# Patient Record
Sex: MALE | Race: WHITE | Employment: OTHER | URBAN - METROPOLITAN AREA
[De-identification: names, ages, dates, MRNs, and addresses within clinical notes are randomized per-mention and may not be internally consistent; named-entity substitution may affect disease eponyms.]

---

## 2017-08-17 ENCOUNTER — HOSPITAL ENCOUNTER (EMERGENCY)
Age: 82
Discharge: HOME | End: 2017-08-17
Attending: EMERGENCY MEDICINE
Payer: MEDICARE

## 2017-08-17 ENCOUNTER — APPOINTMENT (OUTPATIENT)
Dept: GENERAL RADIOLOGY | Age: 82
End: 2017-08-17
Attending: EMERGENCY MEDICINE
Payer: MEDICARE

## 2017-08-17 VITALS
RESPIRATION RATE: 10 BRPM | SYSTOLIC BLOOD PRESSURE: 148 MMHG | WEIGHT: 197 LBS | TEMPERATURE: 97.8 F | DIASTOLIC BLOOD PRESSURE: 67 MMHG | BODY MASS INDEX: 27.58 KG/M2 | HEART RATE: 58 BPM | HEIGHT: 71 IN | OXYGEN SATURATION: 98 %

## 2017-08-17 DIAGNOSIS — R00.1 SINUS BRADYCARDIA: ICD-10-CM

## 2017-08-17 DIAGNOSIS — R42 DIZZINESS: Primary | ICD-10-CM

## 2017-08-17 DIAGNOSIS — I95.1 ORTHOSTASIS: ICD-10-CM

## 2017-08-17 LAB
ALBUMIN SERPL-MCNC: 3.2 G/DL (ref 3.2–4.6)
ALBUMIN/GLOB SERPL: 0.9 {RATIO} (ref 1.2–3.5)
ALP SERPL-CCNC: 65 U/L (ref 50–136)
ALT SERPL-CCNC: 25 U/L (ref 12–65)
ANION GAP SERPL CALC-SCNC: 11 MMOL/L (ref 7–16)
AST SERPL-CCNC: 28 U/L (ref 15–37)
ATRIAL RATE: 55 BPM
BASOPHILS # BLD: 0 K/UL (ref 0–0.2)
BASOPHILS NFR BLD: 0 % (ref 0–2)
BILIRUB SERPL-MCNC: 0.7 MG/DL (ref 0.2–1.1)
BUN SERPL-MCNC: 17 MG/DL (ref 8–23)
CALCIUM SERPL-MCNC: 9.1 MG/DL (ref 8.3–10.4)
CALCULATED P AXIS, ECG09: 49 DEGREES
CALCULATED R AXIS, ECG10: 10 DEGREES
CALCULATED T AXIS, ECG11: 69 DEGREES
CHLORIDE SERPL-SCNC: 107 MMOL/L (ref 98–107)
CO2 SERPL-SCNC: 24 MMOL/L (ref 21–32)
CREAT SERPL-MCNC: 2.1 MG/DL (ref 0.8–1.5)
DIAGNOSIS, 93000: NORMAL
DIFFERENTIAL METHOD BLD: ABNORMAL
EOSINOPHIL # BLD: 0.1 K/UL (ref 0–0.8)
EOSINOPHIL NFR BLD: 2 % (ref 0.5–7.8)
ERYTHROCYTE [DISTWIDTH] IN BLOOD BY AUTOMATED COUNT: 13.3 % (ref 11.9–14.6)
GLOBULIN SER CALC-MCNC: 3.5 G/DL (ref 2.3–3.5)
GLUCOSE SERPL-MCNC: 144 MG/DL (ref 65–100)
HCT VFR BLD AUTO: 43.9 % (ref 41.1–50.3)
HGB BLD-MCNC: 15.5 G/DL (ref 13.6–17.2)
IMM GRANULOCYTES # BLD: 0 K/UL (ref 0–0.5)
IMM GRANULOCYTES NFR BLD: 0.1 % (ref 0–5)
LACTATE BLD-SCNC: 1.8 MMOL/L (ref 0.5–1.9)
LYMPHOCYTES # BLD: 2.4 K/UL (ref 0.5–4.6)
LYMPHOCYTES NFR BLD: 32 % (ref 13–44)
MCH RBC QN AUTO: 29.8 PG (ref 26.1–32.9)
MCHC RBC AUTO-ENTMCNC: 35.3 G/DL (ref 31.4–35)
MCV RBC AUTO: 84.4 FL (ref 79.6–97.8)
MONOCYTES # BLD: 0.7 K/UL (ref 0.1–1.3)
MONOCYTES NFR BLD: 10 % (ref 4–12)
NEUTS SEG # BLD: 4.2 K/UL (ref 1.7–8.2)
NEUTS SEG NFR BLD: 56 % (ref 43–78)
P-R INTERVAL, ECG05: 178 MS
PLATELET # BLD AUTO: 188 K/UL (ref 150–450)
PMV BLD AUTO: 9.6 FL (ref 10.8–14.1)
POTASSIUM SERPL-SCNC: 3.7 MMOL/L (ref 3.5–5.1)
PROT SERPL-MCNC: 6.7 G/DL (ref 6.3–8.2)
Q-T INTERVAL, ECG07: 464 MS
QRS DURATION, ECG06: 96 MS
QTC CALCULATION (BEZET), ECG08: 443 MS
RBC # BLD AUTO: 5.2 M/UL (ref 4.23–5.67)
SODIUM SERPL-SCNC: 142 MMOL/L (ref 136–145)
TROPONIN I BLD-MCNC: 0.02 NG/ML (ref 0–0.08)
TROPONIN I BLD-MCNC: 0.03 NG/ML (ref 0–0.08)
VENTRICULAR RATE, ECG03: 55 BPM
WBC # BLD AUTO: 7.4 K/UL (ref 4.3–11.1)

## 2017-08-17 PROCEDURE — 71020 XR CHEST PA LAT: CPT

## 2017-08-17 PROCEDURE — 85025 COMPLETE CBC W/AUTO DIFF WBC: CPT | Performed by: EMERGENCY MEDICINE

## 2017-08-17 PROCEDURE — 84484 ASSAY OF TROPONIN QUANT: CPT

## 2017-08-17 PROCEDURE — 83605 ASSAY OF LACTIC ACID: CPT

## 2017-08-17 PROCEDURE — 74011250636 HC RX REV CODE- 250/636: Performed by: EMERGENCY MEDICINE

## 2017-08-17 PROCEDURE — 99285 EMERGENCY DEPT VISIT HI MDM: CPT | Performed by: EMERGENCY MEDICINE

## 2017-08-17 PROCEDURE — 81003 URINALYSIS AUTO W/O SCOPE: CPT | Performed by: EMERGENCY MEDICINE

## 2017-08-17 PROCEDURE — 96374 THER/PROPH/DIAG INJ IV PUSH: CPT | Performed by: EMERGENCY MEDICINE

## 2017-08-17 PROCEDURE — 80053 COMPREHEN METABOLIC PANEL: CPT | Performed by: EMERGENCY MEDICINE

## 2017-08-17 PROCEDURE — 93005 ELECTROCARDIOGRAM TRACING: CPT | Performed by: EMERGENCY MEDICINE

## 2017-08-17 RX ORDER — HYDROCORTISONE SODIUM SUCCINATE 100 MG/2ML
100 INJECTION, POWDER, FOR SOLUTION INTRAMUSCULAR; INTRAVENOUS
Status: COMPLETED | OUTPATIENT
Start: 2017-08-17 | End: 2017-08-17

## 2017-08-17 RX ADMIN — SODIUM CHLORIDE 1000 ML: 900 INJECTION, SOLUTION INTRAVENOUS at 10:49

## 2017-08-17 RX ADMIN — HYDROCORTISONE SODIUM SUCCINATE 100 MG: 100 INJECTION, POWDER, FOR SOLUTION INTRAMUSCULAR; INTRAVENOUS at 13:03

## 2017-08-17 NOTE — ED TRIAGE NOTES
Pt states he started feeling bad 2 weeks ago with some stomach and lower back pain. Pt states today he is so weak he cannot sit up. Pt denies any new pains.

## 2017-08-17 NOTE — DISCHARGE INSTRUCTIONS
Rest.  Temperature fluids. Call cardiologist if you do not hear from them by noon tomorrow to recheck. Also call your endocrinologist to recheck as well. Bradycardia: Care Instructions  Your Care Instructions    Bradycardia is a slow heart rate. If your heart beats too slowly, it can't supply your body with enough blood. This can make you weak or dizzy. Or it may make you pass out. Sometimes medicine can cause this problem. If this happens, your doctor may have you adjust one of your medicines. If a medicine is not the problem, your doctor may recommend a pacemaker. It is important to treat bradycardia so that you don't get more serious health problems. Your doctor will want to see you on a routine schedule to make sure that your heartbeat is normal.  Follow-up care is a key part of your treatment and safety. Be sure to make and go to all appointments, and call your doctor if you are having problems. It's also a good idea to know your test results and keep a list of the medicines you take. How can you care for yourself at home? · Take your medicines exactly as prescribed. Call your doctor if you think you are having a problem with your medicine. If your bradycardia is caused by another disease, your doctor will try to treat the disease. If it is caused by heart medicines, he or she will adjust your medicines. · Make lifestyle changes to improve your heart health. ¨ To control your cholesterol, avoid foods with a lot of fat, saturated fat, or sodium. Try to eat more fiber. And if your doctor says it's okay, get some exercise on most days. ¨ Do not smoke. Smoking can make your heart condition worse. If you need help quitting, talk to your doctor about stop-smoking programs and medicines. These can increase your chances of quitting for good. ¨ Limit alcohol to 2 drinks a day for men and 1 drink a day for women. Too much alcohol can cause health problems.   Pacemaker  If you have a pacemaker, you will get more specific information about it. Be sure to:  · Check your pulse as your doctor tells you. · Have your pacemaker checked as often as your doctor recommends. You may be able to do this over the phone or computer. · Avoid strong magnetic or electrical fields. These include wand metal detectors used in airports, MRIs, welding equipment, and generators. · You will be checked several times right after you get your pacemaker and when it is time to have the battery changed. Batteries last for 5 to 15 years. · You can talk on a cell phone. But keep it 6 inches away from your pacemaker. · Microwaves, TVs, radios, and kitchen and bathroom appliances won't harm you. When should you call for help? Call 911 anytime you think you may need emergency care. For example, call if:  · You passed out (lost consciousness). · You have symptoms of a heart attack. These may include:  ¨ Chest pain or pressure, or a strange feeling in the chest.  ¨ Sweating. ¨ Shortness of breath. ¨ Nausea or vomiting. ¨ Pain, pressure, or a strange feeling in the back, neck, jaw, or upper belly or in one or both shoulders or arms. ¨ Lightheadedness or sudden weakness. ¨ A fast or irregular heartbeat. After you call 911, the  may tell you to chew 1 adult-strength or 2 to 4 low-dose aspirin. Wait for an ambulance. Do not try to drive yourself. · You have symptoms of a stroke. These may include:  ¨ Sudden numbness, tingling, weakness, or loss of movement in your face, arm, or leg, especially on only one side of your body. ¨ Sudden vision changes. ¨ Sudden trouble speaking. ¨ Sudden confusion or trouble understanding simple statements. ¨ Sudden problems with walking or balance. ¨ A sudden, severe headache that is different from past headaches. Call your doctor now or seek immediate medical care if:  · You are dizzy or lightheaded, or you feel like you may faint. · You have new or increased shortness of breath.   · You had a pacemaker implanted and you have signs of infection, such as:  ¨ Increased pain, swelling, warmth, or redness. ¨ Red streaks leading from the cut (incision). ¨ Pus draining from the incision. ¨ A fever. Watch closely for changes in your health, and be sure to contact your doctor if you have any problems. Where can you learn more? Go to http://zoie-nusrat.info/. Enter G984 in the search box to learn more about \"Bradycardia: Care Instructions. \"  Current as of: April 3, 2017  Content Version: 11.3  © 5608-9610 [employer]. Care instructions adapted under license by [employer] (which disclaims liability or warranty for this information). If you have questions about a medical condition or this instruction, always ask your healthcare professional. [address] any warranty or liability for your use of this information. Dizziness: Care Instructions  Your Care Instructions  Dizziness is the feeling of unsteadiness or fuzziness in your head. It is different than having vertigo, which is a feeling that the room is spinning or that you are moving or falling. It is also different from lightheadedness, which is the feeling that you are about to faint. It can be hard to know what causes dizziness. Some people feel dizzy when they have migraine headaches. Sometimes bouts of flu can make you feel dizzy. Some medical conditions, such as heart problems or high blood pressure, can make you feel dizzy. Many medicines can cause dizziness, including medicines for high blood pressure, pain, or anxiety. If a medicine causes your symptoms, your doctor may recommend that you stop or change the medicine. If it is a problem with your heart, you may need medicine to help your heart work better. If there is no clear reason for your symptoms, your doctor may suggest watching and waiting for a while to see if the dizziness goes away on its own.   Follow-up care is a key part of your treatment and safety. Be sure to make and go to all appointments, and call your doctor if you are having problems. It's also a good idea to know your test results and keep a list of the medicines you take. How can you care for yourself at home? · If your doctor recommends or prescribes medicine, take it exactly as directed. Call your doctor if you think you are having a problem with your medicine. · Do not drive while you feel dizzy. · Try to prevent falls. Steps you can take include:  ¨ Using nonskid mats, adding grab bars near the tub, and using night-lights. ¨ Clearing your home so that walkways are free of anything you might trip on. ¨ Letting family and friends know that you have been feeling dizzy. This will help them know how to help you. When should you call for help? Call 911 anytime you think you may need emergency care. For example, call if:  · You passed out (lost consciousness). · You have dizziness along with symptoms of a heart attack. These may include:  ¨ Chest pain or pressure, or a strange feeling in the chest.  ¨ Sweating. ¨ Shortness of breath. ¨ Nausea or vomiting. ¨ Pain, pressure, or a strange feeling in the back, neck, jaw, or upper belly or in one or both shoulders or arms. ¨ Lightheadedness or sudden weakness. ¨ A fast or irregular heartbeat. · You have symptoms of a stroke. These may include:  ¨ Sudden numbness, tingling, weakness, or loss of movement in your face, arm, or leg, especially on only one side of your body. ¨ Sudden vision changes. ¨ Sudden trouble speaking. ¨ Sudden confusion or trouble understanding simple statements. ¨ Sudden problems with walking or balance. ¨ A sudden, severe headache that is different from past headaches. Call your doctor now or seek immediate medical care if:  · You feel dizzy and have a fever, headache, or ringing in your ears. · You have new or increased nausea and vomiting.   · Your dizziness does not go away or comes back. Watch closely for changes in your health, and be sure to contact your doctor if:  · You do not get better as expected. Where can you learn more? Go to http://zoie-nusrat.info/. Enter F452 in the search box to learn more about \"Dizziness: Care Instructions. \"  Current as of: March 20, 2017  Content Version: 11.3  © 2332-3133 [employer]. Care instructions adapted under license by [employer] (which disclaims liability or warranty for this information). If you have questions about a medical condition or this instruction, always ask your healthcare professional. [place] [address] any warranty or liability for your use of this information.

## 2017-08-17 NOTE — ED PROVIDER NOTES
HPI Comments: 82-year-old gentleman states he's had some mild flu symptoms for 2 weeks. He is eating less. Feels weak. He's had some chills but no definite fever. His symptoms really worsened in the last 48 hours. No cough no vomiting or diarrhea. No fever. No headache or chest pain or shortness of breath no change in urine. Slight lower abdominal A gradient to his back. No numbness or weakness in the legs. 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 He is on some hormone replacement medicine. Patient is a [de-identified] y.o. male presenting with fatigue. The history is provided by the patient. Fatigue   This is a new problem. The current episode started more than 1 week ago. The problem has been gradually worsening. There was no focality noted. Pertinent negatives include no focal weakness, no loss of sensation, no loss of balance and no visual change. There has been no fever. Associated symptoms include nausea. Pertinent negatives include no shortness of breath, no chest pain, no vomiting, no confusion and no headaches. Past Medical History:   Diagnosis Date    Hypertension     Neurological disorder        History reviewed. No pertinent surgical history. History reviewed. No pertinent family history. Social History     Social History    Marital status:      Spouse name: N/A    Number of children: N/A    Years of education: N/A     Occupational History    Not on file. Social History Main Topics    Smoking status: Never Smoker    Smokeless tobacco: Never Used    Alcohol use No    Drug use: No    Sexual activity: Not on file     Other Topics Concern    Not on file     Social History Narrative    No narrative on file         ALLERGIES: Pcn [penicillins]    Review of Systems   Constitutional: Positive for fatigue. Negative for chills and fever. Respiratory: Negative for cough and shortness of breath.     Cardiovascular: Negative for chest pain and palpitations. Gastrointestinal: Positive for nausea. Negative for abdominal pain, diarrhea and vomiting. Genitourinary: Negative for dysuria and flank pain. Musculoskeletal: Negative for back pain and neck pain. Skin: Negative for color change and rash. Neurological: Negative for focal weakness, syncope, headaches and loss of balance. Psychiatric/Behavioral: Negative for confusion. All other systems reviewed and are negative. Vitals:    08/17/17 1006 08/17/17 1042   BP: (!) 78/48 157/76   Pulse:  (!) 50   Resp: 20    Temp: 97.8 °F (36.6 °C)    SpO2: 97% 99%   Weight: 89.4 kg (197 lb)    Height: 5' 11\" (1.803 m)             Physical Exam   Constitutional: He is oriented to person, place, and time. He appears well-developed and well-nourished. No distress. HENT:   Head: Normocephalic and atraumatic. Mouth/Throat: Oropharynx is clear and moist. No oropharyngeal exudate. Eyes: Conjunctivae and EOM are normal. Pupils are equal, round, and reactive to light. Neck: Normal range of motion. Neck supple. Cardiovascular: Normal rate, regular rhythm and intact distal pulses. No murmur heard. Pulmonary/Chest: Breath sounds normal. No respiratory distress. Abdominal: Soft. Bowel sounds are normal. He exhibits no mass. There is no tenderness. There is no rebound and no guarding. No hernia. Neurological: He is alert and oriented to person, place, and time. Gait normal. GCS eye subscore is 4. GCS verbal subscore is 5. GCS motor subscore is 6. Nl speech    No drift. Normal finger-nose testing. Skin: Skin is warm and dry. Psychiatric: He has a normal mood and affect. His speech is normal.   Nursing note and vitals reviewed. MDM  Number of Diagnoses or Management Options  Diagnosis management comments: Assessment sepsis, dehydration, bleeding, pneumonia, UTI, silent cardiac issues. Patient may need stress dose of cortisol. IV fluids.        Amount and/or Complexity of Data Reviewed  Clinical lab tests: ordered and reviewed  Tests in the radiology section of CPT®: ordered and reviewed  Tests in the medicine section of CPT®: ordered and reviewed    Risk of Complications, Morbidity, and/or Mortality  Presenting problems: moderate  Diagnostic procedures: low  Management options: moderate  General comments: EKG reveals sinus bradycardia at 55 and no ST-T changes or ectopy. ED Course       Procedures           Results Include:    Recent Results (from the past 24 hour(s))   EKG, 12 LEAD, INITIAL    Collection Time: 08/17/17 10:08 AM   Result Value Ref Range    Ventricular Rate 55 BPM    Atrial Rate 55 BPM    P-R Interval 178 ms    QRS Duration 96 ms    Q-T Interval 464 ms    QTC Calculation (Bezet) 443 ms    Calculated P Axis 49 degrees    Calculated R Axis 10 degrees    Calculated T Axis 69 degrees    Diagnosis       !!! Poor data quality, interpretation may be adversely affected  Sinus bradycardia  Nonspecific ST abnormality  Abnormal ECG  No previous ECGs available  Confirmed by Chanel Camarena MD (), ANU GRACIA ([id number]) on 8/17/2017 12:43:27 PM     CBC WITH AUTOMATED DIFF    Collection Time: 08/17/17 10:20 AM   Result Value Ref Range    WBC 7.4 4.3 - 11.1 K/uL    RBC 5.20 4.23 - 5.67 M/uL    HGB 15.5 13.6 - 17.2 g/dL    HCT 43.9 41.1 - 50.3 %    MCV 84.4 79.6 - 97.8 FL    MCH 29.8 26.1 - 32.9 PG    MCHC 35.3 (H) 31.4 - 35.0 g/dL    RDW 13.3 11.9 - 14.6 %    PLATELET 381 829 - 741 K/uL    MPV 9.6 (L) 10.8 - 14.1 FL    DF AUTOMATED      NEUTROPHILS 56 43 - 78 %    LYMPHOCYTES 32 13 - 44 %    MONOCYTES 10 4.0 - 12.0 %    EOSINOPHILS 2 0.5 - 7.8 %    BASOPHILS 0 0.0 - 2.0 %    IMMATURE GRANULOCYTES 0.1 0.0 - 5.0 %    ABS. NEUTROPHILS 4.2 1.7 - 8.2 K/UL    ABS. LYMPHOCYTES 2.4 0.5 - 4.6 K/UL    ABS. MONOCYTES 0.7 0.1 - 1.3 K/UL    ABS. EOSINOPHILS 0.1 0.0 - 0.8 K/UL    ABS. BASOPHILS 0.0 0.0 - 0.2 K/UL    ABS. IMM.  GRANS. 0.0 0.0 - 0.5 K/UL   METABOLIC PANEL, COMPREHENSIVE    Collection Time: 08/17/17 10:20 AM   Result Value Ref Range    Sodium 142 136 - 145 mmol/L    Potassium 3.7 3.5 - 5.1 mmol/L    Chloride 107 98 - 107 mmol/L    CO2 24 21 - 32 mmol/L    Anion gap 11 7 - 16 mmol/L    Glucose 144 (H) 65 - 100 mg/dL    BUN 17 8 - 23 MG/DL    Creatinine 2.10 (H) 0.8 - 1.5 MG/DL    GFR est AA 39 (L) >60 ml/min/1.73m2    GFR est non-AA 32 (L) >60 ml/min/1.73m2    Calcium 9.1 8.3 - 10.4 MG/DL    Bilirubin, total 0.7 0.2 - 1.1 MG/DL    ALT (SGPT) 25 12 - 65 U/L    AST (SGOT) 28 15 - 37 U/L    Alk. phosphatase 65 50 - 136 U/L    Protein, total 6.7 6.3 - 8.2 g/dL    Albumin 3.2 3.2 - 4.6 g/dL    Globulin 3.5 2.3 - 3.5 g/dL    A-G Ratio 0.9 (L) 1.2 - 3.5     POC TROPONIN-I    Collection Time: 08/17/17 10:22 AM   Result Value Ref Range    Troponin-I (POC) 0.02 0.0 - 0.08 ng/ml   POC LACTIC ACID    Collection Time: 08/17/17 10:26 AM   Result Value Ref Range    Lactic Acid (POC) 1.8 0.5 - 1.9 mmol/L   POC TROPONIN-I    Collection Time: 08/17/17 12:22 PM   Result Value Ref Range    Troponin-I (POC) 0.03 0.0 - 0.08 ng/ml     Xr Chest Pa Lat    Result Date: 8/17/2017  TWO-VIEW CHEST: CLINICAL HISTORY: CHEST pain and generalized weakness for one week. COMPARISON:  None. FINDINGS: PA and lateral chest images demonstrate no acute pneumonic infiltrate or significant pleural fluid collection. The heart size is within normal limits without evidence of congestive heart failure or pneumothorax. The bony thorax appears intact on these views with multilevel spondylosis. There are overlying radiopaque support devices. IMPRESSION:  NO ACUTE CARDIOPULMONARY DISEASE IDENTIFIED. Patient feels better at this point. Monitor still showed sinus bradycardia with occasional PVC. Will refer to cardiology for follow-up and possible monitoring.

## 2017-08-17 NOTE — ED NOTES
Discharge instructions given verbally and in written form. Pt verbalized understanding of instructions given. PIV removed and pt left the ER via w/c with family.

## 2017-08-23 PROBLEM — N17.9 ACUTE KIDNEY FAILURE (HCC): Status: ACTIVE | Noted: 2017-08-23

## 2017-08-23 PROBLEM — I10 ESSENTIAL HYPERTENSION: Status: ACTIVE | Noted: 2017-08-23

## 2017-08-23 PROBLEM — R00.1 BRADYCARDIA: Status: ACTIVE | Noted: 2017-08-23

## 2017-08-23 PROBLEM — R94.31 EKG, ABNORMAL: Status: ACTIVE | Noted: 2017-08-23

## 2017-09-15 ENCOUNTER — HOSPITAL ENCOUNTER (OUTPATIENT)
Dept: LAB | Age: 82
Discharge: HOME | End: 2017-09-15
Payer: MEDICARE

## 2017-09-15 DIAGNOSIS — N17.9 ACUTE RENAL FAILURE, UNSPECIFIED ACUTE RENAL FAILURE TYPE (HCC): ICD-10-CM

## 2017-09-15 LAB
ANION GAP SERPL CALC-SCNC: 7 MMOL/L
BNP SERPL-MCNC: 107 PG/ML
BUN SERPL-MCNC: 17 MG/DL (ref 8–23)
CALCIUM SERPL-MCNC: 8.7 MG/DL (ref 8.3–10.4)
CHLORIDE SERPL-SCNC: 105 MMOL/L (ref 98–107)
CO2 SERPL-SCNC: 28 MMOL/L (ref 21–32)
CREAT SERPL-MCNC: 1.2 MG/DL (ref 0.8–1.5)
GLUCOSE SERPL-MCNC: 111 MG/DL (ref 65–100)
MAGNESIUM SERPL-MCNC: 2.1 MG/DL (ref 1.8–2.4)
POTASSIUM SERPL-SCNC: 3.8 MMOL/L (ref 3.5–5.1)
SODIUM SERPL-SCNC: 140 MMOL/L (ref 136–145)
TSH SERPL DL<=0.005 MIU/L-ACNC: 0.04 UIU/ML (ref 0.36–3.74)

## 2017-09-15 PROCEDURE — 83735 ASSAY OF MAGNESIUM: CPT | Performed by: INTERNAL MEDICINE

## 2017-09-15 PROCEDURE — 83880 ASSAY OF NATRIURETIC PEPTIDE: CPT | Performed by: INTERNAL MEDICINE

## 2017-09-15 PROCEDURE — 36415 COLL VENOUS BLD VENIPUNCTURE: CPT | Performed by: INTERNAL MEDICINE

## 2017-09-15 PROCEDURE — 84443 ASSAY THYROID STIM HORMONE: CPT | Performed by: INTERNAL MEDICINE

## 2017-09-15 PROCEDURE — 80048 BASIC METABOLIC PNL TOTAL CA: CPT | Performed by: INTERNAL MEDICINE

## 2017-09-15 NOTE — PROGRESS NOTES
I called and informed wife of lab results. Wife says pt.sees endocrinology DR. Mancera  for thyroid. I then called DR. Mancera at [phone] spoke w/Rosario she said pt.has pituitary tumor and TSH is non-relevant they prefer a T4 but asked me to fax labs to her at [phone] and she will have DR. Mancera address. This was done. Tyra Ochoa

## 2017-09-21 PROBLEM — R60.0 LOCALIZED EDEMA: Status: ACTIVE | Noted: 2017-09-21

## 2018-05-16 ENCOUNTER — HOSPITAL ENCOUNTER (INPATIENT)
Age: 83
LOS: 8 days | Discharge: ANOTHER HOSPITAL | DRG: 234 | End: 2018-05-24
Attending: EMERGENCY MEDICINE | Admitting: INTERNAL MEDICINE
Payer: MEDICARE

## 2018-05-16 ENCOUNTER — APPOINTMENT (OUTPATIENT)
Dept: GENERAL RADIOLOGY | Age: 83
DRG: 234 | End: 2018-05-16
Attending: EMERGENCY MEDICINE
Payer: MEDICARE

## 2018-05-16 DIAGNOSIS — R09.02 HYPOXIA: ICD-10-CM

## 2018-05-16 DIAGNOSIS — R60.0 LOCALIZED EDEMA: ICD-10-CM

## 2018-05-16 DIAGNOSIS — R00.1 BRADYCARDIA: Chronic | ICD-10-CM

## 2018-05-16 DIAGNOSIS — Z79.52 CURRENT CHRONIC USE OF SYSTEMIC STEROIDS: Chronic | ICD-10-CM

## 2018-05-16 DIAGNOSIS — I48.0 PAF (PAROXYSMAL ATRIAL FIBRILLATION) (HCC): ICD-10-CM

## 2018-05-16 DIAGNOSIS — Z95.1 S/P CABG (CORONARY ARTERY BYPASS GRAFT): ICD-10-CM

## 2018-05-16 DIAGNOSIS — I21.4 NSTEMI (NON-ST ELEVATED MYOCARDIAL INFARCTION) (HCC): Primary | ICD-10-CM

## 2018-05-16 DIAGNOSIS — J98.11 ATELECTASIS: ICD-10-CM

## 2018-05-16 PROBLEM — N17.9 ACUTE KIDNEY FAILURE (HCC): Status: RESOLVED | Noted: 2017-08-23 | Resolved: 2018-05-16

## 2018-05-16 LAB
ALBUMIN SERPL-MCNC: 3.7 G/DL (ref 3.2–4.6)
ALBUMIN/GLOB SERPL: 1.1 {RATIO} (ref 1.2–3.5)
ALP SERPL-CCNC: 109 U/L (ref 50–136)
ALT SERPL-CCNC: 36 U/L (ref 12–65)
ANION GAP SERPL CALC-SCNC: 11 MMOL/L (ref 7–16)
APTT PPP: 24.3 SEC (ref 23.2–35.3)
AST SERPL-CCNC: 31 U/L (ref 15–37)
BASOPHILS # BLD: 0 K/UL (ref 0–0.2)
BASOPHILS NFR BLD: 0 % (ref 0–2)
BILIRUB SERPL-MCNC: 0.4 MG/DL (ref 0.2–1.1)
BNP SERPL-MCNC: 98 PG/ML
BUN SERPL-MCNC: 15 MG/DL (ref 8–23)
CALCIUM SERPL-MCNC: 8.7 MG/DL (ref 8.3–10.4)
CHLORIDE SERPL-SCNC: 106 MMOL/L (ref 98–107)
CO2 SERPL-SCNC: 24 MMOL/L (ref 21–32)
CREAT SERPL-MCNC: 1.35 MG/DL (ref 0.8–1.5)
D DIMER PPP FEU-MCNC: 0.73 UG/ML(FEU)
DIFFERENTIAL METHOD BLD: ABNORMAL
EOSINOPHIL # BLD: 0.1 K/UL (ref 0–0.8)
EOSINOPHIL NFR BLD: 1 % (ref 0.5–7.8)
ERYTHROCYTE [DISTWIDTH] IN BLOOD BY AUTOMATED COUNT: 13.9 % (ref 11.9–14.6)
GLOBULIN SER CALC-MCNC: 3.5 G/DL (ref 2.3–3.5)
GLUCOSE SERPL-MCNC: 231 MG/DL (ref 65–100)
HCT VFR BLD AUTO: 45.1 % (ref 41.1–50.3)
HGB BLD-MCNC: 15.3 G/DL (ref 13.6–17.2)
IMM GRANULOCYTES # BLD: 0 K/UL (ref 0–0.5)
IMM GRANULOCYTES NFR BLD AUTO: 0 % (ref 0–5)
LYMPHOCYTES # BLD: 1 K/UL (ref 0.5–4.6)
LYMPHOCYTES NFR BLD: 14 % (ref 13–44)
MCH RBC QN AUTO: 29.8 PG (ref 26.1–32.9)
MCHC RBC AUTO-ENTMCNC: 33.9 G/DL (ref 31.4–35)
MCV RBC AUTO: 87.9 FL (ref 79.6–97.8)
MONOCYTES # BLD: 0.4 K/UL (ref 0.1–1.3)
MONOCYTES NFR BLD: 5 % (ref 4–12)
NEUTS SEG # BLD: 5.8 K/UL (ref 1.7–8.2)
NEUTS SEG NFR BLD: 80 % (ref 43–78)
PLATELET # BLD AUTO: 187 K/UL (ref 150–450)
PMV BLD AUTO: 9.6 FL (ref 10.8–14.1)
POTASSIUM SERPL-SCNC: 3.7 MMOL/L (ref 3.5–5.1)
PROT SERPL-MCNC: 7.2 G/DL (ref 6.3–8.2)
RBC # BLD AUTO: 5.13 M/UL (ref 4.23–5.67)
SODIUM SERPL-SCNC: 141 MMOL/L (ref 136–145)
TROPONIN I BLD-MCNC: 0.04 NG/ML (ref 0.02–0.05)
TROPONIN I SERPL-MCNC: 0.04 NG/ML (ref 0.02–0.05)
TROPONIN I SERPL-MCNC: 2.66 NG/ML (ref 0.02–0.05)
WBC # BLD AUTO: 7.3 K/UL (ref 4.3–11.1)

## 2018-05-16 PROCEDURE — 74011250636 HC RX REV CODE- 250/636: Performed by: EMERGENCY MEDICINE

## 2018-05-16 PROCEDURE — 93005 ELECTROCARDIOGRAM TRACING: CPT | Performed by: EMERGENCY MEDICINE

## 2018-05-16 PROCEDURE — 99285 EMERGENCY DEPT VISIT HI MDM: CPT | Performed by: EMERGENCY MEDICINE

## 2018-05-16 PROCEDURE — 93005 ELECTROCARDIOGRAM TRACING: CPT | Performed by: NURSE PRACTITIONER

## 2018-05-16 PROCEDURE — 83880 ASSAY OF NATRIURETIC PEPTIDE: CPT | Performed by: EMERGENCY MEDICINE

## 2018-05-16 PROCEDURE — 80053 COMPREHEN METABOLIC PANEL: CPT | Performed by: EMERGENCY MEDICINE

## 2018-05-16 PROCEDURE — 84484 ASSAY OF TROPONIN QUANT: CPT

## 2018-05-16 PROCEDURE — 65660000000 HC RM CCU STEPDOWN

## 2018-05-16 PROCEDURE — 74011250637 HC RX REV CODE- 250/637: Performed by: EMERGENCY MEDICINE

## 2018-05-16 PROCEDURE — 96374 THER/PROPH/DIAG INJ IV PUSH: CPT | Performed by: EMERGENCY MEDICINE

## 2018-05-16 PROCEDURE — 71045 X-RAY EXAM CHEST 1 VIEW: CPT

## 2018-05-16 PROCEDURE — 85379 FIBRIN DEGRADATION QUANT: CPT | Performed by: EMERGENCY MEDICINE

## 2018-05-16 PROCEDURE — 84484 ASSAY OF TROPONIN QUANT: CPT | Performed by: EMERGENCY MEDICINE

## 2018-05-16 PROCEDURE — 85025 COMPLETE CBC W/AUTO DIFF WBC: CPT | Performed by: EMERGENCY MEDICINE

## 2018-05-16 PROCEDURE — 85730 THROMBOPLASTIN TIME PARTIAL: CPT | Performed by: EMERGENCY MEDICINE

## 2018-05-16 RX ORDER — HEPARIN SODIUM 5000 [USP'U]/ML
4000 INJECTION, SOLUTION INTRAVENOUS; SUBCUTANEOUS ONCE
Status: COMPLETED | OUTPATIENT
Start: 2018-05-16 | End: 2018-05-16

## 2018-05-16 RX ORDER — GUAIFENESIN 100 MG/5ML
324 LIQUID (ML) ORAL
Status: COMPLETED | OUTPATIENT
Start: 2018-05-16 | End: 2018-05-16

## 2018-05-16 RX ORDER — GUAIFENESIN 100 MG/5ML
324 LIQUID (ML) ORAL
Status: DISCONTINUED | OUTPATIENT
Start: 2018-05-16 | End: 2018-05-16

## 2018-05-16 RX ORDER — SODIUM CHLORIDE 0.9 % (FLUSH) 0.9 %
5-10 SYRINGE (ML) INJECTION EVERY 8 HOURS
Status: DISCONTINUED | OUTPATIENT
Start: 2018-05-16 | End: 2018-05-17

## 2018-05-16 RX ORDER — SODIUM CHLORIDE 0.9 % (FLUSH) 0.9 %
5-10 SYRINGE (ML) INJECTION AS NEEDED
Status: DISCONTINUED | OUTPATIENT
Start: 2018-05-16 | End: 2018-05-17

## 2018-05-16 RX ORDER — HEPARIN SODIUM 5000 [USP'U]/100ML
12-25 INJECTION, SOLUTION INTRAVENOUS
Status: DISCONTINUED | OUTPATIENT
Start: 2018-05-16 | End: 2018-05-17

## 2018-05-16 RX ORDER — EPTIFIBATIDE 0.75 MG/ML
2 INJECTION, SOLUTION INTRAVENOUS CONTINUOUS
Status: DISCONTINUED | OUTPATIENT
Start: 2018-05-16 | End: 2018-05-19

## 2018-05-16 RX ORDER — LISINOPRIL 5 MG/1
5 TABLET ORAL DAILY
Status: DISCONTINUED | OUTPATIENT
Start: 2018-05-16 | End: 2018-05-17

## 2018-05-16 RX ADMIN — ASPIRIN 81 MG 324 MG: 81 TABLET ORAL at 19:18

## 2018-05-16 RX ADMIN — HEPARIN SODIUM AND DEXTROSE 12 UNITS/KG/HR: 5000; 5 INJECTION INTRAVENOUS at 22:38

## 2018-05-16 RX ADMIN — HEPARIN SODIUM 4000 UNITS: 5000 INJECTION, SOLUTION INTRAVENOUS; SUBCUTANEOUS at 22:33

## 2018-05-16 NOTE — ED TRIAGE NOTES
Pt arrives EMS for right side chest pain x30 min prior to ems arrival. Constant. No diaphoresis. Depression in v2 v3 v4. No n/v. . Denies hx of diabetes. No meds given with ems.

## 2018-05-16 NOTE — Clinical Note
Status[de-identified] Inpatient [101] Type of Bed: Telemetry [19] Inpatient Hospitalization Certified Necessary for the Following Reasons: 3. Patient receiving treatment that can only be provided in an inpatient setting (further clarification in H&P documentation) Admitting Diagnosis: NSTEMI (non-ST elevated [facility] infarction) [facility] [[id number]] Admitting Physician: Alexis Mahan [[id number]] Attending Physician: Alexis Mahan [[id number]] Estimated Length of Stay: 2 Midnights Discharge Plan[de-identified] Home with Office Follow-up

## 2018-05-17 ENCOUNTER — ANESTHESIA EVENT (OUTPATIENT)
Dept: SURGERY | Age: 83
DRG: 234 | End: 2018-05-17
Payer: MEDICARE

## 2018-05-17 LAB
ANION GAP SERPL CALC-SCNC: 9 MMOL/L (ref 7–16)
APPEARANCE UR: ABNORMAL
APTT PPP: 95.1 SEC (ref 23.2–35.3)
ATRIAL RATE: 288 BPM
ATRIAL RATE: 53 BPM
ATRIAL RATE: 54 BPM
ATRIAL RATE: 63 BPM
ATRIAL RATE: 79 BPM
BACTERIA SPEC CULT: NORMAL
BACTERIA URNS QL MICRO: 0 /HPF
BILIRUB UR QL: NEGATIVE
BUN SERPL-MCNC: 14 MG/DL (ref 8–23)
CALCIUM SERPL-MCNC: 8.5 MG/DL (ref 8.3–10.4)
CALCULATED P AXIS, ECG09: -126 DEGREES
CALCULATED P AXIS, ECG09: 76 DEGREES
CALCULATED P AXIS, ECG09: 79 DEGREES
CALCULATED P AXIS, ECG09: 85 DEGREES
CALCULATED R AXIS, ECG10: 16 DEGREES
CALCULATED R AXIS, ECG10: 18 DEGREES
CALCULATED R AXIS, ECG10: 23 DEGREES
CALCULATED R AXIS, ECG10: 30 DEGREES
CALCULATED R AXIS, ECG10: 51 DEGREES
CALCULATED T AXIS, ECG11: 57 DEGREES
CALCULATED T AXIS, ECG11: 69 DEGREES
CALCULATED T AXIS, ECG11: 71 DEGREES
CALCULATED T AXIS, ECG11: 73 DEGREES
CALCULATED T AXIS, ECG11: 81 DEGREES
CASTS URNS QL MICRO: 0 /LPF
CHLORIDE SERPL-SCNC: 110 MMOL/L (ref 98–107)
CHOLEST SERPL-MCNC: 183 MG/DL
CO2 SERPL-SCNC: 25 MMOL/L (ref 21–32)
COLOR UR: ABNORMAL
CREAT SERPL-MCNC: 0.96 MG/DL (ref 0.8–1.5)
DIAGNOSIS, 93000: NORMAL
EPI CELLS #/AREA URNS HPF: 0 /HPF
ERYTHROCYTE [DISTWIDTH] IN BLOOD BY AUTOMATED COUNT: 13.9 % (ref 11.9–14.6)
GLUCOSE SERPL-MCNC: 89 MG/DL (ref 65–100)
GLUCOSE UR STRIP.AUTO-MCNC: NEGATIVE MG/DL
HCT VFR BLD AUTO: 41 % (ref 41.1–50.3)
HDLC SERPL-MCNC: 46 MG/DL (ref 40–60)
HDLC SERPL: 4 {RATIO}
HGB BLD-MCNC: 14.2 G/DL (ref 13.6–17.2)
HGB UR QL STRIP: ABNORMAL
KETONES UR QL STRIP.AUTO: NEGATIVE MG/DL
LDLC SERPL CALC-MCNC: 123 MG/DL
LEUKOCYTE ESTERASE UR QL STRIP.AUTO: ABNORMAL
LIPID PROFILE,FLP: ABNORMAL
MCH RBC QN AUTO: 29.8 PG (ref 26.1–32.9)
MCHC RBC AUTO-ENTMCNC: 34.6 G/DL (ref 31.4–35)
MCV RBC AUTO: 86 FL (ref 79.6–97.8)
NITRITE UR QL STRIP.AUTO: NEGATIVE
P-R INTERVAL, ECG05: 152 MS
P-R INTERVAL, ECG05: 160 MS
P-R INTERVAL, ECG05: 168 MS
P-R INTERVAL, ECG05: 170 MS
PH UR STRIP: 8 [PH] (ref 5–9)
PLATELET # BLD AUTO: 190 K/UL (ref 150–450)
PMV BLD AUTO: 9.9 FL (ref 10.8–14.1)
POTASSIUM SERPL-SCNC: 3.2 MMOL/L (ref 3.5–5.1)
PROT UR STRIP-MCNC: ABNORMAL MG/DL
Q-T INTERVAL, ECG07: 378 MS
Q-T INTERVAL, ECG07: 404 MS
Q-T INTERVAL, ECG07: 426 MS
Q-T INTERVAL, ECG07: 438 MS
Q-T INTERVAL, ECG07: 444 MS
QRS DURATION, ECG06: 100 MS
QRS DURATION, ECG06: 102 MS
QRS DURATION, ECG06: 102 MS
QRS DURATION, ECG06: 86 MS
QRS DURATION, ECG06: 98 MS
QTC CALCULATION (BEZET), ECG08: 414 MS
QTC CALCULATION (BEZET), ECG08: 415 MS
QTC CALCULATION (BEZET), ECG08: 416 MS
QTC CALCULATION (BEZET), ECG08: 422 MS
QTC CALCULATION (BEZET), ECG08: 463 MS
RBC # BLD AUTO: 4.77 M/UL (ref 4.23–5.67)
RBC #/AREA URNS HPF: >100 /HPF
SERVICE CMNT-IMP: NORMAL
SODIUM SERPL-SCNC: 144 MMOL/L (ref 136–145)
SP GR UR REFRACTOMETRY: 1.03 (ref 1–1.02)
TRIGL SERPL-MCNC: 70 MG/DL (ref 35–150)
TROPONIN I SERPL-MCNC: 13.3 NG/ML (ref 0.02–0.05)
UROBILINOGEN UR QL STRIP.AUTO: 1 EU/DL (ref 0.2–1)
VENTRICULAR RATE, ECG03: 53 BPM
VENTRICULAR RATE, ECG03: 54 BPM
VENTRICULAR RATE, ECG03: 57 BPM
VENTRICULAR RATE, ECG03: 75 BPM
VENTRICULAR RATE, ECG03: 79 BPM
VLDLC SERPL CALC-MCNC: 14 MG/DL (ref 6–23)
WBC # BLD AUTO: 7 K/UL (ref 4.3–11.1)
WBC URNS QL MICRO: ABNORMAL /HPF

## 2018-05-17 PROCEDURE — 74011000250 HC RX REV CODE- 250: Performed by: INTERNAL MEDICINE

## 2018-05-17 PROCEDURE — 86900 BLOOD TYPING SEROLOGIC ABO: CPT | Performed by: NURSE PRACTITIONER

## 2018-05-17 PROCEDURE — 85730 THROMBOPLASTIN TIME PARTIAL: CPT | Performed by: INTERNAL MEDICINE

## 2018-05-17 PROCEDURE — 86923 COMPATIBILITY TEST ELECTRIC: CPT | Performed by: NURSE PRACTITIONER

## 2018-05-17 PROCEDURE — 81001 URINALYSIS AUTO W/SCOPE: CPT | Performed by: NURSE PRACTITIONER

## 2018-05-17 PROCEDURE — B2111ZZ FLUOROSCOPY OF MULTIPLE CORONARY ARTERIES USING LOW OSMOLAR CONTRAST: ICD-10-PCS | Performed by: INTERNAL MEDICINE

## 2018-05-17 PROCEDURE — 74011250636 HC RX REV CODE- 250/636: Performed by: NURSE PRACTITIONER

## 2018-05-17 PROCEDURE — 74011250637 HC RX REV CODE- 250/637: Performed by: INTERNAL MEDICINE

## 2018-05-17 PROCEDURE — 74011636320 HC RX REV CODE- 636/320: Performed by: INTERNAL MEDICINE

## 2018-05-17 PROCEDURE — 74011250636 HC RX REV CODE- 250/636: Performed by: INTERNAL MEDICINE

## 2018-05-17 PROCEDURE — 77030019605

## 2018-05-17 PROCEDURE — 74011250636 HC RX REV CODE- 250/636

## 2018-05-17 PROCEDURE — 93458 L HRT ARTERY/VENTRICLE ANGIO: CPT

## 2018-05-17 PROCEDURE — 4A023N7 MEASUREMENT OF CARDIAC SAMPLING AND PRESSURE, LEFT HEART, PERCUTANEOUS APPROACH: ICD-10-PCS | Performed by: INTERNAL MEDICINE

## 2018-05-17 PROCEDURE — 74011000250 HC RX REV CODE- 250: Performed by: NURSE PRACTITIONER

## 2018-05-17 PROCEDURE — 36415 COLL VENOUS BLD VENIPUNCTURE: CPT | Performed by: INTERNAL MEDICINE

## 2018-05-17 PROCEDURE — 74011636637 HC RX REV CODE- 636/637: Performed by: NURSE PRACTITIONER

## 2018-05-17 PROCEDURE — 74011250637 HC RX REV CODE- 250/637: Performed by: NURSE PRACTITIONER

## 2018-05-17 PROCEDURE — C8929 TTE W OR WO FOL WCON,DOPPLER: HCPCS

## 2018-05-17 PROCEDURE — 80048 BASIC METABOLIC PNL TOTAL CA: CPT | Performed by: NURSE PRACTITIONER

## 2018-05-17 PROCEDURE — B2151ZZ FLUOROSCOPY OF LEFT HEART USING LOW OSMOLAR CONTRAST: ICD-10-PCS | Performed by: INTERNAL MEDICINE

## 2018-05-17 PROCEDURE — 85027 COMPLETE CBC AUTOMATED: CPT | Performed by: NURSE PRACTITIONER

## 2018-05-17 PROCEDURE — 84484 ASSAY OF TROPONIN QUANT: CPT | Performed by: NURSE PRACTITIONER

## 2018-05-17 PROCEDURE — 87641 MR-STAPH DNA AMP PROBE: CPT | Performed by: NURSE PRACTITIONER

## 2018-05-17 PROCEDURE — 99152 MOD SED SAME PHYS/QHP 5/>YRS: CPT

## 2018-05-17 PROCEDURE — 80061 LIPID PANEL: CPT | Performed by: NURSE PRACTITIONER

## 2018-05-17 PROCEDURE — 65610000001 HC ROOM ICU GENERAL

## 2018-05-17 RX ORDER — ENALAPRILAT 1.25 MG/ML
1.25 INJECTION INTRAVENOUS
Status: COMPLETED | OUTPATIENT
Start: 2018-05-17 | End: 2018-05-17

## 2018-05-17 RX ORDER — AMLODIPINE BESYLATE 10 MG/1
10 TABLET ORAL DAILY
Status: DISCONTINUED | OUTPATIENT
Start: 2018-05-17 | End: 2018-05-19

## 2018-05-17 RX ORDER — GUAIFENESIN 100 MG/5ML
81 LIQUID (ML) ORAL DAILY
Status: DISCONTINUED | OUTPATIENT
Start: 2018-05-17 | End: 2018-05-18

## 2018-05-17 RX ORDER — SODIUM CHLORIDE 9 MG/ML
75 INJECTION, SOLUTION INTRAVENOUS CONTINUOUS
Status: DISCONTINUED | OUTPATIENT
Start: 2018-05-17 | End: 2018-05-19

## 2018-05-17 RX ORDER — LEVOTHYROXINE SODIUM 88 UG/1
88 TABLET ORAL
Status: DISCONTINUED | OUTPATIENT
Start: 2018-05-17 | End: 2018-05-21

## 2018-05-17 RX ORDER — ONDANSETRON 2 MG/ML
4 INJECTION INTRAMUSCULAR; INTRAVENOUS
Status: DISCONTINUED | OUTPATIENT
Start: 2018-05-17 | End: 2018-05-19

## 2018-05-17 RX ORDER — VANCOMYCIN 2 GRAM/500 ML IN 0.9 % SODIUM CHLORIDE INTRAVENOUS
2000
Status: COMPLETED | OUTPATIENT
Start: 2018-05-18 | End: 2018-05-21

## 2018-05-17 RX ORDER — SODIUM CHLORIDE 0.9 % (FLUSH) 0.9 %
5-10 SYRINGE (ML) INJECTION EVERY 8 HOURS
Status: DISCONTINUED | OUTPATIENT
Start: 2018-05-17 | End: 2018-05-18 | Stop reason: HOSPADM

## 2018-05-17 RX ORDER — MIDAZOLAM HYDROCHLORIDE 1 MG/ML
.5-2 INJECTION, SOLUTION INTRAMUSCULAR; INTRAVENOUS
Status: DISCONTINUED | OUTPATIENT
Start: 2018-05-17 | End: 2018-05-18 | Stop reason: SDUPTHER

## 2018-05-17 RX ORDER — PREDNISONE 5 MG/1
5 TABLET ORAL
Status: DISCONTINUED | OUTPATIENT
Start: 2018-05-17 | End: 2018-05-24 | Stop reason: HOSPADM

## 2018-05-17 RX ORDER — SODIUM CHLORIDE 0.9 % (FLUSH) 0.9 %
5-10 SYRINGE (ML) INJECTION EVERY 8 HOURS
Status: DISCONTINUED | OUTPATIENT
Start: 2018-05-17 | End: 2018-05-18

## 2018-05-17 RX ORDER — AMIODARONE HYDROCHLORIDE 200 MG/1
200 TABLET ORAL EVERY 12 HOURS
Status: DISCONTINUED | OUTPATIENT
Start: 2018-05-17 | End: 2018-05-18 | Stop reason: HOSPADM

## 2018-05-17 RX ORDER — FENTANYL CITRATE 50 UG/ML
25-50 INJECTION, SOLUTION INTRAMUSCULAR; INTRAVENOUS
Status: DISCONTINUED | OUTPATIENT
Start: 2018-05-17 | End: 2018-05-18 | Stop reason: HOSPADM

## 2018-05-17 RX ORDER — HYDRALAZINE HYDROCHLORIDE 20 MG/ML
10 INJECTION INTRAMUSCULAR; INTRAVENOUS
Status: DISCONTINUED | OUTPATIENT
Start: 2018-05-17 | End: 2018-05-19

## 2018-05-17 RX ORDER — SODIUM CHLORIDE 0.9 % (FLUSH) 0.9 %
5-10 SYRINGE (ML) INJECTION AS NEEDED
Status: DISCONTINUED | OUTPATIENT
Start: 2018-05-17 | End: 2018-05-18 | Stop reason: HOSPADM

## 2018-05-17 RX ORDER — MUPIROCIN 20 MG/G
OINTMENT TOPICAL 2 TIMES DAILY
Status: DISCONTINUED | OUTPATIENT
Start: 2018-05-17 | End: 2018-05-18

## 2018-05-17 RX ORDER — HEPARIN SODIUM 200 [USP'U]/100ML
3 INJECTION, SOLUTION INTRAVENOUS CONTINUOUS
Status: DISCONTINUED | OUTPATIENT
Start: 2018-05-17 | End: 2018-05-17

## 2018-05-17 RX ORDER — POTASSIUM CHLORIDE 20 MEQ/1
40 TABLET, EXTENDED RELEASE ORAL EVERY 4 HOURS
Status: DISCONTINUED | OUTPATIENT
Start: 2018-05-17 | End: 2018-05-17

## 2018-05-17 RX ORDER — SODIUM CHLORIDE 0.9 % (FLUSH) 0.9 %
5-10 SYRINGE (ML) INJECTION EVERY 8 HOURS
Status: DISCONTINUED | OUTPATIENT
Start: 2018-05-17 | End: 2018-05-17

## 2018-05-17 RX ORDER — LIDOCAINE HYDROCHLORIDE 20 MG/ML
60 INJECTION, SOLUTION INFILTRATION; PERINEURAL ONCE
Status: COMPLETED | OUTPATIENT
Start: 2018-05-17 | End: 2018-05-17

## 2018-05-17 RX ORDER — MORPHINE SULFATE 4 MG/ML
2 INJECTION, SOLUTION INTRAMUSCULAR; INTRAVENOUS
Status: DISCONTINUED | OUTPATIENT
Start: 2018-05-17 | End: 2018-05-19

## 2018-05-17 RX ORDER — SODIUM CHLORIDE 0.9 % (FLUSH) 0.9 %
5-10 SYRINGE (ML) INJECTION AS NEEDED
Status: DISCONTINUED | OUTPATIENT
Start: 2018-05-17 | End: 2018-05-18

## 2018-05-17 RX ORDER — NITROGLYCERIN 0.4 MG/1
0.4 TABLET SUBLINGUAL
Status: DISCONTINUED | OUTPATIENT
Start: 2018-05-17 | End: 2018-05-19

## 2018-05-17 RX ORDER — SODIUM CHLORIDE 9 MG/ML
75 INJECTION, SOLUTION INTRAVENOUS CONTINUOUS
Status: DISCONTINUED | OUTPATIENT
Start: 2018-05-17 | End: 2018-05-17

## 2018-05-17 RX ORDER — ACETAMINOPHEN 325 MG/1
650 TABLET ORAL
Status: DISCONTINUED | OUTPATIENT
Start: 2018-05-17 | End: 2018-05-19

## 2018-05-17 RX ORDER — POTASSIUM CHLORIDE 20 MEQ/1
40 TABLET, EXTENDED RELEASE ORAL EVERY 4 HOURS
Status: COMPLETED | OUTPATIENT
Start: 2018-05-17 | End: 2018-05-17

## 2018-05-17 RX ORDER — SODIUM CHLORIDE 0.9 % (FLUSH) 0.9 %
5-10 SYRINGE (ML) INJECTION AS NEEDED
Status: DISCONTINUED | OUTPATIENT
Start: 2018-05-17 | End: 2018-05-17

## 2018-05-17 RX ORDER — FAMOTIDINE 20 MG/1
20 TABLET, FILM COATED ORAL
Status: DISCONTINUED | OUTPATIENT
Start: 2018-05-18 | End: 2018-05-19

## 2018-05-17 RX ORDER — HYDROCODONE BITARTRATE AND ACETAMINOPHEN 5; 325 MG/1; MG/1
1 TABLET ORAL
Status: DISCONTINUED | OUTPATIENT
Start: 2018-05-17 | End: 2018-05-19

## 2018-05-17 RX ADMIN — Medication 5 ML: at 06:00

## 2018-05-17 RX ADMIN — AMLODIPINE BESYLATE 10 MG: 10 TABLET ORAL at 05:54

## 2018-05-17 RX ADMIN — Medication 10 ML: at 01:37

## 2018-05-17 RX ADMIN — LIDOCAINE HYDROCHLORIDE 60 MG: 20 INJECTION, SOLUTION INFILTRATION; PERINEURAL at 08:19

## 2018-05-17 RX ADMIN — MUPIROCIN: 20 OINTMENT TOPICAL at 17:52

## 2018-05-17 RX ADMIN — HEPARIN SODIUM 2 ML: 10000 INJECTION, SOLUTION INTRAVENOUS; SUBCUTANEOUS at 08:20

## 2018-05-17 RX ADMIN — FENTANYL CITRATE 50 MCG: 50 INJECTION, SOLUTION INTRAMUSCULAR; INTRAVENOUS at 08:15

## 2018-05-17 RX ADMIN — AMIODARONE HYDROCHLORIDE 200 MG: 200 TABLET ORAL at 20:00

## 2018-05-17 RX ADMIN — HEPARIN SODIUM 3 ML/HR: 200 INJECTION, SOLUTION INTRAVENOUS at 08:25

## 2018-05-17 RX ADMIN — POTASSIUM CHLORIDE 40 MEQ: 20 TABLET, EXTENDED RELEASE ORAL at 17:52

## 2018-05-17 RX ADMIN — ASPIRIN 81 MG 81 MG: 81 TABLET ORAL at 07:47

## 2018-05-17 RX ADMIN — SODIUM CHLORIDE 75 ML/HR: 900 INJECTION, SOLUTION INTRAVENOUS at 01:53

## 2018-05-17 RX ADMIN — EPTIFIBATIDE 2 MCG/KG/MIN: 0.75 INJECTION, SOLUTION INTRAVENOUS at 05:24

## 2018-05-17 RX ADMIN — SODIUM CHLORIDE 75 ML/HR: 900 INJECTION, SOLUTION INTRAVENOUS at 17:51

## 2018-05-17 RX ADMIN — ENALAPRILAT 1.25 MG: 1.25 INJECTION INTRAVENOUS at 04:00

## 2018-05-17 RX ADMIN — PREDNISONE 5 MG: 5 TABLET ORAL at 10:38

## 2018-05-17 RX ADMIN — Medication 10 ML: at 17:52

## 2018-05-17 RX ADMIN — EPTIFIBATIDE 2 MCG/KG/MIN: 0.75 INJECTION, SOLUTION INTRAVENOUS at 00:22

## 2018-05-17 RX ADMIN — LEVOTHYROXINE SODIUM 88 MCG: 88 TABLET ORAL at 10:38

## 2018-05-17 RX ADMIN — ASPIRIN 81 MG 81 MG: 81 TABLET ORAL at 10:38

## 2018-05-17 RX ADMIN — Medication 10 ML: at 17:51

## 2018-05-17 RX ADMIN — IOPAMIDOL 84 ML: 755 INJECTION, SOLUTION INTRAVENOUS at 08:32

## 2018-05-17 RX ADMIN — Medication 5 ML: at 22:00

## 2018-05-17 RX ADMIN — POTASSIUM CHLORIDE 40 MEQ: 20 TABLET, EXTENDED RELEASE ORAL at 12:33

## 2018-05-17 RX ADMIN — EPTIFIBATIDE 2 MCG/KG/MIN: 0.75 INJECTION, SOLUTION INTRAVENOUS at 13:00

## 2018-05-17 RX ADMIN — HYDRALAZINE HYDROCHLORIDE 10 MG: 20 INJECTION INTRAMUSCULAR; INTRAVENOUS at 02:06

## 2018-05-17 RX ADMIN — MIDAZOLAM HYDROCHLORIDE 2 MG: 1 INJECTION, SOLUTION INTRAMUSCULAR; INTRAVENOUS at 08:15

## 2018-05-17 RX ADMIN — PERFLUTREN 1 ML: 6.52 INJECTION, SUSPENSION INTRAVENOUS at 09:25

## 2018-05-17 NOTE — PROCEDURES
[address]  MR#: [id number]  : 1935  ACCOUNT #: [de-identified]   DATE OF SERVICE: 2018    PRIMARY CARDIOLOGIST:  Jose Cabrales MD     PRIMARY CARE PHYSICIAN:  Bonilla Ayala MD     BRIEF HISTORY:  The patient is an 65-year-old gentleman followed in our office for a history of mitral regurgitation, hypertension and bradycardia. The patient was admitted yesterday with acute onset chest discomfort. There is description of transient inferior ST elevation by EMS EKGs, which are not available for my evaluation. He does have a nonspecific inferior ST depression, and what appears to be an old anterior infarct by EKG present here. Review of prior echocardiogram demonstrates normal left ventricular systolic function and mild mitral regurgitation. He is referred today for urgent cardiac catheterization due to non-ST elevation myocardial infarction and transient inferior ST elevation. PROCEDURE:  After informed consent, was prepped and draped in the usual sterile fashion. The right wrist was infiltrated with lidocaine. The right radial artery was accessed, and a 6-Israeli sheath was advanced. A 6-Israeli Tiger catheter was utilized for left and right coronary injections, and a 5-Israeli angled pigtail for left ventriculography. Isovue contrast utilized. CONSCIOUS SEDATION:  Start time 8:19 a.m., end time 8:32 a.m. MEDICATIONS:  2 mg of Versed, 50 mcg of fentanyl. MONITORING RN:  Cassy Freitas    FINDINGS:  1. Left ventricle:  Normal left ventricular size, with normal left ventricular systolic function. There is mild inferior hypokinesis present. Left ventricular end-diastolic pressures are measured at 11 mmHg. 2.  Left main:  Left main is heavily calcified, bifurcates in the LAD and circumflex systems, appears to have a 50% distal stenosis. 3.  Left anterior descending coronary artery:   This is a moderate-sized vessel, has proximal 90% stenosis into the first diagonal, the diagonal distally has a 50% to 70% stenosis. The LAD is chronically occluded just after the takeoff of the first septal branch. This is seen filling via left to left and right to left collaterals. 4.  Left circumflex coronary artery: This is a moderate-sized vessel. It has a 50% to 70% proximal stenosis. The larger first obtuse marginal has a 99% stenosis in the ongoing AV circumflex, gives rise to a smaller second obtuse marginal, which has a 95% stenosis. 5.  Right coronary artery: This is a relatively large, anatomically dominant vessel. It has a 50% to 70% stenosis just prior to the bifurcation. Posterolateral branch is large and has a 70% hazy stenosis, and then distal stenosis on the order of 50% to 70% in the small terminal branches. The PDA is a very large branch and appears normal and collateral.  This is the primary source of collateralization of the LAD. Successful hemostasis with pneumatic radial band. CONCLUSIONS:  1. Normal left ventricular systolic function, with mild inferior hypokinesis. 2.  Complex 3-vessel atherosclerotic coronary artery disease. RECOMMENDATIONS:  The patient will be evaluated for urgent coronary bypass grafting, with consideration of LIMA to the LAD, vein graft to the first diagonal, first obtuse marginal and right posterior descending branch. Consideration could be given to bypassing the second obtuse marginal, although appears small. Thank you for allowing us to participate in the care of this patient. If there are questions or concerns, please feel free to contact me.       MD LUCY Manriquez / TERESITA  D: 05/17/2018 08:44     T: 05/17/2018 09:13  JOB #: [id number]  CC: Carolyn You MD  [address] PRACTICE  [address] [facility]  [facility] [address]  CC: [address]  [address] [facility] [address]  [facility], [address] [place] [address]

## 2018-05-17 NOTE — INTERDISCIPLINARY ROUNDS
Interdisciplinary team rounds were held 5/17/2018 with the following team members:Care Management, Nursing, Nurse Practitioner, Nutrition, Pastoral Care, Pharmacy, Physician's Assistant, Respiratory Therapy and Clinical Coordinator. Plan of care discussed. See clinical pathway and/or care plan for interventions and desired outcomes.

## 2018-05-17 NOTE — PROGRESS NOTES
TRANSFER - OUT REPORT:    Verbal report given to Bob(name) on Keyona De Guzman  being transferred to [facility](unit) for routine progression of care       Report consisted of patients Situation, Background, Assessment and   Recommendations(SBAR). Information from the following report(s) SBAR was reviewed with the receiving nurse. Opportunity for questions and clarification was provided. Procedure: [facility]   Finding Summary: consult for cabg(cath/pci/pacer settings)  Location: rwrist    Closure Device: trband 13ml(yes/no/description)  Post Site Assessment: no bleeding no hematoma         Intra Procedure Meds:    Versed: 2mg  Fentanyl: 50mcg             Peripheral IV 05/17/18 Left Antecubital (Active)   Site Assessment Clean, dry, & intact 5/17/2018  7:30 AM   Phlebitis Assessment 0 5/17/2018  7:30 AM   Infiltration Assessment 0 5/17/2018  7:30 AM   Dressing Status Clean, dry, & intact 5/17/2018  7:30 AM   Dressing Type Transparent;Tape 5/17/2018  7:30 AM   Hub Color/Line Status Pink;Patent; Infusing 5/17/2018  7:30 AM   Alcohol Cap Used No 5/17/2018  7:30 AM       Peripheral IV 05/16/18 Right Antecubital (Active)   Site Assessment Clean, dry, & intact 5/17/2018  7:30 AM   Phlebitis Assessment 0 5/17/2018  7:30 AM   Infiltration Assessment 0 5/17/2018  7:30 AM   Dressing Status Clean, dry, & intact 5/17/2018  7:30 AM   Dressing Type Transparent;Tape 5/17/2018  7:30 AM   Hub Color/Line Status Pink;Patent; Infusing 5/17/2018  7:30 AM   Alcohol Cap Used No 5/17/2018  7:30 AM        Post-Procedure Site Assessment (1)  Wound Type: Catheter entry/exit  Location: Wrist  Orientation : Right  Hemostasis : TR Band  Site Assessment: No bleeding, No hematoma                       is allergic to pcn [penicillins].     Past Medical History:   Diagnosis Date    Hypertension     Neurological disorder      Visit Vitals    /72    Pulse (!) 58    Temp 98.7 °F (37.1 °C)    Resp 16    Ht 5' 11\" (1.803 m)    Wt 90.2 kg (198 lb 13.7 oz)    SpO2 97%    BMI 27.73 kg/m2

## 2018-05-17 NOTE — ED PROVIDER NOTES
HPI:  81-year-old male history of hypertension, prior pituitary tumor no prior cardiac history is here with right-sided chest pressure while at rest that lasted for 2 hours and has resolved by the time medics got there. Currently no chest pain. Takes a baby aspirin daily. Compliant with all his medication. Has history of prior swollen in both lower extremity with left greater than right. Recently drove back from [place] 8-1/2 hours each way with rest in between. Denies any hemoptysis. Has not has any chest pain, dyspnea with exertion over the past few weeks. Currently pain-free and has no complaint. Feels well. ROS  Constitutional: No fever, no chills  Skin: no rash  Eye: No vision changes  ENMT: No sore throat, no congestion  Respiratory: No shortness of breath, no cough  Cardiovascular: + chest pain, no palpitations  Gastrointestinal: No vomiting, no nausea, no diarrhea, no abdominal pain  : No dysuria, no hematuria  MSK: No back pain, no muscle pain  Neuro: No headache, no change in mental status, no numbness, no tingling, no weakness    All other review of systems positive per history of present illness and the above otherwise negative or noncontributory. Visit Vitals    /83    Pulse 87    Temp 97.6 °F (36.4 °C)    Resp 16    Ht 5' 11\" (1.803 m)    Wt 90.7 kg (200 lb)    SpO2 99%    BMI 27.89 kg/m2     Past Medical History:   Diagnosis Date    Hypertension     Neurological disorder      Past Surgical History:   Procedure Laterality Date    HX HEMORRHOIDECTOMY       Prior to Admission Medications   Prescriptions Last Dose Informant Patient Reported? Taking? amLODIPine (NORVASC) 10 mg tablet   Yes No   Sig: Take  by mouth daily. levothyroxine (SYNTHROID) 88 mcg tablet   Yes No   Sig: Take  by mouth Daily (before breakfast). predniSONE (DELTASONE) 5 mg tablet   Yes No   Sig: Take  by mouth.       Facility-Administered Medications: None         Adult Exam   General: alert, no acute distress  Head: normocephalic, atraumatic  ENT: moist mucous membranes  Neck: supple, non-tender; full range of motion  Cardiovascular: regular rate and rhythm, normal peripheral perfusion, no edema. Equal radial pulses   Respiratory: lungs are clear to auscultation; normal respirations; no wheezing, rales or rhonchi  Gastrointestinal: soft, non-tender; no rebound or guarding, no peritoneal signs, no distension  Back: non-tender, full range of motion  Musculoskeletal: normal range of motion, normal strength, no gross deformities  Neurological: alert and oriented x 4, no gross focal deficits; normal speech  Psychiatric: cooperative; appropriate mood and affect    MDM: there were 4 EKG strip performed by EMS. Tube him has ST depression with poor baseline noted V2. The following 2 which were taken a minute apart does not have any sore ST depression. EKG here rate of 79 sinus rhythm normal axis no STEMI noted no ST depression noted. Nonspecific T-wave inversion in aVL. No ectopy or Brugada. Troponin negative.   d-dimer of 0.73. Age adjusted for 82-year-old male normal level is less than 0.81. I have a low suspicion for dissection. Chest x-ray unremarkable. We'll obtain serial troponin and EKG. Low suspicion for DVT.     Recent Results (from the past 12 hour(s))   EKG, 12 LEAD, INITIAL    Collection Time: 05/16/18  6:35 PM   Result Value Ref Range    Ventricular Rate 79 BPM    Atrial Rate 79 BPM    P-R Interval 168 ms    QRS Duration 100 ms    Q-T Interval 404 ms    QTC Calculation (Bezet) 463 ms    Calculated P Axis 76 degrees    Calculated R Axis 16 degrees    Calculated T Axis 73 degrees    Diagnosis       !!! Poor data quality, interpretation may be adversely affected  Normal sinus rhythm  Nonspecific ST and T wave abnormality  Abnormal ECG  When compared with ECG of 17-AUG-2017 10:08,  No significant change was found     CBC WITH AUTOMATED DIFF    Collection Time: 05/16/18  6:45 PM   Result Value Ref Range    WBC 7.3 4.3 - 11.1 K/uL    RBC 5.13 4.23 - 5.67 M/uL    HGB 15.3 13.6 - 17.2 g/dL    HCT 45.1 41.1 - 50.3 %    MCV 87.9 79.6 - 97.8 FL    MCH 29.8 26.1 - 32.9 PG    MCHC 33.9 31.4 - 35.0 g/dL    RDW 13.9 11.9 - 14.6 %    PLATELET 527 147 - 765 K/uL    MPV 9.6 (L) 10.8 - 14.1 FL    DF AUTOMATED      NEUTROPHILS 80 (H) 43 - 78 %    LYMPHOCYTES 14 13 - 44 %    MONOCYTES 5 4.0 - 12.0 %    EOSINOPHILS 1 0.5 - 7.8 %    BASOPHILS 0 0.0 - 2.0 %    IMMATURE GRANULOCYTES 0 0.0 - 5.0 %    ABS. NEUTROPHILS 5.8 1.7 - 8.2 K/UL    ABS. LYMPHOCYTES 1.0 0.5 - 4.6 K/UL    ABS. MONOCYTES 0.4 0.1 - 1.3 K/UL    ABS. EOSINOPHILS 0.1 0.0 - 0.8 K/UL    ABS. BASOPHILS 0.0 0.0 - 0.2 K/UL    ABS. IMM. GRANS. 0.0 0.0 - 0.5 K/UL   METABOLIC PANEL, COMPREHENSIVE    Collection Time: 05/16/18  6:45 PM   Result Value Ref Range    Sodium 141 136 - 145 mmol/L    Potassium 3.7 3.5 - 5.1 mmol/L    Chloride 106 98 - 107 mmol/L    CO2 24 21 - 32 mmol/L    Anion gap 11 7 - 16 mmol/L    Glucose 231 (H) 65 - 100 mg/dL    BUN 15 8 - 23 MG/DL    Creatinine 1.35 0.8 - 1.5 MG/DL    GFR est AA >60 >60 ml/min/1.73m2    GFR est non-AA 54 (L) >60 ml/min/1.73m2    Calcium 8.7 8.3 - 10.4 MG/DL    Bilirubin, total 0.4 0.2 - 1.1 MG/DL    ALT (SGPT) 36 12 - 65 U/L    AST (SGOT) 31 15 - 37 U/L    Alk.  phosphatase 109 50 - 136 U/L    Protein, total 7.2 6.3 - 8.2 g/dL    Albumin 3.7 3.2 - 4.6 g/dL    Globulin 3.5 2.3 - 3.5 g/dL    A-G Ratio 1.1 (L) 1.2 - 3.5     D DIMER    Collection Time: 05/16/18  6:45 PM   Result Value Ref Range    D DIMER 0.73 (HH) <0.56 ug/ml(FEU)   BNP    Collection Time: 05/16/18  6:45 PM   Result Value Ref Range    BNP 98 pg/mL   TROPONIN I    Collection Time: 05/16/18  6:45 PM   Result Value Ref Range    Troponin-I, Qt. 0.04 0.02 - 0.05 NG/ML   POC TROPONIN-I    Collection Time: 05/16/18  6:49 PM   Result Value Ref Range    Troponin-I (POC) 0.04 0.02 - 0.05 ng/ml   EKG, 12 LEAD, SUBSEQUENT    Collection Time: 05/16/18  7:11 PM Result Value Ref Range    Ventricular Rate 75 BPM    Atrial Rate 288 BPM    QRS Duration 86 ms    Q-T Interval 378 ms    QTC Calculation (Bezet) 422 ms    Calculated R Axis 18 degrees    Calculated T Axis 57 degrees    Diagnosis       !! AGE AND GENDER SPECIFIC ECG ANALYSIS !! Accelerated Junctional rhythm  Possible Anterior infarct , age undetermined  Abnormal ECG  When compared with ECG of 16-MAY-2018 18:35,  Junctional rhythm has replaced Sinus rhythm     EKG, 12 LEAD, SUBSEQUENT    Collection Time: 05/16/18  8:25 PM   Result Value Ref Range    Ventricular Rate 57 BPM    Atrial Rate 63 BPM    P-R Interval 152 ms    QRS Duration 102 ms    Q-T Interval 426 ms    QTC Calculation (Bezet) 414 ms    Calculated P Axis 85 degrees    Calculated R Axis 23 degrees    Calculated T Axis 81 degrees    Diagnosis       !! AGE AND GENDER SPECIFIC ECG ANALYSIS !!   Sinus rhythm with sinus arrhythmia  Cannot rule out Anterior infarct (cited on or before 16-MAY-2018)  Abnormal ECG  When compared with ECG of 16-MAY-2018 19:11,  Sinus rhythm has replaced Junctional rhythm     EKG, 12 LEAD, SUBSEQUENT    Collection Time: 05/16/18  8:51 PM   Result Value Ref Range    Ventricular Rate 54 BPM    Atrial Rate 54 BPM    P-R Interval 160 ms    QRS Duration 98 ms    Q-T Interval 438 ms    QTC Calculation (Bezet) 415 ms    Calculated P Axis -126 degrees    Calculated R Axis 30 degrees    Calculated T Axis 71 degrees    Diagnosis       !! AGE AND GENDER SPECIFIC ECG ANALYSIS !!  Unusual P axis, possible ectopic atrial bradycardia  Cannot rule out Anterior infarct (cited on or before 16-MAY-2018)  Abnormal ECG  When compared with ECG of 16-MAY-2018 20:25,  Ectopic atrial rhythm has replaced Sinus rhythm     TROPONIN I    Collection Time: 05/16/18  8:52 PM   Result Value Ref Range    Troponin-I, Qt. 2.66 (HH) 0.02 - 0.05 NG/ML   PTT    Collection Time: 05/16/18 10:02 PM   Result Value Ref Range    aPTT 24.3 23.2 - 35.3 SEC       Xr Chest Port    Result Date: 5/16/2018  PORTABLE CHEST, May 16, 2018 at 1834 hours CLINICAL HISTORY:  Moderate chest pain. COMPARISON:  August 17, 2017. FINDINGS:  AP erect image demonstrates no confluent infiltrate or significant pleural fluid. The heart size is within normal limits without evidence of congestive heart failure or pneumothorax. Atherosclerotic calcification and tortuosity of the aorta are noted. The bony thorax appears intact on this view. There are overlying radiopaque support devices. IMPRESSION:  NO ACUTE CARDIOPULMONARY DISEASE IDENTIFIED. Dragon voice recognition software was used to create this note. Although the note has been reviewed and corrected where necessary, additional errors may have been overlooked and remain in the text.

## 2018-05-17 NOTE — PROGRESS NOTES
LEAPFROG PROTOCOL NOTE    Simba Spain  5/17/2018    The patient is currently in the critical care setting managed by Dr. Radha Ferrera with NSTEMI. The patient's chart is reviewed and the patient is discussed with the staff. Patient is currently hemodynamically stable. Patient has no needs identified for Intensivist management in the critical care setting at this time. Please notify us if can be of assistance. No charge billed to the patient. Thank you.     Leandro Fofana MD

## 2018-05-17 NOTE — PROGRESS NOTES
Tamia NP notified of cont hypertension after hydralazine and vasotec earlier. Order received to give norvasc earlier. Norvasc given. bilat groins shaved and cleaned. Cath consent obtained. Hematuria noted with some blood from penis as well. NP Tamia notified. PTT 95 no change to heparin gtt per protocol.     Troponin 13.3 Tamia BENTLEY notified

## 2018-05-17 NOTE — H&P
[facility] Cardiology History & Physical      Date of  Admission: 5/16/2018  6:38 PM     Primary Care Physician: Dr. Jacquelyn Healy  Primary Cardiologist: Dr. Nadya Ta  Admitting Physician: Dr. Cristiano Ragland    CC: chest pain    HPI:  Maria Jean is a 82 y.o. male with past medical history of remote pituitary tumor post surgery, HTN and hypothyroidism who presented to the ER with complaints of chest pain. Patient reports that his pain started tonight around 1800. He describes his pain as right sided pressure with radiation into his right shoulder. He denies nausea, vomiting, shortness of breath or diaphoresis with chest pain episode. Upon arrival of EMS, EKG showed significant ST depression in V2-V4 without ST elevation. No medications were given by EMS during transport and his chest pain resolved on its own. Repeat EKG done in the ER showed improvement of ST depression initially. Additional repeat EKG done while in the ER showed ST changes in III and aVF. Initial troponin was 0.04, repeat was 2.66. He remains chest pain free since arrival. While in the ER, he was treated with 324mg ASA, 4000 unit IV heparin bolus followed by drip. Social history -- denies tobacco abuse  Family history -- no known CAD     Past Medical History:   Diagnosis Date    Hypertension     Neurological disorder       Past Surgical History:   Procedure Laterality Date    HX HEMORRHOIDECTOMY         Allergies   Allergen Reactions    Pcn [Penicillins] Swelling      Social History     Social History    Marital status:      Spouse name: N/A    Number of children: N/A    Years of education: N/A     Occupational History    Not on file.      Social History Main Topics    Smoking status: Never Smoker    Smokeless tobacco: Never Used    Alcohol use No    Drug use: No    Sexual activity: Not on file     Other Topics Concern    Not on file     Social History Narrative     Family History   Problem Relation Age of Onset    No Known Problems Mother     No Known Problems Father         Current Facility-Administered Medications   Medication Dose Route Frequency    sodium chloride (NS) flush 5-10 mL  5-10 mL IntraVENous Q8H    sodium chloride (NS) flush 5-10 mL  5-10 mL IntraVENous PRN    heparin 25,000 units in dextrose 500 mL infusion  12-25 Units/kg/hr IntraVENous TITRATE    eptifibatide (INTEGRILIN) 0.75 mg/mL infusion  2 mcg/kg/min IntraVENous CONTINUOUS     Current Outpatient Prescriptions   Medication Sig    amLODIPine (NORVASC) 10 mg tablet Take  by mouth daily.  predniSONE (DELTASONE) 5 mg tablet Take  by mouth.  levothyroxine (SYNTHROID) 88 mcg tablet Take  by mouth Daily (before breakfast). Review of Systems    Review of Systems   Cardiovascular: Positive for chest pain. All other systems reviewed and are negative. Subjective:     Visit Vitals    /85    Pulse (!) 52    Temp 97.6 °F (36.4 °C)    Resp 21    Ht 5' 11\" (1.803 m)    Wt 90.7 kg (200 lb)    SpO2 95%    BMI 27.89 kg/m2     Physical Exam   Constitutional: He is oriented to person, place, and time and well-developed, well-nourished, and in no distress. Cardiovascular: Regular rhythm. Bradycardia present. Pulmonary/Chest: Effort normal and breath sounds normal.   Abdominal: Soft. Bowel sounds are normal.   Musculoskeletal: Normal range of motion. He exhibits no edema. Neurological: He is alert and oriented to person, place, and time. Skin: Skin is warm and dry.    Psychiatric: Affect normal.       Cardiographics  Telemetry: sinus bradycardia  ECG: normal EKG, normal sinus rhythm, nonspecific ST and T waves changes  Echocardiogram: ordered/pending    Labs:   Recent Results (from the past 24 hour(s))   EKG, 12 LEAD, INITIAL    Collection Time: 05/16/18  6:35 PM   Result Value Ref Range    Ventricular Rate 79 BPM    Atrial Rate 79 BPM    P-R Interval 168 ms    QRS Duration 100 ms    Q-T Interval 404 ms    QTC Calculation (Bezet) 463 ms Calculated P Axis 76 degrees    Calculated R Axis 16 degrees    Calculated T Axis 73 degrees    Diagnosis       !!! Poor data quality, interpretation may be adversely affected  Normal sinus rhythm  Nonspecific ST and T wave abnormality  Abnormal ECG  When compared with ECG of 17-AUG-2017 10:08,  No significant change was found     CBC WITH AUTOMATED DIFF    Collection Time: 05/16/18  6:45 PM   Result Value Ref Range    WBC 7.3 4.3 - 11.1 K/uL    RBC 5.13 4.23 - 5.67 M/uL    HGB 15.3 13.6 - 17.2 g/dL    HCT 45.1 41.1 - 50.3 %    MCV 87.9 79.6 - 97.8 FL    MCH 29.8 26.1 - 32.9 PG    MCHC 33.9 31.4 - 35.0 g/dL    RDW 13.9 11.9 - 14.6 %    PLATELET 377 661 - 499 K/uL    MPV 9.6 (L) 10.8 - 14.1 FL    DF AUTOMATED      NEUTROPHILS 80 (H) 43 - 78 %    LYMPHOCYTES 14 13 - 44 %    MONOCYTES 5 4.0 - 12.0 %    EOSINOPHILS 1 0.5 - 7.8 %    BASOPHILS 0 0.0 - 2.0 %    IMMATURE GRANULOCYTES 0 0.0 - 5.0 %    ABS. NEUTROPHILS 5.8 1.7 - 8.2 K/UL    ABS. LYMPHOCYTES 1.0 0.5 - 4.6 K/UL    ABS. MONOCYTES 0.4 0.1 - 1.3 K/UL    ABS. EOSINOPHILS 0.1 0.0 - 0.8 K/UL    ABS. BASOPHILS 0.0 0.0 - 0.2 K/UL    ABS. IMM. GRANS. 0.0 0.0 - 0.5 K/UL   METABOLIC PANEL, COMPREHENSIVE    Collection Time: 05/16/18  6:45 PM   Result Value Ref Range    Sodium 141 136 - 145 mmol/L    Potassium 3.7 3.5 - 5.1 mmol/L    Chloride 106 98 - 107 mmol/L    CO2 24 21 - 32 mmol/L    Anion gap 11 7 - 16 mmol/L    Glucose 231 (H) 65 - 100 mg/dL    BUN 15 8 - 23 MG/DL    Creatinine 1.35 0.8 - 1.5 MG/DL    GFR est AA >60 >60 ml/min/1.73m2    GFR est non-AA 54 (L) >60 ml/min/1.73m2    Calcium 8.7 8.3 - 10.4 MG/DL    Bilirubin, total 0.4 0.2 - 1.1 MG/DL    ALT (SGPT) 36 12 - 65 U/L    AST (SGOT) 31 15 - 37 U/L    Alk.  phosphatase 109 50 - 136 U/L    Protein, total 7.2 6.3 - 8.2 g/dL    Albumin 3.7 3.2 - 4.6 g/dL    Globulin 3.5 2.3 - 3.5 g/dL    A-G Ratio 1.1 (L) 1.2 - 3.5     D DIMER    Collection Time: 05/16/18  6:45 PM   Result Value Ref Range    D DIMER 0.73 () <0.56 ug/ml(FEU)   BNP    Collection Time: 05/16/18  6:45 PM   Result Value Ref Range    BNP 98 pg/mL   TROPONIN I    Collection Time: 05/16/18  6:45 PM   Result Value Ref Range    Troponin-I, Qt. 0.04 0.02 - 0.05 NG/ML   POC TROPONIN-I    Collection Time: 05/16/18  6:49 PM   Result Value Ref Range    Troponin-I (POC) 0.04 0.02 - 0.05 ng/ml   EKG, 12 LEAD, SUBSEQUENT    Collection Time: 05/16/18  7:11 PM   Result Value Ref Range    Ventricular Rate 75 BPM    Atrial Rate 288 BPM    QRS Duration 86 ms    Q-T Interval 378 ms    QTC Calculation (Bezet) 422 ms    Calculated R Axis 18 degrees    Calculated T Axis 57 degrees    Diagnosis       !! AGE AND GENDER SPECIFIC ECG ANALYSIS !! Accelerated Junctional rhythm  Possible Anterior infarct , age undetermined  Abnormal ECG  When compared with ECG of 16-MAY-2018 18:35,  Junctional rhythm has replaced Sinus rhythm     EKG, 12 LEAD, SUBSEQUENT    Collection Time: 05/16/18  8:25 PM   Result Value Ref Range    Ventricular Rate 57 BPM    Atrial Rate 63 BPM    P-R Interval 152 ms    QRS Duration 102 ms    Q-T Interval 426 ms    QTC Calculation (Bezet) 414 ms    Calculated P Axis 85 degrees    Calculated R Axis 23 degrees    Calculated T Axis 81 degrees    Diagnosis       !! AGE AND GENDER SPECIFIC ECG ANALYSIS !!   Sinus rhythm with sinus arrhythmia  Cannot rule out Anterior infarct (cited on or before 16-MAY-2018)  Abnormal ECG  When compared with ECG of 16-MAY-2018 19:11,  Sinus rhythm has replaced Junctional rhythm     EKG, 12 LEAD, SUBSEQUENT    Collection Time: 05/16/18  8:51 PM   Result Value Ref Range    Ventricular Rate 54 BPM    Atrial Rate 54 BPM    P-R Interval 160 ms    QRS Duration 98 ms    Q-T Interval 438 ms    QTC Calculation (Bezet) 415 ms    Calculated P Axis -126 degrees    Calculated R Axis 30 degrees    Calculated T Axis 71 degrees    Diagnosis       !! AGE AND GENDER SPECIFIC ECG ANALYSIS !!  Unusual P axis, possible ectopic atrial bradycardia  Cannot rule out Anterior infarct (cited on or before 16-MAY-2018)  Abnormal ECG  When compared with ECG of 16-MAY-2018 20:25,  Ectopic atrial rhythm has replaced Sinus rhythm     TROPONIN I    Collection Time: 05/16/18  8:52 PM   Result Value Ref Range    Troponin-I, Qt. 2.66 (HH) 0.02 - 0.05 NG/ML   PTT    Collection Time: 05/16/18 10:02 PM   Result Value Ref Range    aPTT 24.3 23.2 - 35.3 SEC       Patient has been seen and examined by Dr. Rui Reyes and he agrees with the following assessment and plan:     Assessment/Plan:        NSTEMI (non-ST elevated myocardial infarction) -- given EKG changes, will start IV Integrilin and admit to ICU. Start ASA daily. No BB due to bradycardia. No ACEi at this time due to past history of SALEEM while taking. NPO after midnight with IV hydration. Continue IV heparin/Integrilin overnight. Plan for LHC with possible PCI and echocardiogram in the AM. Recheck labs including lipid panel. Bradycardia -- No BB. Has history of bradycardia due SALEEM in 2017. Continue monitoring. Essential hypertension -- elevated in the ER. Continue home Norvasc dose. Will add prn IV hydralazine for now.          Genet Dahl NP  5/17/2018 1:12 AM

## 2018-05-17 NOTE — PROGRESS NOTES
Patient admitted to 3103 from ER. Able to move self to bed. Placed on bedside cardiac monitor with NBP q30 min. Full assessment to follow. Patient denied chest pain or pressure at this time. SB on monitor. Occasional PVC noted. Bilat AC 20g INT patient. Heparin remains at 12 units/kg/hr and Integrillin at 2 mcg/kg/min as started in ER. Dual skin assessment done - no skin breaksown/injuries noted. Bilat  heels intact. Pt very mobile so alevyn not placed. Pt turns self frequently in bed.

## 2018-05-17 NOTE — CONSULTS
[address] [facility]  Edgard Fuelling. MD Shaye De Luna. Addie Delgado MD              Warren Gavin       [id number]  5/16/2018        1935        CHIEF COMPLAINT: NSTEMI/ Complex multi-vessel CAD    HISTORY OF PRESENT ILLNESS:  The patient is a 82 y.o. male never smoker, non diabetic who we asked in Consultation for Coronary Artery Bypass Grafting for his Complex multi-vessel Coronary Artery Diease a with admission for NSTEMI. The patient is typically followed by [facility] for his history of Mitral Valve Regurgitation, HTN and Bradycardia. He was in his usual state of good health until he had an acute episode of of chest pain at home that would not resolve. EMS transferred him here and NSTEMI was ruled in. [facility] today shows normal LVF, mild inferior hypokinesis with complex 3 vessel CAD involving the LAD, Circ and RCA. Echocardiogram pending. He is currently chest pain free. Risk Factors: CAD, NSTEMI, Bradycardia, MV Regurg, HTN, HLD. Past Medical History:   Diagnosis Date    Hypertension     Neurological disorder        Past Surgical History:   Procedure Laterality Date    HX HEMORRHOIDECTOMY         Family History   Problem Relation Age of Onset    No Known Problems Mother     No Known Problems Father        Social History     Social History    Marital status:      Spouse name: N/A    Number of children: N/A    Years of education: N/A     Occupational History    Not on file. Social History Main Topics    Smoking status: Never Smoker    Smokeless tobacco: Never Used    Alcohol use No    Drug use: No    Sexual activity: Not on file     Other Topics Concern    Not on file     Social History Narrative       Allergies   Allergen Reactions    Pcn [Penicillins] Swelling       No current facility-administered medications on file prior to encounter.       Current Outpatient Prescriptions on File Prior to Encounter   Medication Sig Dispense Refill    amLODIPine (NORVASC) 10 mg tablet Take  by mouth daily.  predniSONE (DELTASONE) 5 mg tablet Take  by mouth.  levothyroxine (SYNTHROID) 88 mcg tablet Take  by mouth Daily (before breakfast). REVIEW OF SYSTEMS:  GENERAL:  No weight loss. No fever. EYES:  No diplopia. No vision loss. ENTM:  No hearing loss. No trouble swallowing. No hoarseness. CARDIAC:  See present illness. PULMONARY:  Denies asthma or chronic pulmonary disease. GI:  No change in bowel habits. No bleeding. :  No dysuria. No history of renal stones or renal insufficiency. MS:  Denies osteoarthritis. NEURO:  No stroke/TIA or seizure disorder  HEME/LYMPHATIC:  No history of bleeding tendencies. No Hx DVT or PE  PSYCHIATRIC:  No history of depression. PHYSICAL EXAMINATION:  GENERAL APPEARANCE:  Well developed. Well nourished. Alert, cooperative and oriented times three. HEENT:  Normocephalic. Extraocular muscles are intact. No scleral icterus. NECK/LYMPHATIC:  Supple with no carotid bruits. No jugular venous distention. LUNGS: Lungs are clear to auscultation. HEART:  Heart is regular rate and rhythm without a murmur. ABDOMEN:  Soft and non-tender. SKIN:  No rashes, cyanosis, jaundice, ecchymoses or evidence of skin breakdown present. EXTREMITIES:  Full range of motion. No deformity. No peripheral edema. VASCULAR:  Pulses are full and equal at the radial arteries and the popliteal arteries bilaterally. There is no venous stasis disease or varicosties. NEURO:  Grossly intact. IMPRESSION:  82year old male non smoker, non diabetic with complex coronary artery disease in the setting of NSTEMI    PLAN:  Dr Iris Blackburn discussed in detail his recommendation of Coronary Artery Bypass Grafting. The patient saw our teaching video. Risks include but are not limited to:  1. Bleeding  2. Infection including mediastinitis  3. Myocardial infarction  4. Stroke  5. Renal or Pulmonary Complications  6.  Return to the operating room  7. Potential death  STS risk assessment approximately 1%    Surgery scheduled for 2nd case tomorrow. NO PRE OP BETA BLOCKER DUE TO PROFOUND BRADYCARDIA. Pre op checklist in progress    Dr Erica Jc has personally viewed the cardiac catheterization and reviewed labs, chest x-ray and echocardiogram.    Rosario JOVELP-ALICIA

## 2018-05-17 NOTE — PROGRESS NOTES
;abs drawn at 0430 still not resulted including PTT for heparin gtt protocol.   Per  they are running now

## 2018-05-17 NOTE — PROGRESS NOTES
Bedside and Verbal shift change report given to Maritza Serrano RN (oncoming nurse) by Kelly Witt RN (offgoing nurse).  Report included the following information SBAR, Kardex, Intake/Output, MAR, Recent Results, Med Rec Status and Cardiac Rhythm SR.

## 2018-05-17 NOTE — ANESTHESIA PREPROCEDURE EVALUATION
Anesthetic History   No history of anesthetic complications            Review of Systems / Medical History  Patient summary reviewed and pertinent labs reviewed    Pulmonary  Within defined limits                 Neuro/Psych   Within defined limits           Cardiovascular    Hypertension: well controlled    Angina: at rest      Past MI (5/16) and CAD    Exercise tolerance: <4 METS     GI/Hepatic/Renal  Within defined limits              Endo/Other      Hypothyroidism: well controlled  Obesity     Other Findings            Physical Exam    Airway  Mallampati: II  TM Distance: < 4 cm  Neck ROM: normal range of motion, short neck   Mouth opening: Diminished (comment)     Cardiovascular    Rhythm: irregular  Rate: abnormal         Dental    Dentition: Caps/crowns, Full upper dentures and Lower partial plate     Pulmonary  Breath sounds clear to auscultation               Abdominal         Other Findings            Anesthetic Plan    ASA: 4  Anesthesia type: general    Monitoring Plan: Arterial line, BIS, CVP, Ririe-Rajan and KRYSTINA    Post procedure ventilation   Induction: Intravenous  Anesthetic plan and risks discussed with: Patient and Spouse

## 2018-05-17 NOTE — PROGRESS NOTES
Pt seen in ICU s/p NSTEMI and heart cath. Wife, Mol at bedside. States pt needs CABG with Dr. Wayne Edge per Dr. Rosalinda Schaeffer. Pt lives at home with wife and was independent prior to admission. Does not use anything to ambulate with and drives self. No DME's used at home, but does have walker available per wife. Confirms MCR/supplemental plan and PCP, Dr. Sana Clark. Able to get Rx with drug plan. They have one daughter that lives nearby. Pt does have LW/HCPOA per wife and she is decision maker. Not noted on file here. Discussed possible d/c needs post surgery for HH vs STR. Wife states she would be at home with pt 24/7 and would like to d/c home with [facility] if pt recovers well. If were to need STR, [facility]. Explained that goal is home and CM will continue to follow and assist with d/c needs. Care Management Interventions  PCP Verified by CM: Yes  Mode of Transport at Discharge:  Other (see comment)  Current Support Network: Lives with Spouse, Own Home  Confirm Follow Up Transport: Family  Plan discussed with Pt/Family/Caregiver: Yes  Freedom of Choice Offered: Yes  Discharge Location  Discharge Placement: Unable to determine at this time

## 2018-05-17 NOTE — PROCEDURES
Brief Cardiac Procedure Note    Patient: Janice Holley MRN: [id number]  SSN: [id number]    YOB: 1935  Age: 82 y.o. Sex: male      Date of Procedure: 5/17/2018     Pre-procedure Diagnosis: NSTEMI    Post-procedure Diagnosis: Coronary Artery Disease    Procedure: Left Heart Catheterization    Brief Description of Procedure: See note    Performed By: Vanesa Martinez MD     Assistants: None    Anesthesia: Moderate Sedation    Estimated Blood Loss: Less than 10 mL      Specimens: None    Implants: None    Findings:   LV:  EF 55%  LM:  50%  LAD:  100% mid, 95% large prox D1  LCx:  95% OM1, 80% OM2  RCA:  70% dRCA, 50% THOM    Complications: None    Recommendations: Continue medical therapy.     Signed By: Vanesa Martinez MD     May 17, 2018

## 2018-05-17 NOTE — PROGRESS NOTES
0745: Patient off the floor to Cath Lab  0845: Patient back from cath lab. Echo tech at bedside. Patient drowsy but arousable. Denies pain and states no needs at this time.

## 2018-05-18 ENCOUNTER — APPOINTMENT (OUTPATIENT)
Dept: GENERAL RADIOLOGY | Age: 83
DRG: 234 | End: 2018-05-18
Attending: THORACIC SURGERY (CARDIOTHORACIC VASCULAR SURGERY)
Payer: MEDICARE

## 2018-05-18 ENCOUNTER — ANESTHESIA (OUTPATIENT)
Dept: SURGERY | Age: 83
DRG: 234 | End: 2018-05-18
Payer: MEDICARE

## 2018-05-18 ENCOUNTER — APPOINTMENT (OUTPATIENT)
Dept: ULTRASOUND IMAGING | Age: 83
DRG: 234 | End: 2018-05-18
Attending: NURSE PRACTITIONER
Payer: MEDICARE

## 2018-05-18 PROBLEM — I10 ESSENTIAL HYPERTENSION: Chronic | Status: ACTIVE | Noted: 2018-05-16

## 2018-05-18 PROBLEM — Z79.52 CURRENT CHRONIC USE OF SYSTEMIC STEROIDS: Chronic | Status: ACTIVE | Noted: 2018-05-18

## 2018-05-18 PROBLEM — E03.9 HYPOTHYROIDISM: Chronic | Status: ACTIVE | Noted: 2018-05-18

## 2018-05-18 PROBLEM — R09.02 HYPOXIA: Status: ACTIVE | Noted: 2018-05-18

## 2018-05-18 PROBLEM — R00.1 BRADYCARDIA: Chronic | Status: ACTIVE | Noted: 2018-05-16

## 2018-05-18 PROBLEM — Z99.11 ENCOUNTER FOR WEANING FROM VENTILATOR (HCC): Status: ACTIVE | Noted: 2018-05-18

## 2018-05-18 PROBLEM — Z95.1 S/P CABG (CORONARY ARTERY BYPASS GRAFT): Status: ACTIVE | Noted: 2018-05-18

## 2018-05-18 LAB
ALBUMIN SERPL-MCNC: 3 G/DL (ref 3.2–4.6)
ALBUMIN/GLOB SERPL: 1 {RATIO} (ref 1.2–3.5)
ALP SERPL-CCNC: 65 U/L (ref 50–136)
ALT SERPL-CCNC: 30 U/L (ref 12–65)
ANION GAP SERPL CALC-SCNC: 11 MMOL/L (ref 7–16)
ANION GAP SERPL CALC-SCNC: 7 MMOL/L (ref 7–16)
APTT PPP: 24.8 SEC (ref 23.2–35.3)
APTT PPP: 31.9 SEC (ref 23.2–35.3)
ARTERIAL PATENCY WRIST A: ABNORMAL
ARTERIAL PATENCY WRIST A: ABNORMAL
AST SERPL-CCNC: 40 U/L (ref 15–37)
BASE DEFICIT BLD-SCNC: 2 MMOL/L
BASE DEFICIT BLD-SCNC: 2 MMOL/L
BASE DEFICIT BLD-SCNC: 3 MMOL/L
BASE DEFICIT BLD-SCNC: 3 MMOL/L
BASE DEFICIT BLD-SCNC: 4 MMOL/L
BASE DEFICIT BLD-SCNC: 5 MMOL/L
BASE DEFICIT BLDA-SCNC: 7.1 MMOL/L (ref 0–2)
BASE DEFICIT BLDA-SCNC: 8.1 MMOL/L (ref 0–2)
BASOPHILS # BLD: 0 K/UL (ref 0–0.2)
BASOPHILS NFR BLD: 0 % (ref 0–2)
BDY SITE: ABNORMAL
BDY SITE: ABNORMAL
BILIRUB DIRECT SERPL-MCNC: 0.1 MG/DL
BILIRUB SERPL-MCNC: 0.4 MG/DL (ref 0.2–1.1)
BUN SERPL-MCNC: 15 MG/DL (ref 8–23)
BUN SERPL-MCNC: 16 MG/DL (ref 8–23)
CA-I BLD-MCNC: 1.03 MMOL/L (ref 1.12–1.32)
CA-I BLD-MCNC: 1.05 MMOL/L (ref 1.12–1.32)
CA-I BLD-MCNC: 1.13 MMOL/L (ref 1.12–1.32)
CA-I BLD-MCNC: 1.15 MMOL/L (ref 1.12–1.32)
CA-I BLD-MCNC: 1.19 MMOL/L (ref 1.12–1.32)
CA-I BLD-MCNC: 1.34 MMOL/L (ref 1.12–1.32)
CA-I BLD-SCNC: 1.11 MMOL/L (ref 1–1.3)
CALCIUM SERPL-MCNC: 7.3 MG/DL (ref 8.3–10.4)
CALCIUM SERPL-MCNC: 8.2 MG/DL (ref 8.3–10.4)
CHLORIDE BLDA-SCNC: 110 MMOL/L (ref 98–106)
CHLORIDE SERPL-SCNC: 112 MMOL/L (ref 98–107)
CHLORIDE SERPL-SCNC: 115 MMOL/L (ref 98–107)
CO2 SERPL-SCNC: 20 MMOL/L (ref 21–32)
CO2 SERPL-SCNC: 24 MMOL/L (ref 21–32)
COHGB MFR BLD: 0.3 % (ref 0.5–1.5)
COHGB MFR BLD: 0.4 % (ref 0.5–1.5)
CREAT SERPL-MCNC: 1 MG/DL (ref 0.8–1.5)
CREAT SERPL-MCNC: 1.34 MG/DL (ref 0.8–1.5)
DIFFERENTIAL METHOD BLD: ABNORMAL
DO-HGB BLD-MCNC: 1 % (ref 0–5)
DO-HGB BLD-MCNC: 2 % (ref 0–5)
EOSINOPHIL # BLD: 0.2 K/UL (ref 0–0.8)
EOSINOPHIL NFR BLD: 4 % (ref 0.5–7.8)
ERYTHROCYTE [DISTWIDTH] IN BLOOD BY AUTOMATED COUNT: 14 % (ref 11.9–14.6)
ERYTHROCYTE [DISTWIDTH] IN BLOOD BY AUTOMATED COUNT: 14.1 % (ref 11.9–14.6)
EST. AVERAGE GLUCOSE BLD GHB EST-MCNC: 126 MG/DL
FIBRINOGEN PPP-MCNC: 211 MG/DL (ref 190–501)
GLOBULIN SER CALC-MCNC: 2.9 G/DL (ref 2.3–3.5)
GLUCOSE BLD STRIP.AUTO-MCNC: 103 MG/DL (ref 65–100)
GLUCOSE BLD STRIP.AUTO-MCNC: 106 MG/DL (ref 65–100)
GLUCOSE BLD STRIP.AUTO-MCNC: 113 MG/DL (ref 65–100)
GLUCOSE BLD STRIP.AUTO-MCNC: 122 MG/DL (ref 65–100)
GLUCOSE BLD STRIP.AUTO-MCNC: 127 MG/DL (ref 65–100)
GLUCOSE BLD STRIP.AUTO-MCNC: 132 MG/DL (ref 65–100)
GLUCOSE BLD STRIP.AUTO-MCNC: 135 MG/DL (ref 65–100)
GLUCOSE BLD STRIP.AUTO-MCNC: 141 MG/DL (ref 65–100)
GLUCOSE BLD STRIP.AUTO-MCNC: 144 MG/DL (ref 65–100)
GLUCOSE BLD STRIP.AUTO-MCNC: 150 MG/DL (ref 65–100)
GLUCOSE BLD STRIP.AUTO-MCNC: 151 MG/DL (ref 65–100)
GLUCOSE BLD STRIP.AUTO-MCNC: 156 MG/DL (ref 65–100)
GLUCOSE SERPL-MCNC: 158 MG/DL (ref 65–100)
GLUCOSE SERPL-MCNC: 75 MG/DL (ref 65–100)
HBA1C MFR BLD: 6 % (ref 4.8–6)
HCO3 BLD-SCNC: 21.3 MMOL/L (ref 22–26)
HCO3 BLD-SCNC: 21.8 MMOL/L (ref 22–26)
HCO3 BLD-SCNC: 22.8 MMOL/L (ref 22–26)
HCO3 BLD-SCNC: 23.2 MMOL/L (ref 22–26)
HCO3 BLD-SCNC: 23.2 MMOL/L (ref 22–26)
HCO3 BLD-SCNC: 24.3 MMOL/L (ref 22–26)
HCO3 BLDA-SCNC: 17 MMOL/L (ref 22–26)
HCO3 BLDA-SCNC: 19 MMOL/L (ref 22–26)
HCT VFR BLD AUTO: 32.2 % (ref 41.1–50.3)
HCT VFR BLD AUTO: 32.9 % (ref 41.1–50.3)
HCT VFR BLD AUTO: 40.3 % (ref 41.1–50.3)
HGB BLD-MCNC: 10.7 G/DL (ref 13.6–17.2)
HGB BLD-MCNC: 10.8 G/DL (ref 13.6–17.2)
HGB BLD-MCNC: 13.7 G/DL (ref 13.6–17.2)
HGB BLDMV-MCNC: 11.6 GM/DL (ref 11.7–15)
HGB BLDMV-MCNC: 11.9 GM/DL (ref 11.7–15)
IMM GRANULOCYTES # BLD: 0 K/UL (ref 0–0.5)
IMM GRANULOCYTES NFR BLD AUTO: 0 % (ref 0–5)
INR PPP: 1.1
INR PPP: 1.6
LYMPHOCYTES # BLD: 1.4 K/UL (ref 0.5–4.6)
LYMPHOCYTES NFR BLD: 23 % (ref 13–44)
MAGNESIUM SERPL-MCNC: 2.1 MG/DL (ref 1.8–2.4)
MAGNESIUM SERPL-MCNC: 2.5 MG/DL (ref 1.8–2.4)
MAGNESIUM SERPL-MCNC: 2.8 MG/DL (ref 1.8–2.4)
MCH RBC QN AUTO: 29 PG (ref 26.1–32.9)
MCH RBC QN AUTO: 29.7 PG (ref 26.1–32.9)
MCHC RBC AUTO-ENTMCNC: 32.8 G/DL (ref 31.4–35)
MCHC RBC AUTO-ENTMCNC: 34 G/DL (ref 31.4–35)
MCV RBC AUTO: 87.4 FL (ref 79.6–97.8)
MCV RBC AUTO: 88.4 FL (ref 79.6–97.8)
METHGB MFR BLD: 0.4 % (ref 0–1.5)
METHGB MFR BLD: 0.6 % (ref 0–1.5)
MONOCYTES # BLD: 0.5 K/UL (ref 0.1–1.3)
MONOCYTES NFR BLD: 8 % (ref 4–12)
NEUTS SEG # BLD: 3.9 K/UL (ref 1.7–8.2)
NEUTS SEG NFR BLD: 65 % (ref 43–78)
OXYHGB MFR BLDA: 97 % (ref 94–97)
OXYHGB MFR BLDA: 98.1 % (ref 94–97)
PCO2 BLD: 32.5 MMHG (ref 35–45)
PCO2 BLD: 43.5 MMHG (ref 35–45)
PCO2 BLD: 45.8 MMHG (ref 35–45)
PCO2 BLD: 45.9 MMHG (ref 35–45)
PCO2 BLD: 47.4 MMHG (ref 35–45)
PCO2 BLD: 48.7 MMHG (ref 35–45)
PCO2 BLDA: 33 MMHG (ref 35–45)
PCO2 BLDA: 42 MMHG (ref 35–45)
PEEP RESPIRATORY: 8 CM[H2O]
PH BLD: 7.3 [PH] (ref 7.35–7.45)
PH BLD: 7.31 [PH] (ref 7.35–7.45)
PH BLD: 7.31 [PH] (ref 7.35–7.45)
PH BLD: 7.43 [PH] (ref 7.35–7.45)
PH BLDA: 7.28 [PH] (ref 7.35–7.45)
PH BLDA: 7.33 [PH] (ref 7.35–7.45)
PLATELET # BLD AUTO: 120 K/UL (ref 150–450)
PLATELET # BLD AUTO: 168 K/UL (ref 150–450)
PMV BLD AUTO: 10 FL (ref 10.8–14.1)
PMV BLD AUTO: 9.5 FL (ref 10.8–14.1)
PO2 BLD: 140 MMHG (ref 80–105)
PO2 BLD: 193 MMHG (ref 80–105)
PO2 BLD: 216 MMHG (ref 80–105)
PO2 BLD: 316 MMHG (ref 80–105)
PO2 BLD: 417 MMHG (ref 80–105)
PO2 BLD: 49 MMHG (ref 80–105)
PO2 BLDA: 114 MMHG (ref 80–105)
PO2 BLDA: 227 MMHG (ref 80–105)
POTASSIUM BLD-SCNC: 4.1 MMOL/L (ref 3.5–5.1)
POTASSIUM BLD-SCNC: 4.5 MMOL/L (ref 3.5–5.1)
POTASSIUM BLD-SCNC: 4.6 MMOL/L (ref 3.5–5.1)
POTASSIUM BLD-SCNC: 5.3 MMOL/L (ref 3.5–5.1)
POTASSIUM BLD-SCNC: 5.7 MMOL/L (ref 3.5–5.1)
POTASSIUM BLD-SCNC: 6.4 MMOL/L (ref 3.5–5.1)
POTASSIUM BLDA-SCNC: 4.33 MMOL/L (ref 3.5–5.3)
POTASSIUM SERPL-SCNC: 4.2 MMOL/L (ref 3.5–5.1)
POTASSIUM SERPL-SCNC: 4.4 MMOL/L (ref 3.5–5.1)
POTASSIUM SERPL-SCNC: 4.4 MMOL/L (ref 3.5–5.1)
PROT SERPL-MCNC: 5.9 G/DL (ref 6.3–8.2)
PROTHROMBIN TIME: 13.2 SEC (ref 11.5–14.5)
PROTHROMBIN TIME: 18.8 SEC (ref 11.5–14.5)
RBC # BLD AUTO: 3.72 M/UL (ref 4.23–5.67)
RBC # BLD AUTO: 4.61 M/UL (ref 4.23–5.67)
SAO2 % BLD: 100 % (ref 95–98)
SAO2 % BLD: 80 % (ref 95–98)
SAO2 % BLD: 98 % (ref 92–98.5)
SAO2 % BLD: 99 % (ref 92–98.5)
SAO2 % BLD: 99 % (ref 95–98)
SERVICE CMNT-IMP: ABNORMAL
SODIUM BLD-SCNC: 135 MMOL/L (ref 136–145)
SODIUM BLD-SCNC: 138 MMOL/L (ref 136–145)
SODIUM BLD-SCNC: 140 MMOL/L (ref 136–145)
SODIUM BLD-SCNC: 141 MMOL/L (ref 136–145)
SODIUM BLDA-SCNC: 138.1 MMOL/L (ref 135–148)
SODIUM SERPL-SCNC: 143 MMOL/L (ref 136–145)
SODIUM SERPL-SCNC: 146 MMOL/L (ref 136–145)
VENTILATION MODE VENT: ABNORMAL
WBC # BLD AUTO: 14.8 K/UL (ref 4.3–11.1)
WBC # BLD AUTO: 5.9 K/UL (ref 4.3–11.1)

## 2018-05-18 PROCEDURE — 74011250636 HC RX REV CODE- 250/636: Performed by: INTERNAL MEDICINE

## 2018-05-18 PROCEDURE — 77030003034 HC SUT WRE CRD J&J -B: Performed by: THORACIC SURGERY (CARDIOTHORACIC VASCULAR SURGERY)

## 2018-05-18 PROCEDURE — 77030016564 HC BLD STRNL SAW4 CNMD -B: Performed by: THORACIC SURGERY (CARDIOTHORACIC VASCULAR SURGERY)

## 2018-05-18 PROCEDURE — 74011000302 HC RX REV CODE- 302: Performed by: THORACIC SURGERY (CARDIOTHORACIC VASCULAR SURGERY)

## 2018-05-18 PROCEDURE — 85730 THROMBOPLASTIN TIME PARTIAL: CPT | Performed by: THORACIC SURGERY (CARDIOTHORACIC VASCULAR SURGERY)

## 2018-05-18 PROCEDURE — 77030002520 HC INSRT CLMP LATIS STLTH AMR -B: Performed by: THORACIC SURGERY (CARDIOTHORACIC VASCULAR SURGERY)

## 2018-05-18 PROCEDURE — 77030013797 HC KT TRNSDUC PRSSR EDWD -A: Performed by: THORACIC SURGERY (CARDIOTHORACIC VASCULAR SURGERY)

## 2018-05-18 PROCEDURE — 021109W BYPASS CORONARY ARTERY, TWO ARTERIES FROM AORTA WITH AUTOLOGOUS VENOUS TISSUE, OPEN APPROACH: ICD-10-PCS | Performed by: THORACIC SURGERY (CARDIOTHORACIC VASCULAR SURGERY)

## 2018-05-18 PROCEDURE — 77030002986 HC SUT PROL J&J -A: Performed by: THORACIC SURGERY (CARDIOTHORACIC VASCULAR SURGERY)

## 2018-05-18 PROCEDURE — 76060000041 HC ANESTHESIA 5 TO 5.5 HR: Performed by: THORACIC SURGERY (CARDIOTHORACIC VASCULAR SURGERY)

## 2018-05-18 PROCEDURE — 77030013292 HC BOWL MX PRSM J&J -A: Performed by: ANESTHESIOLOGY

## 2018-05-18 PROCEDURE — P9045 ALBUMIN (HUMAN), 5%, 250 ML: HCPCS | Performed by: THORACIC SURGERY (CARDIOTHORACIC VASCULAR SURGERY)

## 2018-05-18 PROCEDURE — 5A1223Z PERFORMANCE OF CARDIAC PACING, CONTINUOUS: ICD-10-PCS | Performed by: THORACIC SURGERY (CARDIOTHORACIC VASCULAR SURGERY)

## 2018-05-18 PROCEDURE — 76010000155 HC AUTO TRANSFUSION/CELL SAVER: Performed by: THORACIC SURGERY (CARDIOTHORACIC VASCULAR SURGERY)

## 2018-05-18 PROCEDURE — 82962 GLUCOSE BLOOD TEST: CPT

## 2018-05-18 PROCEDURE — 83735 ASSAY OF MAGNESIUM: CPT | Performed by: THORACIC SURGERY (CARDIOTHORACIC VASCULAR SURGERY)

## 2018-05-18 PROCEDURE — 77030034888 HC SUT PROL 2 J&J -B: Performed by: THORACIC SURGERY (CARDIOTHORACIC VASCULAR SURGERY)

## 2018-05-18 PROCEDURE — 85610 PROTHROMBIN TIME: CPT | Performed by: THORACIC SURGERY (CARDIOTHORACIC VASCULAR SURGERY)

## 2018-05-18 PROCEDURE — 77030020751 HC FLTR TBNG TRNSFUS HAEM -A: Performed by: ANESTHESIOLOGY

## 2018-05-18 PROCEDURE — P9047 ALBUMIN (HUMAN), 25%, 50ML: HCPCS

## 2018-05-18 PROCEDURE — 84132 ASSAY OF SERUM POTASSIUM: CPT | Performed by: THORACIC SURGERY (CARDIOTHORACIC VASCULAR SURGERY)

## 2018-05-18 PROCEDURE — 74011250636 HC RX REV CODE- 250/636: Performed by: THORACIC SURGERY (CARDIOTHORACIC VASCULAR SURGERY)

## 2018-05-18 PROCEDURE — 93880 EXTRACRANIAL BILAT STUDY: CPT

## 2018-05-18 PROCEDURE — 77030002987 HC SUT PROL J&J -B: Performed by: THORACIC SURGERY (CARDIOTHORACIC VASCULAR SURGERY)

## 2018-05-18 PROCEDURE — 77030010939 HC CLP LIG TELE -B: Performed by: THORACIC SURGERY (CARDIOTHORACIC VASCULAR SURGERY)

## 2018-05-18 PROCEDURE — 77030002966 HC SUT PDS J&J -A: Performed by: THORACIC SURGERY (CARDIOTHORACIC VASCULAR SURGERY)

## 2018-05-18 PROCEDURE — 77030008477 HC STYL SATN SLP COVD -A: Performed by: ANESTHESIOLOGY

## 2018-05-18 PROCEDURE — 74011250636 HC RX REV CODE- 250/636

## 2018-05-18 PROCEDURE — 77030018547 HC SUT ETHBND1 J&J -B: Performed by: THORACIC SURGERY (CARDIOTHORACIC VASCULAR SURGERY)

## 2018-05-18 PROCEDURE — 74011250637 HC RX REV CODE- 250/637: Performed by: NURSE PRACTITIONER

## 2018-05-18 PROCEDURE — 93005 ELECTROCARDIOGRAM TRACING: CPT | Performed by: THORACIC SURGERY (CARDIOTHORACIC VASCULAR SURGERY)

## 2018-05-18 PROCEDURE — 77030025827 HC BG BLD DNR AUTLG MEDT -A: Performed by: THORACIC SURGERY (CARDIOTHORACIC VASCULAR SURGERY)

## 2018-05-18 PROCEDURE — 74011000258 HC RX REV CODE- 258

## 2018-05-18 PROCEDURE — 77030019908 HC STETH ESOPH SIMS -A: Performed by: ANESTHESIOLOGY

## 2018-05-18 PROCEDURE — 74011000258 HC RX REV CODE- 258: Performed by: INTERNAL MEDICINE

## 2018-05-18 PROCEDURE — 77030011640 HC PAD GRND REM COVD -A: Performed by: THORACIC SURGERY (CARDIOTHORACIC VASCULAR SURGERY)

## 2018-05-18 PROCEDURE — 77030016687: Performed by: THORACIC SURGERY (CARDIOTHORACIC VASCULAR SURGERY)

## 2018-05-18 PROCEDURE — 94002 VENT MGMT INPAT INIT DAY: CPT

## 2018-05-18 PROCEDURE — 85025 COMPLETE CBC W/AUTO DIFF WBC: CPT | Performed by: THORACIC SURGERY (CARDIOTHORACIC VASCULAR SURGERY)

## 2018-05-18 PROCEDURE — 77030005521 HC CATH URETH FOL38 BARD -B: Performed by: THORACIC SURGERY (CARDIOTHORACIC VASCULAR SURGERY)

## 2018-05-18 PROCEDURE — 74011636637 HC RX REV CODE- 636/637: Performed by: THORACIC SURGERY (CARDIOTHORACIC VASCULAR SURGERY)

## 2018-05-18 PROCEDURE — 77030005537 HC CATH URETH BARD -A: Performed by: THORACIC SURGERY (CARDIOTHORACIC VASCULAR SURGERY)

## 2018-05-18 PROCEDURE — 77030003037 HC SUT WRE STRNOTMY AEMC -B: Performed by: THORACIC SURGERY (CARDIOTHORACIC VASCULAR SURGERY)

## 2018-05-18 PROCEDURE — 85027 COMPLETE CBC AUTOMATED: CPT | Performed by: THORACIC SURGERY (CARDIOTHORACIC VASCULAR SURGERY)

## 2018-05-18 PROCEDURE — 77030014007 HC SPNG HEMSTAT J&J -B: Performed by: THORACIC SURGERY (CARDIOTHORACIC VASCULAR SURGERY)

## 2018-05-18 PROCEDURE — 80048 BASIC METABOLIC PNL TOTAL CA: CPT | Performed by: THORACIC SURGERY (CARDIOTHORACIC VASCULAR SURGERY)

## 2018-05-18 PROCEDURE — 06BQ0ZZ EXCISION OF LEFT SAPHENOUS VEIN, OPEN APPROACH: ICD-10-PCS | Performed by: THORACIC SURGERY (CARDIOTHORACIC VASCULAR SURGERY)

## 2018-05-18 PROCEDURE — 77030010514 HC APPL CLP LIG COVD -B: Performed by: THORACIC SURGERY (CARDIOTHORACIC VASCULAR SURGERY)

## 2018-05-18 PROCEDURE — 77030018571 HC SUT PROL1 J&J -B: Performed by: THORACIC SURGERY (CARDIOTHORACIC VASCULAR SURGERY)

## 2018-05-18 PROCEDURE — 74011250637 HC RX REV CODE- 250/637: Performed by: THORACIC SURGERY (CARDIOTHORACIC VASCULAR SURGERY)

## 2018-05-18 PROCEDURE — 82803 BLOOD GASES ANY COMBINATION: CPT

## 2018-05-18 PROCEDURE — 77030018548 HC SUT ETHBND2 J&J -B: Performed by: THORACIC SURGERY (CARDIOTHORACIC VASCULAR SURGERY)

## 2018-05-18 PROCEDURE — 77030010512 HC APPL CLP LIG J&J -C: Performed by: THORACIC SURGERY (CARDIOTHORACIC VASCULAR SURGERY)

## 2018-05-18 PROCEDURE — 77030002970 HC SUT PLEDG TELE -A: Performed by: THORACIC SURGERY (CARDIOTHORACIC VASCULAR SURGERY)

## 2018-05-18 PROCEDURE — 74011000250 HC RX REV CODE- 250

## 2018-05-18 PROCEDURE — 77030018836 HC SOL IRR NACL ICUM -A: Performed by: THORACIC SURGERY (CARDIOTHORACIC VASCULAR SURGERY)

## 2018-05-18 PROCEDURE — 77030005401 HC CATH RAD ARRO -A: Performed by: ANESTHESIOLOGY

## 2018-05-18 PROCEDURE — 77030002888 HC SUT CHRMC J&J -A: Performed by: THORACIC SURGERY (CARDIOTHORACIC VASCULAR SURGERY)

## 2018-05-18 PROCEDURE — 76010000198 HC CV SURG 5 TO 5.5 HR INTENSV-TIER 1: Performed by: THORACIC SURGERY (CARDIOTHORACIC VASCULAR SURGERY)

## 2018-05-18 PROCEDURE — 74011000258 HC RX REV CODE- 258: Performed by: THORACIC SURGERY (CARDIOTHORACIC VASCULAR SURGERY)

## 2018-05-18 PROCEDURE — 80076 HEPATIC FUNCTION PANEL: CPT | Performed by: THORACIC SURGERY (CARDIOTHORACIC VASCULAR SURGERY)

## 2018-05-18 PROCEDURE — C1769 GUIDE WIRE: HCPCS | Performed by: ANESTHESIOLOGY

## 2018-05-18 PROCEDURE — 77030031139 HC SUT VCRL2 J&J -A: Performed by: THORACIC SURGERY (CARDIOTHORACIC VASCULAR SURGERY)

## 2018-05-18 PROCEDURE — 77030006689 HC BLD OPHTH BVR BD -A: Performed by: THORACIC SURGERY (CARDIOTHORACIC VASCULAR SURGERY)

## 2018-05-18 PROCEDURE — 0210099 BYPASS CORONARY ARTERY, ONE ARTERY FROM LEFT INTERNAL MAMMARY WITH AUTOLOGOUS VENOUS TISSUE, OPEN APPROACH: ICD-10-PCS | Performed by: THORACIC SURGERY (CARDIOTHORACIC VASCULAR SURGERY)

## 2018-05-18 PROCEDURE — 85384 FIBRINOGEN ACTIVITY: CPT | Performed by: THORACIC SURGERY (CARDIOTHORACIC VASCULAR SURGERY)

## 2018-05-18 PROCEDURE — 84295 ASSAY OF SERUM SODIUM: CPT

## 2018-05-18 PROCEDURE — 99223 1ST HOSP IP/OBS HIGH 75: CPT | Performed by: INTERNAL MEDICINE

## 2018-05-18 PROCEDURE — 77030018729 HC ELECTRD DEFIB PAD CARD -B: Performed by: THORACIC SURGERY (CARDIOTHORACIC VASCULAR SURGERY)

## 2018-05-18 PROCEDURE — 77030003010 HC SUT SURG STL J&J -B: Performed by: THORACIC SURGERY (CARDIOTHORACIC VASCULAR SURGERY)

## 2018-05-18 PROCEDURE — C1729 CATH, DRAINAGE: HCPCS | Performed by: THORACIC SURGERY (CARDIOTHORACIC VASCULAR SURGERY)

## 2018-05-18 PROCEDURE — 77030008771 HC TU NG SALEM SUMP -A: Performed by: ANESTHESIOLOGY

## 2018-05-18 PROCEDURE — 5A1221Z PERFORMANCE OF CARDIAC OUTPUT, CONTINUOUS: ICD-10-PCS | Performed by: THORACIC SURGERY (CARDIOTHORACIC VASCULAR SURGERY)

## 2018-05-18 PROCEDURE — 74011000250 HC RX REV CODE- 250: Performed by: INTERNAL MEDICINE

## 2018-05-18 PROCEDURE — 77030025646 HC AUTOTRNSFUS KT TERU -C: Performed by: THORACIC SURGERY (CARDIOTHORACIC VASCULAR SURGERY)

## 2018-05-18 PROCEDURE — 77030009355 HC CRDPLG DEL SET QUES -C: Performed by: THORACIC SURGERY (CARDIOTHORACIC VASCULAR SURGERY)

## 2018-05-18 PROCEDURE — 77030013861 HC PNCH AORT CLNCUT QUES -B: Performed by: THORACIC SURGERY (CARDIOTHORACIC VASCULAR SURGERY)

## 2018-05-18 PROCEDURE — 85014 HEMATOCRIT: CPT | Performed by: THORACIC SURGERY (CARDIOTHORACIC VASCULAR SURGERY)

## 2018-05-18 PROCEDURE — 83036 HEMOGLOBIN GLYCOSYLATED A1C: CPT | Performed by: THORACIC SURGERY (CARDIOTHORACIC VASCULAR SURGERY)

## 2018-05-18 PROCEDURE — 3E080GC INTRODUCTION OF OTHER THERAPEUTIC SUBSTANCE INTO HEART, OPEN APPROACH: ICD-10-PCS | Performed by: THORACIC SURGERY (CARDIOTHORACIC VASCULAR SURGERY)

## 2018-05-18 PROCEDURE — 77030010813: Performed by: THORACIC SURGERY (CARDIOTHORACIC VASCULAR SURGERY)

## 2018-05-18 PROCEDURE — 86580 TB INTRADERMAL TEST: CPT | Performed by: THORACIC SURGERY (CARDIOTHORACIC VASCULAR SURGERY)

## 2018-05-18 PROCEDURE — 74011636637 HC RX REV CODE- 636/637: Performed by: NURSE PRACTITIONER

## 2018-05-18 PROCEDURE — 77030008703 HC TU ET UNCUF COVD -A: Performed by: ANESTHESIOLOGY

## 2018-05-18 PROCEDURE — 77030015758: Performed by: THORACIC SURGERY (CARDIOTHORACIC VASCULAR SURGERY)

## 2018-05-18 PROCEDURE — 77030013794 HC KT TRNSDUC BLD EDWD -B: Performed by: ANESTHESIOLOGY

## 2018-05-18 PROCEDURE — 36600 WITHDRAWAL OF ARTERIAL BLOOD: CPT

## 2018-05-18 PROCEDURE — 74011250636 HC RX REV CODE- 250/636: Performed by: NURSE PRACTITIONER

## 2018-05-18 PROCEDURE — 65610000006 HC RM INTENSIVE CARE

## 2018-05-18 PROCEDURE — 80051 ELECTROLYTE PANEL: CPT

## 2018-05-18 PROCEDURE — 77030020407 HC IV BLD WRMR ST 3M -A: Performed by: ANESTHESIOLOGY

## 2018-05-18 PROCEDURE — C1751 CATH, INF, PER/CENT/MIDLINE: HCPCS | Performed by: ANESTHESIOLOGY

## 2018-05-18 PROCEDURE — 71045 X-RAY EXAM CHEST 1 VIEW: CPT

## 2018-05-18 PROCEDURE — 77030009995: Performed by: THORACIC SURGERY (CARDIOTHORACIC VASCULAR SURGERY)

## 2018-05-18 PROCEDURE — 77030016791 HC CATH CV SWN1 EDWD -C: Performed by: ANESTHESIOLOGY

## 2018-05-18 PROCEDURE — 77030002996 HC SUT SLK J&J -A: Performed by: THORACIC SURGERY (CARDIOTHORACIC VASCULAR SURGERY)

## 2018-05-18 PROCEDURE — 77030034927 HC PK PROC CPB INSPIRE PERF LIVA -F: Performed by: THORACIC SURGERY (CARDIOTHORACIC VASCULAR SURGERY)

## 2018-05-18 RX ORDER — DEXTROSE 50 % IN WATER (D50W) INTRAVENOUS SYRINGE
25 AS NEEDED
Status: DISCONTINUED | OUTPATIENT
Start: 2018-05-18 | End: 2018-05-19

## 2018-05-18 RX ORDER — SUFENTANIL CITRATE 50 UG/ML
INJECTION EPIDURAL; INTRAVENOUS AS NEEDED
Status: DISCONTINUED | OUTPATIENT
Start: 2018-05-18 | End: 2018-05-18 | Stop reason: HOSPADM

## 2018-05-18 RX ORDER — SODIUM CHLORIDE 0.9 % (FLUSH) 0.9 %
5-10 SYRINGE (ML) INJECTION EVERY 8 HOURS
Status: DISCONTINUED | OUTPATIENT
Start: 2018-05-18 | End: 2018-05-19

## 2018-05-18 RX ORDER — GLYCOPYRROLATE 0.2 MG/ML
INJECTION INTRAMUSCULAR; INTRAVENOUS AS NEEDED
Status: DISCONTINUED | OUTPATIENT
Start: 2018-05-18 | End: 2018-05-18 | Stop reason: HOSPADM

## 2018-05-18 RX ORDER — HEPARIN SODIUM 1000 [USP'U]/ML
INJECTION, SOLUTION INTRAVENOUS; SUBCUTANEOUS AS NEEDED
Status: DISCONTINUED | OUTPATIENT
Start: 2018-05-18 | End: 2018-05-18 | Stop reason: HOSPADM

## 2018-05-18 RX ORDER — ALBUMIN HUMAN 50 G/1000ML
25 SOLUTION INTRAVENOUS AS NEEDED
Status: COMPLETED | OUTPATIENT
Start: 2018-05-18 | End: 2018-05-21

## 2018-05-18 RX ORDER — ROCURONIUM BROMIDE 10 MG/ML
INJECTION, SOLUTION INTRAVENOUS AS NEEDED
Status: DISCONTINUED | OUTPATIENT
Start: 2018-05-18 | End: 2018-05-18 | Stop reason: HOSPADM

## 2018-05-18 RX ORDER — OXYCODONE AND ACETAMINOPHEN 5; 325 MG/1; MG/1
1 TABLET ORAL
Status: DISCONTINUED | OUTPATIENT
Start: 2018-05-18 | End: 2018-05-21

## 2018-05-18 RX ORDER — NITROGLYCERIN 20 MG/100ML
INJECTION INTRAVENOUS
Status: DISCONTINUED | OUTPATIENT
Start: 2018-05-18 | End: 2018-05-18 | Stop reason: HOSPADM

## 2018-05-18 RX ORDER — SODIUM CHLORIDE 0.9 % (FLUSH) 0.9 %
5-10 SYRINGE (ML) INJECTION AS NEEDED
Status: DISCONTINUED | OUTPATIENT
Start: 2018-05-18 | End: 2018-05-19

## 2018-05-18 RX ORDER — PAPAVERINE HYDROCHLORIDE 30 MG/ML
INJECTION INTRAMUSCULAR; INTRAVENOUS AS NEEDED
Status: DISCONTINUED | OUTPATIENT
Start: 2018-05-18 | End: 2018-05-18 | Stop reason: HOSPADM

## 2018-05-18 RX ORDER — SODIUM CHLORIDE, SODIUM LACTATE, POTASSIUM CHLORIDE, CALCIUM CHLORIDE 600; 310; 30; 20 MG/100ML; MG/100ML; MG/100ML; MG/100ML
INJECTION, SOLUTION INTRAVENOUS
Status: DISCONTINUED | OUTPATIENT
Start: 2018-05-18 | End: 2018-05-18 | Stop reason: HOSPADM

## 2018-05-18 RX ORDER — MIDAZOLAM HYDROCHLORIDE 1 MG/ML
1 INJECTION, SOLUTION INTRAMUSCULAR; INTRAVENOUS
Status: DISCONTINUED | OUTPATIENT
Start: 2018-05-18 | End: 2018-05-19

## 2018-05-18 RX ORDER — FAMOTIDINE 20 MG/1
20 TABLET, FILM COATED ORAL ONCE
Status: DISCONTINUED | OUTPATIENT
Start: 2018-05-18 | End: 2018-05-18 | Stop reason: SDUPTHER

## 2018-05-18 RX ORDER — MUPIROCIN 20 MG/G
OINTMENT TOPICAL 2 TIMES DAILY
Status: DISCONTINUED | OUTPATIENT
Start: 2018-05-18 | End: 2018-05-19

## 2018-05-18 RX ORDER — SODIUM CHLORIDE 9 MG/ML
25 INJECTION, SOLUTION INTRAVENOUS CONTINUOUS
Status: DISCONTINUED | OUTPATIENT
Start: 2018-05-18 | End: 2018-05-19

## 2018-05-18 RX ORDER — MIDAZOLAM HYDROCHLORIDE 1 MG/ML
INJECTION, SOLUTION INTRAMUSCULAR; INTRAVENOUS AS NEEDED
Status: DISCONTINUED | OUTPATIENT
Start: 2018-05-18 | End: 2018-05-18 | Stop reason: HOSPADM

## 2018-05-18 RX ORDER — LIDOCAINE HYDROCHLORIDE 10 MG/ML
0.1 INJECTION INFILTRATION; PERINEURAL AS NEEDED
Status: DISCONTINUED | OUTPATIENT
Start: 2018-05-18 | End: 2018-05-18 | Stop reason: HOSPADM

## 2018-05-18 RX ORDER — NITROGLYCERIN 20 MG/100ML
10-100 INJECTION INTRAVENOUS
Status: DISCONTINUED | OUTPATIENT
Start: 2018-05-18 | End: 2018-05-19

## 2018-05-18 RX ORDER — GUAIFENESIN 100 MG/5ML
81 LIQUID (ML) ORAL DAILY
Status: DISCONTINUED | OUTPATIENT
Start: 2018-05-19 | End: 2018-05-19

## 2018-05-18 RX ORDER — SODIUM CHLORIDE, SODIUM LACTATE, POTASSIUM CHLORIDE, CALCIUM CHLORIDE 600; 310; 30; 20 MG/100ML; MG/100ML; MG/100ML; MG/100ML
75 INJECTION, SOLUTION INTRAVENOUS CONTINUOUS
Status: DISCONTINUED | OUTPATIENT
Start: 2018-05-18 | End: 2018-05-18 | Stop reason: HOSPADM

## 2018-05-18 RX ORDER — LIDOCAINE HCL/PF 100 MG/5ML
50-100 SYRINGE (ML) INTRAVENOUS
Status: DISCONTINUED | OUTPATIENT
Start: 2018-05-18 | End: 2018-05-19

## 2018-05-18 RX ORDER — VECURONIUM BROMIDE FOR INJECTION 1 MG/ML
INJECTION, POWDER, LYOPHILIZED, FOR SOLUTION INTRAVENOUS AS NEEDED
Status: DISCONTINUED | OUTPATIENT
Start: 2018-05-18 | End: 2018-05-18 | Stop reason: HOSPADM

## 2018-05-18 RX ORDER — CHLORHEXIDINE GLUCONATE 1.2 MG/ML
10 RINSE ORAL 2 TIMES DAILY
Status: DISCONTINUED | OUTPATIENT
Start: 2018-05-18 | End: 2018-05-19

## 2018-05-18 RX ORDER — POTASSIUM CHLORIDE 14.9 MG/ML
10 INJECTION INTRAVENOUS AS NEEDED
Status: DISCONTINUED | OUTPATIENT
Start: 2018-05-18 | End: 2018-05-19

## 2018-05-18 RX ORDER — METOPROLOL TARTRATE 25 MG/1
25 TABLET, FILM COATED ORAL EVERY 12 HOURS
Status: DISCONTINUED | OUTPATIENT
Start: 2018-05-19 | End: 2018-05-19

## 2018-05-18 RX ORDER — MAGNESIUM SULFATE 1 G/100ML
1 INJECTION INTRAVENOUS AS NEEDED
Status: DISCONTINUED | OUTPATIENT
Start: 2018-05-18 | End: 2018-05-19

## 2018-05-18 RX ORDER — PROPOFOL 10 MG/ML
0-50 VIAL (ML) INTRAVENOUS
Status: DISCONTINUED | OUTPATIENT
Start: 2018-05-18 | End: 2018-05-19

## 2018-05-18 RX ORDER — DEXTROSE, SODIUM CHLORIDE, AND POTASSIUM CHLORIDE 5; .45; .15 G/100ML; G/100ML; G/100ML
25 INJECTION INTRAVENOUS CONTINUOUS
Status: DISCONTINUED | OUTPATIENT
Start: 2018-05-18 | End: 2018-05-19

## 2018-05-18 RX ORDER — ETOMIDATE 2 MG/ML
INJECTION INTRAVENOUS AS NEEDED
Status: DISCONTINUED | OUTPATIENT
Start: 2018-05-18 | End: 2018-05-18 | Stop reason: HOSPADM

## 2018-05-18 RX ORDER — CEFAZOLIN SODIUM/WATER 2 G/20 ML
2 SYRINGE (ML) INTRAVENOUS EVERY 8 HOURS
Status: DISCONTINUED | OUTPATIENT
Start: 2018-05-18 | End: 2018-05-18

## 2018-05-18 RX ORDER — NALOXONE HYDROCHLORIDE 0.4 MG/ML
0.4 INJECTION, SOLUTION INTRAMUSCULAR; INTRAVENOUS; SUBCUTANEOUS AS NEEDED
Status: DISCONTINUED | OUTPATIENT
Start: 2018-05-18 | End: 2018-05-19

## 2018-05-18 RX ORDER — PROTAMINE SULFATE 10 MG/ML
INJECTION, SOLUTION INTRAVENOUS AS NEEDED
Status: DISCONTINUED | OUTPATIENT
Start: 2018-05-18 | End: 2018-05-18 | Stop reason: HOSPADM

## 2018-05-18 RX ORDER — ALBUMIN HUMAN 50 G/1000ML
SOLUTION INTRAVENOUS
Status: DISCONTINUED | OUTPATIENT
Start: 2018-05-18 | End: 2018-05-18 | Stop reason: HOSPADM

## 2018-05-18 RX ORDER — ATORVASTATIN CALCIUM 40 MG/1
80 TABLET, FILM COATED ORAL
Status: DISCONTINUED | OUTPATIENT
Start: 2018-05-18 | End: 2018-05-19

## 2018-05-18 RX ORDER — AMIODARONE HYDROCHLORIDE 200 MG/1
200 TABLET ORAL EVERY 12 HOURS
Status: DISCONTINUED | OUTPATIENT
Start: 2018-05-18 | End: 2018-05-19

## 2018-05-18 RX ORDER — SODIUM BICARBONATE 1 MEQ/ML
50 SYRINGE (ML) INTRAVENOUS AS NEEDED
Status: DISCONTINUED | OUTPATIENT
Start: 2018-05-18 | End: 2018-05-19

## 2018-05-18 RX ORDER — MORPHINE SULFATE 10 MG/ML
3-5 INJECTION, SOLUTION INTRAMUSCULAR; INTRAVENOUS
Status: DISCONTINUED | OUTPATIENT
Start: 2018-05-18 | End: 2018-05-19

## 2018-05-18 RX ORDER — VANCOMYCIN HYDROCHLORIDE
1250 ONCE
Status: COMPLETED | OUTPATIENT
Start: 2018-05-19 | End: 2018-05-19

## 2018-05-18 RX ORDER — EPHEDRINE SULFATE 50 MG/ML
INJECTION, SOLUTION INTRAVENOUS AS NEEDED
Status: DISCONTINUED | OUTPATIENT
Start: 2018-05-18 | End: 2018-05-18 | Stop reason: HOSPADM

## 2018-05-18 RX ORDER — HYDROCORTISONE SODIUM SUCCINATE 100 MG/2ML
50 INJECTION, POWDER, FOR SOLUTION INTRAMUSCULAR; INTRAVENOUS EVERY 8 HOURS
Status: DISCONTINUED | OUTPATIENT
Start: 2018-05-18 | End: 2018-05-19

## 2018-05-18 RX ORDER — MIDAZOLAM HYDROCHLORIDE 1 MG/ML
2 INJECTION, SOLUTION INTRAMUSCULAR; INTRAVENOUS
Status: DISCONTINUED | OUTPATIENT
Start: 2018-05-18 | End: 2018-05-18 | Stop reason: HOSPADM

## 2018-05-18 RX ORDER — INSULIN GLARGINE 100 [IU]/ML
20 INJECTION, SOLUTION SUBCUTANEOUS ONCE
Status: COMPLETED | OUTPATIENT
Start: 2018-05-18 | End: 2018-05-18

## 2018-05-18 RX ORDER — SODIUM CHLORIDE 9 MG/ML
INJECTION, SOLUTION INTRAVENOUS
Status: DISCONTINUED | OUTPATIENT
Start: 2018-05-18 | End: 2018-05-18 | Stop reason: HOSPADM

## 2018-05-18 RX ADMIN — ETOMIDATE 24 MG: 2 INJECTION INTRAVENOUS at 13:00

## 2018-05-18 RX ADMIN — HYDROCORTISONE SODIUM SUCCINATE 50 MG: 100 INJECTION, POWDER, FOR SOLUTION INTRAMUSCULAR; INTRAVENOUS at 21:21

## 2018-05-18 RX ADMIN — MIDAZOLAM HYDROCHLORIDE 5 MG: 1 INJECTION, SOLUTION INTRAMUSCULAR; INTRAVENOUS at 16:10

## 2018-05-18 RX ADMIN — SODIUM CHLORIDE 2 UNITS/HR: 900 INJECTION, SOLUTION INTRAVENOUS at 19:00

## 2018-05-18 RX ADMIN — DEXTROSE MONOHYDRATE, SODIUM CHLORIDE, AND POTASSIUM CHLORIDE 25 ML/HR: 50; 4.5; 1.49 INJECTION, SOLUTION INTRAVENOUS at 18:37

## 2018-05-18 RX ADMIN — INSULIN GLARGINE 20 UNITS: 100 INJECTION, SOLUTION SUBCUTANEOUS at 21:28

## 2018-05-18 RX ADMIN — VECURONIUM BROMIDE FOR INJECTION 2 MG: 1 INJECTION, POWDER, LYOPHILIZED, FOR SOLUTION INTRAVENOUS at 16:06

## 2018-05-18 RX ADMIN — EPHEDRINE SULFATE 5 MG: 50 INJECTION, SOLUTION INTRAVENOUS at 15:08

## 2018-05-18 RX ADMIN — EPHEDRINE SULFATE 10 MG: 50 INJECTION, SOLUTION INTRAVENOUS at 13:26

## 2018-05-18 RX ADMIN — CHLORHEXIDINE GLUCONATE 10 ML: 1.2 RINSE ORAL at 18:39

## 2018-05-18 RX ADMIN — TUBERCULIN PURIFIED PROTEIN DERIVATIVE 5 UNITS: 5 INJECTION, SOLUTION INTRADERMAL at 21:27

## 2018-05-18 RX ADMIN — EPHEDRINE SULFATE 10 MG: 50 INJECTION, SOLUTION INTRAVENOUS at 13:22

## 2018-05-18 RX ADMIN — EPHEDRINE SULFATE 10 MG: 50 INJECTION, SOLUTION INTRAVENOUS at 13:09

## 2018-05-18 RX ADMIN — SODIUM CHLORIDE: 9 INJECTION, SOLUTION INTRAVENOUS at 13:21

## 2018-05-18 RX ADMIN — GLYCOPYRROLATE 0.2 MG: 0.2 INJECTION INTRAMUSCULAR; INTRAVENOUS at 13:47

## 2018-05-18 RX ADMIN — SODIUM CHLORIDE, SODIUM LACTATE, POTASSIUM CHLORIDE, CALCIUM CHLORIDE: 600; 310; 30; 20 INJECTION, SOLUTION INTRAVENOUS at 13:21

## 2018-05-18 RX ADMIN — FAMOTIDINE 20 MG: 20 TABLET ORAL at 06:05

## 2018-05-18 RX ADMIN — HEPARIN SODIUM 38000 UNITS: 1000 INJECTION, SOLUTION INTRAVENOUS; SUBCUTANEOUS at 15:08

## 2018-05-18 RX ADMIN — SODIUM CHLORIDE: 9 INJECTION, SOLUTION INTRAVENOUS at 17:29

## 2018-05-18 RX ADMIN — SODIUM CHLORIDE, SODIUM LACTATE, POTASSIUM CHLORIDE, CALCIUM CHLORIDE: 600; 310; 30; 20 INJECTION, SOLUTION INTRAVENOUS at 12:38

## 2018-05-18 RX ADMIN — PROTAMINE SULFATE 350 MG: 10 INJECTION, SOLUTION INTRAVENOUS at 16:53

## 2018-05-18 RX ADMIN — AMLODIPINE BESYLATE 10 MG: 10 TABLET ORAL at 08:30

## 2018-05-18 RX ADMIN — AMIODARONE HYDROCHLORIDE 200 MG: 200 TABLET ORAL at 08:30

## 2018-05-18 RX ADMIN — ALBUMIN (HUMAN) 25 G: 12.5 INJECTION, SOLUTION INTRAVENOUS at 22:46

## 2018-05-18 RX ADMIN — SUFENTANIL CITRATE 25 MCG: 50 INJECTION EPIDURAL; INTRAVENOUS at 14:30

## 2018-05-18 RX ADMIN — LEVOTHYROXINE SODIUM 88 MCG: 88 TABLET ORAL at 08:21

## 2018-05-18 RX ADMIN — ASPIRIN 81 MG 81 MG: 81 TABLET ORAL at 08:20

## 2018-05-18 RX ADMIN — VECURONIUM BROMIDE FOR INJECTION 2 MG: 1 INJECTION, POWDER, LYOPHILIZED, FOR SOLUTION INTRAVENOUS at 15:32

## 2018-05-18 RX ADMIN — SODIUM CHLORIDE 25 ML/HR: 900 INJECTION, SOLUTION INTRAVENOUS at 18:37

## 2018-05-18 RX ADMIN — ATORVASTATIN CALCIUM 80 MG: 40 TABLET, FILM COATED ORAL at 21:20

## 2018-05-18 RX ADMIN — MIDAZOLAM HYDROCHLORIDE 2 MG: 1 INJECTION, SOLUTION INTRAMUSCULAR; INTRAVENOUS at 12:47

## 2018-05-18 RX ADMIN — MIDAZOLAM HYDROCHLORIDE 1 MG: 1 INJECTION, SOLUTION INTRAMUSCULAR; INTRAVENOUS at 14:34

## 2018-05-18 RX ADMIN — ALBUMIN (HUMAN) 25 G: 12.5 INJECTION, SOLUTION INTRAVENOUS at 19:47

## 2018-05-18 RX ADMIN — NITROGLYCERIN 10 MCG/MIN: 20 INJECTION INTRAVENOUS at 13:26

## 2018-05-18 RX ADMIN — VECURONIUM BROMIDE FOR INJECTION 4 MG: 1 INJECTION, POWDER, LYOPHILIZED, FOR SOLUTION INTRAVENOUS at 14:28

## 2018-05-18 RX ADMIN — EPHEDRINE SULFATE 5 MG: 50 INJECTION, SOLUTION INTRAVENOUS at 13:03

## 2018-05-18 RX ADMIN — SUFENTANIL CITRATE 15 MCG: 50 INJECTION EPIDURAL; INTRAVENOUS at 14:10

## 2018-05-18 RX ADMIN — SUFENTANIL CITRATE 50 MCG: 50 INJECTION EPIDURAL; INTRAVENOUS at 14:32

## 2018-05-18 RX ADMIN — SUFENTANIL CITRATE 100 MCG: 50 INJECTION EPIDURAL; INTRAVENOUS at 13:00

## 2018-05-18 RX ADMIN — Medication 10 ML: at 06:06

## 2018-05-18 RX ADMIN — Medication 10 ML: at 21:20

## 2018-05-18 RX ADMIN — MIDAZOLAM HYDROCHLORIDE 2 MG: 1 INJECTION, SOLUTION INTRAMUSCULAR; INTRAVENOUS at 13:00

## 2018-05-18 RX ADMIN — VANCOMYCIN HYDROCHLORIDE 2000 MG: 10 INJECTION, POWDER, LYOPHILIZED, FOR SOLUTION INTRAVENOUS at 11:37

## 2018-05-18 RX ADMIN — PREDNISONE 5 MG: 5 TABLET ORAL at 08:20

## 2018-05-18 RX ADMIN — AZTREONAM 1 G: 1 INJECTION, POWDER, LYOPHILIZED, FOR SOLUTION INTRAMUSCULAR; INTRAVENOUS at 19:26

## 2018-05-18 RX ADMIN — EPHEDRINE SULFATE 5 MG: 50 INJECTION, SOLUTION INTRAVENOUS at 13:04

## 2018-05-18 RX ADMIN — ROCURONIUM BROMIDE 50 MG: 10 INJECTION, SOLUTION INTRAVENOUS at 16:10

## 2018-05-18 RX ADMIN — VECURONIUM BROMIDE FOR INJECTION 2 MG: 1 INJECTION, POWDER, LYOPHILIZED, FOR SOLUTION INTRAVENOUS at 14:00

## 2018-05-18 RX ADMIN — ROCURONIUM BROMIDE 50 MG: 10 INJECTION, SOLUTION INTRAVENOUS at 13:01

## 2018-05-18 RX ADMIN — AMIODARONE HYDROCHLORIDE 200 MG: 200 TABLET ORAL at 21:20

## 2018-05-18 RX ADMIN — MUPIROCIN: 20 OINTMENT TOPICAL at 08:28

## 2018-05-18 RX ADMIN — EPHEDRINE SULFATE 10 MG: 50 INJECTION, SOLUTION INTRAVENOUS at 13:36

## 2018-05-18 RX ADMIN — SUFENTANIL CITRATE 35 MCG: 50 INJECTION EPIDURAL; INTRAVENOUS at 14:26

## 2018-05-18 RX ADMIN — MUPIROCIN: 20 OINTMENT TOPICAL at 18:39

## 2018-05-18 RX ADMIN — SUFENTANIL CITRATE 25 MCG: 50 INJECTION EPIDURAL; INTRAVENOUS at 14:31

## 2018-05-18 NOTE — PROGRESS NOTES
TRANSFER - OUT REPORT:    Verbal report given to Adilia Mccall (name) on Janice Holley  being transferred to Pre-op(unit) for ordered procedure       Report consisted of patients Situation, Background, Assessment and   Recommendations(SBAR). Information from the following report(s) SBAR, Kardex, Intake/Output, MAR, Recent Results, Med Rec Status and Cardiac Rhythm SB was reviewed with the receiving nurse. Lines:   Peripheral IV 05/17/18 Left Antecubital (Active)   Site Assessment Clean, dry, & intact 5/18/2018  7:01 AM   Phlebitis Assessment 0 5/18/2018  7:01 AM   Infiltration Assessment 0 5/18/2018  7:01 AM   Dressing Status Clean, dry, & intact 5/18/2018  7:01 AM   Dressing Type Transparent;Tape 5/18/2018  7:01 AM   Hub Color/Line Status Pink;Patent; Infusing 5/18/2018  7:01 AM   Alcohol Cap Used No 5/18/2018  7:01 AM       Peripheral IV 05/17/18 Right Wrist (Active)   Site Assessment Clean, dry, & intact 5/18/2018  7:01 AM   Phlebitis Assessment 0 5/18/2018  7:01 AM   Infiltration Assessment 0 5/18/2018  7:01 AM   Dressing Status Clean, dry, & intact 5/18/2018  7:01 AM   Dressing Type Transparent;Tape 5/18/2018  7:01 AM   Hub Color/Line Status Green;Patent; Flushed 5/18/2018  7:01 AM   Alcohol Cap Used No 5/18/2018  7:01 AM        Opportunity for questions and clarification was provided.       Patient transported with:   Monitor  Registered Nurse  Tech

## 2018-05-18 NOTE — PROGRESS NOTES
Patient out from operating room and placed on the ventilator on documented settings. Patient is orally intubated with a # 8.0 ET Tube secured at the 22 cm bertha at the lip. Breath sounds are clear. Trachea is midline. Negative for subcutaneous air, chest excursion is symmetric. Negative for pitting edema. Patient is also Negative for cyanosis. Patient has a Left Radial arterial line. Patient has 2 Chest tubes. All alarms are set and audible. Resuscitation bag is at the head of the bed. Ventilator Settings  Mode FIO2 Rate Tidal Volume Pressure PEEP I:E Ratio   SIMV, Volume control  69 %    500 ml  10 cm H2O  8 cm H20         Peak airway pressure: 21.9 cm H2O   Minute ventilation: 7.6 l/min     ABG: No results for input(s): PH, PCO2, PO2, HCO3 in the last 72 hours.       Tim Figueroa, RT

## 2018-05-18 NOTE — PROGRESS NOTES
Type & Cross done and sent to lab.       Pre-op CABG video viewed by patient and wife    Surgery and blood consents signed    Pre-op checklist started

## 2018-05-18 NOTE — PROGRESS NOTES
Physical Therapy Note:    Participated in interdisciplinary ICU rounds, patient going for CABG today. Will benefit from skilled PT to increase independence with functional mobility after surgery. Will await potential PT orders pending MD agreement and patient appropriateness.     Thank you  KYRIE JavedT

## 2018-05-18 NOTE — PROGRESS NOTES
[facility] CARDIOLOGY PROGRESS NOTE           5/18/2018 7:17 AM    Admit Date: 5/16/2018      Subjective:   Patient with complex CAD and planning CABG today. He has been stabel overnight and feels well. ROS:  Cardiovascular:  As noted above    Objective:      Vitals:    05/18/18 0401 05/18/18 0501 05/18/18 0601 05/18/18 0636   BP: 169/85 169/75 153/77    Pulse: (!) 53 (!) 46 (!) 51    Resp: 9 21 13    Temp:       SpO2: 99% 96% 98%    Weight:    88.5 kg (195 lb 1.7 oz)   Height:    5' 11\" (1.803 m)       Physical Exam:  General-No Acute Distress  Neck- supple, no JVD  CV- regular rate and rhythm no MRG  Lung- clear bilaterally  Abd- soft, nontender, nondistended  Ext- no edema bilaterally. Skin- warm and dry    Data Review:   Recent Labs      05/18/18   0351  05/17/18   0421  05/16/18 2052   NA  143  144   --    K  4.2  3.2*   --    MG  2.1   --    --    BUN  15  14   --    CREA  1.00  0.96   --    GLU  75  89   --    WBC  5.9  7.0   --    HGB  13.7  14.2   --    HCT  40.3*  41.0*   --    PLT  168  190   --    INR  1.1   --    --    TROIQ   --   13.30*  2.66*   CHOL   --   183   --    LDLC   --   123*   --    HDL   --   46   --        Assessment/Plan:     Principal Problem:    NSTEMI (non-ST elevated myocardial infarction) ([facility] (5/16/2018)    Complex 3vCAD and planning CABG. Will maximize therapy as he recovers.   Would add clopidogrel prior to DC due to NSTEMI    Active Problems:    Bradycardia (8/23/2017)    This is stable      Essential hypertension (8/23/2017)    Will optimize therapy prior to Lane Amanda MD  5/18/2018 7:17 AM

## 2018-05-18 NOTE — BRIEF OP NOTE
BRIEF OPERATIVE NOTE    Date of Procedure: 5/18/2018   Preoperative Diagnosis: Atherosclerosis of native coronary artery of native heart with [facility] [I25.110]  Postoperative Diagnosis: Atherosclerosis of native coronary artery of native heart with unstable angina pectoris ([facility] [I25.110]    Procedure(s):  CORONARY ARTERY BYPASS GRAFT X 3; LIMA   VEIN HARVEST GREATER SAPHENOUS  Surgeon(s) and Role:     * Nicole Reyes MD - Primary         Surgical Assistant:     Surgical Staff:  Circ-1: Chantale Giron RN  Circ-Relief: Elena Mayer RN  Perfusionist: Katie Machado  Scrub Tech-1: Liz Phan  Scrub RN-1: Arline Bonner RN  Event Time In   Incision Start 1402   Incision Close 1729     Anesthesia: General   Estimated Blood Loss: minimal  Specimens: * No specimens in log *   Findings: cad   Complications: none  Implants: * No implants in log *

## 2018-05-18 NOTE — INTERDISCIPLINARY ROUNDS
Interdisciplinary team rounds were held 5/18/2018 with the following team members:Nursing, Nurse Practitioner, Nutrition, Pastoral Care, Pharmacy, Physical Therapy, Physician, [de-identified] Assistant, Respiratory Therapy and Clinical Coordinator. Plan of care discussed. See clinical pathway and/or care plan for interventions and desired outcomes.

## 2018-05-18 NOTE — CONSULTS
Cardiovascular ICU Consult Note: 5/18/2018  Angeline Villarreal  Admission Date: 5/16/2018     The patient's chart is reviewed and the patient is discussed with the staff. Subjective:     Patient is seen at the request of Dr. Francis Escalante for respiratory management status post cardiac surgery. Patient had CABG x3. Currently is sedated in CV-ICU and orally intubated receiving  mechanical ventilation. Was admitted with chest pain and diagnosed with NSTEMI. Cardiac work up was done and cardiac catheterization revealed multivessel coronary artery disease and surgical revascularization was recommended. Chronic medical:  MR, HTN, bradycardia, HLD, chronic steroid for hx pituitary tumor surgery x2 and never smoked. We have been asked to see in the CV-ICU for mechanical ventilation management and weaning. Prior to Admission Medications   Prescriptions Last Dose Informant Patient Reported? Taking? amLODIPine (NORVASC) 10 mg tablet   Yes No   Sig: Take  by mouth daily. levothyroxine (SYNTHROID) 88 mcg tablet   Yes No   Sig: Take  by mouth Daily (before breakfast). predniSONE (DELTASONE) 5 mg tablet   Yes No   Sig: Take  by mouth. Facility-Administered Medications: None       Review of Systems  Review of systems not obtained due to patient factors. 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      Social History Main Topics    Smoking status: Never Smoker    Smokeless tobacco: Never Used    Alcohol use No    Drug use: No    Sexual activity: Not on file Other Topics Concern    Not on file     Social History Narrative     Family History   Problem Relation Age of Onset    No Known Problems Mother     No Known Problems Father      Allergies   Allergen Reactions    Pcn [Penicillins] Swelling       Current Facility-Administered Medications   Medication Dose Route Frequency    0.9% sodium chloride infusion  25 mL/hr IntraVENous CONTINUOUS    dextrose 5% - 0.45% NaCl with KCl 20 mEq/L infusion  25 mL/hr IntraVENous CONTINUOUS    sodium chloride (NS) flush 5-10 mL  5-10 mL IntraVENous Q8H    sodium chloride (NS) flush 5-10 mL  5-10 mL IntraVENous PRN    oxyCODONE-acetaminophen (PERCOCET) 5-325 mg per tablet 1 Tab  1 Tab Oral Q4H PRN    morphine injection 3-5 mg  3-5 mg IntraVENous Q1H PRN    naloxone (NARCAN) injection 0.4 mg  0.4 mg IntraVENous PRN    mupirocin (BACTROBAN) 2 % ointment   Both Nostrils BID    ceFAZolin (ANCEF) 2 g/20 mL in sterile water IV syringe  2 g IntraVENous Q8H    sodium bicarbonate 8.4 % (1 mEq/mL) injection 50 mEq  50 mEq IntraVENous PRN    EPINEPHrine (ADRENALIN) 4 mg in 0.9% sodium chloride 250 mL infusion  0.05-0.1 mcg/kg/min IntraVENous TITRATE    nitroglycerin (Tridil) 200 mcg/ml infusion   mcg/min IntraVENous TITRATE    PHENYLephrine (CIPRIANO-SYNEPHRINE) 30 mg in 0.9% sodium chloride 250 mL infusion   mcg/min IntraVENous TITRATE    lidocaine (PF) (XYLOCAINE) 100 mg/5 mL (2 %) injection syringe  mg   mg IntraVENous ONCE PRN    amiodarone (CORDARONE) tablet 200 mg  200 mg Oral Q12H    [START ON 5/19/2018] metoprolol tartrate (LOPRESSOR) tablet 25 mg  25 mg Oral Q12H    atorvastatin (LIPITOR) tablet 80 mg  80 mg Oral QHS    insulin regular (NOVOLIN R, HUMULIN R) 100 Units in 0.9% sodium chloride 100 mL infusion  1 Units/hr IntraVENous TITRATE    dextrose (D50W) injection syrg 12.5 g  25 mL IntraVENous PRN    [START ON 5/19/2018] aspirin chewable tablet 81 mg  81 mg Oral DAILY    magnesium sulfate 1 g/100 ml IVPB (premix or compounded)  1 g IntraVENous PRN    potassium chloride 10 mEq in 50 ml IVPB  10 mEq IntraVENous PRN    midazolam (VERSED) injection 1 mg  1 mg IntraVENous Q1H PRN    propofol (DIPRIVAN) infusion  0-50 mcg/kg/min IntraVENous TITRATE    meperidine (DEMEROL) injection 25 mg  25 mg IntraVENous Q1H PRN    chlorhexidine (PERIDEX) 0.12 % mouthwash 10 mL  10 mL Oral BID    tuberculin injection 5 Units  5 Units IntraDERMal ONCE    amLODIPine (NORVASC) tablet 10 mg  10 mg Oral DAILY    levothyroxine (SYNTHROID) tablet 88 mcg  88 mcg Oral ACB    predniSONE (DELTASONE) tablet 5 mg  5 mg Oral DAILY WITH BREAKFAST    nitroglycerin (NITROSTAT) tablet 0.4 mg  0.4 mg SubLINGual Q5MIN PRN    morphine injection 2 mg  2 mg IntraVENous Q4H PRN    acetaminophen (TYLENOL) tablet 650 mg  650 mg Oral Q4H PRN    HYDROcodone-acetaminophen (NORCO) 5-325 mg per tablet 1 Tab  1 Tab Oral Q4H PRN    ondansetron (ZOFRAN) injection 4 mg  4 mg IntraVENous Q4H PRN    0.9% sodium chloride infusion  75 mL/hr IntraVENous CONTINUOUS    hydrALAZINE (APRESOLINE) 20 mg/mL injection 10 mg  10 mg IntraVENous Q6H PRN    famotidine (PEPCID) tablet 20 mg  20 mg Oral 6am    eptifibatide (INTEGRILIN) 0.75 mg/mL infusion  2 mcg/kg/min IntraVENous CONTINUOUS         Objective:     Vitals:    05/18/18 1119 05/18/18 1750 05/18/18 1751 05/18/18 1754   BP: 177/79  93/47 95/55   Pulse: 63 84 83 82   Resp: 20  16 14   Temp: 97.8 °F (36.6 °C)  97.6 °F (36.4 °C) 98.6 °F (37 °C)   SpO2: 99% 100% 100% 100%   Weight:       Height:           Intake and Output:   05/16 1901 - 05/18 0700  In: 2411.1 [I.V.:2411.1]  Out: 1025 [Urine:1025]  05/18 0701 - 05/18 1900  In: 1600 [I.V.:1600]  Out: 986 [Urine:776]    Physical Exam:          Constitutional:  Sedated, orally intubated and mechanically ventilated.   EENMT:  Sclera clear, pupils equal, oral mucosa moist and orally intubated  Respiratory: clear  Cardiovascular:  RRR with no M,G,R;  Gastrointestinal:  soft; no bowel sounds present  Musculoskeletal:  warm with no cyanosis, no lower leg edema. [place] site  leg with ace wrap. Sedated with no movements. SKIN:  no jaundice or ecchymosis   Neurologic:  sedated but no gross neuro deficits  Psychiatric:  sedated and unable to assess at this time    CXR:  Pending       LINES:  ETT, silva, swan francisco, arterial line, chest tubes times 2 in epigastric area without air leak.     DRIPS:  Dee, NTG gtt    CI:  3.2    Ventilator Settings  Mode FIO2 Rate Tidal Volume Pressure PEEP   SIMV, Volume control  69 %    500 ml  10 cm H2O  8 cm H20      Peak airway pressure: 21.9 cm H2O   Minute ventilation: 7.6 l/min     ABG:   Recent Labs      05/18/18   1805   PH  7.28*   PCO2  42   PO2  227*   HCO3  19*        LAB  Recent Labs      05/18/18   0351  05/17/18   0421  05/16/18   1845   WBC  5.9  7.0  7.3   HGB  13.7  14.2  15.3   HCT  40.3*  41.0*  45.1   PLT  168  190  187   INR  1.1   --    --      Recent Labs      05/18/18   0351  05/17/18   0421  05/16/18   1845   NA  143  144  141   K  4.2  3.2*  3.7   CL  112*  110*  106   CO2  24  25  24   GLU  75  89  231*   BUN  15  14  15   CREA  1.00  0.96  1.35   MG  2.1   --    --    CA  8.2*  8.5  8.7   ALB  3.0*   --   3.7   SGOT  40*   --   31       Assessment and Plan :  (Medical Decision Making)     Hospital Problems  Date Reviewed: 5/18/2018          Codes Class Noted POA    S/P CABG (coronary artery bypass graft) ICD-10-CM: Z95.1  ICD-9-CM: V45.81  5/18/2018 No        Encounter for weaning from ventilator [facility]) ICD-10-CM: Z99.11  ICD-9-CM: V46.13  5/18/2018 No        Hypoxia ICD-10-CM: R09.02  ICD-9-CM: 799.02  5/18/2018 No        Hypothyroidism (Chronic) ICD-10-CM: E03.9  ICD-9-CM: 244.9  5/18/2018 Yes        Current chronic use of systemic steroids (Chronic) ICD-10-CM: Z79.52  ICD-9-CM: V58.65  5/18/2018 Yes    Overview Signed 5/18/2018  9:35 AM by Radu Whittington NP     Pituitary tumor surgery in 2001 and in 2013--has been on Prednisone 5 mg daily since 2013. Bradycardia (Chronic) ICD-10-CM: R00.1  ICD-9-CM: 427.89  5/16/2018 Yes        Essential hypertension (Chronic) ICD-10-CM: I10  ICD-9-CM: 401.9  5/16/2018 Yes        * (Principal)[facility]non[facility]ST [facility] ICD-10-CM: I21.4  ICD-9-CM: 410.70  5/16/2018 Yes              Plan:   --Wean mechanical ventilation per protocol. --Bronchodilators per protocol. --Incentive spirometry every hour post extubation. --Review CXR  -on chronic prednisone for pituitary tumor, will give stress dose steroid and wean in Am  More than 50% of the time documented was spent in face-to-face contact with the patient and in the care of the patient on the floor/unit where the patient is located. Thank you for this referral.  We appreciate the opportunity to participate in this patient's care. Will follow along with you.     Mitchell Prescott MD

## 2018-05-18 NOTE — PROGRESS NOTES
0900: HR 52-60. Call placed to Pre-op regarding administration of PO amiodarone and Norvasc prior to surgery. OK to give per MD America Valenzuela.

## 2018-05-18 NOTE — PROGRESS NOTES
1s hibiclens bath given. Chest/abd/pelvis/legs shaved. PO meds given. Denies chest pain. Up to [facility] to void-voiding without diff-still with hematuria.

## 2018-05-18 NOTE — PROGRESS NOTES
Initial visit was made and a spiritual presence was provided. Patient was having surgery today. Three family members were present.        [employer]

## 2018-05-18 NOTE — PROGRESS NOTES
Bedside and Verbal shift change report given to Shahzad Thurston RN (oncoming nurse) by Manuel Johnson RN (offgoing nurse).  Report included the following information SBAR, Kardex, Intake/Output, MAR, Recent Results, Med Rec Status and Cardiac Rhythm SB.

## 2018-05-18 NOTE — PROGRESS NOTES
TRANSFER - IN REPORT:    Verbal report received from [facility] RN(name) on Yoni Huynh  being received from OR(unit) for routine post - op      Report consisted of patients Situation, Background, Assessment and   Recommendations(SBAR). Information from the following report(s) OR Summary, Procedure Summary and Intake/Output was reviewed with the receiving nurse. Opportunity for questions and clarification was provided. Assessment completed upon patients arrival to unit and care assumed. Primary Rn is Kelsea Shin.

## 2018-05-18 NOTE — PROGRESS NOTES
Dual skin assessment performed. Blanchable redness to sacrum. No other skin breakdown noted. Bruising to abdomen and bilateral arms. Midsternal and left leg incision dressings clean dry and intact. Will continue to monitor.

## 2018-05-19 ENCOUNTER — APPOINTMENT (OUTPATIENT)
Dept: GENERAL RADIOLOGY | Age: 83
DRG: 234 | End: 2018-05-19
Attending: THORACIC SURGERY (CARDIOTHORACIC VASCULAR SURGERY)
Payer: MEDICARE

## 2018-05-19 PROBLEM — J98.11 ATELECTASIS: Status: ACTIVE | Noted: 2018-05-19

## 2018-05-19 PROBLEM — Z99.11 ENCOUNTER FOR WEANING FROM VENTILATOR (HCC): Status: RESOLVED | Noted: 2018-05-18 | Resolved: 2018-05-19

## 2018-05-19 LAB
ANION GAP SERPL CALC-SCNC: 7 MMOL/L (ref 7–16)
ATRIAL RATE: 59 BPM
ATRIAL RATE: 78 BPM
BUN SERPL-MCNC: 16 MG/DL (ref 8–23)
CALCIUM SERPL-MCNC: 7.3 MG/DL (ref 8.3–10.4)
CALCULATED P AXIS, ECG09: 48 DEGREES
CALCULATED P AXIS, ECG09: 65 DEGREES
CALCULATED R AXIS, ECG10: 3 DEGREES
CALCULATED R AXIS, ECG10: 67 DEGREES
CALCULATED T AXIS, ECG11: 87 DEGREES
CALCULATED T AXIS, ECG11: 97 DEGREES
CHLORIDE SERPL-SCNC: 119 MMOL/L (ref 98–107)
CO2 SERPL-SCNC: 22 MMOL/L (ref 21–32)
CREAT SERPL-MCNC: 1.32 MG/DL (ref 0.8–1.5)
DIAGNOSIS, 93000: NORMAL
DIAGNOSIS, 93000: NORMAL
ERYTHROCYTE [DISTWIDTH] IN BLOOD BY AUTOMATED COUNT: 14.7 % (ref 11.9–14.6)
GLUCOSE BLD STRIP.AUTO-MCNC: 101 MG/DL (ref 65–100)
GLUCOSE BLD STRIP.AUTO-MCNC: 184 MG/DL (ref 65–100)
GLUCOSE BLD STRIP.AUTO-MCNC: 74 MG/DL (ref 65–100)
GLUCOSE BLD STRIP.AUTO-MCNC: 81 MG/DL (ref 65–100)
GLUCOSE BLD STRIP.AUTO-MCNC: 91 MG/DL (ref 65–100)
GLUCOSE BLD STRIP.AUTO-MCNC: 94 MG/DL (ref 65–100)
GLUCOSE BLD STRIP.AUTO-MCNC: 99 MG/DL (ref 65–100)
GLUCOSE SERPL-MCNC: 87 MG/DL (ref 65–100)
HCT VFR BLD AUTO: 29.6 % (ref 41.1–50.3)
HCT VFR BLD AUTO: 30.2 % (ref 41.1–50.3)
HGB BLD-MCNC: 9.6 G/DL (ref 13.6–17.2)
HGB BLD-MCNC: 9.9 G/DL (ref 13.6–17.2)
MAGNESIUM SERPL-MCNC: 2.4 MG/DL (ref 1.8–2.4)
MAGNESIUM SERPL-MCNC: 2.4 MG/DL (ref 1.8–2.4)
MCH RBC QN AUTO: 28.8 PG (ref 26.1–32.9)
MCHC RBC AUTO-ENTMCNC: 32.4 G/DL (ref 31.4–35)
MCV RBC AUTO: 88.9 FL (ref 79.6–97.8)
P-R INTERVAL, ECG05: 168 MS
P-R INTERVAL, ECG05: 190 MS
PLATELET # BLD AUTO: 114 K/UL (ref 150–450)
PMV BLD AUTO: 9.6 FL (ref 10.8–14.1)
POTASSIUM SERPL-SCNC: 4.4 MMOL/L (ref 3.5–5.1)
POTASSIUM SERPL-SCNC: 4.7 MMOL/L (ref 3.5–5.1)
Q-T INTERVAL, ECG07: 378 MS
Q-T INTERVAL, ECG07: 428 MS
QRS DURATION, ECG06: 92 MS
QRS DURATION, ECG06: 92 MS
QTC CALCULATION (BEZET), ECG08: 374 MS
QTC CALCULATION (BEZET), ECG08: 487 MS
RBC # BLD AUTO: 3.33 M/UL (ref 4.23–5.67)
SODIUM SERPL-SCNC: 148 MMOL/L (ref 136–145)
VENTRICULAR RATE, ECG03: 59 BPM
VENTRICULAR RATE, ECG03: 78 BPM
WBC # BLD AUTO: 7.4 K/UL (ref 4.3–11.1)

## 2018-05-19 PROCEDURE — 74011250636 HC RX REV CODE- 250/636: Performed by: THORACIC SURGERY (CARDIOTHORACIC VASCULAR SURGERY)

## 2018-05-19 PROCEDURE — 71045 X-RAY EXAM CHEST 1 VIEW: CPT

## 2018-05-19 PROCEDURE — 74011250637 HC RX REV CODE- 250/637: Performed by: NURSE PRACTITIONER

## 2018-05-19 PROCEDURE — 36600 WITHDRAWAL OF ARTERIAL BLOOD: CPT

## 2018-05-19 PROCEDURE — 74011250636 HC RX REV CODE- 250/636: Performed by: NURSE PRACTITIONER

## 2018-05-19 PROCEDURE — 84132 ASSAY OF SERUM POTASSIUM: CPT | Performed by: THORACIC SURGERY (CARDIOTHORACIC VASCULAR SURGERY)

## 2018-05-19 PROCEDURE — 99233 SBSQ HOSP IP/OBS HIGH 50: CPT | Performed by: INTERNAL MEDICINE

## 2018-05-19 PROCEDURE — 74011250636 HC RX REV CODE- 250/636: Performed by: INTERNAL MEDICINE

## 2018-05-19 PROCEDURE — 74011250637 HC RX REV CODE- 250/637: Performed by: THORACIC SURGERY (CARDIOTHORACIC VASCULAR SURGERY)

## 2018-05-19 PROCEDURE — 80048 BASIC METABOLIC PNL TOTAL CA: CPT | Performed by: THORACIC SURGERY (CARDIOTHORACIC VASCULAR SURGERY)

## 2018-05-19 PROCEDURE — 85018 HEMOGLOBIN: CPT | Performed by: THORACIC SURGERY (CARDIOTHORACIC VASCULAR SURGERY)

## 2018-05-19 PROCEDURE — 97162 PT EVAL MOD COMPLEX 30 MIN: CPT

## 2018-05-19 PROCEDURE — 93005 ELECTROCARDIOGRAM TRACING: CPT | Performed by: THORACIC SURGERY (CARDIOTHORACIC VASCULAR SURGERY)

## 2018-05-19 PROCEDURE — 65660000004 HC RM CVT STEPDOWN

## 2018-05-19 PROCEDURE — 74011636637 HC RX REV CODE- 636/637: Performed by: THORACIC SURGERY (CARDIOTHORACIC VASCULAR SURGERY)

## 2018-05-19 PROCEDURE — P9045 ALBUMIN (HUMAN), 5%, 250 ML: HCPCS | Performed by: THORACIC SURGERY (CARDIOTHORACIC VASCULAR SURGERY)

## 2018-05-19 PROCEDURE — 77010033678 HC OXYGEN DAILY

## 2018-05-19 PROCEDURE — 85027 COMPLETE CBC AUTOMATED: CPT | Performed by: THORACIC SURGERY (CARDIOTHORACIC VASCULAR SURGERY)

## 2018-05-19 PROCEDURE — 83735 ASSAY OF MAGNESIUM: CPT | Performed by: THORACIC SURGERY (CARDIOTHORACIC VASCULAR SURGERY)

## 2018-05-19 PROCEDURE — 97530 THERAPEUTIC ACTIVITIES: CPT

## 2018-05-19 PROCEDURE — 74011636637 HC RX REV CODE- 636/637: Performed by: INTERNAL MEDICINE

## 2018-05-19 PROCEDURE — 82962 GLUCOSE BLOOD TEST: CPT

## 2018-05-19 RX ORDER — AMIODARONE HYDROCHLORIDE 200 MG/1
200 TABLET ORAL EVERY 12 HOURS
Status: DISCONTINUED | OUTPATIENT
Start: 2018-05-19 | End: 2018-05-21

## 2018-05-19 RX ORDER — POTASSIUM CHLORIDE 20 MEQ/1
20 TABLET, EXTENDED RELEASE ORAL
Status: DISCONTINUED | OUTPATIENT
Start: 2018-05-19 | End: 2018-05-24 | Stop reason: HOSPADM

## 2018-05-19 RX ORDER — LANOLIN ALCOHOL/MO/W.PET/CERES
400 CREAM (GRAM) TOPICAL
Status: DISCONTINUED | OUTPATIENT
Start: 2018-05-19 | End: 2018-05-24 | Stop reason: HOSPADM

## 2018-05-19 RX ORDER — TRAMADOL HYDROCHLORIDE 50 MG/1
50 TABLET ORAL
Status: DISCONTINUED | OUTPATIENT
Start: 2018-05-19 | End: 2018-05-24 | Stop reason: HOSPADM

## 2018-05-19 RX ORDER — SODIUM CHLORIDE 0.9 % (FLUSH) 0.9 %
5-10 SYRINGE (ML) INJECTION EVERY 8 HOURS
Status: DISCONTINUED | OUTPATIENT
Start: 2018-05-19 | End: 2018-05-24 | Stop reason: HOSPADM

## 2018-05-19 RX ORDER — MUPIROCIN 20 MG/G
OINTMENT TOPICAL 2 TIMES DAILY
Status: DISCONTINUED | OUTPATIENT
Start: 2018-05-19 | End: 2018-05-21

## 2018-05-19 RX ORDER — MAG HYDROX/ALUMINUM HYD/SIMETH 200-200-20
30 SUSPENSION, ORAL (FINAL DOSE FORM) ORAL
Status: DISCONTINUED | OUTPATIENT
Start: 2018-05-19 | End: 2018-05-24 | Stop reason: HOSPADM

## 2018-05-19 RX ORDER — METOPROLOL TARTRATE 25 MG/1
25 TABLET, FILM COATED ORAL EVERY 12 HOURS
Status: DISCONTINUED | OUTPATIENT
Start: 2018-05-19 | End: 2018-05-21

## 2018-05-19 RX ORDER — POTASSIUM CHLORIDE 20 MEQ/1
40 TABLET, EXTENDED RELEASE ORAL
Status: DISCONTINUED | OUTPATIENT
Start: 2018-05-19 | End: 2018-05-24 | Stop reason: HOSPADM

## 2018-05-19 RX ORDER — FUROSEMIDE 40 MG/1
40 TABLET ORAL DAILY
Status: COMPLETED | OUTPATIENT
Start: 2018-05-20 | End: 2018-05-22

## 2018-05-19 RX ORDER — ATORVASTATIN CALCIUM 40 MG/1
80 TABLET, FILM COATED ORAL
Status: DISCONTINUED | OUTPATIENT
Start: 2018-05-19 | End: 2018-05-24 | Stop reason: HOSPADM

## 2018-05-19 RX ORDER — DOCUSATE SODIUM 100 MG/1
100 CAPSULE, LIQUID FILLED ORAL DAILY
Status: DISCONTINUED | OUTPATIENT
Start: 2018-05-20 | End: 2018-05-21

## 2018-05-19 RX ORDER — SODIUM CHLORIDE 0.9 % (FLUSH) 0.9 %
5-10 SYRINGE (ML) INJECTION AS NEEDED
Status: DISCONTINUED | OUTPATIENT
Start: 2018-05-19 | End: 2018-05-24 | Stop reason: HOSPADM

## 2018-05-19 RX ORDER — ASPIRIN 81 MG/1
81 TABLET ORAL DAILY
Status: DISCONTINUED | OUTPATIENT
Start: 2018-05-20 | End: 2018-05-24 | Stop reason: HOSPADM

## 2018-05-19 RX ORDER — ONDANSETRON 2 MG/ML
4 INJECTION INTRAMUSCULAR; INTRAVENOUS
Status: DISCONTINUED | OUTPATIENT
Start: 2018-05-19 | End: 2018-05-24 | Stop reason: HOSPADM

## 2018-05-19 RX ORDER — POTASSIUM CHLORIDE 750 MG/1
10 TABLET, EXTENDED RELEASE ORAL DAILY
Status: COMPLETED | OUTPATIENT
Start: 2018-05-20 | End: 2018-05-22

## 2018-05-19 RX ORDER — ACETAMINOPHEN 325 MG/1
650 TABLET ORAL
Status: DISCONTINUED | OUTPATIENT
Start: 2018-05-19 | End: 2018-05-24 | Stop reason: HOSPADM

## 2018-05-19 RX ORDER — HYDROCORTISONE SODIUM SUCCINATE 100 MG/2ML
25 INJECTION, POWDER, FOR SOLUTION INTRAMUSCULAR; INTRAVENOUS EVERY 8 HOURS
Status: COMPLETED | OUTPATIENT
Start: 2018-05-19 | End: 2018-05-19

## 2018-05-19 RX ORDER — FAMOTIDINE 20 MG/1
20 TABLET, FILM COATED ORAL 2 TIMES DAILY
Status: DISCONTINUED | OUTPATIENT
Start: 2018-05-19 | End: 2018-05-21

## 2018-05-19 RX ADMIN — Medication 10 ML: at 20:51

## 2018-05-19 RX ADMIN — MUPIROCIN: 20 OINTMENT TOPICAL at 21:00

## 2018-05-19 RX ADMIN — TRAMADOL HYDROCHLORIDE 50 MG: 50 TABLET, FILM COATED ORAL at 11:00

## 2018-05-19 RX ADMIN — PHENYLEPHRINE HYDROCHLORIDE 35 MCG/MIN: 10 INJECTION INTRAVENOUS at 01:03

## 2018-05-19 RX ADMIN — Medication 10 ML: at 05:44

## 2018-05-19 RX ADMIN — LEVOTHYROXINE SODIUM 88 MCG: 88 TABLET ORAL at 06:21

## 2018-05-19 RX ADMIN — AMIODARONE HYDROCHLORIDE 200 MG: 200 TABLET ORAL at 20:51

## 2018-05-19 RX ADMIN — HYDROCORTISONE SODIUM SUCCINATE 25 MG: 100 INJECTION, POWDER, FOR SOLUTION INTRAMUSCULAR; INTRAVENOUS at 13:51

## 2018-05-19 RX ADMIN — VANCOMYCIN HYDROCHLORIDE 1250 MG: 10 INJECTION, POWDER, LYOPHILIZED, FOR SOLUTION INTRAVENOUS at 00:05

## 2018-05-19 RX ADMIN — FAMOTIDINE 20 MG: 20 TABLET ORAL at 06:19

## 2018-05-19 RX ADMIN — INSULIN HUMAN 5 UNITS: 100 INJECTION, SOLUTION PARENTERAL at 21:04

## 2018-05-19 RX ADMIN — MUPIROCIN: 20 OINTMENT TOPICAL at 08:05

## 2018-05-19 RX ADMIN — ONDANSETRON 4 MG: 2 INJECTION, SOLUTION INTRAMUSCULAR; INTRAVENOUS at 02:46

## 2018-05-19 RX ADMIN — ASPIRIN 81 MG 81 MG: 81 TABLET ORAL at 08:04

## 2018-05-19 RX ADMIN — PREDNISONE 5 MG: 5 TABLET ORAL at 08:04

## 2018-05-19 RX ADMIN — ACETAMINOPHEN 650 MG: 325 TABLET ORAL at 08:10

## 2018-05-19 RX ADMIN — Medication 10 ML: at 18:04

## 2018-05-19 RX ADMIN — ATORVASTATIN CALCIUM 80 MG: 40 TABLET, FILM COATED ORAL at 20:54

## 2018-05-19 RX ADMIN — ALBUMIN (HUMAN) 25 G: 12.5 INJECTION, SOLUTION INTRAVENOUS at 08:05

## 2018-05-19 RX ADMIN — FAMOTIDINE 20 MG: 20 TABLET ORAL at 20:52

## 2018-05-19 RX ADMIN — HYDROCORTISONE SODIUM SUCCINATE 25 MG: 100 INJECTION, POWDER, FOR SOLUTION INTRAMUSCULAR; INTRAVENOUS at 06:20

## 2018-05-19 RX ADMIN — AMIODARONE HYDROCHLORIDE 200 MG: 200 TABLET ORAL at 08:04

## 2018-05-19 NOTE — PROGRESS NOTES
Juany Carranza  Admission Date: 5/16/2018             Daily Progress Note: 5/19/2018    The patient's chart is reviewed and the patient is discussed with the staff. Juany Carranza is an 83yo gentleman who has h/o MR, HTN, bradycardia, HLD, chronic steroids for pituitary tumor who underwent 3v CABG on 5/18 with Dr. Star Adam. He is a never smoker. Subjective:   Hydrocortisone started yesterday since chronically on steroids and has 2 doses left  BP stable, cipriano weaned  Extubated at 10pm and on airvo 40%.     Current Facility-Administered Medications   Medication Dose Route Frequency    0.9% sodium chloride infusion  25 mL/hr IntraVENous CONTINUOUS    dextrose 5% - 0.45% NaCl with KCl 20 mEq/L infusion  25 mL/hr IntraVENous CONTINUOUS    sodium chloride (NS) flush 5-10 mL  5-10 mL IntraVENous Q8H    sodium chloride (NS) flush 5-10 mL  5-10 mL IntraVENous PRN    oxyCODONE-acetaminophen (PERCOCET) 5-325 mg per tablet 1 Tab  1 Tab Oral Q4H PRN    morphine injection 3-5 mg  3-5 mg IntraVENous Q1H PRN    naloxone (NARCAN) injection 0.4 mg  0.4 mg IntraVENous PRN    mupirocin (BACTROBAN) 2 % ointment   Both Nostrils BID    sodium bicarbonate 8.4 % (1 mEq/mL) injection 50 mEq  50 mEq IntraVENous PRN    EPINEPHrine (ADRENALIN) 4 mg in 0.9% sodium chloride 250 mL infusion  0.05-0.1 mcg/kg/min IntraVENous TITRATE    nitroglycerin (Tridil) 200 mcg/ml infusion   mcg/min IntraVENous TITRATE    PHENYLephrine (CIPRIANO-SYNEPHRINE) 30 mg in 0.9% sodium chloride 250 mL infusion   mcg/min IntraVENous TITRATE    lidocaine (PF) (XYLOCAINE) 100 mg/5 mL (2 %) injection syringe  mg   mg IntraVENous ONCE PRN    amiodarone (CORDARONE) tablet 200 mg  200 mg Oral Q12H    metoprolol tartrate (LOPRESSOR) tablet 25 mg  25 mg Oral Q12H    atorvastatin (LIPITOR) tablet 80 mg  80 mg Oral QHS    insulin regular (NOVOLIN R, HUMULIN R) 100 Units in 0.9% sodium chloride 100 mL infusion  1 Units/hr IntraVENous TITRATE    dextrose (D50W) injection syrg 12.5 g  25 mL IntraVENous PRN    aspirin chewable tablet 81 mg  81 mg Oral DAILY    magnesium sulfate 1 g/100 ml IVPB (premix or compounded)  1 g IntraVENous PRN    potassium chloride 10 mEq in 50 ml IVPB  10 mEq IntraVENous PRN    midazolam (VERSED) injection 1 mg  1 mg IntraVENous Q1H PRN    propofol (DIPRIVAN) infusion  0-50 mcg/kg/min IntraVENous TITRATE    meperidine (DEMEROL) injection 25 mg  25 mg IntraVENous Q1H PRN    chlorhexidine (PERIDEX) 0.12 % mouthwash 10 mL  10 mL Oral BID    tuberculin injection 5 Units  5 Units IntraDERMal ONCE    hydrocortisone Sod Succ (PF) (SOLU-CORTEF) injection 50 mg  50 mg IntraVENous Q8H    albumin human 5% (BUMINATE) solution 25 g  25 g IntraVENous PRN    amLODIPine (NORVASC) tablet 10 mg  10 mg Oral DAILY    levothyroxine (SYNTHROID) tablet 88 mcg  88 mcg Oral ACB    predniSONE (DELTASONE) tablet 5 mg  5 mg Oral DAILY WITH BREAKFAST    nitroglycerin (NITROSTAT) tablet 0.4 mg  0.4 mg SubLINGual Q5MIN PRN    morphine injection 2 mg  2 mg IntraVENous Q4H PRN    acetaminophen (TYLENOL) tablet 650 mg  650 mg Oral Q4H PRN    HYDROcodone-acetaminophen (NORCO) 5-325 mg per tablet 1 Tab  1 Tab Oral Q4H PRN    ondansetron (ZOFRAN) injection 4 mg  4 mg IntraVENous Q4H PRN    0.9% sodium chloride infusion  75 mL/hr IntraVENous CONTINUOUS    hydrALAZINE (APRESOLINE) 20 mg/mL injection 10 mg  10 mg IntraVENous Q6H PRN    famotidine (PEPCID) tablet 20 mg  20 mg Oral 6am    eptifibatide (INTEGRILIN) 0.75 mg/mL infusion  2 mcg/kg/min IntraVENous CONTINUOUS       Review of Systems  Constitutional: negative for fever, chills, sweats  Cardiovascular: negative for chest pain, palpitations, syncope, edema  Gastrointestinal:  negative for dysphagia, reflux, vomiting, diarrhea, abdominal pain, or melena  Neurologic:  negative for focal weakness, numbness, headache    Objective:     Vitals:    05/19/18 0401 05/19/18 0416 05/19/18 0431 05/19/18 0446   BP: 95/52 104/57 106/58 119/60   Pulse: 70 62 69 68   Resp: 27 9 20 22   Temp:       SpO2: 97% 98% 100% 99%   Weight:  207 lb 14.3 oz (94.3 kg)     Height:         Intake and Output:   05/17 0701 - 05/18 1900  In: 4520.4 [I.V.:4520.4]  Out: 1802 [OUOLF:6302]  05/18 1901 - 05/19 0700  In: 1874.8 [I.V.:1874.8]  Out: 1905 [Urine:1355]    Physical Exam:   Constitution:  the patient is well developed and in no acute distress  EENMT:  Sclera clear, pupils equal, oral mucosa moist  Respiratory: CTA with decreased basilar BS  Cardiovascular:  RRR without M,G,R  Gastrointestinal: soft and non-tender; with positive bowel sounds. Musculoskeletal: warm without cyanosis. There is 1+ lower leg edema.   Skin:  no jaundice or rashes, surgical wounds c/d/i  Neurologic: no gross neuro deficits     Psychiatric:  alert and oriented x 3    CXR:         LAB  Recent Labs      05/19/18   0500  05/19/18   0404  05/19/18   0205  05/19/18   0014  05/18/18   2301   GLUCPOC  91  81  74  94  106*      Recent Labs      05/19/18 0207 05/18/18 2206 05/18/18   1809 05/18/18   0351 05/17/18 0421 05/16/18   1845   WBC   --    --   14.8*  5.9  7.0  7.3   HGB  9.9*  10.7*  10.8*  13.7  14.2  15.3   HCT  30.2*  32.2*  32.9*  40.3*  41.0*  45.1   PLT   --    --   120*  168  190  187   INR   --    --   1.6  1.1   --    --      Recent Labs      05/19/18   0207  05/18/18   2206  05/18/18   1809 05/18/18   0351   05/17/18   0421  05/16/18   2052  05/16/18   1845   NA   --    --   146*  143   --   144   --   141   K  4.4  4.4  4.4  4.2   --   3.2*   --   3.7   CL   --    --   115*  112*   --   110*   --   106   CO2   --    --   20*  24   --   25   --   24   GLU   --    --   158*  75   --   89   --   231*   BUN   --    --   16  15   --   14   --   15   CREA   --    --   1.34  1.00   --   0.96   --   1.35   MG  2.4  2.5*  2.8*  2.1   < >   --    --    --    CA   --    --   7.3*  8.2*   --   8.5   --   8.7   TROIQ   -- --    --    --    --   13.30*  2.66*  0.04   ALB   --    --    --   3.0*   --    --    --   3.7   TBILI   --    --    --   0.4   --    --    --   0.4   ALT   --    --    --   30   --    --    --   36   SGOT   --    --    --   40*   --    --    --   31    < > = values in this interval not displayed. Recent Labs      05/18/18   2205  05/18/18   1805   PH  7.33*  7.28*   PCO2  33*  42   PO2  114*  227*   HCO3  17*  19*     No results for input(s): LCAD, LAC in the last 72 hours. Assessment:  (Medical Decision Making)     Hospital Problems  Date Reviewed: 5/18/2018          Codes Class Noted POA    S/P CABG (coronary artery bypass graft) ICD-10-CM: Z95.1  ICD-9-CM: V45.81  5/18/2018 No    POD#1    Encounter for weaning from ventilator [facility]) ICD-10-CM: Z99.11  ICD-9-CM: V46.13  5/18/2018 No    extubated    Hypoxia ICD-10-CM: R09.02  ICD-9-CM: 799.02  5/18/2018 No    Wean O2 as able    Hypothyroidism (Chronic) ICD-10-CM: E03.9  ICD-9-CM: 244.9  5/18/2018 Yes        Current chronic use of systemic steroids (Chronic) ICD-10-CM: Z79.52  ICD-9-CM: V58.65  5/18/2018 Yes    Overview Signed 5/18/2018  9:35 AM by Gardiner Cockayne, NP     Pituitary tumor surgery in 2001 and in 2013--has been on Prednisone 5 mg daily since 2013.         2 more doses of hydrocortisone and resume prednisone    Bradycardia (Chronic) ICD-10-CM: R00.1  ICD-9-CM: 427.89  5/16/2018 Yes        Essential hypertension (Chronic) ICD-10-CM: I10  ICD-9-CM: 401.9  5/16/2018 Yes        * (Principal)NSTEMI (non-ST elevated myocardial infarction) ([facility] [address] ICD-10-CM: I21.4  ICD-9-CM: 410.70  5/16/2018 Yes          Localized edema in legs:  Stable. Standard diuretics post-op should suffice. Plan:  (Medical Decision Making)     --Encouraged IS  --Wean O2  --periop hydrocortisone started yesterday will finish today.     More than 50% of the time documented was spent in face-to-face contact with the patient and in the care of the patient on the floor/unit where the patient is located.     Caroline Cook MD

## 2018-05-19 NOTE — ANESTHESIA PROCEDURE NOTES
Pulmonary Artery Catheter Placement    Start time: 5/18/2018 1:05 PM  End time: 5/18/2018 1:15 PM  Performed by: Kalyan Walker  Authorized by: Kalyan Walker     Indications: vascular access and central pressure monitoring  Preanesthetic Checklist: patient identified, risks and benefits discussed, anesthesia consent, site marked, patient being monitored and timeout performed    Timeout Time: 13:05       Pre-procedure: All elements of maximal sterile barrier technique followed? Yes    2% Chlorhexidine for cutaneous antisepsis, Hand hygiene performed prior to catheter insertion and Ultrasound guidance    Sterile Ultrasound Technique followed?: Yes        Ultrasound Image Stored? Image stored    Procedure:   Prep:  Chlorhexidine  Location:  Internal jugular  Orientation:  Right  Patient position:  Trendelenburg  Number of lumens: 9 Fr double sideport PAC introducer. Catheter size: 7.5 Fr PA CCO PA catheter passed via introducer to depth of 43 cm.     Number of attempts:  1  Successful placement: Yes      Assessment:   Post-procedure:  Catheter secured and sterile dressing applied  Assessment:  Blood return through all ports, free fluid flow and guidewire removal verified  Insertion:  Uncomplicated  Patient tolerance:  Patient tolerated the procedure well with no immediate complications

## 2018-05-19 NOTE — ANESTHESIA PROCEDURE NOTES
Arterial Line Placement    Start time: 5/18/2018 12:40 PM  End time: 5/18/2018 12:50 PM  Performed by: Brenton Hoff by: Ilan Mendoza     Pre-Procedure  Indications:  Arterial pressure monitoring and blood sampling  Preanesthetic Checklist: patient identified, risks and benefits discussed, anesthesia consent, site marked, patient being monitored, timeout performed and patient being monitored    Timeout Time: 12:40        Procedure:   Prep:  Chlorhexidine  Seldinger Technique?: Yes    Orientation:  Left  Location:  Radial artery  Catheter size:  20 G  Number of attempts:  3  Cont Cardiac Output Sensor: No      Assessment:   Post-procedure:  Line secured and sterile dressing applied  Patient Tolerance:  Patient tolerated the procedure well with no immediate complications

## 2018-05-19 NOTE — PROGRESS NOTES
Ventilator check complete; patient has a #8. 0 ET tube secured at the 21 at the lip. Patient is not sedated. Patient is able to follow commands. Breath sounds are clear. Trachea is midline, Negative for subcutaneous air, and chest excursion is symmetric. Patient is also Negative for cyanosis and is Negative for pitting edema. All alarms are set and audible. Resuscitation bag is at the head of the bed.       Ventilator Settings  Mode FIO2 Rate Tidal Volume Pressure PEEP I:E Ratio   SIMV, Pressure support, PRVC  30 %   12 500 ml  10 cm H2O  8 cm H20         Peak airway pressure: 21.9 cm H2O   Minute ventilation: 5.6 l/min     ABG:   Recent Labs      05/18/18   2205  05/18/18   1805   PH  7.33*  7.28*   PCO2  33*  42   PO2  114*  227*   HCO3  17*  19*         Adrienne Jackson, RT

## 2018-05-19 NOTE — PROGRESS NOTES
POD 1 Day Post-Op    Procedure:  Procedure(s):  CORONARY ARTERY BYPASS GRAFT X 3; LIMA   VEIN HARVEST GREATER SAPHENOUS      Subjective:     Patient has No significant medical complaints      Objective:     Patient Vitals for the past 8 hrs:   BP Temp Pulse Resp SpO2 Weight   18 0900 108/56 - 63 28 100 % -   18 0846 110/55 - 63 22 99 % -   18 0831 105/57 - 63 12 100 % -   18 0817 118/58 - 65 22 100 % -   18 0801 99/56 - 64 19 100 % -   18 0715 104/59 - 60 - 100 % -   18 0701 92/53 97.8 °F (36.6 °C) 60 - 100 % -   18 0646 91/53 - 62 18 100 % -   18 0631 (!) 87/50 - (!) 58 20 100 % -   18 0616 90/52 - 63 21 99 % -   18 0601 93/54 - 68 20 100 % -   18 0546 117/65 - 71 9 100 % -   18 0530 104/57 - 67 (!) 35 98 % -   18 0516 107/59 - 67 9 100 % -   18 0501 111/56 99.3 °F (37.4 °C) 66 10 100 % -   18 0446 119/60 - 68 22 99 % -   18 0431 106/58 - 69 20 100 % -   18 0416 104/57 - 62 9 98 % 207 lb 14.3 oz (94.3 kg)   18 0401 95/52 99.1 °F (37.3 °C) 70 27 97 % -   18 0345 96/55 - 71 20 99 % -   18 0330 94/51 - 71 26 95 % -   18 0316 102/59 - 70 18 98 % -   18 0301 101/56 99.1 °F (37.3 °C) 72 23 95 % -   18 0246 121/61 - 82 10 99 % -   05/19/18 0231 108/57 - 69 23 98 % -   18 0216 111/58 - 70 16 99 % -   18 0201 112/61 99.2 °F (37.3 °C) 70 22 98 % -   18 0145 108/59 - 69 23 98 % -     Temp (24hrs), Av.2 °F (36.8 °C), Min:96.1 °F (35.6 °C), Max:99.3 °F (37.4 °C)        Hemodynamics  PAP Systolic: 30 PAP  CO (l/min): 4.9 l/min CO  CI (l/min/m2): 2.3 l/min/m2 CI     0701 -  1900  In: 240 [P.O.:240]  Out: 16 [Urine:16]   1901 -  0700  In: 6074.3 [P.O.:240;  I.V.:5834.3]  Out: 3088 [Urine:2228]    CT Drainage              total of all CT's    Heart:  regular rate and rhythm, S1, S2 normal, no murmur, click, rub or gallop  Lung:  clear to auscultation bilaterally  Neuro: Grossly non focal  Incisions: Clean, dry, and intact    Labs:  Recent Results (from the past 24 hour(s))   POC CG8I    Collection Time: 05/18/18  1:42 PM   Result Value Ref Range    pH (POC) 7.434 7.35 - 7.45      pCO2 (POC) 32.5 (L) 35 - 45 MMHG    pO2 (POC) 140 (H) 80 - 105 MMHG    HCO3 (POC) 21.8 (L) 22 - 26 MMOL/L    sO2 (POC) 99 (H) 95 - 98 %    Base deficit (POC) 2 mmol/L    Sodium,  136 - 145 MMOL/L    Potassium, POC 4.1 3.5 - 5.1 MMOL/L    Glucose,  (H) 65 - 100 MG/DL    Calcium, ionized (POC) 1.13 1.12 - 1.32 mmol/L   POC CG8I    Collection Time: 05/18/18  3:18 PM   Result Value Ref Range    pH (POC) 7.305 (L) 7.35 - 7.45      pCO2 (POC) 45.8 (H) 35 - 45 MMHG    pO2 (POC) 216 (H) 80 - 105 MMHG    HCO3 (POC) 22.8 22 - 26 MMOL/L    sO2 (POC) 100 (H) 95 - 98 %    Base deficit (POC) 4 mmol/L    Sodium,  136 - 145 MMOL/L    Potassium, POC 4.5 3.5 - 5.1 MMOL/L    Glucose,  (H) 65 - 100 MG/DL    Calcium, ionized (POC) 1.15 1.12 - 1.32 mmol/L   POC CG8I    Collection Time: 05/18/18  3:51 PM   Result Value Ref Range    pH (POC) 7.298 (L) 7.35 - 7.45      pCO2 (POC) 43.5 35 - 45 MMHG    pO2 (POC) 49 (L) 80 - 105 MMHG    HCO3 (POC) 21.3 (L) 22 - 26 MMOL/L    sO2 (POC) 80 (L) 95 - 98 %    Base deficit (POC) 5 mmol/L    Sodium,  (L) 136 - 145 MMOL/L    Potassium, POC 6.4 (H) 3.5 - 5.1 MMOL/L    Glucose,  (H) 65 - 100 MG/DL    Calcium, ionized (POC) 1.03 (L) 1.12 - 1.32 mmol/L   POC CG8I    Collection Time: 05/18/18  4:23 PM   Result Value Ref Range    pH (POC) 7.306 (L) 7.35 - 7.45      pCO2 (POC) 48.7 (H) 35 - 45 MMHG    pO2 (POC) 193 (H) 80 - 105 MMHG    HCO3 (POC) 24.3 22 - 26 MMOL/L    sO2 (POC) 100 (H) 95 - 98 %    Base deficit (POC) 2 mmol/L    Sodium,  136 - 145 MMOL/L    Potassium, POC 5.7 (H) 3.5 - 5.1 MMOL/L    Glucose,  (H) 65 - 100 MG/DL    Calcium, ionized (POC) 1.05 (L) 1.12 - 1.32 mmol/L   POC CG8I    Collection Time: 05/18/18 4:51 PM   Result Value Ref Range    pH (POC) 7.312 (L) 7.35 - 7.45      pCO2 (POC) 45.9 (H) 35 - 45 MMHG    pO2 (POC) 316 (H) 80 - 105 MMHG    HCO3 (POC) 23.2 22 - 26 MMOL/L    sO2 (POC) 100 (H) 95 - 98 %    Base deficit (POC) 3 mmol/L    Sodium,  136 - 145 MMOL/L    Potassium, POC 5.3 (H) 3.5 - 5.1 MMOL/L    Glucose,  (H) 65 - 100 MG/DL    Calcium, ionized (POC) 1.34 (H) 1.12 - 1.32 mmol/L   POC CG8I    Collection Time: 05/18/18  5:16 PM   Result Value Ref Range    pH (POC) 7.298 (L) 7.35 - 7.45      pCO2 (POC) 47.4 (H) 35 - 45 MMHG    pO2 (POC) 417 (H) 80 - 105 MMHG    HCO3 (POC) 23.2 22 - 26 MMOL/L    sO2 (POC) 100 (H) 95 - 98 %    Base deficit (POC) 3 mmol/L    Sodium,  136 - 145 MMOL/L    Potassium, POC 4.6 3.5 - 5.1 MMOL/L    Glucose,  (H) 65 - 100 MG/DL    Calcium, ionized (POC) 1.19 1.12 - 1.32 mmol/L   BLOOD GAS & LYTES, ARTERIAL    Collection Time: 05/18/18  6:05 PM   Result Value Ref Range    pH 7.28 (L) 7.35 - 7.45      PCO2 42 35 - 45 mmHg    PO2 227 (H) 80 - 105 mmHg    BICARBONATE 19 (L) 22 - 26 mmol/L    BASE DEFICIT 7.1 (H) 0 - 2 mmol/L    TOTAL HEMOGLOBIN 11.9 11.7 - 15.0 GM/DL    O2 SAT 99 (H) 92 - 98.5 %    Arterial O2 Hgb 98.1 (H) 94 - 97 %    CARBOXYHEMOGLOBIN 0.3 (L) 0.5 - 1.5 %    METHEMOGLOBIN 0.6 0.0 - 1.5 %    DEOXYHEMOGLOBIN 1 0.0 - 5.0 %    Sodium 138.1 135 - 148 MMOL/L    POTASSIUM 4.33 3.5 - 5.3 MMOL/L    CHLORIDE 110 (H) 98 - 106      Calcium, ionized 1.11 1.0 - 1.3 mmol/L    SITE [facility] TEST NA    GLUCOSE, POC    Collection Time: 05/18/18  6:05 PM   Result Value Ref Range    Glucose (POC) 156 (H) 65 - 028 mg/dL   METABOLIC PANEL, BASIC    Collection Time: 05/18/18  6:09 PM   Result Value Ref Range    Sodium 146 (H) 136 - 145 mmol/L    Potassium 4.4 3.5 - 5.1 mmol/L    Chloride 115 (H) 98 - 107 mmol/L    CO2 20 (L) 21 - 32 mmol/L    Anion gap 11 7 - 16 mmol/L    Glucose 158 (H) 65 - 100 mg/dL    BUN 16 8 - 23 MG/DL    Creatinine 1.34 0.8 - 1.5 MG/DL    GFR est AA >60 >60 ml/min/1.73m2    GFR est non-AA 54 (L) >60 ml/min/1.73m2    Calcium 7.3 (L) 8.3 - 10.4 MG/DL   MAGNESIUM    Collection Time: 05/18/18  6:09 PM   Result Value Ref Range    Magnesium 2.8 (H) 1.8 - 2.4 mg/dL   CBC W/O DIFF    Collection Time: 05/18/18  6:09 PM   Result Value Ref Range    WBC 14.8 (H) 4.3 - 11.1 K/uL    RBC 3.72 (L) 4.23 - 5.67 M/uL    HGB 10.8 (L) 13.6 - 17.2 g/dL    HCT 32.9 (L) 41.1 - 50.3 %    MCV 88.4 79.6 - 97.8 FL    MCH 29.0 26.1 - 32.9 PG    MCHC 32.8 31.4 - 35.0 g/dL    RDW 14.1 11.9 - 14.6 %    PLATELET 529 (L) 024 - 450 K/uL    MPV 9.5 (L) 10.8 - 14.1 FL   PTT    Collection Time: 05/18/18  6:09 PM   Result Value Ref Range    aPTT 31.9 23.2 - 35.3 SEC   PROTHROMBIN TIME + INR    Collection Time: 05/18/18  6:09 PM   Result Value Ref Range    Prothrombin time 18.8 (H) 11.5 - 14.5 sec    INR 1.6     FIBRINOGEN    Collection Time: 05/18/18  6:09 PM   Result Value Ref Range    Fibrinogen 211 190 - 501 mg/dL   EKG, 12 LEAD, INITIAL    Collection Time: 05/18/18  6:43 PM   Result Value Ref Range    Ventricular Rate 78 BPM    Atrial Rate 78 BPM    P-R Interval 168 ms    QRS Duration 92 ms    Q-T Interval 428 ms    QTC Calculation (Bezet) 487 ms    Calculated P Axis 48 degrees    Calculated R Axis 67 degrees    Calculated T Axis 97 degrees    Diagnosis       Normal sinus rhythm  ST & T wave abnormality, consider anterior ischemia  Prolonged QT  Abnormal ECG  When compared with ECG of 16-MAY-2018 23:49,  T wave inversion now evident in Anterior leads  QT has lengthened     GLUCOSE, POC    Collection Time: 05/18/18  7:11 PM   Result Value Ref Range    Glucose (POC) 141 (H) 65 - 100 mg/dL   GLUCOSE, POC    Collection Time: 05/18/18  8:07 PM   Result Value Ref Range    Glucose (POC) 132 (H) 65 - 100 mg/dL   GLUCOSE, POC    Collection Time: 05/18/18  9:09 PM   Result Value Ref Range    Glucose (POC) 122 (H) 65 - 100 mg/dL   GLUCOSE, POC    Collection Time: 05/18/18 10:02 PM Result Value Ref Range    Glucose (POC) 113 (H) 65 - 100 mg/dL   BLOOD GAS, ARTERIAL    Collection Time: 05/18/18 10:05 PM   Result Value Ref Range    pH 7.33 (L) 7.35 - 7.45      PCO2 33 (L) 35 - 45 mmHg    PO2 114 (H) 80 - 105 mmHg    BICARBONATE 17 (L) 22 - 26 mmol/L    BASE DEFICIT 8.1 (H) 0 - 2 mmol/L    TOTAL HEMOGLOBIN 11.6 (L) 11.7 - 15.0 GM/DL    O2 SAT 98 92 - 98.5 %    Arterial O2 Hgb 97.0 94 - 97 %    CARBOXYHEMOGLOBIN 0.4 (L) 0.5 - 1.5 %    METHEMOGLOBIN 0.4 0.0 - 1.5 %    DEOXYHEMOGLOBIN 2 0.0 - 5.0 %    SITE GIA     [facility] TEST NA     MODE PSV     PEEP/CPAP 8.0      Respiratory comment: Danii ZARAGOZA at 5 18 2018 10 10 53 PM. Read back.     MAGNESIUM    Collection Time: 05/18/18 10:06 PM   Result Value Ref Range    Magnesium 2.5 (H) 1.8 - 2.4 mg/dL   POTASSIUM    Collection Time: 05/18/18 10:06 PM   Result Value Ref Range    Potassium 4.4 3.5 - 5.1 mmol/L   HGB & HCT    Collection Time: 05/18/18 10:06 PM   Result Value Ref Range    HGB 10.7 (L) 13.6 - 17.2 g/dL    HCT 32.2 (L) 41.1 - 50.3 %   GLUCOSE, POC    Collection Time: 05/18/18 11:01 PM   Result Value Ref Range    Glucose (POC) 106 (H) 65 - 100 mg/dL   GLUCOSE, POC    Collection Time: 05/19/18 12:14 AM   Result Value Ref Range    Glucose (POC) 94 65 - 100 mg/dL   GLUCOSE, POC    Collection Time: 05/19/18  2:05 AM   Result Value Ref Range    Glucose (POC) 74 65 - 100 mg/dL   MAGNESIUM    Collection Time: 05/19/18  2:07 AM   Result Value Ref Range    Magnesium 2.4 1.8 - 2.4 mg/dL   POTASSIUM    Collection Time: 05/19/18  2:07 AM   Result Value Ref Range    Potassium 4.4 3.5 - 5.1 mmol/L   HGB & HCT    Collection Time: 05/19/18  2:07 AM   Result Value Ref Range    HGB 9.9 (L) 13.6 - 17.2 g/dL    HCT 30.2 (L) 41.1 - 50.3 %   GLUCOSE, POC    Collection Time: 05/19/18  4:04 AM   Result Value Ref Range    Glucose (POC) 81 65 - 100 mg/dL   GLUCOSE, POC    Collection Time: 05/19/18  5:00 AM   Result Value Ref Range    Glucose (POC) 91 65 - 100 mg/dL METABOLIC PANEL, BASIC    Collection Time: 05/19/18  5:01 AM   Result Value Ref Range    Sodium 148 (H) 136 - 145 mmol/L    Potassium 4.7 3.5 - 5.1 mmol/L    Chloride 119 (H) 98 - 107 mmol/L    CO2 22 21 - 32 mmol/L    Anion gap 7 7 - 16 mmol/L    Glucose 87 65 - 100 mg/dL    BUN 16 8 - 23 MG/DL    Creatinine 1.32 0.8 - 1.5 MG/DL    GFR est AA >60 >60 ml/min/1.73m2    GFR est non-AA 55 (L) >60 ml/min/1.73m2    Calcium 7.3 (L) 8.3 - 10.4 MG/DL   MAGNESIUM    Collection Time: 05/19/18  5:01 AM   Result Value Ref Range    Magnesium 2.4 1.8 - 2.4 mg/dL   CBC W/O DIFF    Collection Time: 05/19/18  5:01 AM   Result Value Ref Range    WBC 7.4 4.3 - 11.1 K/uL    RBC 3.33 (L) 4.23 - 5.67 M/uL    HGB 9.6 (L) 13.6 - 17.2 g/dL    HCT 29.6 (L) 41.1 - 50.3 %    MCV 88.9 79.6 - 97.8 FL    MCH 28.8 26.1 - 32.9 PG    MCHC 32.4 31.4 - 35.0 g/dL    RDW 14.7 (H) 11.9 - 14.6 %    PLATELET 770 (L) 144 - 450 K/uL    MPV 9.6 (L) 10.8 - 14.1 FL   EKG, 12 LEAD, SUBSEQUENT    Collection Time: 05/19/18  7:27 AM   Result Value Ref Range    Ventricular Rate 59 BPM    Atrial Rate 59 BPM    P-R Interval 190 ms    QRS Duration 92 ms    Q-T Interval 378 ms    QTC Calculation (Bezet) 374 ms    Calculated P Axis 65 degrees    Calculated R Axis 3 degrees    Calculated T Axis 87 degrees    Diagnosis       Sinus bradycardia  Nonspecific T wave abnormality  Abnormal ECG  When compared with ECG of 18-MAY-2018 18:43,  Questionable change in QRS axis  ST no longer depressed in Anterior leads  T wave inversion no longer evident in Anterior leads  QT has shortened         Assessment:     Principal Problem:    NSTEMI (non-ST elevated myocardial infarction) ([facility] [address] (5/16/2018)    Active Problems:    Bradycardia (5/16/2018)      Essential hypertension (5/16/2018)      S/P CABG (coronary artery bypass graft) (5/18/2018)      Hypoxia (5/18/2018)      Hypothyroidism (5/18/2018)      Current chronic use of systemic steroids (5/18/2018)      Overview: Pituitary tumor surgery in 2001 and in 2013--has been on Prednisone 5 mg       daily since 2013.       Atelectasis (5/19/2018)        Plan/Recommendations/Medical Decision Making:     Discontinue:  chest tubes    See orders    Doing well, to floor, protocol

## 2018-05-19 NOTE — PROGRESS NOTES
TRANSFER - OUT REPORT:    Verbal report given to myself for continued care (name) on Kinsey Robert  being transferred to CVICU(unit) for routine progression of care       Report consisted of patients Situation, Background, Assessment and   Recommendations(SBAR). Information from the following report(s) OR Summary, Procedure Summary, Intake/Output, MAR and Recent Results was reviewed with the receiving nurse. Lines:   Peripheral IV 05/19/18 Left Antecubital (Active)   Site Assessment Clean, dry, & intact 5/19/2018  2:24 PM   Phlebitis Assessment 0 5/19/2018  2:24 PM   Infiltration Assessment 0 5/19/2018  2:24 PM   Dressing Status Clean, dry, & intact; New 5/19/2018  2:24 PM   Dressing Type Transparent;Tape 5/19/2018  2:24 PM   Hub Color/Line Status Patent; Flushed 5/19/2018  2:24 PM   Action Taken Open ports on tubing capped 5/19/2018  2:24 PM        Opportunity for questions and clarification was provided.       Patient transported with:   Monitor  O2 @ 3 liters  Registered Nurse

## 2018-05-19 NOTE — PROGRESS NOTES
TRANSFER - IN REPORT:    Verbal report received from This RN(name) on Janeen Ruck  being received from [facility](unit) for routine post - op      Report consisted of patients Situation, Background, Assessment and   Recommendations(SBAR). Information from the following report(s) OR Summary, Procedure Summary and Intake/Output was reviewed with the receiving nurse. Opportunity for questions and clarification was provided. Assessment completed upon patients arrival to unit and care assumed. Dual skin assessment completed with Jose Cosby. No redness or breakdown on sacral area. Foam dressing replaced after visualization.

## 2018-05-19 NOTE — ANESTHESIA POSTPROCEDURE EVALUATION
Post-Anesthesia Evaluation and Assessment    Patient: Reva Hdz MRN: [id number]  SSN: [id number]    YOB: 1935  Age: 82 y.o. Sex: male       Cardiovascular Function/Vital Signs  Visit Vitals    /61    Pulse 71    Temp 36.8 °C (98.3 °F)    Resp 8    Ht 5' 11\" (1.803 m)    Wt 88.5 kg (195 lb 1.7 oz)    SpO2 100%    BMI 27.21 kg/m2       Patient is status post general anesthesia for Procedure(s):  CORONARY ARTERY BYPASS GRAFT X 3; LIMA   VEIN HARVEST GREATER SAPHENOUS. Nausea/Vomiting: None    Postoperative hydration reviewed and adequate. Pain:  Pain Scale 1: Adult Nonverbal Pain Scale (05/18/18 2200)  Pain Intensity 1: 0 (05/18/18 1751)   Managed    Neurological Status:   Neuro  Neurologic State: Alert; Appropriate for age;Eyes open spontaneously (05/18/18 2300)  Orientation Level: Disoriented to time;Oriented to person;Oriented to place;Oriented to situation (05/18/18 2300)  Cognition: Appropriate decision making; Appropriate for age attention/concentration; Appropriate safety awareness; Follows commands (05/18/18 2300)  Speech: Appropriate for age;Clear (05/18/18 2300)  Assessment L Pupil: Brisk;Round (05/18/18 2300)  Size L Pupil (mm): 2 (05/18/18 2300)  Assessment R Pupil: Brisk;Round (05/18/18 2300)  Size R Pupil (mm): 2 (05/18/18 2300)  LUE Motor Response: Purposeful;Spontaneous  (05/18/18 2300)  LLE Motor Response: Purposeful;Spontaneous  (05/18/18 2300)  RUE Motor Response: Purposeful;Spontaneous  (05/18/18 2300)  RLE Motor Response: Purposeful;Spontaneous  (05/18/18 2300)   At baseline    Mental Status and Level of Consciousness: Arousable    Pulmonary Status:   O2 Device: Heated; Hi flow nasal cannula (05/18/18 2300)   Adequate oxygenation and airway patent    Complications related to anesthesia: None    Post-anesthesia assessment completed.  No concerns    Signed By: Laila Mathew MD     May 19, 2018

## 2018-05-19 NOTE — PROGRESS NOTES
5/19/2018 5:32 PM    Admit Date: 5/16/2018        Subjective:     Juany Carranza denies dyspnea. He moved to 2nd floor today He is up in a chair            Objective:      Visit Vitals    /58 (BP 1 Location: Right arm, BP Patient Position: At rest)    Pulse 67    Temp 97.5 °F (36.4 °C)    Resp 20    Ht 5' 11\" (1.803 m)    Wt 94.3 kg (207 lb 14.3 oz)    SpO2 97%    BMI 29 kg/m2       Physical Exam:  Heart: regular rate and rhythm  Lungs: clear to auscultation bilaterally    Data Review:   Labs:    Recent Results (from the past 24 hour(s))   BLOOD GAS & LYTES, ARTERIAL    Collection Time: 05/18/18  6:05 PM   Result Value Ref Range    pH 7.28 (L) 7.35 - 7.45      PCO2 42 35 - 45 mmHg    PO2 227 (H) 80 - 105 mmHg    BICARBONATE 19 (L) 22 - 26 mmol/L    BASE DEFICIT 7.1 (H) 0 - 2 mmol/L    TOTAL HEMOGLOBIN 11.9 11.7 - 15.0 GM/DL    O2 SAT 99 (H) 92 - 98.5 %    Arterial O2 Hgb 98.1 (H) 94 - 97 %    CARBOXYHEMOGLOBIN 0.3 (L) 0.5 - 1.5 %    METHEMOGLOBIN 0.6 0.0 - 1.5 %    DEOXYHEMOGLOBIN 1 0.0 - 5.0 %    Sodium 138.1 135 - 148 MMOL/L    POTASSIUM 4.33 3.5 - 5.3 MMOL/L    CHLORIDE 110 (H) 98 - 106      Calcium, ionized 1.11 1.0 - 1.3 mmol/L    SITE [place]     ALLENS TEST NA    GLUCOSE, POC    Collection Time: 05/18/18  6:05 PM   Result Value Ref Range    Glucose (POC) 156 (H) 65 - 284 mg/dL   METABOLIC PANEL, BASIC    Collection Time: 05/18/18  6:09 PM   Result Value Ref Range    Sodium 146 (H) 136 - 145 mmol/L    Potassium 4.4 3.5 - 5.1 mmol/L    Chloride 115 (H) 98 - 107 mmol/L    CO2 20 (L) 21 - 32 mmol/L    Anion gap 11 7 - 16 mmol/L    Glucose 158 (H) 65 - 100 mg/dL    BUN 16 8 - 23 MG/DL    Creatinine 1.34 0.8 - 1.5 MG/DL    GFR est AA >60 >60 ml/min/1.73m2    GFR est non-AA 54 (L) >60 ml/min/1.73m2    Calcium 7.3 (L) 8.3 - 10.4 MG/DL   MAGNESIUM    Collection Time: 05/18/18  6:09 PM   Result Value Ref Range    Magnesium 2.8 (H) 1.8 - 2.4 mg/dL   CBC W/O DIFF    Collection Time: 05/18/18  6:09 PM   Result Value Ref Range    WBC 14.8 (H) 4.3 - 11.1 K/uL    RBC 3.72 (L) 4.23 - 5.67 M/uL    HGB 10.8 (L) 13.6 - 17.2 g/dL    HCT 32.9 (L) 41.1 - 50.3 %    MCV 88.4 79.6 - 97.8 FL    MCH 29.0 26.1 - 32.9 PG    MCHC 32.8 31.4 - 35.0 g/dL    RDW 14.1 11.9 - 14.6 %    PLATELET 201 (L) 080 - 450 K/uL    MPV 9.5 (L) 10.8 - 14.1 FL   PTT    Collection Time: 05/18/18  6:09 PM   Result Value Ref Range    aPTT 31.9 23.2 - 35.3 SEC   PROTHROMBIN TIME + INR    Collection Time: 05/18/18  6:09 PM   Result Value Ref Range    Prothrombin time 18.8 (H) 11.5 - 14.5 sec    INR 1.6     FIBRINOGEN    Collection Time: 05/18/18  6:09 PM   Result Value Ref Range    Fibrinogen 211 190 - 501 mg/dL   EKG, 12 LEAD, INITIAL    Collection Time: 05/18/18  6:43 PM   Result Value Ref Range    Ventricular Rate 78 BPM    Atrial Rate 78 BPM    P-R Interval 168 ms    QRS Duration 92 ms    Q-T Interval 428 ms    QTC Calculation (Bezet) 487 ms    Calculated P Axis 48 degrees    Calculated R Axis 67 degrees    Calculated T Axis 97 degrees    Diagnosis       Normal sinus rhythm  ST & T wave abnormality, consider anterior ischemia  Prolonged QT  Abnormal ECG  When compared with ECG of 16-MAY-2018 23:49,  T wave inversion now evident in Anterior leads  QT has lengthened  Confirmed by Heidi Cortes MD (), PETRA QUINTERO ([id number]) on 5/19/2018 4:57:08 PM     GLUCOSE, POC    Collection Time: 05/18/18  7:11 PM   Result Value Ref Range    Glucose (POC) 141 (H) 65 - 100 mg/dL   GLUCOSE, POC    Collection Time: 05/18/18  8:07 PM   Result Value Ref Range    Glucose (POC) 132 (H) 65 - 100 mg/dL   GLUCOSE, POC    Collection Time: 05/18/18  9:09 PM   Result Value Ref Range    Glucose (POC) 122 (H) 65 - 100 mg/dL   GLUCOSE, POC    Collection Time: 05/18/18 10:02 PM   Result Value Ref Range    Glucose (POC) 113 (H) 65 - 100 mg/dL   BLOOD GAS, ARTERIAL    Collection Time: 05/18/18 10:05 PM   Result Value Ref Range    pH 7.33 (L) 7.35 - 7.45      PCO2 33 (L) 35 - 45 mmHg    PO2 114 (H) 80 - 105 mmHg    BICARBONATE 17 (L) 22 - 26 mmol/L    BASE DEFICIT 8.1 (H) 0 - 2 mmol/L    TOTAL HEMOGLOBIN 11.6 (L) 11.7 - 15.0 GM/DL    O2 SAT 98 92 - 98.5 %    Arterial O2 Hgb 97.0 94 - 97 %    CARBOXYHEMOGLOBIN 0.4 (L) 0.5 - 1.5 %    METHEMOGLOBIN 0.4 0.0 - 1.5 %    DEOXYHEMOGLOBIN 2 0.0 - 5.0 %    SITE GIA     ALLENS TEST NA     MODE PSV     PEEP/CPAP 8.0      Respiratory comment: Danii ZARAGOZA at 5 18 2018 10 10 53 PM. Read back.     MAGNESIUM    Collection Time: 05/18/18 10:06 PM   Result Value Ref Range    Magnesium 2.5 (H) 1.8 - 2.4 mg/dL   POTASSIUM    Collection Time: 05/18/18 10:06 PM   Result Value Ref Range    Potassium 4.4 3.5 - 5.1 mmol/L   HGB & HCT    Collection Time: 05/18/18 10:06 PM   Result Value Ref Range    HGB 10.7 (L) 13.6 - 17.2 g/dL    HCT 32.2 (L) 41.1 - 50.3 %   GLUCOSE, POC    Collection Time: 05/18/18 11:01 PM   Result Value Ref Range    Glucose (POC) 106 (H) 65 - 100 mg/dL   GLUCOSE, POC    Collection Time: 05/19/18 12:14 AM   Result Value Ref Range    Glucose (POC) 94 65 - 100 mg/dL   GLUCOSE, POC    Collection Time: 05/19/18  2:05 AM   Result Value Ref Range    Glucose (POC) 74 65 - 100 mg/dL   MAGNESIUM    Collection Time: 05/19/18  2:07 AM   Result Value Ref Range    Magnesium 2.4 1.8 - 2.4 mg/dL   POTASSIUM    Collection Time: 05/19/18  2:07 AM   Result Value Ref Range    Potassium 4.4 3.5 - 5.1 mmol/L   HGB & HCT    Collection Time: 05/19/18  2:07 AM   Result Value Ref Range    HGB 9.9 (L) 13.6 - 17.2 g/dL    HCT 30.2 (L) 41.1 - 50.3 %   GLUCOSE, POC    Collection Time: 05/19/18  4:04 AM   Result Value Ref Range    Glucose (POC) 81 65 - 100 mg/dL   GLUCOSE, POC    Collection Time: 05/19/18  5:00 AM   Result Value Ref Range    Glucose (POC) 91 65 - 227 mg/dL   METABOLIC PANEL, BASIC    Collection Time: 05/19/18  5:01 AM   Result Value Ref Range    Sodium 148 (H) 136 - 145 mmol/L    Potassium 4.7 3.5 - 5.1 mmol/L    Chloride 119 (H) 98 - 107 mmol/L    CO2 22 21 - 32 mmol/L    Anion gap 7 7 - 16 mmol/L    Glucose 87 65 - 100 mg/dL    BUN 16 8 - 23 MG/DL    Creatinine 1.32 0.8 - 1.5 MG/DL    GFR est AA >60 >60 ml/min/1.73m2    GFR est non-AA 55 (L) >60 ml/min/1.73m2    Calcium 7.3 (L) 8.3 - 10.4 MG/DL   MAGNESIUM    Collection Time: 05/19/18  5:01 AM   Result Value Ref Range    Magnesium 2.4 1.8 - 2.4 mg/dL   CBC W/O DIFF    Collection Time: 05/19/18  5:01 AM   Result Value Ref Range    WBC 7.4 4.3 - 11.1 K/uL    RBC 3.33 (L) 4.23 - 5.67 M/uL    HGB 9.6 (L) 13.6 - 17.2 g/dL    HCT 29.6 (L) 41.1 - 50.3 %    MCV 88.9 79.6 - 97.8 FL    MCH 28.8 26.1 - 32.9 PG    MCHC 32.4 31.4 - 35.0 g/dL    RDW 14.7 (H) 11.9 - 14.6 %    PLATELET 981 (L) 376 - 450 K/uL    MPV 9.6 (L) 10.8 - 14.1 FL   EKG, 12 LEAD, SUBSEQUENT    Collection Time: 05/19/18  7:27 AM   Result Value Ref Range    Ventricular Rate 59 BPM    Atrial Rate 59 BPM    P-R Interval 190 ms    QRS Duration 92 ms    Q-T Interval 378 ms    QTC Calculation (Bezet) 374 ms    Calculated P Axis 65 degrees    Calculated R Axis 3 degrees    Calculated T Axis 87 degrees    Diagnosis       Sinus bradycardia  Nonspecific T wave abnormality  Abnormal ECG  When compared with ECG of 18-MAY-2018 18:43,  Questionable change in QRS axis  ST no longer depressed in Anterior leads  T wave inversion no longer evident in Anterior leads  QT has shortened  Confirmed by Vineet Duenas MD (), PETRA D ([id number]) on 5/19/2018 5:05:44 PM     GLUCOSE, POC    Collection Time: 05/19/18 10:56 AM   Result Value Ref Range    Glucose (POC) 99 65 - 100 mg/dL   GLUCOSE, POC    Collection Time: 05/19/18  1:01 PM   Result Value Ref Range    Glucose (POC) 101 (H) 65 - 100 mg/dL       Telemetry: normal sinus rhythm      Assessment:     Patient Active Problem List    Diagnosis Date Noted    Atelectasis 05/19/2018    S/P CABG (coronary artery bypass graft) doing well day 1 post op 05/18/2018    Hypoxia 05/18/2018    Hypothyroidism 05/18/2018    Current chronic use of systemic steroids 05/18/2018    Bradycardia stable 05/16/2018    Essential hypertension 05/16/2018    NSTEMI (non-ST elevated myocardial infarction) ([facility] 05/16/2018    Localized edema 09/21/2017    EKG, abnormal 08/23/2017

## 2018-05-19 NOTE — PROGRESS NOTES
Bedside report to [facility] RN, pt calm, no distress noted. VSS. Chart and labs reviewed with on-coming nurse.

## 2018-05-19 NOTE — PROGRESS NOTES
Bedside and Verbal shift change report given to self (oncoming nurse) by Cat, RN (offgoing nurse). Report included the following information SBAR, Kardex, Intake/Output, MAR, Recent Results and Cardiac Rhythm NSR.

## 2018-05-19 NOTE — PROGRESS NOTES
Problem: Mobility Impaired (Adult and Pediatric)  Goal: *Therapy Goal (Edit Goal, Insert Text)  STERNAL PRECAUTIONS :     STG:  (1.)Mr. Man Stein will move from supine to sit and sit to supine , scoot up and down and roll side to side with SUPERVISION within 4 treatment day(s). (2.)Mr. Man Stein will transfer from bed to chair and chair to bed with SUPERVISION using the least restrictive device within 4 treatment day(s). (3.)Mr. Man Stein will ambulate with SUPERVISION for 250 feet with the least restrictive device within 4 treatment day(s). LTG:  (1.)Mr. Man Stein will move from supine to sit and sit to supine , scoot up and down and roll side to side in bed with MODIFIED INDEPENDENCE within 8 treatment day(s). (2.)Mr. Man Stein will transfer from bed to chair and chair to bed with MODIFIED INDEPENDENCE using the least restrictive device within 8 treatment day(s). (3.)Mr. Man Stein will ambulate with MODIFIED INDEPENDENCE for 500 feet with the least restrictive device within 8 treatment day(s).   ________________________________________________________________________________________________    PHYSICAL THERAPY: Daily Note 5/19/2018  INPATIENT: Hospital Day: 4  [place] / [place] / Product Type: Medicare /      NAME/AGE/GENDER: Katt Holbrook is a 82 y.o. male   PRIMARY DIAGNOSIS: Atherosclerosis of native coronary artery of native heart with unstable angina pectoris ([facility] [I25.110] NSTEMI (non-ST elevated myocardial infarction) ([facility] NSTEMI (non-ST elevated myocardial infarction) ([facility]  Procedure(s) (LRB):  CORONARY ARTERY BYPASS GRAFT X 3; LIMA  (N/A)  VEIN HARVEST GREATER SAPHENOUS (Left)  1 Day Post-Op  ICD-10: Treatment Diagnosis:    · Generalized Muscle Weakness (M62.81)  · Difficulty in walking, Not elsewhere classified (R26.2)   Precaution/Allergies:  Pcn [penicillins]      ASSESSMENT:     Mr. Man Stein presents with decreased transfers, ambulation and mobility with above diagnosis and s/p cardiac surgery with sternal precautions. He demonstrates transfers of MIN A x 1 from chair to standing. He ambulates for 150 feet without complaints of increased pain and mild SOB then returned to room and is seated edge of bed with return and positioned with MIN A x 1. He is utilizing portable 02. He is more fatigued at the end of the day than this morning. Skilled PT is indicated for patient safety and mobility progression during current acute care episode. This section established at most recent assessment   PROBLEM LIST (Impairments causing functional limitations):  1. Decreased Strength  2. Decreased ADL/Functional Activities  3. Decreased Transfer Abilities  4. Decreased Ambulation Ability/Technique  5. Decreased Balance  6. Decreased Activity Tolerance  7. Decreased Pacing Skills  8. Decreased Flexibility/Joint Mobility  9. Decreased [place] with Home Exercise Program   INTERVENTIONS PLANNED: (Benefits and precautions of physical therapy have been discussed with the patient.)  1. Balance Exercise  2. Bed Mobility  3. Family Education  4. Gait Training  5. Home Exercise Program (HEP)  6. Range of Motion (ROM)  7. Therapeutic Activites  8. Therapeutic Exercise/Strengthening  9. Transfer Training     TREATMENT PLAN: Frequency/Duration:2 x's per day for duration of hospital stay. Rehabilitation Potential For Stated Goals: Good     RECOMMENDED REHABILITATION/EQUIPMENT: (at time of discharge pending progress): Due to the probability of continued deficits (see above) this patient will likely need continued skilled physical therapy after discharge. Equipment:    Walkers, Type: Rolling Walker              HISTORY:   History of Present Injury/Illness (Reason for Referral):  PER MD H&P    Simba Spain is a 82 y.o. male with past medical history of remote pituitary tumor post surgery, HTN and hypothyroidism who presented to the ER with complaints of chest pain.  Patient reports that his pain started tonight around 1800. He describes his pain as right sided pressure with radiation into his right shoulder. He denies nausea, vomiting, shortness of breath or diaphoresis with chest pain episode. Upon arrival of EMS, EKG showed significant ST depression in V2-V4 without ST elevation. No medications were given by EMS during transport and his chest pain resolved on its own. Repeat EKG done in the ER showed improvement of ST depression initially. Additional repeat EKG done while in the ER showed ST changes in III and aVF. Initial troponin was 0.04, repeat was 2.66. He remains chest pain free since arrival. While in the ER, he was treated with 324mg ASA, 4000 unit IV heparin bolus followed by drip.      Past Medical History/Comorbidities:   Mr. Keli Matute  has a past medical history of Hypertension and Neurological disorder. Mr. Keli Matute  has a past surgical history that includes hx hemorrhoidectomy and hx other surgical (2001 and 2013). Social History/Living Environment:   Home Environment: Private residence  # Steps to Enter: 0  One/Two Story Residence: One story  Living Alone: No  Support Systems: Spouse/Significant Other/Partner  Patient Expects to be Discharged to[de-identified] Rehabilitation facility  Current DME Used/Available at Home: None  Prior Level of Function/Work/Activity:  Patient reports he had been independent with all functional mobility prior to this cardiac event. Dominant Side:         RIGHT  Personal Factors:          Sex:  male        Age:  82 y.o.    Number of Personal Factors/Comorbidities that affect the Plan of Care: 1-2: MODERATE COMPLEXITY   EXAMINATION:   Most Recent Physical Functioning:   Gross Assessment:  AROM: Generally decreased, functional (sternal precautions )  Strength: Generally decreased, functional  Coordination: Generally decreased, functional  Tone: Normal  Sensation: Intact               Posture:  Posture (WDL): Exceptions to WDL  Posture Assessment: Cervical, Forward head, Rounded shoulders  Balance:  Sitting: Impaired  Sitting - Static: Fair (occasional)  Sitting - Dynamic: Fair (occasional)  Standing: Impaired  Standing - Static: Fair  Standing - Dynamic : Fair Bed Mobility:  Rolling: Minimum assistance  Supine to Sit: Minimum assistance  Sit to Supine: Minimum assistance  Scooting: Minimum assistance  Wheelchair Mobility:     Transfers:  Sit to Stand: Minimum assistance  Stand to Sit: Minimum assistance  Bed to Chair: Minimum assistance  Gait:     Base of Support: Center of gravity altered  Speed/Ann: Fluctuations; Pace decreased (<100 feet/min)  Step Length: Left shortened;Right shortened  Swing Pattern: Left asymmetrical;Right asymmetrical  Stance: Left decreased;Right decreased;Time;Weight shift  Gait Abnormalities: Decreased step clearance  Distance (ft): 150 Feet (ft)  Assistive Device: Walker, rolling  Ambulation - Level of Assistance: Contact guard assistance  Interventions: Manual cues; Safety awareness training; Tactile cues; Verbal cues; Visual/Demos      Body Structures Involved:  1. Joints  2. Muscles  3. Ligaments Body Functions Affected:  1. Neuromusculoskeletal  2. Movement Related  3. Skin Related  4. Metobolic/Endocrine Activities and Participation Affected:  1. General Tasks and Demands  2. Mobility  3. Self Care  4. Community, Social and [place]   Number of elements that affect the Plan of Care: 3: MODERATE COMPLEXITY   CLINICAL PRESENTATION:   Presentation: Evolving clinical presentation with changing clinical characteristics: MODERATE COMPLEXITY   CLINICAL DECISION MAKIN [facility] Mobility Inpatient Short Form  How much difficulty does the patient currently have. .. Unable A Lot A Little None   1. Turning over in bed (including adjusting bedclothes, sheets and blankets)? [] 1   [] 2   [x] 3   [] 4   2.   Sitting down on and standing up from a chair with arms ( e.g., wheelchair, bedside commode, etc.)   [] 1   [] 2   [x] 3   [] 4   3.  Moving from lying on back to sitting on the side of the bed? [] 1   [] 2   [x] 3   [] 4   How much help from another person does the patient currently need. .. Total A Lot A Little None   4. Moving to and from a bed to a chair (including a wheelchair)? [] 1   [] 2   [x] 3   [] 4   5. Need to walk in hospital room? [] 1   [] 2   [x] 3   [] 4   6. Climbing 3-5 steps with a railing? [] 1   [x] 2   [] 3   [] 4   © 2007, Trustees of [address] [place] [address] [address] [address], under license to [employer]. All rights reserved      Score:  Initial: 17 Most Recent: X (Date: -- )    Interpretation of Tool:  Represents activities that are increasingly more difficult (i.e. Bed mobility, Transfers, Gait). Score 24 23 22-20 19-15 14-10 9-7 6     Modifier CH CI CJ CK CL CM CN      ? Mobility - Walking and Moving Around:     - CURRENT STATUS: CK - 40%-59% impaired, limited or restricted    - GOAL STATUS: CJ - 20%-39% impaired, limited or restricted    - D/C STATUS:  ---------------To be determined---------------  [place] / Product Type: Medicare /      Medical Necessity:     · Patient demonstrates good rehab potential due to higher previous functional level. Reason for Services/Other Comments:  · Patient continues to require skilled intervention due to s/p cardiac surgery . Use of outcome tool(s) and clinical judgement create a POC that gives a: Questionable prediction of patient's progress: MODERATE COMPLEXITY            TREATMENT:   (In addition to Assessment/Re-Assessment sessions the following treatments were rendered)   Pre-treatment Symptoms/Complaints:  2/10 in the sternum region. Pain: Initial:   Pain Intensity 1: 2  Pain Location 1: Sternum  Pain Intervention(s) 1: Repositioned  Post Session:  2/10 in the sternum region. Therapeutic Activity: (    23 mins):   Therapeutic activities including Bed transfers, Chair transfers and Ambulation on level ground to improve mobility, strength, balance and coordination. Required minimal Manual cues; Safety awareness training; Tactile cues; Verbal cues; Visual/Demos to promote motor control of bilateral, lower extremity(s). Date:   Date:   Date:     Activity/Exercise Parameters Parameters Parameters                                               Braces/Orthotics/Lines/Etc:   · O2 Device: Nasal cannula  Treatment/Session Assessment:    · Response to Treatment:  Improving mobility and ambulation with treatment. · Interdisciplinary Collaboration:   o Physical Therapist  o Registered Nurse  · After treatment position/precautions:   o Supine in bed  o Bed alarm/tab alert on  o Bed/Chair-wheels locked  o Bed in low position  o Call light within reach  o RN notified  o Side rails x 3   · Compliance with Program/Exercises: Will assess as treatment progresses. · Recommendations/Intent for next treatment session: \"Next visit will focus on advancements to more challenging activities, reduction in assistance provided and transfers, ambulation and mobility s/p cardiac surgery. \".   Total Treatment Duration:  PT Patient Time In/Time Out  Time In: 1740  Time Out: 5900 [facility], PT

## 2018-05-19 NOTE — PROGRESS NOTES
Bedside Report and care received from Lisa(off going nurse)  via H&P, SBAR, Emar and Kardex. VSS, assessment to follow.

## 2018-05-19 NOTE — PROGRESS NOTES
Hemodynamically stable. Outputs with parameters. RT at bedside. Successful completion of extubation mechanics. Extubated to AirVo with strong cough. Pt able to clear secretions. Voice clear. No stridor. SpO2 >93%. Chest expansion symmetrical. Teaching and encouragement provided regarding splinting chest, coughing, deep breathing and IS use. Ongoing monitoring.

## 2018-05-19 NOTE — OP NOTES
[facility] REPORT    Iris Jewell  MR#: [id number]  : 1935  ACCOUNT #: [de-identified]   DATE OF SERVICE: 2018    PREOPERATIVE DIAGNOSES:  1.  Non-ST elevation myocardial infarction. 2.  Severe multivessel atherosclerotic coronary artery disease. POSTOPERATIVE DIAGNOSES:   1.  Non-ST elevation myocardial infarction. 2.  Severe multivessel atherosclerotic coronary artery disease. SURGEON:  Kierra Kendall. Annabella Marcelino MD     ASSISTANT:       ANESTHESIA:  General endotracheal.    PROCEDURE PERFORMED:  Coronary artery bypass grafting x3 with grafts consisting of:  1. Left internal mammary artery to left anterior descending artery. 2.  Reverse saphenous vein to diagonal.   3.  Reversed saphenous vein graft to obtuse marginal.     OPERATIVE FINDINGS:  Saphenous vein 3-4 mm. LIMA 2.5 mm. Aorta was soft. LAD was chronically occluded, 1.1 mm, severe distal disease. Diagonal was 1.4 mm, severe distal disease. OM is 1.6 mm, severe distal disease. ESTIMATED BLOOD LOSS:  Minimal.     SPECIMENS REMOVED:      IMPLANTS:       INDICATION:  The patient is a very pleasant 81-year-old gentleman who presented to the emergency room with an NSTEMI. He had been followed by [facility] for mild MR, hypertension, and bradycardia. He had an acute episode of chest pain at home, presented to the emergency room where he ruled in for an NSTEMI. Left heart catheterization showed severe multivessel disease, including a chronically occluded LAD, a high-grade large diagonal, and a high-grade OM. LV function was preserved. COMPLICATIONS:  None. COUNTS:  All instrument and sponge counts were correct at the end of the case. DESCRIPTION OF PROCEDURE:  After informed consent was obtained for the above-mentioned procedure, the patient was then taken to the operating room. Appropriate monitoring lines were placed by the Anesthesia Department.   After administration of general endotracheal anesthesia, the patient was prepped and draped in the usual fashion with Betadine scrub and paint and Ioban cardiovascular drapes. The chest was then entered through a standard mediastinotomy incision while simultaneous harvesting of peripheral conduit was undertaken. After harvesting the conduit, it was inspected and noted to be adequate. The incisions were subsequently closed in a 2-layer fashion of 3-0 and 4-0 Vicryl. The left internal mammary harvest was then undertaken in pedicle fashion and was injected with Papaverine solution. After administration of heparin, the distal pedicle was incised and noted to bleed briskly. A thoracostomy tube was then placed. This was brought out through a separate stab incision inferiorly and affixed to the skin. The pericardium was then opened and tacked up into a pericardial well, revealing the heart. Pursestrings were then placed into the aorta, the right atrial appendage, and the right atrium. After adequate ACT was obtained the patient was then cannulated through these pursestrings, at which point cardiopulmonary bypass was started. Target vessels were identified and marked. A crossclamp was then applied. Antegrade and retrograde cardioplegia was administered initially and then given intermittently throughout the case. A latticed heart support was placed to assist with the distal anastomoses, which were performed by using 7-0 Prolene for vein to artery anastomosis and 8-0 Prolene for artery to artery anastomosis. Proximal anastomoses to the aorta were also placed using 6-0 Prolene. At the conclusion of the final proximal anastomosis, the aorta and right ventricle were flushed of debris and the anastomosis was completed. The crossclamp was then removed. The heart then underwent meticulous de-airing. The patient was warmed and weaned from the cardiopulmonary bypass.   The distal anastomoses were visualized and noted to be hemostatic. All grafts were dopplered and noted to have an excellent flow. The venous cannula was then removed. Ventricular and atrial pacing wires were then placed upon the heart, brought out through stab wounds inferiorly and affixed to the skin. A single straight mediastinal tube was placed, brought out through a separate stab incision inferiorly, and also affixed to the skin. After meticulous hemostasis was made the sternum was reapproximated with heavy gauge stainless steel wire. The midline fascia was subsequently closed separately. Vicryl was then used in a running fashion for subcutaneous tissue and skin. Dressings were then applied to all wounds. The patient was then transported with monitors to the intensive care unit intubated and ventilated.       MD IVAN Almanzar / LAAMR  D: 05/18/2018 17:53     T: 05/18/2018 21:24  JOB #: [id number]

## 2018-05-20 ENCOUNTER — APPOINTMENT (OUTPATIENT)
Dept: GENERAL RADIOLOGY | Age: 83
DRG: 234 | End: 2018-05-20
Attending: INTERNAL MEDICINE
Payer: MEDICARE

## 2018-05-20 PROBLEM — I48.0 PAF (PAROXYSMAL ATRIAL FIBRILLATION) (HCC): Status: ACTIVE | Noted: 2018-05-20

## 2018-05-20 LAB
ANION GAP SERPL CALC-SCNC: 7 MMOL/L (ref 7–16)
BUN SERPL-MCNC: 18 MG/DL (ref 8–23)
CALCIUM SERPL-MCNC: 7.8 MG/DL (ref 8.3–10.4)
CHLORIDE SERPL-SCNC: 112 MMOL/L (ref 98–107)
CO2 SERPL-SCNC: 24 MMOL/L (ref 21–32)
CREAT SERPL-MCNC: 1.24 MG/DL (ref 0.8–1.5)
ERYTHROCYTE [DISTWIDTH] IN BLOOD BY AUTOMATED COUNT: 14.7 % (ref 11.9–14.6)
GLUCOSE BLD STRIP.AUTO-MCNC: 90 MG/DL (ref 65–100)
GLUCOSE BLD STRIP.AUTO-MCNC: 93 MG/DL (ref 65–100)
GLUCOSE SERPL-MCNC: 92 MG/DL (ref 65–100)
HCT VFR BLD AUTO: 27.7 % (ref 41.1–50.3)
HGB BLD-MCNC: 9 G/DL (ref 13.6–17.2)
MAGNESIUM SERPL-MCNC: 2.3 MG/DL (ref 1.8–2.4)
MCH RBC QN AUTO: 29.1 PG (ref 26.1–32.9)
MCHC RBC AUTO-ENTMCNC: 32.5 G/DL (ref 31.4–35)
MCV RBC AUTO: 89.6 FL (ref 79.6–97.8)
MM INDURATION POC: 0 MM (ref 0–5)
MM INDURATION POC: 0 MM (ref 0–5)
PLATELET # BLD AUTO: 85 K/UL (ref 150–450)
PMV BLD AUTO: 9.5 FL (ref 10.8–14.1)
POTASSIUM SERPL-SCNC: 3.7 MMOL/L (ref 3.5–5.1)
PPD POC: NORMAL NEGATIVE
PPD POC: NORMAL NEGATIVE
RBC # BLD AUTO: 3.09 M/UL (ref 4.23–5.67)
SODIUM SERPL-SCNC: 143 MMOL/L (ref 136–145)
WBC # BLD AUTO: 5.5 K/UL (ref 4.3–11.1)

## 2018-05-20 PROCEDURE — 99232 SBSQ HOSP IP/OBS MODERATE 35: CPT | Performed by: INTERNAL MEDICINE

## 2018-05-20 PROCEDURE — 82962 GLUCOSE BLOOD TEST: CPT

## 2018-05-20 PROCEDURE — 97530 THERAPEUTIC ACTIVITIES: CPT

## 2018-05-20 PROCEDURE — 74011250637 HC RX REV CODE- 250/637: Performed by: THORACIC SURGERY (CARDIOTHORACIC VASCULAR SURGERY)

## 2018-05-20 PROCEDURE — 85027 COMPLETE CBC AUTOMATED: CPT | Performed by: THORACIC SURGERY (CARDIOTHORACIC VASCULAR SURGERY)

## 2018-05-20 PROCEDURE — 80048 BASIC METABOLIC PNL TOTAL CA: CPT | Performed by: THORACIC SURGERY (CARDIOTHORACIC VASCULAR SURGERY)

## 2018-05-20 PROCEDURE — 97110 THERAPEUTIC EXERCISES: CPT

## 2018-05-20 PROCEDURE — 65660000004 HC RM CVT STEPDOWN

## 2018-05-20 PROCEDURE — 74011250636 HC RX REV CODE- 250/636: Performed by: NURSE PRACTITIONER

## 2018-05-20 PROCEDURE — 71045 X-RAY EXAM CHEST 1 VIEW: CPT

## 2018-05-20 PROCEDURE — 83735 ASSAY OF MAGNESIUM: CPT | Performed by: THORACIC SURGERY (CARDIOTHORACIC VASCULAR SURGERY)

## 2018-05-20 PROCEDURE — 74011000258 HC RX REV CODE- 258: Performed by: NURSE PRACTITIONER

## 2018-05-20 PROCEDURE — 74011250637 HC RX REV CODE- 250/637: Performed by: NURSE PRACTITIONER

## 2018-05-20 PROCEDURE — 36415 COLL VENOUS BLD VENIPUNCTURE: CPT | Performed by: THORACIC SURGERY (CARDIOTHORACIC VASCULAR SURGERY)

## 2018-05-20 PROCEDURE — 74011636637 HC RX REV CODE- 636/637: Performed by: INTERNAL MEDICINE

## 2018-05-20 RX ADMIN — Medication 10 ML: at 17:14

## 2018-05-20 RX ADMIN — AMIODARONE HYDROCHLORIDE 0.5 MG/MIN: 50 INJECTION, SOLUTION INTRAVENOUS at 10:23

## 2018-05-20 RX ADMIN — METOPROLOL TARTRATE 25 MG: 25 TABLET ORAL at 20:48

## 2018-05-20 RX ADMIN — AMIODARONE HYDROCHLORIDE 150 MG: 50 INJECTION, SOLUTION INTRAVENOUS at 00:38

## 2018-05-20 RX ADMIN — FAMOTIDINE 20 MG: 20 TABLET ORAL at 20:48

## 2018-05-20 RX ADMIN — FAMOTIDINE 20 MG: 20 TABLET ORAL at 09:19

## 2018-05-20 RX ADMIN — ACETAMINOPHEN 650 MG: 325 TABLET ORAL at 09:19

## 2018-05-20 RX ADMIN — POTASSIUM CHLORIDE 10 MEQ: 10 TABLET, EXTENDED RELEASE ORAL at 08:49

## 2018-05-20 RX ADMIN — AMIODARONE HYDROCHLORIDE 200 MG: 200 TABLET ORAL at 20:48

## 2018-05-20 RX ADMIN — LEVOTHYROXINE SODIUM 88 MCG: 88 TABLET ORAL at 06:21

## 2018-05-20 RX ADMIN — MUPIROCIN: 20 OINTMENT TOPICAL at 21:00

## 2018-05-20 RX ADMIN — AMIODARONE HYDROCHLORIDE 1 MG/MIN: 50 INJECTION, SOLUTION INTRAVENOUS at 01:10

## 2018-05-20 RX ADMIN — DOCUSATE SODIUM 100 MG: 100 CAPSULE, LIQUID FILLED ORAL at 08:48

## 2018-05-20 RX ADMIN — MUPIROCIN: 20 OINTMENT TOPICAL at 08:51

## 2018-05-20 RX ADMIN — PREDNISONE 5 MG: 5 TABLET ORAL at 08:49

## 2018-05-20 RX ADMIN — ATORVASTATIN CALCIUM 80 MG: 40 TABLET, FILM COATED ORAL at 20:48

## 2018-05-20 RX ADMIN — METOPROLOL TARTRATE 25 MG: 25 TABLET ORAL at 08:49

## 2018-05-20 RX ADMIN — POTASSIUM CHLORIDE 20 MEQ: 1500 TABLET, EXTENDED RELEASE ORAL at 17:12

## 2018-05-20 RX ADMIN — FUROSEMIDE 40 MG: 40 TABLET ORAL at 08:49

## 2018-05-20 RX ADMIN — Medication 10 ML: at 21:00

## 2018-05-20 NOTE — PROGRESS NOTES
Amio drip decreased to .5 mg.  Still in A fib rate controlled 70-90. Labs reviewed.   Report given to incoming RN

## 2018-05-20 NOTE — PROGRESS NOTES
Noted in AFib at 2315. BP checked. Rate . Asymptomatic. Jennifer Olvera NP called. Made aware pt in AFib. Current  HR, got 200mg of amiodarone at 9pm.  Lopressor held today for HR in the low 60's. Give bolus of Amiodarone over 1/2 hour and start drip as per protocol. Awaiting drip from pharmacy.

## 2018-05-20 NOTE — PROGRESS NOTES
Bedside report to Laci Mcfarlane RN, pt calm, no distress noted. VSS. Chart and labs reviewed with on-coming nurse.

## 2018-05-20 NOTE — PROGRESS NOTES
Problem: Mobility Impaired (Adult and Pediatric)  Goal: *Therapy Goal (Edit Goal, Insert Text)  STERNAL PRECAUTIONS :     STG:  (1.)Mr. Kacey Hernandez will move from supine to sit and sit to supine , scoot up and down and roll side to side with SUPERVISION within 4 treatment day(s). (2.)Mr. Kacey Hernandez will transfer from bed to chair and chair to bed with SUPERVISION using the least restrictive device within 4 treatment day(s). (3.)Mr. Kacey Hernandez will ambulate with SUPERVISION for 250 feet with the least restrictive device within 4 treatment day(s). LTG:  (1.)Mr. Kacey Hernandez will move from supine to sit and sit to supine , scoot up and down and roll side to side in bed with MODIFIED INDEPENDENCE within 8 treatment day(s). (2.)Mr. Kacey Hernandez will transfer from bed to chair and chair to bed with MODIFIED INDEPENDENCE using the least restrictive device within 8 treatment day(s). (3.)Mr. Kacey Hernandez will ambulate with MODIFIED INDEPENDENCE for 500 feet with the least restrictive device within 8 treatment day(s).   ________________________________________________________________________________________________    PHYSICAL THERAPY: Daily Note, Treatment Day: 1st, PM 5/20/2018  INPATIENT: Hospital Day: 5  [place] / [place] / Product Type: Medicare /      NAME/AGE/GENDER: Angeline Villarreal is a 82 y.o. male   PRIMARY DIAGNOSIS: Atherosclerosis of native coronary artery of native heart with unstable angina pectoris ([facility] [I25.110] NSTEMI (non-ST elevated myocardial infarction) ([facility] NSTEMI (non-ST elevated myocardial infarction) ([facility]  Procedure(s) (LRB):  CORONARY ARTERY BYPASS GRAFT X 3; LIMA  (N/A)  VEIN HARVEST GREATER SAPHENOUS (Left)  2 Days Post-Op  ICD-10: Treatment Diagnosis:    · Generalized Muscle Weakness (M62.81)  · Difficulty in walking, Not elsewhere classified (R26.2)   Precaution/Allergies:  Pcn [penicillins]      ASSESSMENT:     Mr. Kacey Hernandez presents sitting up in the chair on RA somewhat lethargic but willing to walk. He performed seated exercises below first then stood with minimal assist into the walker. He walked a total of about 200 feet with 2 seated rests and required regular cues to stand up straight and hold head up. Some progress made with mobility and tendency to be impulsive with transfers. PM:   Patient is more alert and ready to walk so he can lay down and take a nap. He stood again needing cues for hand placement but does stand well. He increased his gait distance and took only 1 seated rest break this time. He needed a lot of cues for log rolling into bed and could use some work on that. Steady progress. This section established at most recent assessment   PROBLEM LIST (Impairments causing functional limitations):  1. Decreased Strength  2. Decreased ADL/Functional Activities  3. Decreased Transfer Abilities  4. Decreased Ambulation Ability/Technique  5. Decreased Balance  6. Decreased Activity Tolerance  7. Decreased Pacing Skills  8. Decreased Flexibility/Joint Mobility  9. Decreased [place] with Home Exercise Program   INTERVENTIONS PLANNED: (Benefits and precautions of physical therapy have been discussed with the patient.)  1. Balance Exercise  2. Bed Mobility  3. Family Education  4. Gait Training  5. Home Exercise Program (HEP)  6. Range of Motion (ROM)  7. Therapeutic Activites  8. Therapeutic Exercise/Strengthening  9. Transfer Training     TREATMENT PLAN: Frequency/Duration:2 x's per day for duration of hospital stay. Rehabilitation Potential For Stated Goals: Good     RECOMMENDED REHABILITATION/EQUIPMENT: (at time of discharge pending progress): Due to the probability of continued deficits (see above) this patient will likely need continued skilled physical therapy after discharge.   Equipment:    Walkers, Type: Rolling Walker              HISTORY:   History of Present Injury/Illness (Reason for Referral):  PER MD H&P    Josephine Antoni is a 82 y.o. male with past medical history of remote pituitary tumor post surgery, HTN and hypothyroidism who presented to the ER with complaints of chest pain. Patient reports that his pain started tonight around 1800. He describes his pain as right sided pressure with radiation into his right shoulder. He denies nausea, vomiting, shortness of breath or diaphoresis with chest pain episode. Upon arrival of EMS, EKG showed significant ST depression in V2-V4 without ST elevation. No medications were given by EMS during transport and his chest pain resolved on its own. Repeat EKG done in the ER showed improvement of ST depression initially. Additional repeat EKG done while in the ER showed ST changes in III and aVF. Initial troponin was 0.04, repeat was 2.66. He remains chest pain free since arrival. While in the ER, he was treated with 324mg ASA, 4000 unit IV heparin bolus followed by drip.      Past Medical History/Comorbidities:   Mr. Keli Matute  has a past medical history of Hypertension and Neurological disorder. Mr. Keli Matute  has a past surgical history that includes hx hemorrhoidectomy and hx other surgical (2001 and 2013). Social History/Living Environment:   Home Environment: Private residence  # Steps to Enter: 0  One/Two Story Residence: One story  Living Alone: No  Support Systems: Spouse/Significant Other/Partner  Patient Expects to be Discharged to[de-identified] Rehabilitation facility  Current DME Used/Available at Home: None  Prior Level of Function/Work/Activity:  Patient reports he had been independent with all functional mobility prior to this cardiac event. Dominant Side:         RIGHT  Personal Factors:          Sex:  male        Age:  82 y.o. Number of Personal Factors/Comorbidities that affect the Plan of Care: 1-2: MODERATE COMPLEXITY   EXAMINATION:   Most Recent Physical Functioning:   Gross Assessment:                  Posture:     Balance:    Bed Mobility:  Sit to Supine: Minimum assistance; Moderate assistance  Scooting: Stand-by assistance  Wheelchair Mobility:     Transfers:  Sit to Stand: Contact guard assistance  Stand to Sit: Contact guard assistance;Minimum assistance  Gait:     Step Length: Left shortened;Right shortened  Gait Abnormalities: Decreased step clearance;Shuffling gait; Steppage gait  Distance (ft): 220 Feet (ft) (1 seated rest)  Assistive Device: Walker, rolling  Ambulation - Level of Assistance: Contact guard assistance      Body Structures Involved:  1. Joints  2. Muscles  3. Ligaments Body Functions Affected:  1. Neuromusculoskeletal  2. Movement Related  3. Skin Related  4. Metobolic/Endocrine Activities and Participation Affected:  1. General Tasks and Demands  2. Mobility  3. Self Care  4. Community, Social and [place]   Number of elements that affect the Plan of Care: 3: MODERATE COMPLEXITY   CLINICAL PRESENTATION:   Presentation: Evolving clinical presentation with changing clinical characteristics: MODERATE COMPLEXITY   CLINICAL DECISION MAKIN [facility]ility Inpatient Short Form  How much difficulty does the patient currently have. .. Unable A Lot A Little None   1. Turning over in bed (including adjusting bedclothes, sheets and blankets)? [] 1   [] 2   [x] 3   [] 4   2. Sitting down on and standing up from a chair with arms ( e.g., wheelchair, bedside commode, etc.)   [] 1   [] 2   [x] 3   [] 4   3. Moving from lying on back to sitting on the side of the bed? [] 1   [] 2   [x] 3   [] 4   How much help from another person does the patient currently need. .. Total A Lot A Little None   4. Moving to and from a bed to a chair (including a wheelchair)? [] 1   [] 2   [x] 3   [] 4   5. Need to walk in hospital room? [] 1   [] 2   [x] 3   [] 4   6. Climbing 3-5 steps with a railing? [] 1   [x] 2   [] 3   [] 4   © , Trustees of [address] [place] [address] [address] [address], under license to [employer].  All rights reserved      Score:  Initial: 17 Most Recent: X (Date: -- ) Interpretation of Tool:  Represents activities that are increasingly more difficult (i.e. Bed mobility, Transfers, Gait). Score 24 23 22-20 19-15 14-10 9-7 6     Modifier CH CI CJ CK CL CM CN      ? Mobility - Walking and Moving Around:     - CURRENT STATUS: CK - 40%-59% impaired, limited or restricted    - GOAL STATUS: CJ - 20%-39% impaired, limited or restricted    - D/C STATUS:  ---------------To be determined---------------  [place] / [place] / Product Type: Medicare /      Medical Necessity:     · Patient demonstrates good rehab potential due to higher previous functional level. Reason for Services/Other Comments:  · Patient continues to require skilled intervention due to s/p cardiac surgery . Use of outcome tool(s) and clinical judgement create a POC that gives a: Questionable prediction of patient's progress: MODERATE COMPLEXITY            TREATMENT:   (In addition to Assessment/Re-Assessment sessions the following treatments were rendered)   Pre-treatment Symptoms/Complaints:  General pain, only taking Tylenol at the moment. Pain: Initial:   Pain Intensity 1: 0  Post Session:  None mentioned         Therapeutic Activity: (    20 minutes): Therapeutic activities including Chair transfers , Ambulation on level ground and bed mobility into a flat bed to improve mobility, strength, balance and endurance. Required minimal   to promote static and dynamic balance in standing with the use of the walker. Date:  5/20/18 Date:   Date:     Activity/Exercise Parameters Parameters Parameters   Seated TKE 10x B     Seated AP 10x B     Seated marching 10x B     Seated hip abd 10x B                           Braces/Orthotics/Lines/Etc:   · IV  Treatment/Session Assessment:    · Response to Treatment:  Improving mobility and ambulation with treatment.         · Interdisciplinary Collaboration:   o Physical Therapy Assistant  o Registered Nurse  · After treatment position/precautions:   o Supine in bed  o Bed/Chair-wheels locked  o Bed in low position  o Call light within reach  o RN notified  o Family at bedside   · Compliance with Program/Exercises: Will assess as treatment progresses. · Recommendations/Intent for next treatment session: \"Next visit will focus on increasing gait distance and safety\".   Total Treatment Duration:  PT Patient Time In/Time Out  Time In: 1300  Time Out: 1320    Aidan Mccoy, PTA

## 2018-05-20 NOTE — PROGRESS NOTES
Amio bolus received and started at 11:40. Finished at [address] and drip started at 1 mg.tolorating well.   No acute distress noted

## 2018-05-20 NOTE — PROGRESS NOTES
Problem: Mobility Impaired (Adult and Pediatric)  Goal: *Therapy Goal (Edit Goal, Insert Text)  STERNAL PRECAUTIONS :     STG:  (1.)Mr. Anoop Powell will move from supine to sit and sit to supine , scoot up and down and roll side to side with SUPERVISION within 4 treatment day(s). (2.)Mr. Anoop Powell will transfer from bed to chair and chair to bed with SUPERVISION using the least restrictive device within 4 treatment day(s). (3.)Mr. Anoop Powell will ambulate with SUPERVISION for 250 feet with the least restrictive device within 4 treatment day(s). LTG:  (1.)Mr. Anoop Powell will move from supine to sit and sit to supine , scoot up and down and roll side to side in bed with MODIFIED INDEPENDENCE within 8 treatment day(s). (2.)Mr. Anoop Powell will transfer from bed to chair and chair to bed with MODIFIED INDEPENDENCE using the least restrictive device within 8 treatment day(s). (3.)Mr. Anoop Powell will ambulate with MODIFIED INDEPENDENCE for 500 feet with the least restrictive device within 8 treatment day(s).   ________________________________________________________________________________________________    PHYSICAL THERAPY: Daily Note, Treatment Day: 1st, AM 5/20/2018  INPATIENT: Hospital Day: 5  [place] / Product Type: Medicare /      NAME/AGE/GENDER: Simba Spain is a 82 y.o. male   PRIMARY DIAGNOSIS: Atherosclerosis of native coronary artery of native heart with unstable angina pectoris ([facility] [I25.110] NSTEMI (non-ST elevated myocardial infarction) ([facility] NSTEMI (non-ST elevated myocardial infarction) ([facility]  Procedure(s) (LRB):  CORONARY ARTERY BYPASS GRAFT X 3; LIMA  (N/A)  VEIN HARVEST GREATER SAPHENOUS (Left)  2 Days Post-Op  ICD-10: Treatment Diagnosis:    · Generalized Muscle Weakness (M62.81)  · Difficulty in walking, Not elsewhere classified (R26.2)   Precaution/Allergies:  Pcn [penicillins]      ASSESSMENT:     Mr. Anoop Powell presents sitting up in the chair on RA somewhat lethargic but willing to walk. He performed seated exercises below first then stood with minimal assist into the walker. He walked a total of about 200 feet with 2 seated rests and required regular cues to stand up straight and hold head up. Some progress made with mobility and tendency to be impulsive with transfers. This section established at most recent assessment   PROBLEM LIST (Impairments causing functional limitations):  1. Decreased Strength  2. Decreased ADL/Functional Activities  3. Decreased Transfer Abilities  4. Decreased Ambulation Ability/Technique  5. Decreased Balance  6. Decreased Activity Tolerance  7. Decreased Pacing Skills  8. Decreased Flexibility/Joint Mobility  9. Decreased [place] with Home Exercise Program   INTERVENTIONS PLANNED: (Benefits and precautions of physical therapy have been discussed with the patient.)  1. Balance Exercise  2. Bed Mobility  3. Family Education  4. Gait Training  5. Home Exercise Program (HEP)  6. Range of Motion (ROM)  7. Therapeutic Activites  8. Therapeutic Exercise/Strengthening  9. Transfer Training     TREATMENT PLAN: Frequency/Duration:2 x's per day for duration of hospital stay. Rehabilitation Potential For Stated Goals: Good     RECOMMENDED REHABILITATION/EQUIPMENT: (at time of discharge pending progress): Due to the probability of continued deficits (see above) this patient will likely need continued skilled physical therapy after discharge. Equipment:    Walkers, Type: Rolling Walker              HISTORY:   History of Present Injury/Illness (Reason for Referral):  PER MD H&P    Julius Canavan is a 82 y.o. male with past medical history of remote pituitary tumor post surgery, HTN and hypothyroidism who presented to the ER with complaints of chest pain. Patient reports that his pain started tonight around 1800. He describes his pain as right sided pressure with radiation into his right shoulder.  He denies nausea, vomiting, shortness of breath or diaphoresis with chest pain episode. Upon arrival of EMS, EKG showed significant ST depression in V2-V4 without ST elevation. No medications were given by EMS during transport and his chest pain resolved on its own. Repeat EKG done in the ER showed improvement of ST depression initially. Additional repeat EKG done while in the ER showed ST changes in III and aVF. Initial troponin was 0.04, repeat was 2.66. He remains chest pain free since arrival. While in the ER, he was treated with 324mg ASA, 4000 unit IV heparin bolus followed by drip.      Past Medical History/Comorbidities:   Mr. Zahra Gupta  has a past medical history of Hypertension and Neurological disorder. Mr. Zahra Gupta  has a past surgical history that includes hx hemorrhoidectomy and hx other surgical (2001 and 2013). Social History/Living Environment:   Home Environment: Private residence  # Steps to Enter: 0  One/Two Story Residence: One story  Living Alone: No  Support Systems: Spouse/Significant Other/Partner  Patient Expects to be Discharged to[de-identified] Rehabilitation facility  Current DME Used/Available at Home: None  Prior Level of Function/Work/Activity:  Patient reports he had been independent with all functional mobility prior to this cardiac event. Dominant Side:         RIGHT  Personal Factors:          Sex:  male        Age:  82 y.o. Number of Personal Factors/Comorbidities that affect the Plan of Care: 1-2: MODERATE COMPLEXITY   EXAMINATION:   Most Recent Physical Functioning:   Gross Assessment:                  Posture:     Balance:    Bed Mobility:     Wheelchair Mobility:     Transfers:     Gait:     Step Length: Left shortened;Right shortened  Gait Abnormalities: Decreased step clearance;Shuffling gait; Steppage gait  Distance (ft): 200 Feet (ft)  Assistive Device: Walker, rolling  Ambulation - Level of Assistance: Contact guard assistance      Body Structures Involved:  1. Joints  2. Muscles  3.  Ligaments Body Functions Affected:  1. Neuromusculoskeletal  2. Movement Related  3. Skin Related  4. Metobolic/Endocrine Activities and Participation Affected:  1. General Tasks and Demands  2. Mobility  3. Self Care  4. Community, Social and [place]   Number of elements that affect the Plan of Care: 3: MODERATE COMPLEXITY   CLINICAL PRESENTATION:   Presentation: Evolving clinical presentation with changing clinical characteristics: MODERATE COMPLEXITY   CLINICAL DECISION MAKIN [facility] Mobility Inpatient Short Form  How much difficulty does the patient currently have. .. Unable A Lot A Little None   1. Turning over in bed (including adjusting bedclothes, sheets and blankets)? [] 1   [] 2   [x] 3   [] 4   2. Sitting down on and standing up from a chair with arms ( e.g., wheelchair, bedside commode, etc.)   [] 1   [] 2   [x] 3   [] 4   3. Moving from lying on back to sitting on the side of the bed? [] 1   [] 2   [x] 3   [] 4   How much help from another person does the patient currently need. .. Total A Lot A Little None   4. Moving to and from a bed to a chair (including a wheelchair)? [] 1   [] 2   [x] 3   [] 4   5. Need to walk in hospital room? [] 1   [] 2   [x] 3   [] 4   6. Climbing 3-5 steps with a railing? [] 1   [x] 2   [] 3   [] 4   © , Trustees of [address] [place] [address], under license to [employer]. All rights reserved      Score:  Initial: 17 Most Recent: X (Date: -- )    Interpretation of Tool:  Represents activities that are increasingly more difficult (i.e. Bed mobility, Transfers, Gait). Score 24 23 22-20 19-15 14-10 9-7 6     Modifier CH CI CJ CK CL CM CN      ?  Mobility - Walking and Moving Around:     - CURRENT STATUS: CK - 40%-59% impaired, limited or restricted    - GOAL STATUS: CJ - 20%-39% impaired, limited or restricted    - D/C STATUS:  ---------------To be determined---------------  [place] / [place] / Product Type: Medicare /      Medical Necessity:     · Patient demonstrates good rehab potential due to higher previous functional level. Reason for Services/Other Comments:  · Patient continues to require skilled intervention due to s/p cardiac surgery . Use of outcome tool(s) and clinical judgement create a POC that gives a: Questionable prediction of patient's progress: MODERATE COMPLEXITY            TREATMENT:   (In addition to Assessment/Re-Assessment sessions the following treatments were rendered)   Pre-treatment Symptoms/Complaints:  General pain, only taking Tylenol at the moment. Pain: Initial:   Pain Intensity 1: 0  Post Session:  None mentioned         Therapeutic Activity: (    15 minutes): Therapeutic activities including Chair transfers and Ambulation on level ground to improve mobility, strength, balance and endurance. Required minimal   to promote static and dynamic balance in standing with the use of the walker. Therapeutic Exercise: (8 Minutes):  Exercises per grid below to improve mobility and strength. Required minimal visual and verbal cues to promote proper body mechanics. Progressed complexity of movement as indicated. Date:  5/20/18 Date:   Date:     Activity/Exercise Parameters Parameters Parameters   Seated TKE 10x B     Seated AP 10x B     Seated marching 10x B     Seated hip abd 10x B                           Braces/Orthotics/Lines/Etc:   · IV  Treatment/Session Assessment:    · Response to Treatment:  Improving mobility and ambulation with treatment. · Interdisciplinary Collaboration:   o Physical Therapy Assistant  o Registered Nurse  · After treatment position/precautions:   o Up in chair  o Bed/Chair-wheels locked  o Bed in low position  o Call light within reach  o RN notified  o Family at bedside   · Compliance with Program/Exercises: Will assess as treatment progresses. · Recommendations/Intent for next treatment session:   \"Next visit will focus on increasing gait distance and safety\".   Total Treatment Duration:  PT Patient Time In/Time Out  Time In: 0930  Time Out: 0955    Gini Calvo PTA

## 2018-05-20 NOTE — PROGRESS NOTES
Reva Hdz  Admission Date: 5/16/2018             Daily Progress Note: 5/20/2018    The patient's chart is reviewed and the patient is discussed with the staff. Reva Hdz is an 83yo gentleman who has h/o MR, HTN, bradycardia, HLD, chronic steroids for pituitary tumor who underwent 3v CABG on 5/18 with Dr. Wayne Edge. He is a never smoker. Subjective: On amiodarone drip for a fib and remains in a fib with HR 109bpm  93% on room air  -420 yesterday  BP stable.   Transitions to low dose prednisone today  Only getting 500ml with IS    Current Facility-Administered Medications   Medication Dose Route Frequency    sodium chloride (NS) flush 5-10 mL  5-10 mL IntraVENous Q8H    sodium chloride (NS) flush 5-10 mL  5-10 mL IntraVENous PRN    metoprolol tartrate (LOPRESSOR) tablet 25 mg  25 mg Oral Q12H    furosemide (LASIX) tablet 40 mg  40 mg Oral DAILY    docusate sodium (COLACE) capsule 100 mg  100 mg Oral DAILY    alum-mag hydroxide-simeth (MYLANTA) oral suspension 30 mL  30 mL Oral Q4H PRN    famotidine (PEPCID) tablet 20 mg  20 mg Oral BID    ondansetron (ZOFRAN) injection 4 mg  4 mg IntraVENous Q6H PRN    acetaminophen (TYLENOL) tablet 650 mg  650 mg Oral Q4H PRN    atorvastatin (LIPITOR) tablet 80 mg  80 mg Oral QHS    insulin regular (NOVOLIN R, HUMULIN R) injection   SubCUTAneous AC&HS    amiodarone (CORDARONE) tablet 200 mg  200 mg Oral Q12H    mupirocin (BACTROBAN) 2 % ointment   Both Nostrils BID    aspirin delayed-release tablet 81 mg  81 mg Oral DAILY    magnesium oxide (MAG-OX) tablet 400 mg  400 mg Oral TID PRN    magnesium oxide (MAG-OX) tablet 400 mg  400 mg Oral QID PRN    potassium chloride (KLOR-CON) tablet 10 mEq  10 mEq Oral DAILY    potassium chloride (K-DUR, KLOR-CON) SR tablet 20 mEq  20 mEq Oral BID PRN    potassium chloride (K-DUR, KLOR-CON) SR tablet 40 mEq  40 mEq Oral BID PRN    traMADol (ULTRAM) tablet 50 mg  50 mg Oral Q6H PRN    amiodarone (CORDARONE) 450 mg in dextrose 5% 250 mL infusion  0.5-1 mg/min IntraVENous CONTINUOUS    oxyCODONE-acetaminophen (PERCOCET) 5-325 mg per tablet 1 Tab  1 Tab Oral Q4H PRN    levothyroxine (SYNTHROID) tablet 88 mcg  88 mcg Oral ACB    predniSONE (DELTASONE) tablet 5 mg  5 mg Oral DAILY WITH BREAKFAST       Review of Systems  Constitutional: negative for fever, chills, sweats  Cardiovascular: negative for chest pain, palpitations, syncope, edema  Gastrointestinal:  negative for dysphagia, reflux, vomiting, diarrhea, abdominal pain, or melena  Neurologic:  negative for focal weakness, numbness, headache    Objective:     Vitals:    05/20/18 0100 05/20/18 0115 05/20/18 0145 05/20/18 0245   BP: 124/71 127/71 123/65 126/75   Pulse: 79 95 (!) 103 99   Resp:       Temp:       SpO2:       Weight:       Height:         Intake and Output:   05/18 0701 - 05/19 1900  In: 5696.3 [P.O.:720; I.V.:4976.3]  Out: 3613 [Urine:2653]  05/19 1901 - 05/20 0700  In: 100 [I.V.:100]  Out: 475 [Urine:475]    Physical Exam:   Constitution:  the patient is well developed and in no acute distress  EENMT:  Sclera clear, pupils equal, oral mucosa moist  Respiratory: CTA with decreased basilar BS  Cardiovascular:  RRR without M,G,R  Gastrointestinal: soft and non-tender; with positive bowel sounds. Musculoskeletal: warm without cyanosis. There is 1+ lower leg edema.   Skin:  no jaundice or rashes, surgical wounds c/d/i  Neurologic: no gross neuro deficits     Psychiatric:  alert and oriented x 3    CXR:         LAB  Recent Labs      05/19/18   2058  05/19/18   1301  05/19/18   1056  05/19/18   0500  05/19/18   0404   GLUCPOC  184*  101*  99  91  81      Recent Labs      05/20/18   0513  05/19/18   0501  05/19/18   0207  05/18/18   2206  05/18/18   1809  05/18/18   0351   WBC  5.5  7.4   --    --   14.8*  5.9   HGB  9.0*  9.6*  9.9*  10.7*  10.8*  13.7   HCT  27.7*  29.6*  30.2*  32.2*  32.9*  40.3*   PLT  85*  114*   --    --   120*  168   INR --    --    --    --   1.6  1.1     Recent Labs      05/19/18   0501  05/19/18   0207  05/18/18   2206  05/18/18   1809  05/18/18   0351   NA  148*   --    --   146*  143   K  4.7  4.4  4.4  4.4  4.2   CL  119*   --    --   115*  112*   CO2  22   --    --   20*  24   GLU  87   --    --   158*  75   BUN  16   --    --   16  15   CREA  1.32   --    --   1.34  1.00   MG  2.4  2.4  2.5*  2.8*  2.1   CA  7.3*   --    --   7.3*  8.2*   ALB   --    --    --    --   3.0*   TBILI   --    --    --    --   0.4   ALT   --    --    --    --   30   SGOT   --    --    --    --   40*     Recent Labs      05/18/18 2205 05/18/18   1805   PH  7.33*  7.28*   PCO2  33*  42   PO2  114*  227*   HCO3  17*  19*     No results for input(s): LCAD, LAC in the last 72 hours. Assessment:  (Medical Decision Making)     Hospital Problems  Date Reviewed: 5/18/2018          Codes Class Noted POA    Atelectasis ICD-10-CM: J98.11  ICD-9-CM: 518.0  5/19/2018 Unknown    Incentive spirometry    S/P CABG (coronary artery bypass graft) ICD-10-CM: Z95.1  ICD-9-CM: V45.81  5/18/2018 No        Hypoxia ICD-10-CM: R09.02  ICD-9-CM: 799.02  5/18/2018 No    Now weaned to RA. Assess with ambulation    Hypothyroidism (Chronic) ICD-10-CM: E03.9  ICD-9-CM: 244.9  5/18/2018 Yes        Current chronic use of systemic steroids (Chronic) ICD-10-CM: Z79.52  ICD-9-CM: V58.65  5/18/2018 Yes    Overview Signed 5/18/2018  9:35 AM by Chencho Dickey NP     Pituitary tumor surgery in 2001 and in 2013--has been on Prednisone 5 mg daily since 2013.          Chronic prednisone dosing ongoing    Bradycardia (Chronic) ICD-10-CM: R00.1  ICD-9-CM: 427.89  5/16/2018 Yes    Now tachycardic with a fib    Essential hypertension (Chronic) ICD-10-CM: I10  ICD-9-CM: 401.9  5/16/2018 Yes        * (Principal)NSTEMI (non-ST elevated myocardial infarction) [facility]) ICD-10-CM: I21.4  ICD-9-CM: 410.70  5/16/2018 Yes          Atelectasis:  Suspected given poor IS, decreased Bs in bases    Plan: (Medical Decision Making)   --Incentive spirometry  --CXR  --diuretics per CV surgery  --amio per cardiology    More than 50% of the time documented was spent in face-to-face contact with the patient and in the care of the patient on the floor/unit where the patient is located.     Franklin Farah MD

## 2018-05-20 NOTE — PROGRESS NOTES
Bedside Report and care received from Jem Mcknight RN(off going nurse)  via H&P, [facility], [address] and [employer]. VSS, assessment to follow.

## 2018-05-20 NOTE — PROGRESS NOTES
Subjective:   No complaint    Objective:     Patient Vitals for the past 8 hrs:   BP Temp Pulse Resp SpO2 Weight   18 0645 125/74 99.5 °F (37.5 °C) (!) 107 17 92 % -   18 0544 122/62 - 95 - - -   18 0445 120/66 99.1 °F (37.3 °C) 86 - - 218 lb 4.1 oz (99 kg)   18 0345 122/74 - 99 - - -   18 0245 126/75 - 99 - - -     Temp (24hrs), Av.7 °F (37.1 °C), Min:97.5 °F (36.4 °C), Max:99.5 °F (37.5 °C)        Heart:  irregularly irregular rhythm  Lung:  clear to auscultation bilaterally   Incisions: Clean, dry, and intact    Labs:  Recent Results (from the past 24 hour(s))   GLUCOSE, POC    Collection Time: 18 10:56 AM   Result Value Ref Range    Glucose (POC) 99 65 - 100 mg/dL   GLUCOSE, POC    Collection Time: 18  1:01 PM   Result Value Ref Range    Glucose (POC) 101 (H) 65 - 100 mg/dL   GLUCOSE, POC    Collection Time: 18  8:58 PM   Result Value Ref Range    Glucose (POC) 184 (H) 65 - 100 mg/dL   PLEASE READ & DOCUMENT PPD TEST IN 24 HRS    Collection Time: 18  9:00 PM   Result Value Ref Range    PPD neg Negative    mm Induration 0 mm   METABOLIC PANEL, BASIC    Collection Time: 18  5:13 AM   Result Value Ref Range    Sodium 143 136 - 145 mmol/L    Potassium 3.7 3.5 - 5.1 mmol/L    Chloride 112 (H) 98 - 107 mmol/L    CO2 24 21 - 32 mmol/L    Anion gap 7 7 - 16 mmol/L    Glucose 92 65 - 100 mg/dL    BUN 18 8 - 23 MG/DL    Creatinine 1.24 0.8 - 1.5 MG/DL    GFR est AA >60 >60 ml/min/1.73m2    GFR est non-AA 59 (L) >60 ml/min/1.73m2    Calcium 7.8 (L) 8.3 - 10.4 MG/DL   MAGNESIUM    Collection Time: 18  5:13 AM   Result Value Ref Range    Magnesium 2.3 1.8 - 2.4 mg/dL   CBC W/O DIFF    Collection Time: 18  5:13 AM   Result Value Ref Range    WBC 5.5 4.3 - 11.1 K/uL    RBC 3.09 (L) 4.23 - 5.67 M/uL    HGB 9.0 (L) 13.6 - 17.2 g/dL    HCT 27.7 (L) 41.1 - 50.3 %    MCV 89.6 79.6 - 97.8 FL    MCH 29.1 26.1 - 32.9 PG    MCHC 32.5 31.4 - 35.0 g/dL    RDW 14. 7 (H) 11.9 - 14.6 %    PLATELET 85 (L) 589 - 450 K/uL    MPV 9.5 (L) 10.8 - 14.1 FL   GLUCOSE, POC    Collection Time: 05/20/18  6:15 AM   Result Value Ref Range    Glucose (POC) 90 65 - 100 mg/dL       Assessment:     Principal Problem:    NSTEMI (non-ST elevated myocardial infarction) ([facility] (5/16/2018)    Active Problems:    Bradycardia (5/16/2018)      Essential hypertension (5/16/2018)      S/P CABG (coronary artery bypass graft) (5/18/2018)      Hypoxia (5/18/2018)      Hypothyroidism (5/18/2018)      Current chronic use of systemic steroids (5/18/2018)      Overview: Pituitary tumor surgery in 2001 and in 2013--has been on Prednisone 5 mg       daily since 2013.       Atelectasis (5/19/2018)        Plan/Recommendations/Medical Decision Making:     AF with RVR last night, cards started Amio gtt, ambulate, protocol, follow plts    See orders

## 2018-05-21 LAB
ABO + RH BLD: NORMAL
ANION GAP SERPL CALC-SCNC: 8 MMOL/L (ref 7–16)
BLD PROD TYP BPU: NORMAL
BLOOD GROUP ANTIBODIES SERPL: NORMAL
BPU ID: NORMAL
BUN SERPL-MCNC: 19 MG/DL (ref 8–23)
CALCIUM SERPL-MCNC: 7.8 MG/DL (ref 8.3–10.4)
CHLORIDE SERPL-SCNC: 108 MMOL/L (ref 98–107)
CO2 SERPL-SCNC: 26 MMOL/L (ref 21–32)
CREAT SERPL-MCNC: 1.32 MG/DL (ref 0.8–1.5)
CROSSMATCH RESULT,%XM: NORMAL
ERYTHROCYTE [DISTWIDTH] IN BLOOD BY AUTOMATED COUNT: 14.3 % (ref 11.9–14.6)
GLUCOSE BLD STRIP.AUTO-MCNC: 98 MG/DL (ref 65–100)
GLUCOSE SERPL-MCNC: 64 MG/DL (ref 65–100)
HCT VFR BLD AUTO: 29.8 % (ref 41.1–50.3)
HGB BLD-MCNC: 10 G/DL (ref 13.6–17.2)
MCH RBC QN AUTO: 29.5 PG (ref 26.1–32.9)
MCHC RBC AUTO-ENTMCNC: 33.6 G/DL (ref 31.4–35)
MCV RBC AUTO: 87.9 FL (ref 79.6–97.8)
MM INDURATION POC: 0 MM (ref 0–5)
PLATELET # BLD AUTO: 122 K/UL (ref 150–450)
PMV BLD AUTO: 10 FL (ref 10.8–14.1)
POTASSIUM SERPL-SCNC: 4 MMOL/L (ref 3.5–5.1)
PPD POC: NORMAL NEGATIVE
RBC # BLD AUTO: 3.39 M/UL (ref 4.23–5.67)
SODIUM SERPL-SCNC: 142 MMOL/L (ref 136–145)
SPECIMEN EXP DATE BLD: NORMAL
STATUS OF UNIT,%ST: NORMAL
UNIT DIVISION, %UDIV: 0
WBC # BLD AUTO: 8.2 K/UL (ref 4.3–11.1)

## 2018-05-21 PROCEDURE — 74011636637 HC RX REV CODE- 636/637: Performed by: INTERNAL MEDICINE

## 2018-05-21 PROCEDURE — 77030012891

## 2018-05-21 PROCEDURE — 74011250637 HC RX REV CODE- 250/637: Performed by: NURSE PRACTITIONER

## 2018-05-21 PROCEDURE — 65660000004 HC RM CVT STEPDOWN

## 2018-05-21 PROCEDURE — 80048 BASIC METABOLIC PNL TOTAL CA: CPT | Performed by: THORACIC SURGERY (CARDIOTHORACIC VASCULAR SURGERY)

## 2018-05-21 PROCEDURE — 99232 SBSQ HOSP IP/OBS MODERATE 35: CPT | Performed by: INTERNAL MEDICINE

## 2018-05-21 PROCEDURE — 94762 N-INVAS EAR/PLS OXIMTRY CONT: CPT

## 2018-05-21 PROCEDURE — 85027 COMPLETE CBC AUTOMATED: CPT | Performed by: THORACIC SURGERY (CARDIOTHORACIC VASCULAR SURGERY)

## 2018-05-21 PROCEDURE — 82962 GLUCOSE BLOOD TEST: CPT

## 2018-05-21 PROCEDURE — 74011250637 HC RX REV CODE- 250/637: Performed by: THORACIC SURGERY (CARDIOTHORACIC VASCULAR SURGERY)

## 2018-05-21 PROCEDURE — 36415 COLL VENOUS BLD VENIPUNCTURE: CPT | Performed by: THORACIC SURGERY (CARDIOTHORACIC VASCULAR SURGERY)

## 2018-05-21 PROCEDURE — 97110 THERAPEUTIC EXERCISES: CPT

## 2018-05-21 PROCEDURE — 97530 THERAPEUTIC ACTIVITIES: CPT

## 2018-05-21 RX ORDER — LISINOPRIL 5 MG/1
5 TABLET ORAL DAILY
Status: DISCONTINUED | OUTPATIENT
Start: 2018-05-21 | End: 2018-05-24 | Stop reason: HOSPADM

## 2018-05-21 RX ORDER — AMOXICILLIN 250 MG
2 CAPSULE ORAL
Status: DISCONTINUED | OUTPATIENT
Start: 2018-05-21 | End: 2018-05-22 | Stop reason: ALTCHOICE

## 2018-05-21 RX ORDER — METOPROLOL TARTRATE 25 MG/1
25 TABLET, FILM COATED ORAL EVERY 12 HOURS
Status: DISCONTINUED | OUTPATIENT
Start: 2018-05-21 | End: 2018-05-23

## 2018-05-21 RX ORDER — AMIODARONE HYDROCHLORIDE 200 MG/1
400 TABLET ORAL EVERY 12 HOURS
Status: DISCONTINUED | OUTPATIENT
Start: 2018-05-21 | End: 2018-05-21

## 2018-05-21 RX ORDER — FAMOTIDINE 20 MG/1
20 TABLET, FILM COATED ORAL EVERY 12 HOURS
Status: DISCONTINUED | OUTPATIENT
Start: 2018-05-21 | End: 2018-05-24 | Stop reason: HOSPADM

## 2018-05-21 RX ORDER — BISACODYL 5 MG
5 TABLET, DELAYED RELEASE (ENTERIC COATED) ORAL DAILY PRN
Status: DISCONTINUED | OUTPATIENT
Start: 2018-05-21 | End: 2018-05-24 | Stop reason: HOSPADM

## 2018-05-21 RX ORDER — METOPROLOL TARTRATE 50 MG/1
50 TABLET ORAL EVERY 12 HOURS
Status: DISCONTINUED | OUTPATIENT
Start: 2018-05-21 | End: 2018-05-21

## 2018-05-21 RX ORDER — LEVOTHYROXINE SODIUM 88 UG/1
88 TABLET ORAL
Status: DISCONTINUED | OUTPATIENT
Start: 2018-05-22 | End: 2018-05-24 | Stop reason: HOSPADM

## 2018-05-21 RX ORDER — LISINOPRIL 5 MG/1
5 TABLET ORAL DAILY
Status: DISCONTINUED | OUTPATIENT
Start: 2018-05-22 | End: 2018-05-21

## 2018-05-21 RX ORDER — GUAIFENESIN 600 MG/1
1200 TABLET, EXTENDED RELEASE ORAL EVERY 12 HOURS
Status: DISCONTINUED | OUTPATIENT
Start: 2018-05-21 | End: 2018-05-24 | Stop reason: HOSPADM

## 2018-05-21 RX ORDER — MUPIROCIN 20 MG/G
OINTMENT TOPICAL EVERY 12 HOURS
Status: DISCONTINUED | OUTPATIENT
Start: 2018-05-21 | End: 2018-05-24 | Stop reason: HOSPADM

## 2018-05-21 RX ORDER — AMIODARONE HYDROCHLORIDE 200 MG/1
200 TABLET ORAL EVERY 12 HOURS
Status: DISCONTINUED | OUTPATIENT
Start: 2018-05-21 | End: 2018-05-22

## 2018-05-21 RX ADMIN — GUAIFENESIN 1200 MG: 600 TABLET, EXTENDED RELEASE ORAL at 20:21

## 2018-05-21 RX ADMIN — STANDARDIZED SENNA CONCENTRATE AND DOCUSATE SODIUM 2 TABLET: 8.6; 5 TABLET, FILM COATED ORAL at 20:21

## 2018-05-21 RX ADMIN — GUAIFENESIN 1200 MG: 600 TABLET, EXTENDED RELEASE ORAL at 09:45

## 2018-05-21 RX ADMIN — ACETAMINOPHEN 650 MG: 325 TABLET ORAL at 09:43

## 2018-05-21 RX ADMIN — Medication 10 ML: at 13:21

## 2018-05-21 RX ADMIN — FAMOTIDINE 20 MG: 20 TABLET ORAL at 20:20

## 2018-05-21 RX ADMIN — MUPIROCIN: 20 OINTMENT TOPICAL at 09:46

## 2018-05-21 RX ADMIN — POTASSIUM CHLORIDE 10 MEQ: 10 TABLET, EXTENDED RELEASE ORAL at 09:43

## 2018-05-21 RX ADMIN — BISACODYL 5 MG: 5 TABLET, COATED ORAL at 20:20

## 2018-05-21 RX ADMIN — ATORVASTATIN CALCIUM 80 MG: 40 TABLET, FILM COATED ORAL at 20:19

## 2018-05-21 RX ADMIN — LISINOPRIL 5 MG: 5 TABLET ORAL at 13:20

## 2018-05-21 RX ADMIN — Medication 10 ML: at 05:01

## 2018-05-21 RX ADMIN — DOCUSATE SODIUM 100 MG: 100 CAPSULE, LIQUID FILLED ORAL at 09:45

## 2018-05-21 RX ADMIN — FUROSEMIDE 40 MG: 40 TABLET ORAL at 09:43

## 2018-05-21 RX ADMIN — ASPIRIN 81 MG: 81 TABLET, COATED ORAL at 09:45

## 2018-05-21 RX ADMIN — Medication 10 ML: at 20:09

## 2018-05-21 RX ADMIN — MUPIROCIN: 20 OINTMENT TOPICAL at 20:25

## 2018-05-21 RX ADMIN — LEVOTHYROXINE SODIUM 88 MCG: 88 TABLET ORAL at 05:55

## 2018-05-21 RX ADMIN — PREDNISONE 5 MG: 5 TABLET ORAL at 09:46

## 2018-05-21 RX ADMIN — FAMOTIDINE 20 MG: 20 TABLET ORAL at 09:45

## 2018-05-21 NOTE — PROGRESS NOTES
Problem: Mobility Impaired (Adult and Pediatric)  Goal: *Therapy Goal (Edit Goal, Insert Text)  STERNAL PRECAUTIONS :     STG:  (1.)Mr. Joyce Barreto will move from supine to sit and sit to supine , scoot up and down and roll side to side with SUPERVISION within 4 treatment day(s). (2.)Mr. Joyce Barreto will transfer from bed to chair and chair to bed with SUPERVISION using the least restrictive device within 4 treatment day(s). (3.)Mr. Joyce Barreto will ambulate with SUPERVISION for 250 feet with the least restrictive device within 4 treatment day(s). LTG:  (1.)Mr. Joyce Barreto will move from supine to sit and sit to supine , scoot up and down and roll side to side in bed with MODIFIED INDEPENDENCE within 8 treatment day(s). (2.)Mr. Joyce Barreto will transfer from bed to chair and chair to bed with MODIFIED INDEPENDENCE using the least restrictive device within 8 treatment day(s). (3.)Mr. Joyce Barreto will ambulate with MODIFIED INDEPENDENCE for 500 feet with the least restrictive device within 8 treatment day(s). ________________________________________________________________________________________________    PHYSICAL THERAPY: Daily Note, Treatment Day: 2nd, PM 5/21/2018  INPATIENT: Hospital Day: 6  [place] / Product Type: Medicare /      NAME/AGE/GENDER: Reva Hdz is a 82 y.o. male   PRIMARY DIAGNOSIS: Atherosclerosis of native coronary artery of native heart with unstable [facility] p[facility] [I25.110] NSTEMI (non-ST elevated myocardial infarction) ([facility] NSTEMI (non-ST elevated myocardial infarction) ([facility]  Procedure(s) (LRB):  CORONARY ARTERY BYPASS GRAFT X 3; LIMA  (N/A)  VEIN HARVEST GREATER SAPHENOUS (Left)  3 Days Post-Op  ICD-10: Treatment Diagnosis:    · Generalized Muscle Weakness (M62.81)  · Difficulty in walking, Not elsewhere classified (R26.2)   Precaution/Allergies:  Pcn [penicillins]      ASSESSMENT:     Mr. Joyce Barreto presents sitting up in bedside chair.  He is very lethargic and needed constant verbal cues to stay on task. He ambulated 80 feet and needed a seated rest break. He is unsteady with walker and needs verbal cues for safety with ambulation. He returned to room and was instructed in seated exercises. He had to stay on task. No progress noted this treatment. Will continue PT efforts. PM NOTE: Patient presents sitting up in bedside chair. He is sleepy and had to be kept awake. He needed verbal cues to stay awake. He needed a seated rest break during ambulation. He is impulsive and needs verbal cues for safety . He needed CGA to stand from toilet and then to transfer to chair. Fair progress noted toward goals will continue PT efforts. This section established at most recent assessment   PROBLEM LIST (Impairments causing functional limitations):  1. Decreased Strength  2. Decreased ADL/Functional Activities  3. Decreased Transfer Abilities  4. Decreased Ambulation Ability/Technique  5. Decreased Balance  6. Decreased Activity Tolerance  7. Decreased Pacing Skills  8. Decreased Flexibility/Joint Mobility  9. Decreased [place] with Home Exercise Program   INTERVENTIONS PLANNED: (Benefits and precautions of physical therapy have been discussed with the patient.)  1. Balance Exercise  2. Bed Mobility  3. Family Education  4. Gait Training  5. Home Exercise Program (HEP)  6. Range of Motion (ROM)  7. Therapeutic Activites  8. Therapeutic Exercise/Strengthening  9. Transfer Training     TREATMENT PLAN: Frequency/Duration:2 x's per day for duration of hospital stay. Rehabilitation Potential For Stated Goals: Good     RECOMMENDED REHABILITATION/EQUIPMENT: (at time of discharge pending progress): Due to the probability of continued deficits (see above) this patient will likely need continued skilled physical therapy after discharge.   Equipment:    Walkers, Type: Rolling Walker              HISTORY:   History of Present Injury/Illness (Reason for Referral):  PER MD PRAVIN Cassidy Derick Simpson is a 82 y.o. male with past medical history of remote pituitary tumor post surgery, HTN and hypothyroidism who presented to the ER with complaints of chest pain. Patient reports that his pain started tonight around 1800. He describes his pain as right sided pressure with radiation into his right shoulder. He denies nausea, vomiting, shortness of breath or diaphoresis with chest pain episode. Upon arrival of EMS, EKG showed significant ST depression in V2-V4 without ST elevation. No medications were given by EMS during transport and his chest pain resolved on its own. Repeat EKG done in the ER showed improvement of ST depression initially. Additional repeat EKG done while in the ER showed ST changes in III and aVF. Initial troponin was 0.04, repeat was 2.66. He remains chest pain free since arrival. While in the ER, he was treated with 324mg ASA, 4000 unit IV heparin bolus followed by drip.      Past Medical History/Comorbidities:   Mr. Derick Simpson  has a past medical history of Hypertension and Neurological disorder. Mr. Derick Simpson  has a past surgical history that includes hx hemorrhoidectomy and hx other surgical (2001 and 2013). Social History/Living Environment:   Home Environment: Private residence  # Steps to Enter: 0  One/Two Story Residence: One story  Living Alone: No  Support Systems: Spouse/Significant Other/Partner  Patient Expects to be Discharged to[de-identified] Rehabilitation facility  Current DME Used/Available at Home: None  Prior Level of Function/Work/Activity:  Patient reports he had been independent with all functional mobility prior to this cardiac event. Dominant Side:         RIGHT  Personal Factors:          Sex:  male        Age:  82 y.o. Number of Personal Factors/Comorbidities that affect the Plan of Care: 1-2: MODERATE COMPLEXITY   EXAMINATION:   Most Recent Physical Functioning:   Gross Assessment:                  Posture:  Posture (WDL): Exceptions to WDL  Posture Assessment:  Forward head, Rounded shoulders  Balance:  Sitting: Impaired  Sitting - Static: Good (unsupported)  Sitting - Dynamic: Fair (occasional)  Standing: Impaired  Standing - Static: Fair  Standing - Dynamic : Poor Bed Mobility:     Wheelchair Mobility:     Transfers:  Sit to Stand: Contact guard assistance  Stand to Sit: Contact guard assistance  Gait:     Base of Support: Narrowed  Speed/Ann: Shuffled;Fluctuations  Step Length: Left shortened;Right shortened  Gait Abnormalities: Decreased step clearance;Shuffling gait  Distance (ft): 85 Feet (ft) (wiht a seated rest break)  Assistive Device: Walker, rolling  Ambulation - Level of Assistance: Minimal assistance  Interventions: Safety awareness training;Verbal cues      Body Structures Involved:  1. Joints  2. Muscles  3. Ligaments Body Functions Affected:  1. Neuromusculoskeletal  2. Movement Related  3. Skin Related  4. Metobolic/Endocrine Activities and Participation Affected:  1. General Tasks and Demands  2. Mobility  3. Self Care  4. Community, Social and [place]   Number of elements that affect the Plan of Care: 3: MODERATE COMPLEXITY   CLINICAL PRESENTATION:   Presentation: Evolving clinical presentation with changing clinical characteristics: MODERATE COMPLEXITY   CLINICAL DECISION MAKIN [facility] Inpatient Short Form  How much difficulty does the patient currently have. .. Unable A Lot A Little None   1. Turning over in bed (including adjusting bedclothes, sheets and blankets)? [] 1   [] 2   [x] 3   [] 4   2. Sitting down on and standing up from a chair with arms ( e.g., wheelchair, bedside commode, etc.)   [] 1   [] 2   [x] 3   [] 4   3. Moving from lying on back to sitting on the side of the bed? [] 1   [] 2   [x] 3   [] 4   How much help from another person does the patient currently need. .. Total A Lot A Little None   4. Moving to and from a bed to a chair (including a wheelchair)?    [] 1   [] 2   [x] 3   [] 4 5.  Need to walk in hospital room? [] 1   [] 2   [x] 3   [] 4   6. Climbing 3-5 steps with a railing? [] 1   [x] 2   [] 3   [] 4   © 2007, Trustees of [address] [place] [address] [address] [address], under license to [employer]. All rights reserved      Score:  Initial: 17 Most Recent: X (Date: -- )    Interpretation of Tool:  Represents activities that are increasingly more difficult (i.e. Bed mobility, Transfers, Gait). Score 24 23 22-20 19-15 14-10 9-7 6     Modifier CH CI CJ CK CL CM CN      ? Mobility - Walking and Moving Around:     - CURRENT STATUS: CK - 40%-59% impaired, limited or restricted    - GOAL STATUS: CJ - 20%-39% impaired, limited or restricted    - D/C STATUS:  ---------------To be determined---------------  [place] / Product Type: Medicare /      Medical Necessity:     · Patient demonstrates good rehab potential due to higher previous functional level. Reason for Services/Other Comments:  · Patient continues to require skilled intervention due to s/p cardiac surgery . Use of outcome tool(s) and clinical judgement create a POC that gives a: Questionable prediction of patient's progress: MODERATE COMPLEXITY            TREATMENT:   (In addition to Assessment/Re-Assessment sessions the following treatments were rendered)   Pre-treatment Symptoms/Complaints: \"Then I can go to bed. \"    Pain: Initial:   Pain Intensity 1: 0  Pain Intervention(s) 1: Nurse notified (at end of treatment)  Post Session:  0/10 no complaints noted. Therapeutic Activity: (    23 minutes): Therapeutic activities including Chair transfers , Ambulation on level ground and bed mobility into a flat bed to improve mobility, strength, balance and endurance. Required minimal Safety awareness training;Verbal cues to promote static and dynamic balance in standing with the use of the walker.       Therapeutic Exercise: (   Minutes):  Exercises per grid below to improve mobility, strength and activity tolerance. Required moderate verbal cues to to keep patient awake during treatment and to stay on task. Progressed repetitions and complexity of movement as indicated. Date:  5/20/18 Date:  5-21-18 Date:     Activity/Exercise Parameters Parameters Parameters   Seated TKE 10x B x15    Seated AP 10x B x15    Seated marching 10x B x15    Seated hip abd 10x B x10                          Braces/Orthotics/Lines/Etc:   · None  Treatment/Session Assessment:    · Response to Treatment:  Fatigue and letheragy      · Interdisciplinary Collaboration:   o Physical Therapy Assistant  o Registered Nurse  · After treatment position/precautions:   o Up in chair  o Bed/Chair-wheels locked  o Bed in low position  o Call light within reach  o RN notified  o Family at bedside   · Compliance with Program/Exercises: Will assess as treatment progresses. · Recommendations/Intent for next treatment session: \"Next visit will focus on increasing gait distance and safety\".   Total Treatment Duration:  PT Patient Time In/Time Out  Time In: 1330  Time Out: Marci Stern [address], PTA

## 2018-05-21 NOTE — PROGRESS NOTES
Pt previously assessed on 5/17 by REJI Doran and pt preferred [facility]. SW spoke with Rosmery Monreal NP, and Lisa KEARNEY PTA about pt's d/c plan. Pt will need to go to a facility. SNF vs. [facility] was discussed with pt. Pt doesn't have a medical condition that needs daily MD management so pt wants to go to [facility]. SW faxed referral to them and notified Tyra Rene, Admissions Coordinator, of the referral.    CM following.

## 2018-05-21 NOTE — PROGRESS NOTES
Problem: Mobility Impaired (Adult and Pediatric)  Goal: *Therapy Goal (Edit Goal, Insert Text)  STERNAL PRECAUTIONS :     STG:  (1.)Mr. Keli Matute will move from supine to sit and sit to supine , scoot up and down and roll side to side with SUPERVISION within 4 treatment day(s). (2.)Mr. Keli Matute will transfer from bed to chair and chair to bed with SUPERVISION using the least restrictive device within 4 treatment day(s). (3.)Mr. Keli Matute will ambulate with SUPERVISION for 250 feet with the least restrictive device within 4 treatment day(s). LTG:  (1.)Mr. Keli Matute will move from supine to sit and sit to supine , scoot up and down and roll side to side in bed with MODIFIED INDEPENDENCE within 8 treatment day(s). (2.)Mr. Keli Matute will transfer from bed to chair and chair to bed with MODIFIED INDEPENDENCE using the least restrictive device within 8 treatment day(s). (3.)Mr. Keli Matute will ambulate with MODIFIED INDEPENDENCE for 500 feet with the least restrictive device within 8 treatment day(s). ________________________________________________________________________________________________    PHYSICAL THERAPY: Daily Note, Treatment Day: 2nd, AM 5/21/2018  INPATIENT: Hospital Day: 6  [place] / Product Type: Medicare /      NAME/AGE/GENDER: Kinsey Robert is a 82 y.o. male   PRIMARY DIAGNOSIS: Atherosclerosis of native coronary artery of native heart with unstable angina pectoris ([facility] [I25.110] NSTEMI (non-ST elevated myocardial infarction) ([facility] NSTEMI (non-ST elevated myocardial infarction) ([facility]  Procedure(s) (LRB):  CORONARY ARTERY BYPASS GRAFT X 3; LIMA  (N/A)  VEIN HARVEST GREATER SAPHENOUS (Left)  3 Days Post-Op  ICD-10: Treatment Diagnosis:    · Generalized Muscle Weakness (M62.81)  · Difficulty in walking, Not elsewhere classified (R26.2)   Precaution/Allergies:  Pcn [penicillins]      ASSESSMENT:     Mr. Keli Matute presents sitting up in bedside chair.  He is very lethargic and needed constant verbal cues to stay on task. He ambulated 80 feet and needed a seated rest break. He is unsteady with walker and needs verbal cues for safety with ambulation. He returned to room and was instructed in seated exercises. He had to stay on task. No progress noted this treatment. Will continue PT efforts. This section established at most recent assessment   PROBLEM LIST (Impairments causing functional limitations):  1. Decreased Strength  2. Decreased ADL/Functional Activities  3. Decreased Transfer Abilities  4. Decreased Ambulation Ability/Technique  5. Decreased Balance  6. Decreased Activity Tolerance  7. Decreased Pacing Skills  8. Decreased Flexibility/Joint Mobility  9. Decreased [place] with Home Exercise Program   INTERVENTIONS PLANNED: (Benefits and precautions of physical therapy have been discussed with the patient.)  1. Balance Exercise  2. Bed Mobility  3. Family Education  4. Gait Training  5. Home Exercise Program (HEP)  6. Range of Motion (ROM)  7. Therapeutic Activites  8. Therapeutic Exercise/Strengthening  9. Transfer Training     TREATMENT PLAN: Frequency/Duration:2 x's per day for duration of hospital stay. Rehabilitation Potential For Stated Goals: Good     RECOMMENDED REHABILITATION/EQUIPMENT: (at time of discharge pending progress): Due to the probability of continued deficits (see above) this patient will likely need continued skilled physical therapy after discharge. Equipment:    Walkers, Type: Rolling Walker              HISTORY:   History of Present Injury/Illness (Reason for Referral):  PER MD H&P    Yuli Nathan is a 82 y.o. male with past medical history of remote pituitary tumor post surgery, HTN and hypothyroidism who presented to the ER with complaints of chest pain. Patient reports that his pain started tonight around 1800. He describes his pain as right sided pressure with radiation into his right shoulder.  He denies nausea, vomiting, shortness of breath or diaphoresis with chest pain episode. Upon arrival of EMS, EKG showed significant ST depression in V2-V4 without ST elevation. No medications were given by EMS during transport and his chest pain resolved on its own. Repeat EKG done in the ER showed improvement of ST depression initially. Additional repeat EKG done while in the ER showed ST changes in III and aVF. Initial troponin was 0.04, repeat was 2.66. He remains chest pain free since arrival. While in the ER, he was treated with 324mg ASA, 4000 unit IV heparin bolus followed by drip.      Past Medical History/Comorbidities:   Mr. Derick Simpson  has a past medical history of Hypertension and Neurological disorder. Mr. Derick Simpson  has a past surgical history that includes hx hemorrhoidectomy and hx other surgical (2001 and 2013). Social History/Living Environment:   Home Environment: Private residence  # Steps to Enter: 0  One/Two Story Residence: One story  Living Alone: No  Support Systems: Spouse/Significant Other/Partner  Patient Expects to be Discharged to[de-identified] Rehabilitation facility  Current DME Used/Available at Home: None  Prior Level of Function/Work/Activity:  Patient reports he had been independent with all functional mobility prior to this cardiac event. Dominant Side:         RIGHT  Personal Factors:          Sex:  male        Age:  82 y.o. Number of Personal Factors/Comorbidities that affect the Plan of Care: 1-2: MODERATE COMPLEXITY   EXAMINATION:   Most Recent Physical Functioning:   Gross Assessment:                  Posture:  Posture (WDL): Exceptions to WDL  Posture Assessment:  Forward head, Rounded shoulders  Balance:  Sitting: Impaired  Sitting - Static: Good (unsupported)  Sitting - Dynamic: Fair (occasional)  Standing: Impaired  Standing - Static: Fair  Standing - Dynamic : Poor Bed Mobility:     Wheelchair Mobility:     Transfers:  Sit to Stand: Contact guard assistance  Stand to Sit: Contact guard assistance  Gait:     Base of Support: Narrowed  Speed/Ann: Shuffled;Fluctuations  Step Length: Left shortened;Right shortened  Gait Abnormalities: Decreased step clearance;Shuffling gait  Distance (ft): 80 Feet (ft) (with seated rest break)  Assistive Device: Walker, rolling  Ambulation - Level of Assistance: Minimal assistance  Interventions: Safety awareness training;Verbal cues      Body Structures Involved:  1. Joints  2. Muscles  3. Ligaments Body Functions Affected:  1. Neuromusculoskeletal  2. Movement Related  3. Skin Related  4. Metobolic/Endocrine Activities and Participation Affected:  1. General Tasks and Demands  2. Mobility  3. Self Care  4. Community, Social and [place]   Number of elements that affect the Plan of Care: 3: MODERATE COMPLEXITY   CLINICAL PRESENTATION:   Presentation: Evolving clinical presentation with changing clinical characteristics: MODERATE COMPLEXITY   CLINICAL DECISION MAKIN [facility] Inpatient Short Form  How much difficulty does the patient currently have. .. Unable A Lot A Little None   1. Turning over in bed (including adjusting bedclothes, sheets and blankets)? [] 1   [] 2   [x] 3   [] 4   2. Sitting down on and standing up from a chair with arms ( e.g., wheelchair, bedside commode, etc.)   [] 1   [] 2   [x] 3   [] 4   3. Moving from lying on back to sitting on the side of the bed? [] 1   [] 2   [x] 3   [] 4   How much help from another person does the patient currently need. .. Total A Lot A Little None   4. Moving to and from a bed to a chair (including a wheelchair)? [] 1   [] 2   [x] 3   [] 4   5. Need to walk in hospital room? [] 1   [] 2   [x] 3   [] 4   6. Climbing 3-5 steps with a railing? [] 1   [x] 2   [] 3   [] 4   © , Trustees of [address] [place] [address] [address] [address], under license to [employer].  All rights reserved      Score:  Initial: 17 Most Recent: X (Date: -- )    Interpretation of Tool:  Represents activities that are increasingly more difficult (i.e. Bed mobility, Transfers, Gait). Score 24 23 22-20 19-15 14-10 9-7 6     Modifier CH CI CJ CK CL CM CN      ? Mobility - Walking and Moving Around:     - CURRENT STATUS: CK - 40%-59% impaired, limited or restricted    - GOAL STATUS: CJ - 20%-39% impaired, limited or restricted    - D/C STATUS:  ---------------To be determined---------------  [place] / Product Type: Medicare /      Medical Necessity:     · Patient demonstrates good rehab potential due to higher previous functional level. Reason for Services/Other Comments:  · Patient continues to require skilled intervention due to s/p cardiac surgery . Use of outcome tool(s) and clinical judgement create a POC that gives a: Questionable prediction of patient's progress: MODERATE COMPLEXITY            TREATMENT:   (In addition to Assessment/Re-Assessment sessions the following treatments were rendered)   Pre-treatment Symptoms/Complaints:  \"I am so sore. \"    Pain: Initial:   Pain Intensity 1: 8  Pain Intervention(s) 1: Nurse notified (at end of treatment)  Post Session:  8/10 nurse made aware of patient's complaints. Therapeutic Activity: (    15 minutes): Therapeutic activities including Chair transfers , Ambulation on level ground and bed mobility into a flat bed to improve mobility, strength, balance and endurance. Required minimal Safety awareness training;Verbal cues to promote static and dynamic balance in standing with the use of the walker. Therapeutic Exercise: (10 Minutes):  Exercises per grid below to improve mobility, strength and activity tolerance. Required moderate verbal cues to to keep patient awake during treatment and to stay on task. Progressed repetitions and complexity of movement as indicated.        Date:  5/20/18 Date:  5-21-18 Date:     Activity/Exercise Parameters Parameters Parameters   Seated TKE 10x B x15    Seated AP 10x B x15    Seated marching 10x B x15    Seated hip abd 10x B x10                          Braces/Orthotics/Lines/Etc:   · None  Treatment/Session Assessment:    · Response to Treatment:  Fatigue and letheragy      · Interdisciplinary Collaboration:   o Physical Therapy Assistant  o Registered Nurse  · After treatment position/precautions:   o Up in chair  o Bed/Chair-wheels locked  o Bed in low position  o Call light within reach  o RN notified  o Family at bedside   · Compliance with Program/Exercises: Will assess as treatment progresses. · Recommendations/Intent for next treatment session: \"Next visit will focus on increasing gait distance and safety\".   Total Treatment Duration:  PT Patient Time In/Time Out  Time In: 0855  Time Out: [address], PTA

## 2018-05-21 NOTE — PROGRESS NOTES
5/21/2018 9:32 AM    Admit Date: 5/16/2018    Admit Diagnosis: Atherosclerosis of native coronary artery of native heart w*      Subjective:   No cp or sob- back in nsr      Objective:      Visit Vitals    /73    Pulse 61    Temp 98.7 °F (37.1 °C)    Resp 18    Ht 5' 11\" (1.803 m)    Wt 99.5 kg (219 lb 4.8 oz)    SpO2 92%    BMI 30.59 kg/m2       Physical Exam:  [facility], Well Nourished, No Acute Distress, Alert & Oriented x 3, appropriate mood. Neck- supple, no JVD  CV- regular rate and rhythm no MRG  Lung- clear bilaterally  Abd- soft, nontender, nondistended  Ext- no edema bilaterally. Skin- warm and dry        Data Review:   Recent Labs      05/21/18   0501  05/20/18   0513   05/18/18   1809   NA   --   143   < >  146*   K   --   3.7   < >  4.4   BUN   --   18   < >  16   CREA   --   1.24   < >  1.34   WBC  8.2  5.5   < >  14.8*   HGB  10.0*  9.0*   < >  10.8*   HCT  29.8*  27.7*   < >  32.9*   PLT  122*  85*   < >  120*   INR   --    --    --   1.6    < > = values in this interval not displayed. Assessment/Plan:     Principal Problem:    NSTEMI (non-ST elevated myocardial infarction) ([facility] (5/16/2018)/ cad- s/p cabg- doing well    Active Problems:    Bradycardia (5/16/2018)      Essential hypertension (5/16/2018)- worse- increase lopressor      S/P CABG (coronary artery bypass graft) (5/18/2018)      Hypoxia (5/18/2018)      Hypothyroidism (5/18/2018)      Current chronic use of systemic steroids (5/18/2018)      Overview: Pituitary tumor surgery in 2001 and in 2013--has been on Prednisone 5 mg       daily since 2013.       Atelectasis (5/19/2018)      PAF (paroxysmal atrial fibrillation) ([facility] (5/20/2018)in nsr- change to po amio- no need for ac with short duration af if maintains nsr

## 2018-05-21 NOTE — PROGRESS NOTES
Yoni Huynh  Admission Date: 5/16/2018             Daily Progress Note: 5/21/2018    The patient's chart is reviewed and the patient is discussed with the staff. 81 yo  male, PMH MR, HTN, bradycardia, HLD, chronic steroids for pituitary tumor who underwent 3v CABG on 5/18/18 with Dr. Dawna Kessler. Transferred out of CVICU on 5/19/18, went into atrial fib and placed on Amiodarone gtt that evening. Transitioned to low dose prednisone on 5/20/18 (previous pituitary surgery). He is a never smoker.       Subjective:     Patient seen up in chair in room. Feels \"okay\". Encouraged IS use.     Current Facility-Administered Medications   Medication Dose Route Frequency    famotidine (PEPCID) tablet 20 mg  20 mg Oral Q12H    [START ON 5/22/2018] levothyroxine (SYNTHROID) tablet 88 mcg  88 mcg Oral 6am    mupirocin (BACTROBAN) 2 % ointment   Both Nostrils Q12H    senna-docusate (PERICOLACE) 8.6-50 mg per tablet 2 Tab  2 Tab Oral QHS    guaiFENesin ER (MUCINEX) tablet 1,200 mg  1,200 mg Oral Q12H    sodium chloride (NS) flush 5-10 mL  5-10 mL IntraVENous Q8H    sodium chloride (NS) flush 5-10 mL  5-10 mL IntraVENous PRN    metoprolol tartrate (LOPRESSOR) tablet 25 mg  25 mg Oral Q12H    furosemide (LASIX) tablet 40 mg  40 mg Oral DAILY    docusate sodium (COLACE) capsule 100 mg  100 mg Oral DAILY    alum-mag hydroxide-simeth (MYLANTA) oral suspension 30 mL  30 mL Oral Q4H PRN    ondansetron (ZOFRAN) injection 4 mg  4 mg IntraVENous Q6H PRN    acetaminophen (TYLENOL) tablet 650 mg  650 mg Oral Q4H PRN    atorvastatin (LIPITOR) tablet 80 mg  80 mg Oral QHS    amiodarone (CORDARONE) tablet 200 mg  200 mg Oral Q12H    aspirin delayed-release tablet 81 mg  81 mg Oral DAILY    magnesium oxide (MAG-OX) tablet 400 mg  400 mg Oral TID PRN    magnesium oxide (MAG-OX) tablet 400 mg  400 mg Oral QID PRN    potassium chloride (KLOR-CON) tablet 10 mEq  10 mEq Oral DAILY    potassium chloride (K-DUR, KLOR-CON) SR tablet 20 mEq  20 mEq Oral BID PRN    potassium chloride (K-DUR, KLOR-CON) SR tablet 40 mEq  40 mEq Oral BID PRN    traMADol (ULTRAM) tablet 50 mg  50 mg Oral Q6H PRN    amiodarone (CORDARONE) 450 mg in dextrose 5% 250 mL infusion  0.5-1 mg/min IntraVENous CONTINUOUS    oxyCODONE-acetaminophen (PERCOCET) 5-325 mg per tablet 1 Tab  1 Tab Oral Q4H PRN    predniSONE (DELTASONE) tablet 5 mg  5 mg Oral DAILY WITH BREAKFAST       Review of Systems    Constitutional: negative for fever, chills, sweats  Cardiovascular: negative for chest pain, palpitations, syncope, edema  Gastrointestinal:  negative for dysphagia, reflux, vomiting, diarrhea, abdominal pain, or melena  Neurologic:  negative for focal weakness, numbness, headache    Objective:     Vitals:    05/20/18 2235 05/21/18 0213 05/21/18 0257 05/21/18 0707   BP: 148/78  149/70 143/73   Pulse:   67 61   Resp:   16 18   Temp:   98.8 °F (37.1 °C) 98.7 °F (37.1 °C)   SpO2:   96% 92%   Weight:  219 lb 4.8 oz (99.5 kg)     Height:         Intake and Output:   05/19 1901 - 05/21 0700  In: 700 [P.O.:600; I.V.:100]  Out: Abisai Kennedy [[id number]]  05/21 0701 - 05/21 1900  In: 240 [P.O.:240]  Out: -     Physical Exam:   Constitution:  the patient is well developed and in no acute distress  EENMT:  Sclera clear, pupils equal, oral mucosa moist  Respiratory: Clear, no wheezing, currently on room air. Cardiovascular:  RRR without M,G,R  Gastrointestinal: soft and non-tender; with positive bowel sounds. Musculoskeletal: warm without cyanosis. There is trace lower leg edema.   Skin:  no jaundice or rashes, no wounds   Neurologic: no gross neuro deficits     Psychiatric:  alert and oriented x 3, answers questions appropriately    CXR:  None today      LAB  Recent Labs      05/20/18   1136  05/20/18   0615  05/19/18   2058  05/19/18   1301  05/19/18   1056   GLUCPOC  93  90  184*  101*  99      Recent Labs      05/21/18   0501  05/20/18   0513  05/19/18   0501 05/19/18   0207   05/18/18   1809   WBC  8.2  5.5  7.4   --    --   14.8*   HGB  10.0*  9.0*  9.6*  9.9*   < >  10.8*   HCT  29.8*  27.7*  29.6*  30.2*   < >  32.9*   PLT  122*  85*  114*   --    --   120*   INR   --    --    --    --    --   1.6    < > = values in this interval not displayed. Recent Labs      05/20/18   0513  05/19/18   0501  05/19/18   0207   05/18/18   1809   NA  143  148*   --    --   146*   K  3.7  4.7  4.4   < >  4.4   CL  112*  119*   --    --   115*   CO2  24  22   --    --   20*   GLU  92  87   --    --   158*   BUN  18  16   --    --   16   CREA  1.24  1.32   --    --   1.34   MG  2.3  2.4  2.4   < >  2.8*   CA  7.8*  7.3*   --    --   7.3*    < > = values in this interval not displayed. Recent Labs      05/18/18   2205  05/18/18   1805   PH  7.33*  7.28*   PCO2  33*  42   PO2  114*  227*   HCO3  17*  19*     No results for input(s): LCAD, LAC in the last 72 hours. Assessment:  (Medical Decision Making)     Hospital Problems  Date Reviewed: 5/21/2018          Codes Class Noted POA    PAF (paroxysmal atrial fibrillation) (HCC)  Controlled, amiodarone off ICD-10-CM: I48.0  ICD-9-CM: 427.31  5/20/2018 Unknown        Atelectasis  Encouraged IS use ICD-10-CM: J98.11  ICD-9-CM: 518.0  5/19/2018 Unknown        S/P CABG (coronary artery bypass graft)  Per CT surgery ICD-10-CM: Z95.1  ICD-9-CM: V45.81  5/18/2018 No        Hypoxia  Still requring O2 at night ICD-10-CM: R09.02  ICD-9-CM: 799.02  5/18/2018 No        Hypothyroidism   (Chronic) ICD-10-CM: E03.9  ICD-9-CM: 244.9  5/18/2018 Yes        Current chronic use of systemic steroids   (Chronic) ICD-10-CM: Z79.52  ICD-9-CM: V58.65  5/18/2018 Yes    Overview Signed 5/18/2018  9:35 AM by Joselo Gee NP     Pituitary tumor surgery in 2001 and in 2013--has been on Prednisone 5 mg daily since 2013.              Bradycardia   (Chronic) ICD-10-CM: R00.1  ICD-9-CM: 427.89  5/16/2018 Yes        Essential hypertension   (Chronic) ICD-10-CM: I10  ICD-9-CM: 401.9  5/16/2018 Yes        * (Principal)NSTEMI (non-ST elevated myocardial infarction) ([facility]  S/P CABG  ICD-10-CM: I21.4  ICD-9-CM: 410.70  5/16/2018 Yes              Plan:  (Medical Decision Making)     --Continue IS  --Supplemental O2 at night  --Continue Mucinex    More than 50% of the time documented was spent in face-to-face contact with the patient and in the care of the patient on the floor/unit where the patient is located.     Jeanetta Krabbe, NP

## 2018-05-21 NOTE — PROGRESS NOTES
Called Osmar Daly to inform him that patient is in junctional rhythm. New orders received. Hold lopressor for now. Patient stable.

## 2018-05-21 NOTE — PROGRESS NOTES
Jeaneth Pals  Admission Date: 5/16/2018             Daily Progress Note: 5/21/2018    The patient's chart is reviewed and the patient is discussed with the staff. Subjective:      81yo gentleman who has h/o MR, HTN, bradycardia, HLD, chronic steroids for pituitary tumor who underwent 3v CABG on 5/18 with Dr. Alisha Correa. He is a never smoker. Currently off o2.   Does about 500 on is but is confused about how to do it  Decrease O2 sat last pm- given O2  Current Facility-Administered Medications   Medication Dose Route Frequency    sodium chloride (NS) flush 5-10 mL  5-10 mL IntraVENous Q8H    sodium chloride (NS) flush 5-10 mL  5-10 mL IntraVENous PRN    metoprolol tartrate (LOPRESSOR) tablet 25 mg  25 mg Oral Q12H    furosemide (LASIX) tablet 40 mg  40 mg Oral DAILY    docusate sodium (COLACE) capsule 100 mg  100 mg Oral DAILY    alum-mag hydroxide-simeth (MYLANTA) oral suspension 30 mL  30 mL Oral Q4H PRN    famotidine (PEPCID) tablet 20 mg  20 mg Oral BID    ondansetron (ZOFRAN) injection 4 mg  4 mg IntraVENous Q6H PRN    acetaminophen (TYLENOL) tablet 650 mg  650 mg Oral Q4H PRN    atorvastatin (LIPITOR) tablet 80 mg  80 mg Oral QHS    amiodarone (CORDARONE) tablet 200 mg  200 mg Oral Q12H    mupirocin (BACTROBAN) 2 % ointment   Both Nostrils BID    aspirin delayed-release tablet 81 mg  81 mg Oral DAILY    magnesium oxide (MAG-OX) tablet 400 mg  400 mg Oral TID PRN    magnesium oxide (MAG-OX) tablet 400 mg  400 mg Oral QID PRN    potassium chloride (KLOR-CON) tablet 10 mEq  10 mEq Oral DAILY    potassium chloride (K-DUR, KLOR-CON) SR tablet 20 mEq  20 mEq Oral BID PRN    potassium chloride (K-DUR, KLOR-CON) SR tablet 40 mEq  40 mEq Oral BID PRN    traMADol (ULTRAM) tablet 50 mg  50 mg Oral Q6H PRN    amiodarone (CORDARONE) 450 mg in dextrose 5% 250 mL infusion  0.5-1 mg/min IntraVENous CONTINUOUS    oxyCODONE-acetaminophen (PERCOCET) 5-325 mg per tablet 1 Tab  1 Tab Oral Q4H PRN    levothyroxine (SYNTHROID) tablet 88 mcg  88 mcg Oral ACB    predniSONE (DELTASONE) tablet 5 mg  5 mg Oral DAILY WITH BREAKFAST       Review of Systems    Constitutional: negative for fever, chills, sweats  Cardiovascular: negative for chest pain, palpitations, syncope, edema  Gastrointestinal:  negative for dysphagia, reflux, vomiting, diarrhea, abdominal pain, or melena  Neurologic:  negative for focal weakness, numbness, headache    Objective:     Vitals:    05/20/18 2235 05/21/18 0213 05/21/18 0257 05/21/18 0707   BP: 148/78  149/70 143/73   Pulse:   67 61   Resp:   16 18   Temp:   98.8 °F (37.1 °C) 98.7 °F (37.1 °C)   SpO2:   96% 92%   Weight:  219 lb 4.8 oz (99.5 kg)     Height:         Intake and Output:   05/19 1901 - 05/21 0700  In: 700 [P.O.:600; I.V.:100]  Out: 1975 [[id number]]  05/21 0701 - 05/21 1900  In: 240 [P.O.:240]  Out: -     Physical Exam:   Constitution:  the patient is well developed and in no acute distress  EENMT:  Sclera clear, pupils equal, oral mucosa moist  Respiratory: clear  Cardiovascular:  RRR without M,G,R  Gastrointestinal: soft and non-tender; with positive bowel sounds. Musculoskeletal: warm without cyanosis. There is no lower leg edema. Skin:  no jaundice or rashes, sternal wounds   Neurologic: no gross neuro deficits     Psychiatric:  alert and oriented x 3    CXR:       LAB  Recent Labs      05/20/18   1136  05/20/18   0615  05/19/18   2058  05/19/18   1301  05/19/18   1056   GLUCPOC  93  90  184*  101*  99      Recent Labs      05/21/18   0501  05/20/18   0513  05/19/18   0501  05/19/18   0207   05/18/18   1809   WBC  8.2  5.5  7.4   --    --   14.8*   HGB  10.0*  9.0*  9.6*  9.9*   < >  10.8*   HCT  29.8*  27.7*  29.6*  30.2*   < >  32.9*   PLT  122*  85*  114*   --    --   120*   INR   --    --    --    --    --   1.6    < > = values in this interval not displayed.      Recent Labs      05/20/18   0513  05/19/18   0501  05/19/18   0207   05/18/18   1809   NA 143  148*   --    --   146*   K  3.7  4.7  4.4   < >  4.4   CL  112*  119*   --    --   115*   CO2  24  22   --    --   20*   GLU  92  87   --    --   158*   BUN  18  16   --    --   16   CREA  1.24  1.32   --    --   1.34   MG  2.3  2.4  2.4   < >  2.8*   CA  7.8*  7.3*   --    --   7.3*    < > = values in this interval not displayed. Recent Labs      05/18/18   2205  05/18/18   1805   PH  7.33*  7.28*   PCO2  33*  42   PO2  114*  227*   HCO3  17*  19*     No results for input(s): LCAD, LAC in the last 72 hours. Assessment:  (Medical Decision Making)     Hospital Problems  Date Reviewed: 5/18/2018          Codes Class Noted POA    PAF (paroxysmal atrial fibrillation) ([facility] ICD-10-CM: I48.0  ICD-9-CM: 427.31  5/20/2018 Unknown        Atelectasis ICD-10-CM: J98.11  ICD-9-CM: 518.0  5/19/2018 Unknown    Post op    S/P CABG (coronary artery bypass graft) ICD-10-CM: Z95.1  ICD-9-CM: V45.81  5/18/2018 No        Hypoxia ICD-10-CM: R09.02  ICD-9-CM: 799.02  5/18/2018 No    Off O2    Hypothyroidism (Chronic) ICD-10-CM: E03.9  ICD-9-CM: 244.9  5/18/2018 Yes        Current chronic use of systemic steroids (Chronic) ICD-10-CM: Z79.52  ICD-9-CM: V58.65  5/18/2018 Yes    Overview Signed 5/18/2018  9:35 AM by Rina Wilson NP     Pituitary tumor surgery in 2001 and in 2013--has been on Prednisone 5 mg daily since 2013. Bradycardia (Chronic) ICD-10-CM: R00.1  ICD-9-CM: 427.89  5/16/2018 Yes        Essential hypertension (Chronic) ICD-10-CM: I10  ICD-9-CM: 401.9  5/16/2018 Yes        * (Principal)NSTEMI (non-ST elevated myocardial infarction) [facility]) ICD-10-CM: I21.4  ICD-9-CM: 410.70  5/16/2018 Yes              Plan:  (Medical Decision Making)   1    Ambulate  2    IS  3    On maintenance prednisone  4    tye on ra  --    More than 50% of the time documented was spent in face-to-face contact with the patient and in the care of the patient on the floor/unit where the patient is located.     Fide Leonard, MD

## 2018-05-21 NOTE — PROGRESS NOTES
Subjective:   No complaints, pain is minimal      Objective:     Patient Vitals for the past 8 hrs:   BP Temp Pulse Resp SpO2   18 1135 133/63 - (!) 59 - -   18 0707 143/73 98.7 °F (37.1 °C) 61 18 92 %     Temp (24hrs), Av.4 °F (36.9 °C), Min:97.6 °F (36.4 °C), Max:98.8 °F (37.1 °C)        Heart:  irregularly irregular rhythm  Lung:  clear to auscultation bilaterally  Incisions: Clean, dry, and intact  Extr: no edema    Labs:  Recent Results (from the past 24 hour(s))   PLEASE READ & DOCUMENT PPD TEST IN 48 HRS    Collection Time: 18  7:29 PM   Result Value Ref Range    PPD neg Negative    mm Induration 0 mm   CBC W/O DIFF    Collection Time: 18  5:01 AM   Result Value Ref Range    WBC 8.2 4.3 - 11.1 K/uL    RBC 3.39 (L) 4.23 - 5.67 M/uL    HGB 10.0 (L) 13.6 - 17.2 g/dL    HCT 29.8 (L) 41.1 - 50.3 %    MCV 87.9 79.6 - 97.8 FL    MCH 29.5 26.1 - 32.9 PG    MCHC 33.6 31.4 - 35.0 g/dL    RDW 14.3 11.9 - 14.6 %    PLATELET 704 (L) 036 - 450 K/uL    MPV 10.0 (L) 10.8 - 57.2 FL   METABOLIC PANEL, BASIC    Collection Time: 18  5:01 AM   Result Value Ref Range    Sodium 142 136 - 145 mmol/L    Potassium 4.0 3.5 - 5.1 mmol/L    Chloride 108 (H) 98 - 107 mmol/L    CO2 26 21 - 32 mmol/L    Anion gap 8 7 - 16 mmol/L    Glucose 64 (L) 65 - 100 mg/dL    BUN 19 8 - 23 MG/DL    Creatinine 1.32 0.8 - 1.5 MG/DL    GFR est AA >60 >60 ml/min/1.73m2    GFR est non-AA 55 (L) >60 ml/min/1.73m2    Calcium 7.8 (L) 8.3 - 10.4 MG/DL   GLUCOSE, POC    Collection Time: 18 11:37 AM   Result Value Ref Range    Glucose (POC) 98 65 - 100 mg/dL       Assessment:     Principal Problem:    NSTEMI (non-ST elevated myocardial infarction) ([facility] (2018)    Active Problems:    Bradycardia (2018)      Essential hypertension (2018)      S/P CABG (coronary artery bypass graft) (2018)      Hypoxia (2018)      Hypothyroidism (2018)      Current chronic use of systemic steroids (2018) Overview: Pituitary tumor surgery in 2001 and in 2013--has been on Prednisone 5 mg       daily since 2013. Atelectasis (5/19/2018)      PAF (paroxysmal atrial fibrillation) ([facility] (5/20/2018)        Plan/Recommendations/Medical Decision Making:     AF with RVR last night-junctional rate 59- Cardiology assisting - yesterday Amio gtt now stopped. Inc PO Amio, holding today AM BB.     HTN: adding Zestril 5 mg- titrate up as needed  ambulate, protocol  Plts >100  HOWIE for tonight, has weaned off oxygen in day  Family desires [facility] if SNF is needed

## 2018-05-22 PROBLEM — I25.10 CAD, MULTIPLE VESSEL: Chronic | Status: ACTIVE | Noted: 2018-05-22

## 2018-05-22 PROBLEM — D62 POSTOPERATIVE ANEMIA DUE TO ACUTE BLOOD LOSS: Status: ACTIVE | Noted: 2018-05-22

## 2018-05-22 LAB
MAGNESIUM SERPL-MCNC: 2.3 MG/DL (ref 1.8–2.4)
POTASSIUM SERPL-SCNC: 3.7 MMOL/L (ref 3.5–5.1)

## 2018-05-22 PROCEDURE — 83735 ASSAY OF MAGNESIUM: CPT | Performed by: NURSE PRACTITIONER

## 2018-05-22 PROCEDURE — 74011250637 HC RX REV CODE- 250/637: Performed by: THORACIC SURGERY (CARDIOTHORACIC VASCULAR SURGERY)

## 2018-05-22 PROCEDURE — 99232 SBSQ HOSP IP/OBS MODERATE 35: CPT | Performed by: INTERNAL MEDICINE

## 2018-05-22 PROCEDURE — 74011250637 HC RX REV CODE- 250/637: Performed by: PHYSICIAN ASSISTANT

## 2018-05-22 PROCEDURE — 74011250637 HC RX REV CODE- 250/637: Performed by: NURSE PRACTITIONER

## 2018-05-22 PROCEDURE — 74011250636 HC RX REV CODE- 250/636: Performed by: THORACIC SURGERY (CARDIOTHORACIC VASCULAR SURGERY)

## 2018-05-22 PROCEDURE — 65660000004 HC RM CVT STEPDOWN

## 2018-05-22 PROCEDURE — 84132 ASSAY OF SERUM POTASSIUM: CPT | Performed by: NURSE PRACTITIONER

## 2018-05-22 PROCEDURE — 74011636637 HC RX REV CODE- 636/637: Performed by: INTERNAL MEDICINE

## 2018-05-22 PROCEDURE — 36415 COLL VENOUS BLD VENIPUNCTURE: CPT | Performed by: NURSE PRACTITIONER

## 2018-05-22 PROCEDURE — 77030012891

## 2018-05-22 PROCEDURE — 97530 THERAPEUTIC ACTIVITIES: CPT

## 2018-05-22 RX ORDER — BISMUTH SUBSALICYLATE 262 MG/15ML
30 LIQUID ORAL
Status: DISCONTINUED | OUTPATIENT
Start: 2018-05-22 | End: 2018-05-24 | Stop reason: HOSPADM

## 2018-05-22 RX ORDER — AMIODARONE HYDROCHLORIDE 200 MG/1
400 TABLET ORAL EVERY 12 HOURS
Status: DISCONTINUED | OUTPATIENT
Start: 2018-05-22 | End: 2018-05-23

## 2018-05-22 RX ADMIN — PREDNISONE 5 MG: 5 TABLET ORAL at 10:49

## 2018-05-22 RX ADMIN — METOPROLOL TARTRATE 25 MG: 25 TABLET ORAL at 20:10

## 2018-05-22 RX ADMIN — AMIODARONE HYDROCHLORIDE 200 MG: 200 TABLET ORAL at 01:38

## 2018-05-22 RX ADMIN — ACETAMINOPHEN 650 MG: 325 TABLET ORAL at 01:08

## 2018-05-22 RX ADMIN — FUROSEMIDE 40 MG: 40 TABLET ORAL at 10:48

## 2018-05-22 RX ADMIN — ATORVASTATIN CALCIUM 80 MG: 40 TABLET, FILM COATED ORAL at 20:09

## 2018-05-22 RX ADMIN — FAMOTIDINE 20 MG: 20 TABLET ORAL at 20:09

## 2018-05-22 RX ADMIN — POTASSIUM CHLORIDE 20 MEQ: 1500 TABLET, EXTENDED RELEASE ORAL at 13:17

## 2018-05-22 RX ADMIN — MUPIROCIN: 20 OINTMENT TOPICAL at 10:51

## 2018-05-22 RX ADMIN — POTASSIUM CHLORIDE 10 MEQ: 10 TABLET, EXTENDED RELEASE ORAL at 10:49

## 2018-05-22 RX ADMIN — Medication 10 ML: at 17:17

## 2018-05-22 RX ADMIN — AMIODARONE HYDROCHLORIDE 200 MG: 200 TABLET ORAL at 10:49

## 2018-05-22 RX ADMIN — LISINOPRIL 5 MG: 5 TABLET ORAL at 10:49

## 2018-05-22 RX ADMIN — AMIODARONE HYDROCHLORIDE 400 MG: 200 TABLET ORAL at 20:10

## 2018-05-22 RX ADMIN — METOPROLOL TARTRATE 25 MG: 25 TABLET ORAL at 10:48

## 2018-05-22 RX ADMIN — LEVOTHYROXINE SODIUM 88 MCG: 88 TABLET ORAL at 06:08

## 2018-05-22 RX ADMIN — MUPIROCIN: 20 OINTMENT TOPICAL at 20:02

## 2018-05-22 RX ADMIN — GUAIFENESIN 1200 MG: 600 TABLET, EXTENDED RELEASE ORAL at 20:09

## 2018-05-22 RX ADMIN — FAMOTIDINE 20 MG: 20 TABLET ORAL at 10:48

## 2018-05-22 RX ADMIN — POTASSIUM CHLORIDE 20 MEQ: 1500 TABLET, EXTENDED RELEASE ORAL at 17:15

## 2018-05-22 RX ADMIN — Medication 10 ML: at 20:01

## 2018-05-22 RX ADMIN — Medication 10 ML: at 06:08

## 2018-05-22 RX ADMIN — ONDANSETRON 4 MG: 2 INJECTION INTRAMUSCULAR; INTRAVENOUS at 07:52

## 2018-05-22 RX ADMIN — ACETAMINOPHEN 650 MG: 325 TABLET ORAL at 17:15

## 2018-05-22 RX ADMIN — ASPIRIN 81 MG: 81 TABLET, COATED ORAL at 10:50

## 2018-05-22 RX ADMIN — BISMUTH SUBSALICYLATE 30 ML: 262 LIQUID ORAL at 23:48

## 2018-05-22 NOTE — PROGRESS NOTES
Pt amio and Lopressor held because patient in junctional rhythm in 50s. VSS. Oscar Camargo, CC notified.

## 2018-05-22 NOTE — PROGRESS NOTES
Pt assisted to bathroom he had semi loose BM. Pt assisted to recliner dyspnea noted on exertion. Chair alarm set. Pt demo IS and splint will need encouragement.

## 2018-05-22 NOTE — PROGRESS NOTES
Problem: Mobility Impaired (Adult and Pediatric)  Goal: *Therapy Goal (Edit Goal, Insert Text)  STERNAL PRECAUTIONS :     STG:  (1.)Mr. Elie Simmons will move from supine to sit and sit to supine , scoot up and down and roll side to side with SUPERVISION within 4 treatment day(s). (2.)Mr. Elie Simmons will transfer from bed to chair and chair to bed with SUPERVISION using the least restrictive device within 4 treatment day(s). (3.)Mr. Elie Simmons will ambulate with SUPERVISION for 250 feet with the least restrictive device within 4 treatment day(s). LTG:  (1.)Mr. Elie Simmons will move from supine to sit and sit to supine , scoot up and down and roll side to side in bed with MODIFIED INDEPENDENCE within 8 treatment day(s). (2.)Mr. Elie Simmons will transfer from bed to chair and chair to bed with MODIFIED INDEPENDENCE using the least restrictive device within 8 treatment day(s). (3.)Mr. Elie Simmons will ambulate with MODIFIED INDEPENDENCE for 500 feet with the least restrictive device within 8 treatment day(s).   ________________________________________________________________________________________________    PHYSICAL THERAPY: Daily Note, Treatment Day: 3rd, AM 5/22/2018  INPATIENT: Hospital Day: 7  [place] / [place] / Product Type: Medicare /      NAME/AGE/GENDER: Dea Márquez is a 82 y.o. male   PRIMARY DIAGNOSIS: Atherosclerosis of native coronary artery of native heart with unstable angina pectoris ([facility] [I25.110] NSTEMI (non-ST elevated myocardial infarction) ([facility] NSTEMI (non-ST elevated myocardial infarction) ([facility]  Procedure(s) (LRB):  CORONARY ARTERY BYPASS GRAFT X 3; LIMA  (N/A)  VEIN HARVEST GREATER SAPHENOUS (Left)  4 Days Post-Op  ICD-10: Treatment Diagnosis:    · Generalized Muscle Weakness (M62.81)  · Difficulty in walking, Not elsewhere classified (R26.2)   Precaution/Allergies:  Pcn [penicillins]      ASSESSMENT:     Mr. Elie Simmons presents sitting up in bedside chair and required strong encouragement and insistence to get up and walk. Considering his resistance he did quite well. He stood without assistance and walked to the chair I had in the hallway, sat and rested and returned all without incident. He is in Afib and not feeling well but his mobility is fair and will continue to encourage. This section established at most recent assessment   PROBLEM LIST (Impairments causing functional limitations):  1. Decreased Strength  2. Decreased ADL/Functional Activities  3. Decreased Transfer Abilities  4. Decreased Ambulation Ability/Technique  5. Decreased Balance  6. Decreased Activity Tolerance  7. Decreased Pacing Skills  8. Decreased Flexibility/Joint Mobility  9. Decreased [place] with Home Exercise Program   INTERVENTIONS PLANNED: (Benefits and precautions of physical therapy have been discussed with the patient.)  1. Balance Exercise  2. Bed Mobility  3. Family Education  4. Gait Training  5. Home Exercise Program (HEP)  6. Range of Motion (ROM)  7. Therapeutic Activites  8. Therapeutic Exercise/Strengthening  9. Transfer Training     TREATMENT PLAN: Frequency/Duration:2 x's per day for duration of hospital stay. Rehabilitation Potential For Stated Goals: Good     RECOMMENDED REHABILITATION/EQUIPMENT: (at time of discharge pending progress): Due to the probability of continued deficits (see above) this patient will likely need continued skilled physical therapy after discharge. Equipment:    Walkers, Type: Rolling Walker              HISTORY:   History of Present Injury/Illness (Reason for Referral):  PER MD H&P    Flor Munoz is a 82 y.o. male with past medical history of remote pituitary tumor post surgery, HTN and hypothyroidism who presented to the ER with complaints of chest pain. Patient reports that his pain started tonight around 1800. He describes his pain as right sided pressure with radiation into his right shoulder.  He denies nausea, vomiting, shortness of breath or diaphoresis with chest pain episode. Upon arrival of EMS, EKG showed significant ST depression in V2-V4 without ST elevation. No medications were given by EMS during transport and his chest pain resolved on its own. Repeat EKG done in the ER showed improvement of ST depression initially. Additional repeat EKG done while in the ER showed ST changes in III and aVF. Initial troponin was 0.04, repeat was 2.66. He remains chest pain free since arrival. While in the ER, he was treated with 324mg ASA, 4000 unit IV heparin bolus followed by drip.      Past Medical History/Comorbidities:   Mr. Kacey Hernandez  has a past medical history of Hypertension and Neurological disorder. Mr. Kacey Hernandez  has a past surgical history that includes hx hemorrhoidectomy and hx other surgical (2001 and 2013). Social History/Living Environment:   Home Environment: Private residence  # Steps to Enter: 0  One/Two Story Residence: One story  Living Alone: No  Support Systems: Spouse/Significant Other/Partner  Patient Expects to be Discharged to[de-identified] Rehabilitation facility  Current DME Used/Available at Home: None  Prior Level of Function/Work/Activity:  Patient reports he had been independent with all functional mobility prior to this cardiac event. Dominant Side:         RIGHT  Personal Factors:          Sex:  male        Age:  82 y.o. Number of Personal Factors/Comorbidities that affect the Plan of Care: 1-2: MODERATE COMPLEXITY   EXAMINATION:   Most Recent Physical Functioning:   Gross Assessment:                  Posture:     Balance:  Standing - Static: Fair  Standing - Dynamic : Fair Bed Mobility:     Wheelchair Mobility:     Transfers:  Sit to Stand: Contact guard assistance  Stand to Sit: Contact guard assistance  Gait:     Speed/Ann: Shuffled; Slow  Step Length: Left shortened;Right shortened  Gait Abnormalities: Decreased step clearance;Shuffling gait; Steppage gait  Distance (ft): 80 Feet (ft)  Assistive Device: Avisena rolling  Ambulation - Level of Assistance: Contact guard assistance      Body Structures Involved:  1. Joints  2. Muscles  3. Ligaments Body Functions Affected:  1. Neuromusculoskeletal  2. Movement Related  3. Skin Related  4. Metobolic/Endocrine Activities and Participation Affected:  1. General Tasks and Demands  2. Mobility  3. Self Care  4. Community, Social and [place]   Number of elements that affect the Plan of Care: 3: MODERATE COMPLEXITY   CLINICAL PRESENTATION:   Presentation: Evolving clinical presentation with changing clinical characteristics: MODERATE COMPLEXITY   CLINICAL DECISION MAKIN [facility] Inpatient Short Form  How much difficulty does the patient currently have. .. Unable A Lot A Little None   1. Turning over in bed (including adjusting bedclothes, sheets and blankets)? [] 1   [] 2   [x] 3   [] 4   2. Sitting down on and standing up from a chair with arms ( e.g., wheelchair, bedside commode, etc.)   [] 1   [] 2   [x] 3   [] 4   3. Moving from lying on back to sitting on the side of the bed? [] 1   [] 2   [x] 3   [] 4   How much help from another person does the patient currently need. .. Total A Lot A Little None   4. Moving to and from a bed to a chair (including a wheelchair)? [] 1   [] 2   [x] 3   [] 4   5. Need to walk in hospital room? [] 1   [] 2   [x] 3   [] 4   6. Climbing 3-5 steps with a railing? [] 1   [x] 2   [] 3   [] 4   © , Trustees of [address] [place] [address] [address] [address], under license to [employer]. All rights reserved      Score:  Initial: 17 Most Recent: X (Date: -- )    Interpretation of Tool:  Represents activities that are increasingly more difficult (i.e. Bed mobility, Transfers, Gait). Score 24 23 22-20 19-15 14-10 9-7 6     Modifier CH CI CJ CK CL CM CN      ?  Mobility - Walking and Moving Around:     - CURRENT STATUS: CK - 40%-59% impaired, limited or restricted    - GOAL STATUS: CJ - 20%-39% impaired, limited or restricted    - D/C STATUS:  ---------------To be determined---------------  [place] / [place] / Product Type: Medicare /      Medical Necessity:     · Patient demonstrates good rehab potential due to higher previous functional level. Reason for Services/Other Comments:  · Patient continues to require skilled intervention due to s/p cardiac surgery . Use of outcome tool(s) and clinical judgement create a POC that gives a: Questionable prediction of patient's progress: MODERATE COMPLEXITY            TREATMENT:   (In addition to Assessment/Re-Assessment sessions the following treatments were rendered)   Pre-treatment Symptoms/Complaints:  \"when can I lay down\"    Pain: Initial:   Pain Intensity 1: 0  Post Session:  0/10 no complaints noted. Therapeutic Activity: (    15 minutes): Therapeutic activities including Chair transfers and Ambulation on level ground to improve mobility, strength, balance and endurance. Required minimal   to promote static and dynamic balance in standing with the use of the walker. Date:  5/20/18 Date:  5-21-18 Date:     Activity/Exercise Parameters Parameters Parameters   Seated TKE 10x B x15    Seated AP 10x B x15    Seated marching 10x B x15    Seated hip abd 10x B x10                          Braces/Orthotics/Lines/Etc:   · None  Treatment/Session Assessment:    · Response to Treatment:  Fatigue and letheragy      · Interdisciplinary Collaboration:   o Physical Therapy Assistant  o Registered Nurse  · After treatment position/precautions:   o Up in chair  o Bed/Chair-wheels locked  o Bed in low position  o Call light within reach  o RN notified  o Family at bedside   · Compliance with Program/Exercises: fair/good  · Recommendations/Intent for next treatment session: \"Next visit will focus on increasing gait distance and safety\".   Total Treatment Duration:  PT Patient Time In/Time Out  Time In: 1030  Time Out: [address], PTA

## 2018-05-22 NOTE — PROGRESS NOTES
Subjective:   Complains of fatigue  Pain is minimal      Objective:     Patient Vitals for the past 8 hrs:   BP Temp Pulse Resp SpO2 Height Weight   18 1048 122/78 - (!) 105 - - - -   18 0705 106/63 98 °F (36.7 °C) (!) 116 18 98 % - -   18 0344 - - - - - 5' 11\" (1.803 m) 206 lb 12.8 oz (93.8 kg)     Temp (24hrs), Av.3 °F (36.8 °C), Min:98 °F (36.7 °C), Max:98.6 °F (37 °C)        Heart:  irregularly irregular rhythm  Lung:  clear to auscultation bilaterally  Incisions: Clean, dry, and intact. TPW removed  Extr: no edema    Labs:  Recent Results (from the past 24 hour(s))   GLUCOSE, POC    Collection Time: 18 11:37 AM   Result Value Ref Range    Glucose (POC) 98 65 - 100 mg/dL   PLEASE READ & DOCUMENT PPD TEST IN 72 HRS    Collection Time: 18  9:30 PM   Result Value Ref Range    PPD Neg Negative    mm Induration 0 mm   POTASSIUM    Collection Time: 18  3:51 AM   Result Value Ref Range    Potassium 3.7 3.5 - 5.1 mmol/L   MAGNESIUM    Collection Time: 18  3:51 AM   Result Value Ref Range    Magnesium 2.3 1.8 - 2.4 mg/dL       Assessment:     Principal Problem:    NSTEMI (non-ST elevated myocardial infarction) ([facility] (2018)    Active Problems:    Bradycardia (2018)      Essential hypertension (2018)      S/P CABG (coronary artery bypass graft) (2018)      Hypoxia (2018)      Hypothyroidism (2018)      Current chronic use of systemic steroids (2018)      Overview: Pituitary tumor surgery in  and in --has been on Prednisone 5 mg       daily since . Atelectasis (2018)      PAF (paroxysmal atrial fibrillation) ([facility] (2018)      CAD, multiple vessel (2018)      Overview:  18 (Dr Diana Boston) Coronary artery bypass grafting x3 with grafts       consisting of:      1. Left internal mammary artery to left anterior descending artery.        2.  Reverse saphenous vein to diagonal.       3.  Reversed saphenous vein graft to obtuse marginal.       Postoperative anemia due to acute blood loss (5/22/2018)        Plan/Recommendations/Medical Decision Making:   POD   AF with RVR-junctional yesterday. BB held for junctional yesterday. Cardiology assisting -  Previous Amio gtt, now PO Amio   BB. HTN: adding Zestril 5 mg- titrate up as needed. Stable on NSTEMI protocol ACEI, BB, ASA and Statin. ambulate, protocol  HOWIE last night has weaned off oxygen in day, remains stable off oxygen  Family desires [facility], [place] - likely would benefit for some time at Rehab. Will ask SW to start paperwork.   Hope for DC tomorrow or Thursday

## 2018-05-22 NOTE — PROGRESS NOTES
Bedside shift report received from Justin Viveros RN (offgoing nurse). Report given to Elias Bang RN. Vital signs stable. No complaints present. Patient in stable condition.

## 2018-05-22 NOTE — PROGRESS NOTES
Herminia Tamez  Admission Date: 5/16/2018             Daily Progress Note: 5/22/2018    The patient's chart is reviewed and the patient is discussed with the staff. Subjective:     81yo gentleman who has h/o MR, HTN, bradycardia, HLD, chronic steroids for pituitary tumor who underwent 3v CABG on 5/18 with Dr. Harry Councilman is a never smoker. Currently off o2.     Decrease O2 sat previous pm- given O2  Last pm tye  2 minuites < 88  C/os of nausea and weakness, in a fib  Doing 2000 of is per his wife    Current Facility-Administered Medications   Medication Dose Route Frequency    famotidine (PEPCID) tablet 20 mg  20 mg Oral Q12H    levothyroxine (SYNTHROID) tablet 88 mcg  88 mcg Oral 6am    mupirocin (BACTROBAN) 2 % ointment   Both Nostrils Q12H    senna-docusate (PERICOLACE) 8.6-50 mg per tablet 2 Tab  2 Tab Oral QHS    guaiFENesin ER (MUCINEX) tablet 1,200 mg  1,200 mg Oral Q12H    amiodarone (CORDARONE) tablet 200 mg  200 mg Oral Q12H    metoprolol tartrate (LOPRESSOR) tablet 25 mg  25 mg Oral Q12H    lisinopril (PRINIVIL, ZESTRIL) tablet 5 mg  5 mg Oral DAILY    bisacodyl (DULCOLAX) tablet 5 mg  5 mg Oral DAILY PRN    sodium chloride (NS) flush 5-10 mL  5-10 mL IntraVENous Q8H    sodium chloride (NS) flush 5-10 mL  5-10 mL IntraVENous PRN    furosemide (LASIX) tablet 40 mg  40 mg Oral DAILY    alum-mag hydroxide-simeth (MYLANTA) oral suspension 30 mL  30 mL Oral Q4H PRN    ondansetron (ZOFRAN) injection 4 mg  4 mg IntraVENous Q6H PRN    acetaminophen (TYLENOL) tablet 650 mg  650 mg Oral Q4H PRN    atorvastatin (LIPITOR) tablet 80 mg  80 mg Oral QHS    aspirin delayed-release tablet 81 mg  81 mg Oral DAILY    magnesium oxide (MAG-OX) tablet 400 mg  400 mg Oral TID PRN    magnesium oxide (MAG-OX) tablet 400 mg  400 mg Oral QID PRN    potassium chloride (KLOR-CON) tablet 10 mEq  10 mEq Oral DAILY    potassium chloride (K-DUR, KLOR-CON) SR tablet 20 mEq  20 mEq Oral BID PRN    potassium chloride (K-DUR, KLOR-CON) SR tablet 40 mEq  40 mEq Oral BID PRN    traMADol (ULTRAM) tablet 50 mg  50 mg Oral Q6H PRN    predniSONE (DELTASONE) tablet 5 mg  5 mg Oral DAILY WITH BREAKFAST       Review of Systems    Constitutional: negative for fever, chills, sweats  Cardiovascular: negative for chest pain, palpitations, syncope, edema  Gastrointestinal:  negative for dysphagia, reflux, vomiting, diarrhea, abdominal pain, or melena  Neurologic:  negative for focal weakness, numbness, headache    Objective:     Vitals:    05/22/18 0138 05/22/18 0329 05/22/18 0344 05/22/18 0705   BP: 127/80 143/73  106/63   Pulse: (!) 113 (!) 101  (!) 116   Resp:  18  18   Temp:  98.2 °F (36.8 °C)  98 °F (36.7 °C)   SpO2:  94%  98%   Weight:   206 lb 12.8 oz (93.8 kg)    Height:   5' 11\" (1.803 m)      Intake and Output:   05/20 1901 - 05/22 0700  In: 1620 [P.O.:1620]  Out: 2150 [Urine:2150]       Physical Exam:   Constitution:  the patient is well developed and in no acute distress  EENMT:  Sclera clear, pupils equal, oral mucosa moist  Respiratory: clear  Cardiovascular:  RRR without M,G,R  Gastrointestinal: soft and non-tender; with positive bowel sounds. Musculoskeletal: warm without cyanosis. There is no lower leg edema.   Skin:  no jaundice or rashes, no wounds   Neurologic: no gross neuro deficits     Psychiatric:  alert and oriented x 3    CXR:       LAB  Recent Labs      05/21/18   1137  05/20/18   1136  05/20/18   0615  05/19/18   2058  05/19/18   1301   GLUCPOC  98  93  90  184*  101*      Recent Labs      05/21/18   0501  05/20/18   0513   WBC  8.2  5.5   HGB  10.0*  9.0*   HCT  29.8*  27.7*   PLT  122*  85*     Recent Labs      05/22/18   0351  05/21/18   0501  05/20/18   0513   NA   --   142  143   K  3.7  4.0  3.7   CL   --   108*  112*   CO2   --   26  24   GLU   --   64*  92   BUN   --   19  18   CREA   --   1.32  1.24   MG  2.3   --   2.3   CA   --   7.8*  7.8*     No results for input(s): PH, PCO2, PO2, HCO3 in the last 72 hours. No results for input(s): LCAD, LAC in the last 72 hours. Assessment:  (Medical Decision Making)     Hospital Problems  Date Reviewed: 5/21/2018          Codes Class Noted POA    PAF (paroxysmal atrial fibrillation) ([facility] ICD-10-CM: I48.0  ICD-9-CM: 427.31  5/20/2018 No        Atelectasis ICD-10-CM: J98.11  ICD-9-CM: 518.0  5/19/2018 No    Post op    S/P CABG (coronary artery bypass graft) ICD-10-CM: Z95.1  ICD-9-CM: V45.81  5/18/2018 No        Hypoxia ICD-10-CM: R09.02  ICD-9-CM: 799.02  5/18/2018 No    Off O2    Hypothyroidism (Chronic) ICD-10-CM: E03.9  ICD-9-CM: 244.9  5/18/2018 Yes        Current chronic use of systemic steroids (Chronic) ICD-10-CM: Z79.52  ICD-9-CM: V58.65  5/18/2018 Yes    Overview Signed 5/18/2018  9:35 AM by Juan Carlos Mcconnell NP     Pituitary tumor surgery in 2001 and in 2013--has been on Prednisone 5 mg daily since 2013. Bradycardia (Chronic) ICD-10-CM: R00.1  ICD-9-CM: 427.89  5/16/2018 Yes        Essential hypertension (Chronic) ICD-10-CM: I10  ICD-9-CM: 401.9  5/16/2018 Yes        * (Principal)NSTEMI (non-ST elevated myocardial infarction) [facility]) ICD-10-CM: I21.4  ICD-9-CM: 410.70  5/16/2018 Yes              Plan:  (Medical Decision Making)   1    IS  2    Ambulate  3    zofran for nausea  --    More than 50% of the time documented was spent in face-to-face contact with the patient and in the care of the patient on the floor/unit where the patient is located.     Cuauhtemoc Vilchis MD

## 2018-05-22 NOTE — PROGRESS NOTES
Pt back in Afib with a rate of 90-120s, /91, pt asymptomatic. Emmanuelle Colmenares NP paged, waiting for return call.

## 2018-05-22 NOTE — PROGRESS NOTES
Pt attempted to eat some breakfast. He ate very little still a little nauseous. Pt repositioned in recliner for comfort. Pt states he just \" feels bad. \" Pt did not sleep well last night last turned off so pt could rest.

## 2018-05-22 NOTE — PROGRESS NOTES
Bedside shift report given to Stephen Orona RN (oncoming nurse) by Denilson Vaca RN (offgoing nurse). Bedside shift report included the following information: SBAR, Kardex, ED Summary, MAR, and Recent Results.

## 2018-05-22 NOTE — PROGRESS NOTES
Cardiac Rehab: Spoke with patient regarding referral to cardiac rehab. Patient meets admission criteria based on CABG(5/18/18). Written information about Cardiac Rehab given and reviewed with patient and his wife. Patients wife has been through Cardiac Rehab also. Discussed lifestyle modifications to promote cardiac wellness. Patient indicated that  wants to participate in the cardiac rehab program after he is discharged from [address] [place] [address] and any State mental health facility he may receive. Mr. Anoop Powell will be contacted regarding his outpatient Cardiac Rehab referral . His Cardiologist is Dr. Mark Cowart.       Thank you,  DONOVAN العليN, RN  Cardiopulmonary Rehabilitation Nurse Liaison  [facility]

## 2018-05-22 NOTE — PROGRESS NOTES
Problem: Mobility Impaired (Adult and Pediatric)  Goal: *Therapy Goal (Edit Goal, Insert Text)  STERNAL PRECAUTIONS :     STG:  (1.)Mr. Emilio Pitt will move from supine to sit and sit to supine , scoot up and down and roll side to side with SUPERVISION within 4 treatment day(s). (2.)Mr. Emilio Pitt will transfer from bed to chair and chair to bed with SUPERVISION using the least restrictive device within 4 treatment day(s). (3.)Mr. Emilio Pitt will ambulate with SUPERVISION for 250 feet with the least restrictive device within 4 treatment day(s). LTG:  (1.)Mr. Emilio Pitt will move from supine to sit and sit to supine , scoot up and down and roll side to side in bed with MODIFIED INDEPENDENCE within 8 treatment day(s). (2.)Mr. Emilio Pitt will transfer from bed to chair and chair to bed with MODIFIED INDEPENDENCE using the least restrictive device within 8 treatment day(s). (3.)Mr. Emilio Pitt will ambulate with MODIFIED INDEPENDENCE for 500 feet with the least restrictive device within 8 treatment day(s).   ________________________________________________________________________________________________    PHYSICAL THERAPY: Daily Note, Treatment Day: 3rd, PM 5/22/2018  INPATIENT: Hospital Day: 7  [place] / [place] / Product Type: Medicare /      NAME/AGE/GENDER: Maria Jean is a 82 y.o. male   PRIMARY DIAGNOSIS: Atherosclerosis of native coronary artery of native heart with unstable angina pectoris ([facility] [address] [I25.110] NSTEMI (non-ST elevated myocardial infarction) ([facility] [address] NSTEMI (non-ST elevated myocardial infarction) ([facility] [address]  Procedure(s) (LRB):  CORONARY ARTERY BYPASS GRAFT X 3; LIMA  (N/A)  VEIN HARVEST GREATER SAPHENOUS (Left)  4 Days Post-Op  ICD-10: Treatment Diagnosis:    · Generalized Muscle Weakness (M62.81)  · Difficulty in walking, Not elsewhere classified (R26.2)   Precaution/Allergies:  Pcn [penicillins]      ASSESSMENT:     Mr. Emilio Pitt presents sitting up in bedside chair and required strong encouragement and insistence to get up and walk. Considering his resistance he did quite well. He stood without assistance and walked to the chair I had in the hallway, sat and rested and returned all without incident. He is in Afib and not feeling well but his mobility is fair and will continue to encourage. PM:  Patient walked a little further this afternoon again with one seated rest along the way. He continues to require cues every time he stands or sits down for hand placement. He was in better spirits and did better this afternoon. Still slow progress. This section established at most recent assessment   PROBLEM LIST (Impairments causing functional limitations):  1. Decreased Strength  2. Decreased ADL/Functional Activities  3. Decreased Transfer Abilities  4. Decreased Ambulation Ability/Technique  5. Decreased Balance  6. Decreased Activity Tolerance  7. Decreased Pacing Skills  8. Decreased Flexibility/Joint Mobility  9. Decreased [place] with Home Exercise Program   INTERVENTIONS PLANNED: (Benefits and precautions of physical therapy have been discussed with the patient.)  1. Balance Exercise  2. Bed Mobility  3. Family Education  4. Gait Training  5. Home Exercise Program (HEP)  6. Range of Motion (ROM)  7. Therapeutic Activites  8. Therapeutic Exercise/Strengthening  9. Transfer Training     TREATMENT PLAN: Frequency/Duration:2 x's per day for duration of hospital stay. Rehabilitation Potential For Stated Goals: Good     RECOMMENDED REHABILITATION/EQUIPMENT: (at time of discharge pending progress): Due to the probability of continued deficits (see above) this patient will likely need continued skilled physical therapy after discharge.   Equipment:    Walkers, Type: Rolling Walker              HISTORY:   History of Present Injury/Illness (Reason for Referral):  PER MD H&P    Elzbieta Wray is a 82 y.o. male with past medical history of remote pituitary tumor post surgery, HTN and hypothyroidism who presented to the ER with complaints of chest pain. Patient reports that his pain started tonight around 1800. He describes his pain as right sided pressure with radiation into his right shoulder. He denies nausea, vomiting, shortness of breath or diaphoresis with chest pain episode. Upon arrival of EMS, EKG showed significant ST depression in V2-V4 without ST elevation. No medications were given by EMS during transport and his chest pain resolved on its own. Repeat EKG done in the ER showed improvement of ST depression initially. Additional repeat EKG done while in the ER showed ST changes in III and aVF. Initial troponin was 0.04, repeat was 2.66. He remains chest pain free since arrival. While in the ER, he was treated with 324mg ASA, 4000 unit IV heparin bolus followed by drip.      Past Medical History/Comorbidities:   Mr. Ashlee Thomas  has a past medical history of CAD, multiple vessel (5/22/2018); Hypertension; and Neurological disorder. Mr. Ashlee Thomas  has a past surgical history that includes hx hemorrhoidectomy and hx other surgical (2001 and 2013). Social History/Living Environment:   Home Environment: Private residence  # Steps to Enter: 0  One/Two Story Residence: One story  Living Alone: No  Support Systems: Spouse/Significant Other/Partner  Patient Expects to be Discharged to[de-identified] Rehabilitation facility  Current DME Used/Available at Home: None  Prior Level of Function/Work/Activity:  Patient reports he had been independent with all functional mobility prior to this cardiac event. Dominant Side:         RIGHT  Personal Factors:          Sex:  male        Age:  82 y.o.    Number of Personal Factors/Comorbidities that affect the Plan of Care: 1-2: MODERATE COMPLEXITY   EXAMINATION:   Most Recent Physical Functioning:   Gross Assessment:                  Posture:     Balance:  Standing - Static: Fair  Standing - Dynamic : Fair Bed Mobility:     Wheelchair Mobility:     Transfers:  Sit to Stand: Contact guard assistance  Stand to Sit: Contact guard assistance  Gait:     Speed/Ann: Shuffled; Slow  Step Length: Left shortened;Right shortened  Gait Abnormalities: Decreased step clearance;Shuffling gait; Steppage gait  Distance (ft): 120 Feet (ft) (one seated rest)  Assistive Device: Walker, rolling  Ambulation - Level of Assistance: Contact guard assistance      Body Structures Involved:  1. Joints  2. Muscles  3. Ligaments Body Functions Affected:  1. Neuromusculoskeletal  2. Movement Related  3. Skin Related  4. Metobolic/Endocrine Activities and Participation Affected:  1. General Tasks and Demands  2. Mobility  3. Self Care  4. Community, Social and [place]   Number of elements that affect the Plan of Care: 3: MODERATE COMPLEXITY   CLINICAL PRESENTATION:   Presentation: Evolving clinical presentation with changing clinical characteristics: MODERATE COMPLEXITY   CLINICAL DECISION MAKIN [facility] Inpatient Short Form  How much difficulty does the patient currently have. .. Unable A Lot A Little None   1. Turning over in bed (including adjusting bedclothes, sheets and blankets)? [] 1   [] 2   [x] 3   [] 4   2. Sitting down on and standing up from a chair with arms ( e.g., wheelchair, bedside commode, etc.)   [] 1   [] 2   [x] 3   [] 4   3. Moving from lying on back to sitting on the side of the bed? [] 1   [] 2   [x] 3   [] 4   How much help from another person does the patient currently need. .. Total A Lot A Little None   4. Moving to and from a bed to a chair (including a wheelchair)? [] 1   [] 2   [x] 3   [] 4   5. Need to walk in hospital room? [] 1   [] 2   [x] 3   [] 4   6. Climbing 3-5 steps with a railing? [] 1   [x] 2   [] 3   [] 4   © , Trustees of [address] [place] [address] [address] [address], under license to [employer].  All rights reserved      Score:  Initial: 17 Most Recent: X (Date: -- )    Interpretation of Tool:  Represents activities that are increasingly more difficult (i.e. Bed mobility, Transfers, Gait). Score 24 23 22-20 19-15 14-10 9-7 6     Modifier CH CI CJ CK CL CM CN      ? Mobility - Walking and Moving Around:     - CURRENT STATUS: CK - 40%-59% impaired, limited or restricted    - GOAL STATUS: CJ - 20%-39% impaired, limited or restricted    - D/C STATUS:  ---------------To be determined---------------  [place] / [place] / Product Type: Medicare /      Medical Necessity:     · Patient demonstrates good rehab potential due to higher previous functional level. Reason for Services/Other Comments:  · Patient continues to require skilled intervention due to s/p cardiac surgery . Use of outcome tool(s) and clinical judgement create a POC that gives a: Questionable prediction of patient's progress: MODERATE COMPLEXITY            TREATMENT:   (In addition to Assessment/Re-Assessment sessions the following treatments were rendered)   Pre-treatment Symptoms/Complaints:  \"when can I lay down\"    Pain: Initial:   Pain Intensity 1: 0  Post Session:  0/10 no complaints noted. Therapeutic Activity: (    15 minutes): Therapeutic activities including Chair transfers and Ambulation on level ground to improve mobility, strength, balance and endurance. Required minimal   to promote static and dynamic balance in standing with the use of the walker.        Date:  5/20/18 Date:  5-21-18 Date:     Activity/Exercise Parameters Parameters Parameters   Seated TKE 10x B x15    Seated AP 10x B x15    Seated marching 10x B x15    Seated hip abd 10x B x10                          Braces/Orthotics/Lines/Etc:   · None  Treatment/Session Assessment:    · Response to Treatment:  Fatigue and letheragy      · Interdisciplinary Collaboration:   o Physical Therapy Assistant  o Registered Nurse  · After treatment position/precautions:   o Up in chair  o Bed/Chair-wheels locked  o Bed in low position  o Call light within reach  o RN notified  o Family at bedside   · Compliance with Program/Exercises: fair/good  · Recommendations/Intent for next treatment session: \"Next visit will focus on increasing gait distance and safety\".   Total Treatment Duration:  PT Patient Time In/Time Out  Time In: 1430  Time Out: 1445    Destiney Cortez PTA

## 2018-05-22 NOTE — PROGRESS NOTES
[facility] CARDIOLOGY PROGRESS NOTE           5/22/2018 11:10 AM    Admit Date: 5/16/2018         Subjective: Pt complains of some nausea this morning and mild dyspnea. He denies any palpitations.      ROS:  Cardiovascular:  As noted above    Objective:      Vitals:    05/22/18 0329 05/22/18 0344 05/22/18 0705 05/22/18 1048   BP: 143/73  106/63 122/78   Pulse: (!) 101  (!) 116 (!) 105   Resp: 18  18    Temp: 98.2 °F (36.8 °C)  98 °F (36.7 °C)    SpO2: 94%  98%    Weight:  93.8 kg (206 lb 12.8 oz)     Height:  5' 11\" (1.803 m)         On telemetry: atrial fibrillation       Physical Exam:  General: Well Developed, Well Nourished, No Acute Distress, Alert & Oriented x 3, Appropriate mood  Neck: supple, no JVD  Heart: S1S2 with IRR  Lungs: Clear throughout auscultation bilaterally without adventitious sounds  Abd: soft, nontender, nondistended, with good bowel sounds  Ext: no edema bilaterally  Sternal incision: clean, dry, and intact  Skin: warm and dry      Data Review:   Recent Labs      05/22/18   0351  05/21/18   0501  05/20/18   0513   NA   --   142  143   K  3.7  4.0  3.7   MG  2.3   --   2.3   BUN   --   19  18   CREA   --   1.32  1.24   GLU   --   64*  92   WBC   --   8.2  5.5   HGB   --   10.0*  9.0*   HCT   --   29.8*  27.7*   PLT   --   122*  85*       Assessment/Plan:     Principal Problem:    NSTEMI (non-ST elevated myocardial infarction) ([facility] [address] (5/16/2018)  S/p CABG, on ASA, BB, ACE-I, statin     Active Problems:    Bradycardia (5/16/2018)  Resolved, now back in a-fib      Essential hypertension (5/16/2018)    BP stable      S/P CABG (coronary artery bypass graft) (5/18/2018)  As above       Hypoxia (5/18/2018)  Stable on room air      Hypothyroidism (5/18/2018)  On synthroid      Current chronic use of systemic steroids (5/18/2018)      Atelectasis (5/19/2018)  IS encouraged       PAF (paroxysmal atrial fibrillation) ([facility] [address] (5/20/2018)  Back in a-fib, will increase oral amiodarone, continue BB, monitor         Toma Perry PA-C  5/22/2018 11:10 AM

## 2018-05-22 NOTE — PROGRESS NOTES
Bedside shift report received from Raul Abad RN (offgoing nurse). Report given to Qiana Chawla RN. Vital signs stable. No complaints present. Patient in stable condition.

## 2018-05-23 PROBLEM — R00.1 BRADYCARDIA, SEVERE SINUS: Status: ACTIVE | Noted: 2018-05-23

## 2018-05-23 LAB
MAGNESIUM SERPL-MCNC: 2.3 MG/DL (ref 1.8–2.4)
POTASSIUM SERPL-SCNC: 3.9 MMOL/L (ref 3.5–5.1)

## 2018-05-23 PROCEDURE — 74011250637 HC RX REV CODE- 250/637: Performed by: THORACIC SURGERY (CARDIOTHORACIC VASCULAR SURGERY)

## 2018-05-23 PROCEDURE — 74011250637 HC RX REV CODE- 250/637: Performed by: NURSE PRACTITIONER

## 2018-05-23 PROCEDURE — 74011250637 HC RX REV CODE- 250/637: Performed by: PHYSICIAN ASSISTANT

## 2018-05-23 PROCEDURE — 36415 COLL VENOUS BLD VENIPUNCTURE: CPT | Performed by: NURSE PRACTITIONER

## 2018-05-23 PROCEDURE — 77030012891

## 2018-05-23 PROCEDURE — 74011636637 HC RX REV CODE- 636/637: Performed by: INTERNAL MEDICINE

## 2018-05-23 PROCEDURE — 83735 ASSAY OF MAGNESIUM: CPT | Performed by: NURSE PRACTITIONER

## 2018-05-23 PROCEDURE — 65660000004 HC RM CVT STEPDOWN

## 2018-05-23 PROCEDURE — 99232 SBSQ HOSP IP/OBS MODERATE 35: CPT | Performed by: INTERNAL MEDICINE

## 2018-05-23 PROCEDURE — 84132 ASSAY OF SERUM POTASSIUM: CPT | Performed by: NURSE PRACTITIONER

## 2018-05-23 PROCEDURE — 97530 THERAPEUTIC ACTIVITIES: CPT

## 2018-05-23 RX ADMIN — Medication 10 ML: at 05:14

## 2018-05-23 RX ADMIN — Medication 10 ML: at 20:45

## 2018-05-23 RX ADMIN — LEVOTHYROXINE SODIUM 88 MCG: 88 TABLET ORAL at 05:14

## 2018-05-23 RX ADMIN — LISINOPRIL 5 MG: 5 TABLET ORAL at 08:19

## 2018-05-23 RX ADMIN — POTASSIUM CHLORIDE 20 MEQ: 1500 TABLET, EXTENDED RELEASE ORAL at 17:17

## 2018-05-23 RX ADMIN — GUAIFENESIN 1200 MG: 600 TABLET, EXTENDED RELEASE ORAL at 20:43

## 2018-05-23 RX ADMIN — MUPIROCIN: 20 OINTMENT TOPICAL at 20:45

## 2018-05-23 RX ADMIN — PREDNISONE 5 MG: 5 TABLET ORAL at 08:19

## 2018-05-23 RX ADMIN — ASPIRIN 81 MG: 81 TABLET, COATED ORAL at 08:19

## 2018-05-23 RX ADMIN — FAMOTIDINE 20 MG: 20 TABLET ORAL at 08:19

## 2018-05-23 RX ADMIN — FAMOTIDINE 20 MG: 20 TABLET ORAL at 20:43

## 2018-05-23 RX ADMIN — POTASSIUM CHLORIDE 20 MEQ: 1500 TABLET, EXTENDED RELEASE ORAL at 08:20

## 2018-05-23 RX ADMIN — Medication 10 ML: at 17:17

## 2018-05-23 RX ADMIN — METOPROLOL TARTRATE 25 MG: 25 TABLET ORAL at 08:19

## 2018-05-23 RX ADMIN — MUPIROCIN: 20 OINTMENT TOPICAL at 08:21

## 2018-05-23 RX ADMIN — AMIODARONE HYDROCHLORIDE 400 MG: 200 TABLET ORAL at 08:19

## 2018-05-23 RX ADMIN — GUAIFENESIN 1200 MG: 600 TABLET, EXTENDED RELEASE ORAL at 08:20

## 2018-05-23 RX ADMIN — ATORVASTATIN CALCIUM 80 MG: 40 TABLET, FILM COATED ORAL at 20:43

## 2018-05-23 NOTE — PROGRESS NOTES
Bedside shift report received from Vidhya Cooper RN (offgoing nurse). Report given to Dede Van RN. Vital signs stable. No complaints present. Patient in stable condition.

## 2018-05-23 NOTE — PROGRESS NOTES
Problem: Nutrition Deficit  Goal: *Optimize nutritional status  Nutrition  Reason for assessment: LOS  Assessment:   Diet order(s): Cardiac  Food/Nutrition Patient History:  Pt seen in company of wife. Pt denies any change in appetite. Wife says he has been eating about the same amount he does at home. They typically just eat 2 meals per day and he doesn't like a lot of meat, prefers beans and vegetables. He has consumed 1 or 2 bottles of Ensure Enlive since admission, and is receptive to continuing to drink more. Anthropometrics:Height: 5' 11\" (180.3 cm),  Weight: 86.9 kg (191 lb 8 oz), Weight Source: Bed, Body mass index is 26.71 kg/(m^2). BMI class of normal weight. Macronutrient needs:  EER:  7530-3909 kcal /day (20-25 kcal/kg ABW)  EPR:  63-94 grams protein/day (0.8-1.2 grams/kg IBW)  Intake/Comparative Standards: Average intake for past 7 day(s)/9 recorded meal(s): 19%. This potentially meets <25% of kcal and ~25-50% of protein needs    Nutrition Diagnosis: Inadequate oral intake r/t decreased ability to consume sufficient oral intake as evidenced by decreased intake as above    Intervention:  Meals and snacks: Continue current diet. Include whole milk once a day. Nutrition Supplement Therapy: Ensure Enlive once a day. Magic cup once a day. Education/Discharge nutrition plan: Discussed role and importance of nutrition in post-op wound healing. Identified and encouraged source of protein intake. Lizandro Urbina, [address] [place] [address], [facility] [address]

## 2018-05-23 NOTE — PROGRESS NOTES
Marcianne Blizzard  Admission Date: 5/16/2018             Daily Progress Note: 5/23/2018    The patient's chart is reviewed and the patient is discussed with the staff. Subjective:     81yo gentleman who has h/o MR, HTN, bradycardia, HLD, chronic steroids for pituitary tumor who underwent 3v CABG on 5/18 with Dr. Emmanuelle James is a never smoker.  Currently off o2.    Decrease O2 sat previous pm- given O2  Last pm tye  2 minuites < 88  C/os of nausea and weakness yesterday better today, still in a fib  Doing is    Current Facility-Administered Medications   Medication Dose Route Frequency    amiodarone (CORDARONE) tablet 400 mg  400 mg Oral Q12H    bismuth subsalicylate (PEPTO-BISMOL) oral suspension 30 mL  30 mL Oral Q6H PRN    famotidine (PEPCID) tablet 20 mg  20 mg Oral Q12H    levothyroxine (SYNTHROID) tablet 88 mcg  88 mcg Oral 6am    mupirocin (BACTROBAN) 2 % ointment   Both Nostrils Q12H    guaiFENesin ER (MUCINEX) tablet 1,200 mg  1,200 mg Oral Q12H    metoprolol tartrate (LOPRESSOR) tablet 25 mg  25 mg Oral Q12H    lisinopril (PRINIVIL, ZESTRIL) tablet 5 mg  5 mg Oral DAILY    bisacodyl (DULCOLAX) tablet 5 mg  5 mg Oral DAILY PRN    sodium chloride (NS) flush 5-10 mL  5-10 mL IntraVENous Q8H    sodium chloride (NS) flush 5-10 mL  5-10 mL IntraVENous PRN    alum-mag hydroxide-simeth (MYLANTA) oral suspension 30 mL  30 mL Oral Q4H PRN    ondansetron (ZOFRAN) injection 4 mg  4 mg IntraVENous Q6H PRN    acetaminophen (TYLENOL) tablet 650 mg  650 mg Oral Q4H PRN    atorvastatin (LIPITOR) tablet 80 mg  80 mg Oral QHS    aspirin delayed-release tablet 81 mg  81 mg Oral DAILY    magnesium oxide (MAG-OX) tablet 400 mg  400 mg Oral TID PRN    magnesium oxide (MAG-OX) tablet 400 mg  400 mg Oral QID PRN    potassium chloride (K-DUR, KLOR-CON) SR tablet 20 mEq  20 mEq Oral BID PRN    potassium chloride (K-DUR, KLOR-CON) SR tablet 40 mEq  40 mEq Oral BID PRN    traMADol (ULTRAM) tablet 50 mg  50 mg Oral Q6H PRN    predniSONE (DELTASONE) tablet 5 mg  5 mg Oral DAILY WITH BREAKFAST       Review of Systems    Constitutional: negative for fever, chills, sweats  Cardiovascular: negative for chest pain, palpitations, syncope, edema  Gastrointestinal:  negative for dysphagia, reflux, vomiting, diarrhea, abdominal pain, or melena  Neurologic:  negative for focal weakness, numbness, headache    Objective:     Vitals:    05/22/18 2301 05/23/18 0359 05/23/18 0424 05/23/18 0658   BP: 105/79 132/79  131/87   Pulse: 83 89  95   Resp: 18 16  20   Temp: 98.2 °F (36.8 °C) 98.1 °F (36.7 °C)  98.9 °F (37.2 °C)   SpO2: 95% 95%  94%   Weight:   191 lb 8 oz (86.9 kg)    Height:   5' 11\" (1.803 m)      Intake and Output:   05/21 1901 - 05/23 0700  In: 1020 [P.O.:1020]  Out: 1835 [VSCQM:8612]  05/23 0701 - 05/23 1900  In: 240 [P.O.:240]  Out: 275 [Urine:275]    Physical Exam:   Constitution:  the patient is well developed and in no acute distress  EENMT:  Sclera clear, pupils equal, oral mucosa moist  Respiratory: clear  Cardiovascular:  RRR without M,G,R  Gastrointestinal: soft and non-tender; with positive bowel sounds. Musculoskeletal: warm without cyanosis. There is no lower leg edema. Skin:  no jaundice or rashes, sternal wounds   Neurologic: no gross neuro deficits     Psychiatric:  alert and oriented x 3    CXR:       LAB  Recent Labs      05/21/18   1137  05/20/18   1136   GLUCPOC  98  93      Recent Labs      05/21/18   0501   WBC  8.2   HGB  10.0*   HCT  29.8*   PLT  122*     Recent Labs      05/23/18   0356  05/22/18   0351  05/21/18   0501   NA   --    --   142   K  3.9  3.7  4.0   CL   --    --   108*   CO2   --    --   26   GLU   --    --   64*   BUN   --    --   19   CREA   --    --   1.32   MG  2.3  2.3   --    CA   --    --   7.8*     No results for input(s): PH, PCO2, PO2, HCO3 in the last 72 hours. No results for input(s): LCAD, LAC in the last 72 hours.       Assessment:  (Medical Decision Making) Hospital Problems  Date Reviewed: 5/21/2018          Codes Class Noted POA    CAD, multiple vessel (Chronic) ICD-10-CM: I25.10  ICD-9-CM: 414.00  5/22/2018 Yes    Overview Signed 5/22/2018 11:32 AM by Tai Wright NP      5/18/18 (Dr Kimberly Poole) Coronary artery bypass grafting x3 with grafts consisting of:  1. Left internal mammary artery to left anterior descending artery. 2.  Reverse saphenous vein to diagonal.   3.  Reversed saphenous vein graft to obtuse marginal.              Postoperative anemia due to acute blood loss ICD-10-CM: D62  ICD-9-CM: 285.1  5/22/2018 No        PAF (paroxysmal atrial fibrillation) (HCC) ICD-10-CM: I48.0  ICD-9-CM: 427.31  5/20/2018 No        Atelectasis ICD-10-CM: J98.11  ICD-9-CM: 518.0  5/19/2018 No        S/P CABG (coronary artery bypass graft) ICD-10-CM: Z95.1  ICD-9-CM: V45.81  5/18/2018 No        Hypoxia ICD-10-CM: R09.02  ICD-9-CM: 799.02  5/18/2018 No        Hypothyroidism (Chronic) ICD-10-CM: E03.9  ICD-9-CM: 244.9  5/18/2018 Yes        Current chronic use of systemic steroids (Chronic) ICD-10-CM: Z79.52  ICD-9-CM: V58.65  5/18/2018 Yes    Overview Signed 5/18/2018  9:35 AM by Perfecto Simon NP     Pituitary tumor surgery in 2001 and in 2013--has been on Prednisone 5 mg daily since 2013. Bradycardia (Chronic) ICD-10-CM: R00.1  ICD-9-CM: 427.89  5/16/2018 Yes        Essential hypertension (Chronic) ICD-10-CM: I10  ICD-9-CM: 401.9  5/16/2018 Yes        * (Principal)NSTEMI (non-ST elevated myocardial infarction) [facility]) ICD-10-CM: I21.4  ICD-9-CM: 410.70  5/16/2018 Yes              Plan:  (Medical Decision Making)   1    Ambulating  2    IS  3    To rehab soon- nothing to add- will sign off  --    More than 50% of the time documented was spent in face-to-face contact with the patient and in the care of the patient on the floor/unit where the patient is located.     Daniel Dsouza MD

## 2018-05-23 NOTE — PROGRESS NOTES
Problem: Mobility Impaired (Adult and Pediatric)  Goal: *Therapy Goal (Edit Goal, Insert Text)  STERNAL PRECAUTIONS :     STG:  (1.)Mr. Sylvester Coronado will move from supine to sit and sit to supine , scoot up and down and roll side to side with SUPERVISION within 4 treatment day(s). (2.)Mr. Sylvester Coronado will transfer from bed to chair and chair to bed with SUPERVISION using the least restrictive device within 4 treatment day(s). (3.)Mr. Sylvester Coronado will ambulate with SUPERVISION for 250 feet with the least restrictive device within 4 treatment day(s). LTG:  (1.)Mr. Sylvester Coronado will move from supine to sit and sit to supine , scoot up and down and roll side to side in bed with MODIFIED INDEPENDENCE within 8 treatment day(s). (2.)Mr. Sylvester Coronado will transfer from bed to chair and chair to bed with MODIFIED INDEPENDENCE using the least restrictive device within 8 treatment day(s). (3.)Mr. Sylvester Coronado will ambulate with MODIFIED INDEPENDENCE for 500 feet with the least restrictive device within 8 treatment day(s).   ________________________________________________________________________________________________    PHYSICAL THERAPY: Daily Note, Treatment Day: 4th, AM 5/23/2018  INPATIENT: Hospital Day: 8  [place] / [place] / Product Type: Medicare /      NAME/AGE/GENDER: Keyona De Guzman is a 82 y.o. male   PRIMARY DIAGNOSIS: Atherosclerosis of native coronary artery of native heart with unstable angina pectoris ([facility] [place] [address] [I25.110] NSTEMI (non-ST elevated myocardial infarction) ([facility] [place] [address] NSTEMI (non-ST elevated myocardial infarction) ([facility] [place] [address]  Procedure(s) (LRB):  CORONARY ARTERY BYPASS GRAFT X 3; LIMA  (N/A)  VEIN HARVEST GREATER SAPHENOUS (Left)  5 Days Post-Op  ICD-10: Treatment Diagnosis:    · Generalized Muscle Weakness (M62.81)  · Difficulty in walking, Not elsewhere classified (R26.2)   Precaution/Allergies:  Pcn [penicillins]      ASSESSMENT:     Mr. Sylvester Coronado presents sitting up in bedside chair and agreed to work with PT. He took 3 standing rest breaks during ambulation and worked on pursed lip breathing. Performed bilateral LE ex as listed below after gt. This section established at most recent assessment   PROBLEM LIST (Impairments causing functional limitations):  1. Decreased Strength  2. Decreased ADL/Functional Activities  3. Decreased Transfer Abilities  4. Decreased Ambulation Ability/Technique  5. Decreased Balance  6. Decreased Activity Tolerance  7. Decreased Pacing Skills  8. Decreased Flexibility/Joint Mobility  9. Decreased [place] with Home Exercise Program   INTERVENTIONS PLANNED: (Benefits and precautions of physical therapy have been discussed with the patient.)  1. Balance Exercise  2. Bed Mobility  3. Family Education  4. Gait Training  5. Home Exercise Program (HEP)  6. Range of Motion (ROM)  7. Therapeutic Activites  8. Therapeutic Exercise/Strengthening  9. Transfer Training     TREATMENT PLAN: Frequency/Duration:2 x's per day for duration of hospital stay. Rehabilitation Potential For Stated Goals: Good     RECOMMENDED REHABILITATION/EQUIPMENT: (at time of discharge pending progress): Due to the probability of continued deficits (see above) this patient will likely need continued skilled physical therapy after discharge. Equipment:    Walkers, Type: Rolling Walker              HISTORY:   History of Present Injury/Illness (Reason for Referral):  PER MD H&P    Keyona De Guzman is a 82 y.o. male with past medical history of remote pituitary tumor post surgery, HTN and hypothyroidism who presented to the ER with complaints of chest pain. Patient reports that his pain started tonight around 1800. He describes his pain as right sided pressure with radiation into his right shoulder. He denies nausea, vomiting, shortness of breath or diaphoresis with chest pain episode. Upon arrival of EMS, EKG showed significant ST depression in V2-V4 without ST elevation.  No medications were given by EMS during transport and his chest pain resolved on its own. Repeat EKG done in the ER showed improvement of ST depression initially. Additional repeat EKG done while in the ER showed ST changes in III and aVF. Initial troponin was 0.04, repeat was 2.66. He remains chest pain free since arrival. While in the ER, he was treated with 324mg ASA, 4000 unit IV heparin bolus followed by drip.      Past Medical History/Comorbidities:   Mr. Rajesh Alcantar  has a past medical history of CAD, multiple vessel (5/22/2018); Hypertension; and Neurological disorder. Mr. Rajesh Alcantar  has a past surgical history that includes hx hemorrhoidectomy and hx other surgical (2001 and 2013). Social History/Living Environment:   Home Environment: Private residence  # Steps to Enter: 0  One/Two Story Residence: One story  Living Alone: No  Support Systems: Spouse/Significant Other/Partner  Patient Expects to be Discharged to[de-identified] Rehabilitation facility  Current DME Used/Available at Home: None  Prior Level of Function/Work/Activity:  Patient reports he had been independent with all functional mobility prior to this cardiac event. Dominant Side:         RIGHT  Personal Factors:          Sex:  male        Age:  82 y.o. Number of Personal Factors/Comorbidities that affect the Plan of Care: 1-2: MODERATE COMPLEXITY   EXAMINATION:   Most Recent Physical Functioning:   Gross Assessment:                  Posture:     Balance:    Bed Mobility:     Wheelchair Mobility:     Transfers:  Sit to Stand: Stand-by assistance  Stand to Sit: Stand-by assistance  Gait:     Base of Support: Center of gravity altered;Narrowed  Speed/Ann: Slow  Step Length: Left shortened;Right shortened  Gait Abnormalities: Decreased step clearance  Distance (ft): 80 Feet (ft) (x 3)  Assistive Device:  (none)  Ambulation - Level of Assistance: Stand-by assistance  Interventions: Safety awareness training;Verbal cues      Body Structures Involved:  1. Joints  2. Muscles  3.  Ligaments Body Functions Affected:  1. Neuromusculoskeletal  2. Movement Related  3. Skin Related  4. Metobolic/Endocrine Activities and Participation Affected:  1. General Tasks and Demands  2. Mobility  3. Self Care  4. Community, Social and [facility]   Number of elements that affect the Plan of Care: 3: MODERATE COMPLEXITY   CLINICAL PRESENTATION:   Presentation: Evolving clinical presentation with changing clinical characteristics: MODERATE COMPLEXITY   CLINICAL DECISION MAKIN [facility] Mobility Inpatient Short Form  How much difficulty does the patient currently have. .. Unable A Lot A Little None   1. Turning over in bed (including adjusting bedclothes, sheets and blankets)? [] 1   [] 2   [x] 3   [] 4   2. Sitting down on and standing up from a chair with arms ( e.g., wheelchair, bedside commode, etc.)   [] 1   [] 2   [x] 3   [] 4   3. Moving from lying on back to sitting on the side of the bed? [] 1   [] 2   [x] 3   [] 4   How much help from another person does the patient currently need. .. Total A Lot A Little None   4. Moving to and from a bed to a chair (including a wheelchair)? [] 1   [] 2   [x] 3   [] 4   5. Need to walk in hospital room? [] 1   [] 2   [x] 3   [] 4   6. Climbing 3-5 steps with a railing? [] 1   [x] 2   [] 3   [] 4   © , Trustees of [address] [place] [address], under license to [employer]. All rights reserved      Score:  Initial: 17 Most Recent: X (Date: -- )    Interpretation of Tool:  Represents activities that are increasingly more difficult (i.e. Bed mobility, Transfers, Gait). Score 24 23 22-20 19-15 14-10 9-7 6     Modifier CH CI CJ CK CL CM CN      ?  Mobility - Walking and Moving Around:     - CURRENT STATUS: CK - 40%-59% impaired, limited or restricted    - GOAL STATUS: CJ - 20%-39% impaired, limited or restricted    - D/C STATUS:  ---------------To be determined---------------  [place] / [place] / Product Type: Medicare /      Medical Necessity:     · Patient demonstrates good rehab potential due to higher previous functional level. Reason for Services/Other Comments:  · Patient continues to require skilled intervention due to s/p cardiac surgery . Use of outcome tool(s) and clinical judgement create a POC that gives a: Questionable prediction of patient's progress: MODERATE COMPLEXITY            TREATMENT:   (In addition to Assessment/Re-Assessment sessions the following treatments were rendered)   Pre-treatment Symptoms/Complaints:  \"when can I lay down\"    Pain: Initial:      Post Session:  0/10 no complaints noted. Therapeutic Activity: (    23 minutes): Therapeutic activities including Chair transfers and Ambulation on level ground to improve mobility, strength, balance and endurance. Required minimal Safety awareness training;Verbal cues to promote static and dynamic balance in standing with the use of the walker. Date:  5/20/18 Date:  5-21-18 Date:  5/23/18   Activity/Exercise Parameters Parameters Parameters   Seated TKE 10x B x15 X 15 B   Seated AP 10x B x15 X 15 B   Seated marching 10x B x15 X 15 B   Seated hip abd 10x B x10 X 15 B                         Braces/Orthotics/Lines/Etc:   · None  Treatment/Session Assessment:    · Response to Treatment:        · Interdisciplinary Collaboration:   o Physical Therapy Assistant  o Registered Nurse  · After treatment position/precautions:   o Up in chair  o Bed/Chair-wheels locked  o Bed in low position  o Call light within reach  o RN notified  o Family at bedside   · Compliance with Program/Exercises: fair/good  · Recommendations/Intent for next treatment session: \"Next visit will focus on increasing gait distance and safety\".   Total Treatment Duration:  PT Patient Time In/Time Out  Time In: 0920  Time Out: Martha Bal PTA

## 2018-05-23 NOTE — PROGRESS NOTES
Profound bradycardia today after AM meds (Amio 400 mg and Lopressor 25 mg) - this recovered and now is Tachycardic rate 130's. [facility] notified, they are monitoring telemetry. NPO tonight for possible PPM in AM after overnight monitoring. ALL BB and AMIODARONE dc'd in MAR.

## 2018-05-23 NOTE — PROGRESS NOTES
Pt converted back into SB in the 40s with PAC's and PVC's at 1015. /55, cycling q 10 min. Pt asymptomatic, resting in recliner, wife in room. Cherry Fleming, NP and Dr. Ace Ch aware. Will continue to monitor closely.

## 2018-05-23 NOTE — PROGRESS NOTES
Physical therapy note: No PT this PM. RN requested treatment be held this PM d/t slow heart rate. Will continue to treat as pt is able and time/schedule permit.

## 2018-05-23 NOTE — PROGRESS NOTES
Bedside shift report given to Vidhya Cooper RN (oncoming nurse) by Dede Van RN (offgoing nurse). Bedside shift report included the following information: SBAR, Kardex, ED Summary, MAR, and Recent Results.

## 2018-05-23 NOTE — PROGRESS NOTES
5/23/2018 10:15 AM    Admit Date: 5/16/2018    Admit Diagnosis: Atherosclerosis of native coronary artery of native heart w*      Subjective:   No cp or sob - feels better      Objective:      Visit Vitals    /87    Pulse 95    Temp 98.9 °F (37.2 °C)    Resp 20    Ht 5' 11\" (1.803 m)    Wt 86.9 kg (191 lb 8 oz)    SpO2 94%    BMI 26.71 kg/m2       Physical Exam:  Lidia Ashleigh, Well Nourished, No Acute Distress, Alert & Oriented x 3, appropriate mood. Neck- supple, no JVD  CV- irregular rate and rhythm no MRG  Lung- clear bilaterally  Abd- soft, nontender, nondistended  Ext- no edema bilaterally. Skin- warm and dry        Data Review:   Recent Labs      05/23/18   0356   05/21/18   0501   NA   --    --   142   K  3.9   < >  4.0   BUN   --    --   19   CREA   --    --   1.32   WBC   --    --   8.2   HGB   --    --   10.0*   HCT   --    --   29.8*   PLT   --    --   122*    < > = values in this interval not displayed. Assessment/Plan:     Principal Problem:    NSTEMI (non-ST elevated myocardial infarction) ([facility] [address] (5/16/2018)    Active Problems:    Bradycardia (5/16/2018)      Essential hypertension (5/16/2018)Stable. Continue current medical therapy. S/P CABG (coronary artery bypass graft) (5/18/2018)      Hypoxia (5/18/2018)      Hypothyroidism (5/18/2018)      Current chronic use of systemic steroids (5/18/2018)      Overview: Pituitary tumor surgery in 2001 and in 2013--has been on Prednisone 5 mg       daily since 2013. Atelectasis (5/19/2018)      PAF (paroxysmal atrial fibrillation) ([facility] [address] (5/20/2018)- in af rate controlled- would dc on amio 200mg bid and xarelto- can dcc in a month or two if he does not convert as OP      CAD, multiple vessel (5/22/2018)      Overview:  5/18/18 (Dr Zoe Severe) Coronary artery bypass grafting x3 with grafts       consisting of:      1. Left internal mammary artery to left anterior descending artery.        2.  Reverse saphenous vein to diagonal.       3.  Reversed saphenous vein graft to obtuse marginal.       Postoperative anemia due to acute blood loss (5/22/2018)

## 2018-05-23 NOTE — PROGRESS NOTES
Subjective:   Complains of fatigue but stable  Pain is minimal      Objective:     Patient Vitals for the past 8 hrs:   BP Temp Pulse Resp SpO2 Height Weight   18 0658 131/87 98.9 °F (37.2 °C) 95 20 94 % - -   18 0424 - - - - - 5' 11\" (1.803 m) 191 lb 8 oz (86.9 kg)   18 0359 132/79 98.1 °F (36.7 °C) 89 16 95 % - -     Temp (24hrs), Av.4 °F (36.9 °C), Min:98.1 °F (36.7 °C), Max:98.9 °F (37.2 °C)        Heart:  irregularly irregular rhythm- bigeminy/sinus bradycardia  Lung:  clear to auscultation bilaterally  Incisions: Clean, dry, and intact. TPW removed yesterday (POD4)   Extr: no edema below knees    Labs:  Recent Results (from the past 24 hour(s))   POTASSIUM    Collection Time: 18  3:56 AM   Result Value Ref Range    Potassium 3.9 3.5 - 5.1 mmol/L   MAGNESIUM    Collection Time: 18  3:56 AM   Result Value Ref Range    Magnesium 2.3 1.8 - 2.4 mg/dL       Assessment:     Principal Problem:    NSTEM[facility] (non-ST elevated [facility]) [facility] (2018)    Active Problems:    Bradycardia (2018)      Essential hypertension (2018)      S/P CABG (coronary artery bypass graft) (2018)      Hypoxia (2018)      Hypothyroidism (2018)      Current chronic use of systemic steroids (2018)      Overview: Pituitary tumor surgery in  and in --has been on Prednisone 5 mg       daily since . Atelectasis (2018)      PAF (paroxysmal atrial fibrillation) [facility] (2018)      CAD, multiple vessel (2018)      Overview:  18 (Dr Bebeto Edouard) Coronary artery bypass grafting x3 with grafts       consisting of:      1. Left internal mammary artery to left anterior descending artery.        2.  Reverse saphenous vein to diagonal.       3.  Reversed saphenous vein graft to obtuse marginal.       Postoperative anemia due to acute blood loss (2018)        Plan/Recommendations/Medical Decision Making:   POD 5  AF with RVR-junctional yesterday now profound sinus bradycardia-   BB held for [facility]. Cardiology assisting -  Previous Amio gtt, now PO Amio at 400 but with recommendations to reduce to 200 mg. Amio 400 and Lopressor 25 given this AM with profound sinus angelic cardia. [facility] aware of current rhythm. HTN: adding Zestril 5 mg- titrate up as needed. NSTEMI protocol when rhythm and BP permit  ambulate, protocol  Family desires [facility] - likely would benefit for some time at Rehab. Will ask SW to start paperwork. Will hold off on DC until rhythm status stabilizes.

## 2018-05-23 NOTE — PROGRESS NOTES
Bedside and Verbal shift change report received from Charline Herrera, [facility].

## 2018-05-24 ENCOUNTER — APPOINTMENT (OUTPATIENT)
Dept: CT IMAGING | Age: 83
DRG: 234 | End: 2018-05-24
Attending: THORACIC SURGERY (CARDIOTHORACIC VASCULAR SURGERY)
Payer: MEDICARE

## 2018-05-24 ENCOUNTER — APPOINTMENT (OUTPATIENT)
Dept: CT IMAGING | Age: 83
DRG: 234 | End: 2018-05-24
Attending: INTERNAL MEDICINE
Payer: MEDICARE

## 2018-05-24 VITALS
SYSTOLIC BLOOD PRESSURE: 152 MMHG | BODY MASS INDEX: 26.82 KG/M2 | OXYGEN SATURATION: 95 % | RESPIRATION RATE: 20 BRPM | HEART RATE: 67 BPM | TEMPERATURE: 98.2 F | HEIGHT: 71 IN | WEIGHT: 191.6 LBS | DIASTOLIC BLOOD PRESSURE: 70 MMHG

## 2018-05-24 PROBLEM — I69.391 DYSPHAGIA DUE TO RECENT CEREBROVASCULAR ACCIDENT (CVA): Status: ACTIVE | Noted: 2018-05-24

## 2018-05-24 LAB
CREAT SERPL-MCNC: 1.41 MG/DL (ref 0.8–1.5)
GLUCOSE BLD STRIP.AUTO-MCNC: 85 MG/DL (ref 65–100)
MAGNESIUM SERPL-MCNC: 2.5 MG/DL (ref 1.8–2.4)
POTASSIUM SERPL-SCNC: 4.1 MMOL/L (ref 3.5–5.1)

## 2018-05-24 PROCEDURE — 74011250637 HC RX REV CODE- 250/637: Performed by: THORACIC SURGERY (CARDIOTHORACIC VASCULAR SURGERY)

## 2018-05-24 PROCEDURE — 82962 GLUCOSE BLOOD TEST: CPT

## 2018-05-24 PROCEDURE — 70450 CT HEAD/BRAIN W/O DYE: CPT

## 2018-05-24 PROCEDURE — 82565 ASSAY OF CREATININE: CPT | Performed by: INTERNAL MEDICINE

## 2018-05-24 PROCEDURE — 84132 ASSAY OF SERUM POTASSIUM: CPT | Performed by: NURSE PRACTITIONER

## 2018-05-24 PROCEDURE — 70496 CT ANGIOGRAPHY HEAD: CPT

## 2018-05-24 PROCEDURE — 83735 ASSAY OF MAGNESIUM: CPT | Performed by: NURSE PRACTITIONER

## 2018-05-24 PROCEDURE — 74011636320 HC RX REV CODE- 636/320: Performed by: INTERNAL MEDICINE

## 2018-05-24 PROCEDURE — 36415 COLL VENOUS BLD VENIPUNCTURE: CPT | Performed by: NURSE PRACTITIONER

## 2018-05-24 PROCEDURE — 74011000258 HC RX REV CODE- 258: Performed by: INTERNAL MEDICINE

## 2018-05-24 RX ORDER — SODIUM CHLORIDE 0.9 % (FLUSH) 0.9 %
10 SYRINGE (ML) INJECTION
Status: COMPLETED | OUTPATIENT
Start: 2018-05-24 | End: 2018-05-24

## 2018-05-24 RX ORDER — BISACODYL 5 MG
5 TABLET, DELAYED RELEASE (ENTERIC COATED) ORAL DAILY
Qty: 1 TAB | Refills: 0 | Status: SHIPPED | OUTPATIENT
Start: 2018-05-24

## 2018-05-24 RX ORDER — TRAMADOL HYDROCHLORIDE 50 MG/1
50 TABLET ORAL
Qty: 30 TAB | Refills: 0 | Status: SHIPPED | OUTPATIENT
Start: 2018-05-24 | End: 2018-06-20 | Stop reason: CLARIF

## 2018-05-24 RX ORDER — FAMOTIDINE 20 MG/1
20 TABLET, FILM COATED ORAL EVERY 12 HOURS
Qty: 30 TAB | Refills: 5 | Status: SHIPPED | OUTPATIENT
Start: 2018-05-24 | End: 2018-06-20

## 2018-05-24 RX ORDER — ASPIRIN 81 MG/1
81 TABLET ORAL DAILY
Qty: 30 TAB | Refills: 10 | Status: SHIPPED | OUTPATIENT
Start: 2018-05-24

## 2018-05-24 RX ORDER — LISINOPRIL 5 MG/1
5 TABLET ORAL DAILY
Qty: 30 TAB | Refills: 0 | Status: SHIPPED | OUTPATIENT
Start: 2018-05-24 | End: 2018-06-20

## 2018-05-24 RX ORDER — ATORVASTATIN CALCIUM 80 MG/1
80 TABLET, FILM COATED ORAL
Qty: 30 TAB | Refills: 5 | Status: SHIPPED | OUTPATIENT
Start: 2018-05-24 | End: 2018-06-20

## 2018-05-24 RX ORDER — ACETAMINOPHEN 325 MG/1
650 TABLET ORAL
Qty: 1 TAB | Refills: 0 | Status: SHIPPED | OUTPATIENT
Start: 2018-05-24 | End: 2018-06-20 | Stop reason: CLARIF

## 2018-05-24 RX ADMIN — Medication 10 ML: at 05:00

## 2018-05-24 RX ADMIN — SODIUM CHLORIDE 100 ML: 900 INJECTION, SOLUTION INTRAVENOUS at 08:33

## 2018-05-24 RX ADMIN — IOPAMIDOL 80 ML: 755 INJECTION, SOLUTION INTRAVENOUS at 08:33

## 2018-05-24 RX ADMIN — LEVOTHYROXINE SODIUM 88 MCG: 88 TABLET ORAL at 05:00

## 2018-05-24 RX ADMIN — Medication 10 ML: at 08:33

## 2018-05-24 NOTE — PROGRESS NOTES
Spoke with Kristi Ross with [employer] in [place], he states per the  in [facility], he is unable to fly due to the weather. States he will recheck weather in an hour. Call back number [phone].

## 2018-05-24 NOTE — DISCHARGE SUMMARY
Physician Discharge Summary     Patient: Missy Contreras MRN: [id number]  SSN: [id number]    YOB: 1935  Age: 82 y.o. Sex: male       Admit Date: 5/16/2018    Discharge Date: 5/24/2018      Admitting Physician: Gilson Hinton MD     Discharge Physician: Andrew Morin NP    Admission Diagnoses: Atherosclerosis of native coronary artery of native heart with unstable angina pectoris [facility]) [I25.110]    Discharge Diagnoses:   Problem List as of 5/24/2018  Date Reviewed: 5/21/2018          Codes Class Noted - Resolved    Dysphagia due to recent cerebrovascular accident (CVA) ICD-10-CM: E97.191  ICD-9-CM: 438.82  5/24/2018 - Present        Bradycardia, severe sinus ICD-10-CM: R00.1  ICD-9-CM: 427.81  5/23/2018 - Present        CAD, multiple vessel (Chronic) ICD-10-CM: I25.10  ICD-9-CM: 414.00  5/22/2018 - Present    Overview Signed 5/22/2018 11:32 AM by Andrew Morin NP      5/18/18 (Dr Chong Swift) Coronary artery bypass grafting x3 with grafts consisting of:  1. Left internal mammary artery to left anterior descending artery.    2.  Reverse saphenous vein to diagonal.   3.  Reversed saphenous vein graft to obtuse marginal.              Postoperative anemia due to acute blood loss ICD-10-CM: D62  ICD-9-CM: 285.1  5/22/2018 - Present        PAF (paroxysmal atrial fibrillation) ([facility]) ICD-10-CM: I48.0  ICD-9-CM: 427.31  5/20/2018 - Present        Atelectasis ICD-10-CM: J98.11  ICD-9-CM: 518.0  5/19/2018 - Present        S/P CABG (coronary artery bypass graft) ICD-10-CM: Z95.1  ICD-9-CM: V45.81  5/18/2018 - Present        Hypoxia ICD-10-CM: R09.02  ICD-9-CM: 799.02  5/18/2018 - Present        Hypothyroidism (Chronic) ICD-10-CM: E03.9  ICD-9-CM: 244.9  5/18/2018 - Present        Current chronic use of systemic steroids (Chronic) ICD-10-CM: Z79.52  ICD-9-CM: V58.65  5/18/2018 - Present    Overview Signed 5/18/2018  9:35 AM by Jason Multani NP     Pituitary tumor surgery in 2001 and in 2013--has been on Prednisone 5 mg daily since 2013. Bradycardia (Chronic) ICD-10-CM: R00.1  ICD-9-CM: 427.89  5/16/2018 - Present        Essential hypertension (Chronic) ICD-10-CM: I10  ICD-9-CM: 401.9  5/16/2018 - Present        * (Principal)NSTEMI (non-ST elevated myocardial infarction) ([facility] ICD-10-CM: I21.4  ICD-9-CM: 410.70  5/16/2018 - Present        Localized edema ICD-10-CM: R60.0  ICD-9-CM: 782.3  9/21/2017 - Present        EKG, abnormal ICD-10-CM: R94.31  ICD-9-CM: 794.31  8/23/2017 - Present        RESOLVED: Encounter for weaning from ventilator [facility]) ICD-10-CM: Z99.11  ICD-9-CM: V46.13  5/18/2018 - 5/19/2018        RESOLVED: Acute kidney failure ([facility] ICD-10-CM: N17.9  ICD-9-CM: 584.9  8/23/2017 - 5/16/2018               Procedures for this admission: Procedure(s):  CORONARY ARTERY BYPASS GRAFT X 3; LIMA   VEIN HARVEST GREATER SAPHENOUS    Discharged Condition: stable    Hospital Course: The patient is a very pleasant 82-year-old gentleman who presented to the emergency room with an NSTEMI. He had been followed by [facility] for mild MR, hypertension, and bradycardia. He had an acute episode of chest pain at home, presented to the emergency room where he ruled in for an NSTEMI. Left heart catheterization showed severe multivessel disease, including a chronically occluded LAD, a high-grade large diagonal, and a high-grade OM. LV function was preserved. 5/18/18 Dr Migue Guzman performed successful Coronary artery bypass grafting x3 with grafts consisting of:  1. Left internal mammary artery to left anterior descending artery. 2.  Reverse saphenous vein to diagonal.   3.  Reversed saphenous vein graft to obtuse marginal.     The patient tolerated the surgery well and transitioned to stepdown POD 1. His recovery has been complicated by AF with RVR POD 2 treated with IV Amiodarone, that converted to a variety of angelic junctional and tachycardic rhythms daily.   He was closely monitored with plan yesterday to possibly place a PPM today due to yesterday's profound bradycardia with tachycardia. He is currently in NSR, afebrile, and hemodynamically stable. Unfortunately this morning when rounding the RN staff found him alert with total expressive aphasia. Code Stroke called and he was attended and evaluated with CT and CTA head. Neurology present and facilitated transfer to a [facility] center for Neuro Interventional evaluation. All incisions are healing. He has done well participating in PT and IS. He is stable for discharge today. Consults: Cardiology, Cardiothoracic Surgery, Hospitalist, Neurology and Pulmonary/Critical Care    Significant Diagnostic Studies: radiology: CT scan    Treatments:   Cardiac Meds: amlodipine. NO BETA BLOCKER OR AMIODARONE FOR PROFOUND BRADYCARDIA  Anticoagulation: ASA  Statins: atorvastatin      Discharge Exam:   Temp Pulse Resp SpO2 Height Weight    18 0810 139/70 99.4 °F (37.4 °C) 68 18 95 % - -   18 0506 - - - - - 5' 11\" (1.803 m) 191 lb 9.6 oz (86.9 kg)   18 0300 133/66 98.3 °F (36.8 °C) (!) 58 18 99 % - -     Temp (24hrs), Av.3 °F (36.8 °C), Min:97.4 °F (36.3 °C), Max:99.4 °F (37.4 °C)         Heart:  sinus bradycardia with PACs  Lung:  clear to auscultation bilaterally  Incisions: Clean, dry, and intact. TPW removed  (POD4)   Extr: no edema below knees  Neuro: alert, appropriate with expressive dysphasia      Disposition: [facility]    Patient Instructions:   Current Discharge Medication List      START taking these medications    Details   acetaminophen (TYLENOL) 325 mg tablet Take 2 Tabs by mouth every four (4) hours as needed. Qty: 1 Tab, Refills: 0      aspirin delayed-release 81 mg tablet Take 1 Tab by mouth daily. Qty: 30 Tab, Refills: 10      atorvastatin (LIPITOR) 80 mg tablet Take 1 Tab by mouth nightly. Qty: 30 Tab, Refills: 5      bisacodyl (DULCOLAX) 5 mg EC tablet Take 1 Tab by mouth daily.   Qty: 1 Tab, Refills: 0      famotidine (PEPCID) 20 mg tablet Take 1 Tab by mouth every twelve (12) hours. Qty: 30 Tab, Refills: 5      guaiFENesin ER (MUCINEX) 1,200 mg Ta12 ER tablet Take 1 Tab by mouth every twelve (12) hours. Qty: 30 Tab, Refills: 1      lisinopril (PRINIVIL, ZESTRIL) 5 mg tablet Take 1 Tab by mouth daily. Qty: 30 Tab, Refills: 0      traMADol (ULTRAM) 50 mg tablet Take 1 Tab by mouth every six (6) hours as needed. Max Daily Amount: 200 mg. Qty: 30 Tab, Refills: 0    Associated Diagnoses: S/P CABG (coronary artery bypass graft)         CONTINUE these medications which have NOT CHANGED    Details   predniSONE (DELTASONE) 5 mg tablet Take  by mouth.      levothyroxine (SYNTHROID) 88 mcg tablet Take  by mouth Daily (before breakfast). STOP taking these medications       amLODIPine (NORVASC) 10 mg tablet Comments:   Reason for Stopping:               Reference discharge instructions as provided by nursing for diet, labs, medications, activity, wound care and any outpatient referrals. Follow-up Appointments   Procedures    FOLLOW UP VISIT Appointment in: Other (Specify) To be decided at time of discharge     To be decided at time of discharge     Standing Status:   Standing     Number of Occurrences:   1     Order Specific Question:   Appointment in     Answer:    Other (Specify)        Signed:  Mali Alcantar NP  5/24/2018  10:09 AM

## 2018-05-24 NOTE — CONSULTS
Consult    Patient: Katt Holbrook MRN: [id number]  SSN: [id number]    YOB: 1935  Age: 82 y.o. Sex: male      Subjective:      Katt Holbrook is a 82 y.o. male who is being seen for  assessment of acute stroke  LKW 0500 hours  Prior hx pituitary tumor with chronic corticosteroids        Past Medical History:   Diagnosis Date    CAD, multiple vessel 5/22/2018 5/18/18 (Dr Callie Carr) Coronary artery bypass grafting x3 with grafts consisting of: 1. Left internal mammary artery to left anterior descending artery.   2.  Reverse saphenous vein to diagonal.  3.  Reversed saphenous vein graft to obtuse marginal.     Hypertension     Neurological disorder      Past Surgical History:   Procedure Laterality Date    HX HEMORRHOIDECTOMY      HX OTHER SURGICAL  2001 and 2013    Pituitary tumor x2--on chronic Prednisone       Family History   Problem Relation Age of Onset    No Known Problems Mother     No Known Problems Father      Social History   Substance Use Topics    Smoking status: Never Smoker    Smokeless tobacco: Never Used    Alcohol use No      Current Facility-Administered Medications   Medication Dose Route Frequency Provider Last Rate Last Dose    sodium chloride 0.9 % bolus infusion 100 mL  100 mL IntraVENous RAD ONCE Geovanna Underwood MD        saline peripheral flush soln 10 mL  10 mL InterCATHeter RAD ONCE Geovanna Underwood MD        iopamidol (ISOVUE-370) 76 % injection 80 mL  80 mL IntraVENous RAD ONCE Geovanna Underwood MD        bismuth subsalicylate (PEPTO-BISMOL) oral suspension 30 mL  30 mL Oral Q6H PRN Oliver Vallecillo MD   30 mL at 05/22/18 2348    famotidine (PEPCID) tablet 20 mg  20 mg Oral Q12H Rosanna Regalado MD   20 mg at 05/23/18 2043    levothyroxine (SYNTHROID) tablet 88 mcg  88 mcg Oral 6am Rosanna Regalado MD   88 mcg at 05/24/18 0500    mupirocin (BACTROBAN) 2 % ointment   Both Nostrils Q12H Rosanna Regalado MD        guaiFENesin ER [facility]) tablet 1,200 mg  1,200 mg Oral Q12H Grant Tate MD   1,200 mg at 05/23/18 2043    lisinopril (PRINIVIL, ZESTRIL) tablet 5 mg  5 mg Oral DAILY Jeanna BarretoardSHEREE   5 mg at 05/23/18 0819    bisacodyl (DULCOLAX) tablet 5 mg  5 mg Oral DAILY PRN Grant Tate MD   5 mg at 05/21/18 2020    sodium chloride (NS) flush 5-10 mL  5-10 mL IntraVENous Q8H Grant Tate MD   10 mL at 05/24/18 0500    sodium chloride (NS) flush 5-10 mL  5-10 mL IntraVENous PRN Grant Tate MD   10 mL at 05/22/18 2001    alum-mag hydroxide-simeth (MYLANTA) oral suspension 30 mL  30 mL Oral Q4H PRN Grant Tate MD        ondansetron [facility]) injection 4 mg  4 mg IntraVENous Q6H PRN Grant Tate MD   4 mg at 05/22/18 0752    acetaminophen (TYLENOL) tablet 650 mg  650 mg Oral Q4H PRN Grant Tate MD   650 mg at 05/22/18 1715    atorvastatin (LIPITOR) tablet 80 mg  80 mg Oral QHS Grant Tate MD   80 mg at 05/23/18 2043    aspirin delayed-release tablet 81 mg  81 mg Oral DAILY Grant Tate MD   81 mg at 05/23/18 8192    magnesium oxide (MAG-OX) tablet 400 mg  400 mg Oral TID PRN Grant Tate MD        magnesium oxide (MAG-OX) tablet 400 mg  400 mg Oral QID PRN Grant Tate MD        potassium chloride (K-DUR, KLOR-CON) SR tablet 20 mEq  20 mEq Oral BID PRN Grant Tate MD   20 mEq at 05/23/18 1717    potassium chloride (K-DUR, KLOR-CON) SR tablet 40 mEq  40 mEq Oral BID PRN Grant Tate MD        traMADol Lorrene Prows) tablet 50 mg  50 mg Oral Q6H PRN Grant Tate MD   50 mg at 05/19/18 1100    predniSONE (DELTASONE) tablet 5 mg  5 mg Oral DAILY WITH BREAKFAST Hermilo Batista MD   5 mg at 05/23/18 4318        Allergies   Allergen Reactions    Pcn [Penicillins] Swelling       Review of Systems:   Total expressive aphasia    Objective:   CTA shows L M1 occlusion  Enhancing pituitary mass ecc  Vitals:    05/23/18 2236 05/24/18 0300 05/24/18 8545 05/24/18 0810   BP: 143/72 133/66  139/70   Pulse: (!) 51 (!) 58  68   Resp: 20 18 18   Temp: 98.4 °F (36.9 °C) 98.3 °F (36.8 °C)  99.4 °F (37.4 °C)   SpO2: 94% 99%  95%   Weight:   86.9 kg (191 lb 9.6 oz)    Height:   5' 11\" (1.803 m)         Physical Exam:  Alert man who is expressively aphasic. When asked to hold up a number of fingers I am holding up he is able to accurately do that. There appears to be a right homonynous hemianopsia  Mild drift RUE  R facial droop  Sensory difficult to assess          Assessment:   MACO [facility]  Hospital Problems  Date Reviewed: 5/21/2018          Codes Class Noted POA    Bradycardia, severe sinus ICD-10-CM: R00.1  ICD-9-CM: 427.81  5/23/2018 No        CAD, multiple vessel (Chronic) ICD-10-CM: I25.10  ICD-9-CM: 414.00  5/22/2018 Yes    Overview Signed 5/22/2018 11:32 AM by Stevie Damon, NP      5/18/18 (Dr Joelle Pope) Coronary artery bypass grafting x3 with grafts consisting of:  1. Left internal mammary artery to left anterior descending artery. 2.  Reverse saphenous vein to diagonal.   3.  Reversed saphenous vein graft to obtuse marginal.              Postoperative anemia due to acute blood loss ICD-10-CM: D62  ICD-9-CM: 285.1  5/22/2018 No        PAF (paroxysmal atrial fibrillation) (HCC) ICD-10-CM: I48.0  ICD-9-CM: 427.31  5/20/2018 No        Atelectasis ICD-10-CM: J98.11  ICD-9-CM: 518.0  5/19/2018 No        S/P CABG (coronary artery bypass graft) ICD-10-CM: Z95.1  ICD-9-CM: V45.81  5/18/2018 No        Hypoxia ICD-10-CM: R09.02  ICD-9-CM: 799.02  5/18/2018 No        Hypothyroidism (Chronic) ICD-10-CM: E03.9  ICD-9-CM: 244.9  5/18/2018 Yes        Current chronic use of systemic steroids (Chronic) ICD-10-CM: Z79.52  ICD-9-CM: V58.65  5/18/2018 Yes    Overview Signed 5/18/2018  9:35 AM by Bimal Forrester NP     Pituitary tumor surgery in 2001 and in 2013--has been on Prednisone 5 mg daily since 2013.              Bradycardia (Chronic) ICD-10-CM: R00.1  ICD-9-CM: 427.89 5/16/2018 Yes        Essential hypertension (Chronic) ICD-10-CM: I10  ICD-9-CM: 401.9  5/16/2018 Yes        * (Principal)NSTEMI (non-ST elevated myocardial infarction) [facility]) ICD-10-CM: I21.4  ICD-9-CM: 410.70  5/16/2018 Yes              Plan:   Discussed  Reviewed with Stroke coordinator  Tx to facility to do Interventional therapy clot retrieval if pituitary not contraindication    Signed By: Ricardo Russ MD     May 24, 2018

## 2018-05-24 NOTE — PROGRESS NOTES
CT images pushed over to [facility], MD on screen talking with pt, wife, and primary RN. Information from pt's chart faxed over to [facility] including H&P, recent results, vital signs.

## 2018-05-24 NOTE — PROGRESS NOTES
Bedside and Verbal shift change report received from Candelaria Botello, [facility].

## 2018-05-24 NOTE — PROGRESS NOTES
Dr Alexandra Collins talked to ordering MD and okayed to proceed with cta without IV hydration prior to scan

## 2018-05-24 NOTE — PROGRESS NOTES
CODE S NOTE    I responded to a Code S that was called. Upon arrival the patient was slurring words and not making sense. He was able to follow commands. On exam he was weaker in RUE, but other extremities 5/5 strength. Glucose 85. BP slightly elevated. CT Head shows no acute abnormality. The patient is NOT a TPA candidate due to recent CABG. I spoke with tele-neurology, who recommended a STAT CTA H/N to see if intervention could take place. If no large vessel clot, he recommended medical management and treatment.     Will discuss with primary team.    Margoth Segal, DO

## 2018-05-24 NOTE — PROGRESS NOTES
Bedside shift report given to Madison Burch RN (oncoming nurse) by Qiana Chawla RN (offgoing nurse). Bedside shift report included the following information: SBAR, Kardex, ED Summary, MAR, and Recent Results.

## 2018-05-24 NOTE — PROGRESS NOTES
Patient offered provided a drink and a snack before NPO status. Patient informed of NPO status and NPO sign placed on the door.

## 2018-05-24 NOTE — PROGRESS NOTES
Pt transferring to [facility] to the ER. Discharge packet and EMTALA provided to [facility] in [place]. Pt left at 10:28 a.m.  SW canceled referral with Yadi at [facility]. Care Management Interventions  PCP Verified by CM: Yes  Mode of Transport at Discharge: ALS  Transition of Care Consult (CM Consult):  Other ([facility])  Reason Why Partner SNF Not Chosen:  (Pt choice)  Physical Therapy Consult: Yes  Current Support Network: Lives with Spouse, Own Home  Confirm Follow Up Transport: Family  Plan discussed with Pt/Family/Caregiver: Yes  Freedom of Choice Offered: Yes  Discharge Location  Discharge Placement: Transferred to higher level of care [facility])

## 2018-05-24 NOTE — PROGRESS NOTES
Pt returned from CTA. Dr. Kumar [place] and Krunal Evans at bedside during [address] Dr. Kumar [place] to contact S to review findings. Will continue to monitor remotely as Outreach RN and follow treatment progress. Available to call if needed as well.

## 2018-05-24 NOTE — PROGRESS NOTES
Fariha Hyman [facility] [[email or web address]] CARDIOLOGY PROGRESS NOTE           5/24/2018 12:53 PM    Admit Date: 5/16/2018      Subjective:   S/P acute CVA and transferring to [facility]    ROS:  Cardiovascular:  As noted above    Objective:      Vitals:    05/24/18 0300 05/24/18 0506 05/24/18 0810 05/24/18 1019   BP: 133/66  139/70 152/70   Pulse: (!) 58  68 67   Resp: 18  18 20   Temp: 98.3 °F (36.8 °C)  99.4 °F (37.4 °C) 98.2 °F (36.8 °C)   SpO2: 99%  95% 95%   Weight:  86.9 kg (191 lb 9.6 oz)     Height:  5' 11\" (1.803 m)         Physical Exam:  General-Mild AMS  Neck- supple, no JVD  CV- regular rate and rhythm no MRG  Lung- clear bilaterally  Abd- soft, nontender, nondistended  Ext- no edema bilaterally. Skin- warm and dry    Data Review:   Recent Labs      05/24/18   0417  05/23/18   0356   K  4.1  3.9   MG  2.5*  2.3   CREA  1.41   --        Assessment/Plan:     Principal Problem:    NSTEMI (non-ST elevated myocardial infarction) ([facility] [address] (5/16/2018)    S/P CABG - post operative CVA and tranferring to [facility]    Active Problems:    Bradycardia (5/16/2018)    Stable      Essential hypertension (5/16/2018)    Stable      S/P CABG (coronary artery bypass graft) (5/18/2018)    As above      Hypoxia (5/18/2018)    stable      Dysphagia due to recent cerebrovascular accident (CVA) (5/24/2018)    Transferring to [facility] for possible neuro intervention.            Antonio Rivera MD  5/24/2018 12:53 PM

## 2018-05-24 NOTE — PROGRESS NOTES
CODE S called, Tele-neuro cart is process of being brought from 7th floor. Tele-neuro consult placed by this RN. Pt in route to CT.

## 2018-05-24 NOTE — PROGRESS NOTES
Pt awake, attempting to use urinal, unable to respond to questions. Code S called. Pt able to squeeze hands, attempts to stick out tongue, no facial droop noticed. Dr. Eulalio Aldridge in to see pt. Wife at bedside. Blood glucose: 85. Pt sent to CT.

## 2018-05-24 NOTE — PROGRESS NOTES
POD 6 CABG x3  Subjective:   Alert, nods head appropriately to questions/commands  No headache, no pain  Family at bedside appraised of intent to Helicopter transfer pt to [facility]      Objective:   CTA today: 1. No acute vascular pathology however hypertensive arterial changes are seen. 2. 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 3. Irregular atherosclerotic plaque in both internal carotid artery siphons  could serve as a nidus for platelet aggregation with subsequent embolization  4. Suprasellar mass. MRI recommended with gadolinium. Patient Vitals for the past 8 hrs:   BP Temp Pulse Resp SpO2 Height Weight   18 0810 139/70 99.4 °F (37.4 °C) 68 18 95 % - -   18 0506 - - - - - 5' 11\" (1.803 m) 191 lb 9.6 oz (86.9 kg)   18 0300 133/66 98.3 °F (36.8 °C) (!) 58 18 99 % - -     Temp (24hrs), Av.3 °F (36.8 °C), Min:97.4 °F (36.3 °C), Max:99.4 °F (37.4 °C)        Heart:  sinus bradycardia with PACs  Lung:  clear to auscultation bilaterally  Incisions: Clean, dry, and intact.  TPW removed  (POD4)   Extr: no edema below knees  Neuro: alert, appropriate with expressive dysphasia    Labs:  Recent Results (from the past 24 hour(s))   POTASSIUM    Collection Time: 18  4:17 AM   Result Value Ref Range    Potassium 4.1 3.5 - 5.1 mmol/L   MAGNESIUM    Collection Time: 18  4:17 AM   Result Value Ref Range    Magnesium 2.5 (H) 1.8 - 2.4 mg/dL   CREATININE    Collection Time: 18  4:17 AM   Result Value Ref Range    Creatinine 1.41 0.8 - 1.5 MG/DL   GLUCOSE, POC    Collection Time: 18  7:04 AM   Result Value Ref Range    Glucose (POC) 85 65 - 100 mg/dL       Assessment:     Principal Problem:    NSTEMI (non-ST elevated myocardial infarction) ([facility] [address] (2018)    Active Problems:    Bradycardia (2018)      Essential hypertension (2018)      S/P CABG (coronary artery bypass graft) (2018)      Hypoxia (5/18/2018)      Hypothyroidism (5/18/2018)      Current chronic use of systemic steroids (5/18/2018)      Overview: Pituitary tumor surgery in 2001 and in 2013--has been on Prednisone 5 mg       daily since 2013. Atelectasis (5/19/2018)      PAF (paroxysmal atrial fibrillation) ([facility] [address] (5/20/2018)      CAD, multiple vessel (5/22/2018)      Overview:  5/18/18 (Dr Zoe Severe) Coronary artery bypass grafting x3 with grafts       consisting of:      1. Left internal mammary artery to left anterior descending artery. 2.  Reverse saphenous vein to diagonal.       3.  Reversed saphenous vein graft to obtuse marginal.       Postoperative anemia due to acute blood loss (5/22/2018)      Bradycardia, severe sinus (5/23/2018)        Plan/Recommendations/Medical Decision Making:   POD 6 CABG x 3  CVA this AM- code S with Neuro and IM present to assist. CT head neg with CTA as above. Neuro facilitating transfer to [facility]) for Neuro Intervention. Sinus Bradycardia (pre op rhythm) with post op AF with RVR-junctional changed to profound sinus bradycardia 5/23-   BB held for junctionalCardiology assisting -  Previous Amio gtt, now holding all BB and Amio due to profound bradycardia 5/23. HTN: adding Zestril 5 mg- titrate up as needed.  NSTEMI protocol when rhythm and BP permit

## 2018-05-24 NOTE — PROGRESS NOTES
TRANSFER - OUT REPORT:    Verbal report given to Kathleen Flanagan RN(name) [phone] on Fish Akhtar  being transferred to [facility]) for urgent transfer       Report consisted of patients Situation, Background, Assessment and   Recommendations(SBAR). Information from the following report(s) SBAR, Kardex, MAR, Recent Results and Cardiac Rhythm NSR with PACs was reviewed with the receiving nurse. Opportunity for questions and clarification was provided. Pt picked up and transported to [facility] via [facility]. Pt stable with no changes to last neuro assessment upon transfer.

## 2018-05-24 NOTE — PROGRESS NOTES
Physical Therapy Note:    Chart reviewed for physical therapy treatment, noted patient experienced a \"Code S\" this morning. Per neurology note, patient may \"tx to facility to do interventional therapy clot retrieval if pituitary not contraindication. \" Discussed with RN. Will hold therapy today and attempt tomorrow, 5/25/18, pending appropriateness and patient ability.      Thank you,  KYRIE ZamoraT

## 2018-05-24 NOTE — PROGRESS NOTES
Responded to Code S called overhead at room 2225. Mr. Janna Kruger was taken to CT while I remained with patient' spouse was at bedside. Offered spiritual interventions, empathic listening, affirmation of emotions, exploration of coping skills and prayer. Remained to offer care to patient as he returned to his room. Present during neurologist's assessment.       Hermelindo Fontenot 70  Board Certified

## 2018-05-25 ENCOUNTER — PATIENT OUTREACH (OUTPATIENT)
Dept: CASE MANAGEMENT | Age: 83
End: 2018-05-25

## 2018-05-25 NOTE — PROGRESS NOTES
Unable to do [facility] call for discharge due to patient transferred to [facility] on 5/24/18 for a higher level of care   This note will not be viewable in [address]

## 2018-06-01 ENCOUNTER — PATIENT OUTREACH (OUTPATIENT)
Dept: CASE MANAGEMENT | Age: 83
End: 2018-06-01

## 2018-06-01 NOTE — PROGRESS NOTES
· Patient discharged from [facility] on 5/30/18 to [facility]  · Patient is s/p CABG on 5/18/18 at [facility]  · Patient was transferred out of  to [facility] on 5/24/18  · Will f/u in 21 days  This note will not be viewable in [address]

## 2018-06-06 ENCOUNTER — HOSPITAL ENCOUNTER (OUTPATIENT)
Dept: LAB | Age: 83
Discharge: HOME | End: 2018-06-06

## 2018-06-06 LAB
ANION GAP SERPL CALC-SCNC: 11 MMOL/L (ref 7–16)
BUN SERPL-MCNC: 19 MG/DL (ref 8–23)
CALCIUM SERPL-MCNC: 8.2 MG/DL (ref 8.3–10.4)
CHLORIDE SERPL-SCNC: 110 MMOL/L (ref 98–107)
CO2 SERPL-SCNC: 23 MMOL/L (ref 21–32)
CREAT SERPL-MCNC: 1.92 MG/DL (ref 0.8–1.5)
ERYTHROCYTE [DISTWIDTH] IN BLOOD BY AUTOMATED COUNT: 14.5 % (ref 11.9–14.6)
GLUCOSE SERPL-MCNC: 91 MG/DL (ref 65–100)
HCT VFR BLD AUTO: 37.8 % (ref 41.1–50.3)
HGB BLD-MCNC: 12.4 G/DL (ref 13.6–17.2)
MCH RBC QN AUTO: 29 PG (ref 26.1–32.9)
MCHC RBC AUTO-ENTMCNC: 32.8 G/DL (ref 31.4–35)
MCV RBC AUTO: 88.3 FL (ref 79.6–97.8)
PLATELET # BLD AUTO: 365 K/UL (ref 150–450)
PMV BLD AUTO: 9.4 FL (ref 10.8–14.1)
POTASSIUM SERPL-SCNC: 3.7 MMOL/L (ref 3.5–5.1)
RBC # BLD AUTO: 4.28 M/UL (ref 4.23–5.67)
SODIUM SERPL-SCNC: 144 MMOL/L (ref 136–145)
WBC # BLD AUTO: 8 K/UL (ref 4.3–11.1)

## 2018-06-06 PROCEDURE — 80048 BASIC METABOLIC PNL TOTAL CA: CPT | Performed by: EMERGENCY MEDICINE

## 2018-06-06 PROCEDURE — 85027 COMPLETE CBC AUTOMATED: CPT | Performed by: EMERGENCY MEDICINE

## 2018-06-13 ENCOUNTER — APPOINTMENT (OUTPATIENT)
Dept: GENERAL RADIOLOGY | Age: 83
End: 2018-06-13
Attending: EMERGENCY MEDICINE
Payer: MEDICARE

## 2018-06-13 ENCOUNTER — HOSPITAL ENCOUNTER (EMERGENCY)
Age: 83
Discharge: HOME | End: 2018-06-13
Attending: EMERGENCY MEDICINE
Payer: MEDICARE

## 2018-06-13 ENCOUNTER — PATIENT OUTREACH (OUTPATIENT)
Dept: CASE MANAGEMENT | Age: 83
End: 2018-06-13

## 2018-06-13 ENCOUNTER — HOSPITAL ENCOUNTER (EMERGENCY)
Age: 83
Discharge: HOME | End: 2018-06-14
Attending: EMERGENCY MEDICINE
Payer: MEDICARE

## 2018-06-13 VITALS
DIASTOLIC BLOOD PRESSURE: 88 MMHG | TEMPERATURE: 98.3 F | WEIGHT: 180 LBS | SYSTOLIC BLOOD PRESSURE: 187 MMHG | HEIGHT: 71 IN | HEART RATE: 66 BPM | OXYGEN SATURATION: 99 % | RESPIRATION RATE: 14 BRPM | BODY MASS INDEX: 25.2 KG/M2

## 2018-06-13 DIAGNOSIS — I95.9 HYPOTENSION, UNSPECIFIED HYPOTENSION TYPE: Primary | ICD-10-CM

## 2018-06-13 DIAGNOSIS — E86.0 DEHYDRATION: ICD-10-CM

## 2018-06-13 DIAGNOSIS — R53.1 WEAKNESS: Primary | ICD-10-CM

## 2018-06-13 PROBLEM — I49.5 SSS (SICK SINUS SYNDROME) (HCC): Status: ACTIVE | Noted: 2018-06-13

## 2018-06-13 LAB
ALBUMIN SERPL-MCNC: 3.1 G/DL (ref 3.2–4.6)
ALBUMIN/GLOB SERPL: 0.8 {RATIO} (ref 1.2–3.5)
ALP SERPL-CCNC: 143 U/L (ref 50–136)
ALT SERPL-CCNC: 24 U/L (ref 12–65)
ANION GAP SERPL CALC-SCNC: 17 MMOL/L (ref 7–16)
AST SERPL-CCNC: 36 U/L (ref 15–37)
ATRIAL RATE: 416 BPM
BASOPHILS # BLD: 0.1 K/UL (ref 0–0.2)
BASOPHILS NFR BLD: 1 % (ref 0–2)
BILIRUB SERPL-MCNC: 1 MG/DL (ref 0.2–1.1)
BUN SERPL-MCNC: 13 MG/DL (ref 8–23)
CALCIUM SERPL-MCNC: 8.8 MG/DL (ref 8.3–10.4)
CALCULATED R AXIS, ECG10: 42 DEGREES
CALCULATED T AXIS, ECG11: 145 DEGREES
CHLORIDE SERPL-SCNC: 110 MMOL/L (ref 98–107)
CO2 SERPL-SCNC: 20 MMOL/L (ref 21–32)
CREAT SERPL-MCNC: 1.75 MG/DL (ref 0.8–1.5)
DIAGNOSIS, 93000: NORMAL
DIFFERENTIAL METHOD BLD: ABNORMAL
EOSINOPHIL # BLD: 1.1 K/UL (ref 0–0.8)
EOSINOPHIL NFR BLD: 14 % (ref 0.5–7.8)
ERYTHROCYTE [DISTWIDTH] IN BLOOD BY AUTOMATED COUNT: 14.5 % (ref 11.9–14.6)
GLOBULIN SER CALC-MCNC: 3.8 G/DL (ref 2.3–3.5)
GLUCOSE SERPL-MCNC: 94 MG/DL (ref 65–100)
HCT VFR BLD AUTO: 41.7 % (ref 41.1–50.3)
HGB BLD-MCNC: 13.6 G/DL (ref 13.6–17.2)
IMM GRANULOCYTES # BLD: 0 K/UL (ref 0–0.5)
IMM GRANULOCYTES NFR BLD AUTO: 0 % (ref 0–5)
LACTATE BLD-SCNC: 2.6 MMOL/L (ref 0.5–1.9)
LYMPHOCYTES # BLD: 1.4 K/UL (ref 0.5–4.6)
LYMPHOCYTES NFR BLD: 18 % (ref 13–44)
MCH RBC QN AUTO: 28.6 PG (ref 26.1–32.9)
MCHC RBC AUTO-ENTMCNC: 32.6 G/DL (ref 31.4–35)
MCV RBC AUTO: 87.8 FL (ref 79.6–97.8)
MONOCYTES # BLD: 0.6 K/UL (ref 0.1–1.3)
MONOCYTES NFR BLD: 7 % (ref 4–12)
NEUTS SEG # BLD: 4.9 K/UL (ref 1.7–8.2)
NEUTS SEG NFR BLD: 60 % (ref 43–78)
PLATELET # BLD AUTO: 213 K/UL (ref 150–450)
PMV BLD AUTO: 10.5 FL (ref 10.8–14.1)
POTASSIUM SERPL-SCNC: 3.8 MMOL/L (ref 3.5–5.1)
PROCALCITONIN SERPL-MCNC: 0.1 NG/ML
PROT SERPL-MCNC: 6.9 G/DL (ref 6.3–8.2)
Q-T INTERVAL, ECG07: 408 MS
QRS DURATION, ECG06: 94 MS
QTC CALCULATION (BEZET), ECG08: 443 MS
RBC # BLD AUTO: 4.75 M/UL (ref 4.23–5.67)
SODIUM SERPL-SCNC: 147 MMOL/L (ref 136–145)
TROPONIN I BLD-MCNC: 0.04 NG/ML (ref 0.02–0.05)
TROPONIN I BLD-MCNC: 0.08 NG/ML (ref 0.02–0.05)
VENTRICULAR RATE, ECG03: 71 BPM
WBC # BLD AUTO: 8.1 K/UL (ref 4.3–11.1)

## 2018-06-13 PROCEDURE — 96360 HYDRATION IV INFUSION INIT: CPT | Performed by: EMERGENCY MEDICINE

## 2018-06-13 PROCEDURE — 74011250637 HC RX REV CODE- 250/637: Performed by: EMERGENCY MEDICINE

## 2018-06-13 PROCEDURE — 80053 COMPREHEN METABOLIC PANEL: CPT | Performed by: EMERGENCY MEDICINE

## 2018-06-13 PROCEDURE — 83605 ASSAY OF LACTIC ACID: CPT

## 2018-06-13 PROCEDURE — 74011250636 HC RX REV CODE- 250/636: Performed by: EMERGENCY MEDICINE

## 2018-06-13 PROCEDURE — 99284 EMERGENCY DEPT VISIT MOD MDM: CPT | Performed by: EMERGENCY MEDICINE

## 2018-06-13 PROCEDURE — 84145 PROCALCITONIN (PCT): CPT | Performed by: EMERGENCY MEDICINE

## 2018-06-13 PROCEDURE — 99285 EMERGENCY DEPT VISIT HI MDM: CPT | Performed by: EMERGENCY MEDICINE

## 2018-06-13 PROCEDURE — 96361 HYDRATE IV INFUSION ADD-ON: CPT | Performed by: EMERGENCY MEDICINE

## 2018-06-13 PROCEDURE — 85025 COMPLETE CBC W/AUTO DIFF WBC: CPT | Performed by: EMERGENCY MEDICINE

## 2018-06-13 PROCEDURE — 93005 ELECTROCARDIOGRAM TRACING: CPT | Performed by: EMERGENCY MEDICINE

## 2018-06-13 PROCEDURE — 71045 X-RAY EXAM CHEST 1 VIEW: CPT

## 2018-06-13 PROCEDURE — 87040 BLOOD CULTURE FOR BACTERIA: CPT | Performed by: EMERGENCY MEDICINE

## 2018-06-13 PROCEDURE — 84484 ASSAY OF TROPONIN QUANT: CPT

## 2018-06-13 RX ADMIN — APIXABAN 5 MG: 5 TABLET, FILM COATED ORAL at 23:33

## 2018-06-13 RX ADMIN — SODIUM CHLORIDE 1000 ML: 900 INJECTION, SOLUTION INTRAVENOUS at 14:50

## 2018-06-13 RX ADMIN — SODIUM CHLORIDE 1000 ML: 900 INJECTION, SOLUTION INTRAVENOUS at 16:44

## 2018-06-13 NOTE — DISCHARGE INSTRUCTIONS
Dehydration: Care Instructions  Your Care Instructions  Dehydration happens when your body loses too much fluid. This might happen when you do not drink enough water or you lose large amounts of fluids from your body because of diarrhea, vomiting, or sweating. Severe dehydration can be life-threatening. Water and minerals called electrolytes help put your body fluids back in balance. Learn the early signs of fluid loss, and drink more fluids to prevent dehydration. Follow-up care is a key part of your treatment and safety. Be sure to make and go to all appointments, and call your doctor if you are having problems. It's also a good idea to know your test results and keep a list of the medicines you take. How can you care for yourself at home? · To prevent dehydration, drink plenty of fluids, enough so that your urine is light yellow or clear like water. Choose water and other caffeine-free clear liquids until you feel better. If you have kidney, heart, or liver disease and have to limit fluids, talk with your doctor before you increase the amount of fluids you drink. · If you do not feel like eating or drinking, try taking small sips of water, sports drinks, or other rehydration drinks. · Get plenty of rest.  To prevent dehydration  · Add more fluids to your diet and daily routine, unless your doctor has told you not to. · During hot weather, drink more fluids. Drink even more fluids if you exercise a lot. Stay away from drinks with alcohol or caffeine. · Watch for the symptoms of dehydration. These include:  ¨ A dry, sticky mouth. ¨ Dark yellow urine, and not much of it. ¨ Dry and sunken eyes. ¨ Feeling very tired. · Learn what problems can lead to dehydration. These include:  ¨ Diarrhea, fever, and vomiting. ¨ Any illness with a fever, such as pneumonia or the flu. ¨ Activities that cause heavy sweating, such as endurance races and heavy outdoor work in hot or humid weather.   ¨ Alcohol or drug abuse or withdrawal.  ¨ Certain medicines, such as cold and allergy pills (antihistamines), diet pills (diuretics), and laxatives. ¨ Certain diseases, such as diabetes, cancer, and heart or kidney disease. When should you call for help? Call 911 anytime you think you may need emergency care. For example, call if:  ? · You passed out (lost consciousness). ?Call your doctor now or seek immediate medical care if:  ? · You are confused and cannot think clearly. ? · You are dizzy or lightheaded, or you feel like you may faint. ? · You have signs of needing more fluids. You have sunken eyes and a dry mouth, and you pass only a little dark urine. ? · You cannot keep fluids down. ? Watch closely for changes in your health, and be sure to contact your doctor if:  ? · You are not making tears. ? · Your skin is very dry and sags slowly back into place after you pinch it. ? · Your mouth and eyes are very dry. Where can you learn more? Go to http://zoie-nusrat.info/. Enter M367 in the search box to learn more about \"Dehydration: Care Instructions. \"  Current as of: March 20, 2017  Content Version: 11.4  © 6497-6016 [employer]. Care instructions adapted under license by [email or web address] (which disclaims liability or warranty for this information). If you have questions about a medical condition or this instruction, always ask your healthcare professional. [place] [address] any warranty or liability for your use of this information. Oral Rehydration: Care Instructions  Your Care Instructions    Dehydration occurs when your body loses too much water. This can happen if you do not drink enough fluids or lose a lot of fluid due to diarrhea, vomiting, or sweating. Being dehydrated can cause health problems and can even be life-threatening. To replace lost fluids, you need to drink liquid that contains special chemicals called electrolytes. Electrolytes keep your body working well. Plain water does not have electrolytes. You also need to rest to prevent more fluid loss. Replacing water and electrolytes (oral rehydration) completely takes about 36 hours. But you should feel better within a few hours. Follow-up care is a key part of your treatment and safety. Be sure to make and go to all appointments, and call your doctor if you are having problems. It's also a good idea to know your test results and keep a list of the medicines you take. How can you care for yourself at home? · Take frequent sips of a drink such as Gatorade, Powerade, or other rehydration drinks that your doctor suggests. These replace both fluid and important chemicals (electrolytes) you need for balance in your blood. · Drink 2 quarts of cool liquid over 2 to 4 hours. You should have at least 10 glasses of liquid a day to replace lost fluid. If you have kidney, heart, or liver disease and have to limit fluids, talk with your doctor before you increase the amount of fluids you drink. · Make your own drink. Measure everything carefully. The drink may not work well or may even be harmful if the amounts are off. ¨ 1 quart water  ¨ ½ teaspoon salt  ¨ 6 teaspoons sugar  · Do not drink liquid with caffeine, such as coffee and tere. · Do not drink any alcohol. It can make you dehydrated. · Drink plenty of fluids, enough so that your urine is light yellow or clear like water. If you have kidney, heart, or liver disease and have to limit fluids, talk with your doctor before you increase the amount of fluids you drink. When should you call for help? Call 911 anytime you think you may need emergency care. For example, call if:  ? · You have signs of severe dehydration, such as:  ¨ You are confused or unable to stay awake. ¨ You passed out (lost consciousness). ?Call your doctor now or seek immediate medical care if:  ? · You still have signs of dehydration.  You have sunken eyes and a dry mouth, and you pass only a little dark urine. ? · You are dizzy or lightheaded, or you feel like you may faint. ? · You are not able to keep down fluids. ? Watch closely for changes in your health, and be sure to contact your doctor if:  ? · You do not get better as expected. Where can you learn more? Go to http://zoie-nusrat.info/. Enter I040 in the search box to learn more about \"Oral Rehydration: Care Instructions. \"  Current as of: March 20, 2017  Content Version: 11.4  © 4641-5797 [employer]. Care instructions adapted under license by [employer] (which disclaims liability or warranty for this information). If you have questions about a medical condition or this instruction, always ask your healthcare professional. [address] any warranty or liability for your use of this information. Low Blood Pressure: Care Instructions  Your Care Instructions    Blood pressure is a measurement of the force of the blood against the walls of the blood vessels during and after each beat of the heart. Low blood pressure is also called hypotension. It means that your blood pressure is much lower than normal. Some people, especially young, slim women, may have slightly low blood pressure without symptoms. But in many people, low blood pressure can cause symptoms such as feeling dizzy or lightheaded. When your blood pressure is too low, your heart, brain, and other organs do not get enough blood. Low blood pressure can be caused by many things, including heart problems and some medicines. Diabetes that is not under control can cause your blood pressure to drop. And so can a severe allergic reaction or infection. Another cause is dehydration, which is when your body loses too much fluid. Treatment for low blood pressure depends on the cause. Follow-up care is a key part of your treatment and safety.  Be sure to make and go to all appointments, and call your doctor if you are having problems. It's also a good idea to know your test results and keep a list of the medicines you take. How can you care for yourself at home? · Drink plenty of fluids, enough so that your urine is light yellow or clear like water. If you have kidney, heart, or liver disease and have to limit fluids, talk with your doctor before you increase the amount of fluids you drink. · Be safe with medicines. Call your doctor if you think you are having a problem with your medicine. You will get more details on the specific medicines your doctor prescribes. · Stand up or get out of bed very slowly to allow your body to adjust.  · Get plenty of rest.  · Do not smoke. Smoking increases your risk of heart attack. If you need help quitting, talk to your doctor about stop-smoking programs and medicines. These can increase your chances of quitting for good. · Limit alcohol to 2 drinks a day for men and 1 drink a day for women. Alcohol may interfere with your medicine. In addition, alcohol can make your low blood pressure worse by causing your body to lose water. When should you call for help? Call 911 anytime you think you may need emergency care. For example, call if:  ? · You passed out (lost consciousness). ?Call your doctor now or seek immediate medical care if:  ? · You are dizzy or lightheaded, or you feel like you may faint. ? Watch closely for changes in your health, and be sure to contact your doctor if you have any problems. Where can you learn more? Go to http://zoie-nusrat.info/. Enter C304 in the search box to learn more about \"Low Blood Pressure: Care Instructions. \"  Current as of: September 21, 2016  Content Version: 11.4  © 0704-0151 [employer]. Care instructions adapted under license by [employer] (which disclaims liability or warranty for this information).  If you have questions about a medical condition or this instruction, always ask your healthcare professional. [place] [address] any warranty or liability for your use of this information.

## 2018-06-13 NOTE — ED TRIAGE NOTES
Pt arrives to the ED via EMS pt complains of hypotension from [facility] cardiology, BP 70/50 per EMS /70 HR 65, NSR per EMS pt states he had a CABG done 3 weeks ago, per ems 2-3 days after CABG, pt reported a stroke. 20 in L hand.

## 2018-06-13 NOTE — PROGRESS NOTES
· Patient had follow up appointment with cardiology today 6/13/18 and cardiologist stated: \"Patient at [facility] after [facility] admission for acute tx of CVA. Wife here, he is very weak and pale now. Cannot wake up. Not been eating or drinking an rehab. No energy. Pale now, wife worried. Unable to get hx from the patient. Wife in tears at the bedside. · IMPRESSION:      · Recent NSTEMI followed by CABG, Afib and then CVA. At [facility] now after [facility] admission for CVA tx. Very weak now, cannot hold up head. BP low in the office, not waking up. · Not eating and drinking in rehab per wife. · Shock, have called EMS and will transport to [facility] ER. Sepsis? Hypovolemia? On NOAC, check labs on arrival.  EF known to be normal.   · Will have ER evaluate on arrival. \"  · Patient currently in the ER at [facility]: \"Pt arrives to the ED via EMS pt complains of hypotension from [facility] cardiology, BP 70/50 per EMS /70 HR 65, NSR per EMS pt states he had a CABG done 3 weeks ago, per ems 2-3 days after CABG, pt reported a stroke. 20 in L hand. \"  · Will monitor patient's progress while in hospital and will follow up with patient/wife for [facility] if patient is discharged from hospital to home. This note will not be viewable in [address]

## 2018-06-13 NOTE — ED NOTES
I have reviewed discharge instructions with the spouse. The spouse verbalized understanding. Patient left ED via Discharge Method: stretcher to Rehab with EMS. Opportunity for questions and clarification provided. Patient given 0 scripts. To continue your aftercare when you leave the hospital, you may receive an automated call from our care team to check in on how you are doing. This is a free service and part of our promise to provide the best care and service to meet your aftercare needs.  If you have questions, or wish to unsubscribe from this service please call [phone]. Thank you for Choosing our [facility].

## 2018-06-14 VITALS
TEMPERATURE: 98.5 F | WEIGHT: 180 LBS | OXYGEN SATURATION: 96 % | DIASTOLIC BLOOD PRESSURE: 101 MMHG | HEART RATE: 83 BPM | SYSTOLIC BLOOD PRESSURE: 186 MMHG | BODY MASS INDEX: 25.2 KG/M2 | HEIGHT: 71 IN

## 2018-06-14 NOTE — ED NOTES
Pt here because rehab facility refused to accept pt back to facility. Pt wife states they told her that she said he wasn't coming back and that isn't the case. Wife states she would like to take patient home. She states she is comfortable with taking him home and caring for him. Wife states pt has been independent of all ADLs and they are just very tired and want to go home. Pt is sleeping on stretcher and no distress is noted. Pt was seen here earlier and cleared medically to be discharged back to facility.

## 2018-06-14 NOTE — ED NOTES
I have reviewed discharge instructions with the spouse. The spouse verbalized understanding. Patient left ED via Discharge Method: stretcher to Home with taylor. Opportunity for questions and clarification provided. Patient given 0 scripts.

## 2018-06-14 NOTE — ED TRIAGE NOTES
Returned with [facility] ambulance service. Paramedic states they went to facility of patients current residence and told they thought he was going to be admitted so they had discharged him from their facility.

## 2018-06-15 NOTE — ED PROVIDER NOTES
HPI Comments: Patient was seen earlier this evening by Dr. July Santoyo. See his note for specifics. Was discharged to SNF and they had given away his bed so he was brought back to the ED. Wife states she is ready to take him home. States he was supposed to discharged in the next day or so anyway and she can take care of him at home. He states that he wants to go home. Denies complaints at this time. She says she will call Dr. Omar Ford in am for medications and arrangements for home health. Patient is a 82 y.o. male presenting with other event. The history is provided by the spouse, the patient and medical records. Other          Past Medical History:   Diagnosis Date    CAD, multiple vessel 5/22/2018 5/18/18 (Dr Iris Blackburn) Coronary artery bypass grafting x3 with grafts consisting of: 1. Left internal mammary artery to left anterior descending artery. 2.  Reverse saphenous vein to diagonal.  3.  Reversed saphenous vein graft to obtuse marginal.    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 Social History Main Topics    Smoking status: Never Smoker    Smokeless tobacco: Never Used    Alcohol use No    Drug use: No    Sexual activity: Not on file     Other Topics Concern    Not on file     Social History Narrative         ALLERGIES: Pcn [penicillins]    Review of Systems   Unable to perform ROS: Other (recent stroke.  does not want to answer at this time.)       Vitals:    06/13/18 2048 06/14/18 0010   BP: (!) 152/92 (!) 186/101   Pulse: 69 83 Temp: 98.5 °F (36.9 °C)    SpO2: 98% 96%   Weight: 81.6 kg (180 lb)    Height: 5' 11\" (1.803 m)             Physical Exam   Constitutional: He is oriented to person, place, and time. He appears well-developed and well-nourished. Cardiovascular: Normal rate, regular rhythm, normal heart sounds and intact distal pulses. Pulmonary/Chest: Effort normal and breath sounds normal.   Neurological: He is alert and oriented to person, place, and time. Nursing note and vitals reviewed. [facility]      ED Course       Procedures      Recent stroke and CABG  His bed at SNF was given to another patient while he was being evaluated in the ED and his cardiologists office today. Wife wants to take him home  He is stable and also wants to go home  Follow up with Dr. Anabella Mesa  Return with new or worse symptoms.

## 2018-06-18 ENCOUNTER — PATIENT OUTREACH (OUTPATIENT)
Dept: CASE MANAGEMENT | Age: 83
End: 2018-06-18

## 2018-06-18 LAB
BACTERIA SPEC CULT: NORMAL
BACTERIA SPEC CULT: NORMAL
SERVICE CMNT-IMP: NORMAL
SERVICE CMNT-IMP: NORMAL

## 2018-06-18 NOTE — PROGRESS NOTES
· Patient had follow up appointment with cardiology today 6/13/18 and cardiologist stated: \"Patient at [facility] after [facility] admission for acute tx of CVA.  Wife here, he is very weak and pale now.  Cannot wake up.  Not been eating or drinking an rehab.  No energy.  Pale now, wife worried.  Unable to get hx from the patient.  Wife in tears at the bedside. \"  · Recent NSTEMI followed by CABG, Afib and then CVA.  [address] [facility] admission for CVA tx.  Very weak now, cannot hold up head.  BP low in the office, not waking up. · Not eating and drinking in rehab per wife.    · Shock, have called EMS and will transport to [facility] ER.  Sepsis?  Hypovolemia?  On NOAC, check labs on arrival.  EF known to be normal.   · Will have ER evaluate on arrival. \"  · Patient currently in the ER at [facility]: \"Pt arrives to the ED via EMS pt complains of hypotension from [facility] cardiology, BP 70/50 per EMS /70 HR 65, NSR per EMS pt states he had a CABG done 3 weeks ago, per ems 2-3 days after CABG, pt reported a stroke. 20 in L hand. \"  · Patient was then discharged from the ER on 6/13/18 and then patient was returned to the ER and Paramedic stated they went to facility of patients current residence and told they thought he was going to be admitted so they had discharged him from their facility. [facility]   · On 6/14/18 patient was discharged from hospital to home and orders for [address]:  Patient being followed closely by [address] [place] [address] Crossing and cardiologist  Per cardiologist's orders [facility], home PT, OT, social work and anything else we can provide. Will follow up with spouse and patient in one week  This note will not be viewable in [address]

## 2018-06-20 ENCOUNTER — HOSPITAL ENCOUNTER (INPATIENT)
Age: 83
LOS: 2 days | Discharge: HOME | DRG: 243 | End: 2018-06-22
Attending: EMERGENCY MEDICINE | Admitting: INTERNAL MEDICINE
Payer: MEDICARE

## 2018-06-20 ENCOUNTER — APPOINTMENT (OUTPATIENT)
Dept: CT IMAGING | Age: 83
DRG: 243 | End: 2018-06-20
Attending: EMERGENCY MEDICINE
Payer: MEDICARE

## 2018-06-20 ENCOUNTER — APPOINTMENT (OUTPATIENT)
Dept: GENERAL RADIOLOGY | Age: 83
DRG: 243 | End: 2018-06-20
Attending: EMERGENCY MEDICINE
Payer: MEDICARE

## 2018-06-20 DIAGNOSIS — I49.5 SSS (SICK SINUS SYNDROME) (HCC): ICD-10-CM

## 2018-06-20 DIAGNOSIS — R31.0 GROSS HEMATURIA: ICD-10-CM

## 2018-06-20 DIAGNOSIS — N13.30 HYDRONEPHROSIS, UNSPECIFIED HYDRONEPHROSIS TYPE: ICD-10-CM

## 2018-06-20 DIAGNOSIS — I48.92 ATRIAL FLUTTER WITH RAPID VENTRICULAR RESPONSE (HCC): Primary | ICD-10-CM

## 2018-06-20 PROBLEM — R00.1 BRADYCARDIA: Chronic | Status: RESOLVED | Noted: 2018-05-16 | Resolved: 2018-06-20

## 2018-06-20 PROBLEM — Z95.1 S/P CABG (CORONARY ARTERY BYPASS GRAFT): Chronic | Status: ACTIVE | Noted: 2018-05-18

## 2018-06-20 PROBLEM — R09.02 HYPOXIA: Status: RESOLVED | Noted: 2018-05-18 | Resolved: 2018-06-20

## 2018-06-20 PROBLEM — D62 POSTOPERATIVE ANEMIA DUE TO ACUTE BLOOD LOSS: Status: RESOLVED | Noted: 2018-05-22 | Resolved: 2018-06-20

## 2018-06-20 PROBLEM — Z86.73 HISTORY OF CVA (CEREBROVASCULAR ACCIDENT): Chronic | Status: ACTIVE | Noted: 2018-06-20

## 2018-06-20 PROBLEM — R60.0 LOCALIZED EDEMA: Status: RESOLVED | Noted: 2017-09-21 | Resolved: 2018-06-20

## 2018-06-20 PROBLEM — R94.31 EKG, ABNORMAL: Status: RESOLVED | Noted: 2017-08-23 | Resolved: 2018-06-20

## 2018-06-20 PROBLEM — J98.11 ATELECTASIS: Status: RESOLVED | Noted: 2018-05-19 | Resolved: 2018-06-20

## 2018-06-20 PROBLEM — I21.4 NSTEMI (NON-ST ELEVATED MYOCARDIAL INFARCTION) (HCC): Status: RESOLVED | Noted: 2018-05-16 | Resolved: 2018-06-20

## 2018-06-20 PROBLEM — I48.0 PAF (PAROXYSMAL ATRIAL FIBRILLATION) (HCC): Chronic | Status: ACTIVE | Noted: 2018-05-20

## 2018-06-20 PROBLEM — R00.1 BRADYCARDIA, SEVERE SINUS: Status: RESOLVED | Noted: 2018-05-23 | Resolved: 2018-06-20

## 2018-06-20 LAB
ALBUMIN SERPL-MCNC: 3.2 G/DL (ref 3.2–4.6)
ALBUMIN/GLOB SERPL: 1 {RATIO} (ref 1.2–3.5)
ALP SERPL-CCNC: 133 U/L (ref 50–136)
ALT SERPL-CCNC: 27 U/L (ref 12–65)
ANION GAP SERPL CALC-SCNC: 11 MMOL/L (ref 7–16)
AST SERPL-CCNC: 31 U/L (ref 15–37)
BACTERIA URNS QL MICRO: 0 /HPF
BASOPHILS # BLD: 0 K/UL (ref 0–0.2)
BASOPHILS NFR BLD: 0 % (ref 0–2)
BILIRUB SERPL-MCNC: 0.5 MG/DL (ref 0.2–1.1)
BNP SERPL-MCNC: 367 PG/ML
BUN SERPL-MCNC: 14 MG/DL (ref 8–23)
CALCIUM SERPL-MCNC: 8.5 MG/DL (ref 8.3–10.4)
CASTS URNS QL MICRO: ABNORMAL /LPF
CHLORIDE SERPL-SCNC: 105 MMOL/L (ref 98–107)
CO2 SERPL-SCNC: 25 MMOL/L (ref 21–32)
CREAT SERPL-MCNC: 1.65 MG/DL (ref 0.8–1.5)
DIFFERENTIAL METHOD BLD: ABNORMAL
EOSINOPHIL # BLD: 0.1 K/UL (ref 0–0.8)
EOSINOPHIL NFR BLD: 1 % (ref 0.5–7.8)
EPI CELLS #/AREA URNS HPF: ABNORMAL /HPF
ERYTHROCYTE [DISTWIDTH] IN BLOOD BY AUTOMATED COUNT: 14.7 % (ref 11.9–14.6)
GLOBULIN SER CALC-MCNC: 3.3 G/DL (ref 2.3–3.5)
GLUCOSE SERPL-MCNC: 165 MG/DL (ref 65–100)
HCT VFR BLD AUTO: 35.5 % (ref 41.1–50.3)
HGB BLD-MCNC: 11.8 G/DL (ref 13.6–17.2)
IMM GRANULOCYTES # BLD: 0 K/UL (ref 0–0.5)
IMM GRANULOCYTES NFR BLD AUTO: 0 % (ref 0–5)
INR PPP: 1.6
LACTATE BLD-SCNC: 2.6 MMOL/L (ref 0.5–1.9)
LYMPHOCYTES # BLD: 1.4 K/UL (ref 0.5–4.6)
LYMPHOCYTES NFR BLD: 23 % (ref 13–44)
MAGNESIUM SERPL-MCNC: 2 MG/DL (ref 1.8–2.4)
MCH RBC QN AUTO: 28.4 PG (ref 26.1–32.9)
MCHC RBC AUTO-ENTMCNC: 33.2 G/DL (ref 31.4–35)
MCV RBC AUTO: 85.5 FL (ref 79.6–97.8)
MONOCYTES # BLD: 0.2 K/UL (ref 0.1–1.3)
MONOCYTES NFR BLD: 3 % (ref 4–12)
NEUTS SEG # BLD: 4.5 K/UL (ref 1.7–8.2)
NEUTS SEG NFR BLD: 73 % (ref 43–78)
PLATELET # BLD AUTO: 208 K/UL (ref 150–450)
PMV BLD AUTO: 10.2 FL (ref 10.8–14.1)
POTASSIUM SERPL-SCNC: 3.6 MMOL/L (ref 3.5–5.1)
PROCALCITONIN SERPL-MCNC: <0.1 NG/ML
PROT SERPL-MCNC: 6.5 G/DL (ref 6.3–8.2)
PROTHROMBIN TIME: 18.9 SEC (ref 11.5–14.5)
RBC # BLD AUTO: 4.15 M/UL (ref 4.23–5.67)
RBC #/AREA URNS HPF: >100 /HPF
SODIUM SERPL-SCNC: 141 MMOL/L (ref 136–145)
WBC # BLD AUTO: 6.3 K/UL (ref 4.3–11.1)
WBC URNS QL MICRO: ABNORMAL /HPF

## 2018-06-20 PROCEDURE — 96375 TX/PRO/DX INJ NEW DRUG ADDON: CPT | Performed by: EMERGENCY MEDICINE

## 2018-06-20 PROCEDURE — 85610 PROTHROMBIN TIME: CPT | Performed by: EMERGENCY MEDICINE

## 2018-06-20 PROCEDURE — 99218 HC RM OBSERVATION: CPT

## 2018-06-20 PROCEDURE — 83735 ASSAY OF MAGNESIUM: CPT | Performed by: EMERGENCY MEDICINE

## 2018-06-20 PROCEDURE — 93005 ELECTROCARDIOGRAM TRACING: CPT | Performed by: EMERGENCY MEDICINE

## 2018-06-20 PROCEDURE — 74011000258 HC RX REV CODE- 258: Performed by: EMERGENCY MEDICINE

## 2018-06-20 PROCEDURE — 74011250636 HC RX REV CODE- 250/636: Performed by: EMERGENCY MEDICINE

## 2018-06-20 PROCEDURE — 85025 COMPLETE CBC W/AUTO DIFF WBC: CPT | Performed by: EMERGENCY MEDICINE

## 2018-06-20 PROCEDURE — 80053 COMPREHEN METABOLIC PANEL: CPT | Performed by: EMERGENCY MEDICINE

## 2018-06-20 PROCEDURE — 74176 CT ABD & PELVIS W/O CONTRAST: CPT

## 2018-06-20 PROCEDURE — 99285 EMERGENCY DEPT VISIT HI MDM: CPT | Performed by: EMERGENCY MEDICINE

## 2018-06-20 PROCEDURE — 84145 PROCALCITONIN (PCT): CPT | Performed by: EMERGENCY MEDICINE

## 2018-06-20 PROCEDURE — 83880 ASSAY OF NATRIURETIC PEPTIDE: CPT | Performed by: EMERGENCY MEDICINE

## 2018-06-20 PROCEDURE — 65660000000 HC RM CCU STEPDOWN

## 2018-06-20 PROCEDURE — 71045 X-RAY EXAM CHEST 1 VIEW: CPT

## 2018-06-20 PROCEDURE — 96365 THER/PROPH/DIAG IV INF INIT: CPT | Performed by: EMERGENCY MEDICINE

## 2018-06-20 PROCEDURE — 74011000250 HC RX REV CODE- 250: Performed by: EMERGENCY MEDICINE

## 2018-06-20 PROCEDURE — 81015 MICROSCOPIC EXAM OF URINE: CPT | Performed by: EMERGENCY MEDICINE

## 2018-06-20 PROCEDURE — 96366 THER/PROPH/DIAG IV INF ADDON: CPT | Performed by: EMERGENCY MEDICINE

## 2018-06-20 PROCEDURE — 81003 URINALYSIS AUTO W/O SCOPE: CPT | Performed by: EMERGENCY MEDICINE

## 2018-06-20 PROCEDURE — 93005 ELECTROCARDIOGRAM TRACING: CPT | Performed by: INTERNAL MEDICINE

## 2018-06-20 PROCEDURE — 83605 ASSAY OF LACTIC ACID: CPT

## 2018-06-20 RX ORDER — FENTANYL CITRATE 50 UG/ML
75 INJECTION, SOLUTION INTRAMUSCULAR; INTRAVENOUS
Status: COMPLETED | OUTPATIENT
Start: 2018-06-20 | End: 2018-06-20

## 2018-06-20 RX ORDER — METOPROLOL TARTRATE 5 MG/5ML
5 INJECTION INTRAVENOUS
Status: COMPLETED | OUTPATIENT
Start: 2018-06-20 | End: 2018-06-20

## 2018-06-20 RX ORDER — ASPIRIN 81 MG/1
81 TABLET ORAL DAILY
Status: DISCONTINUED | OUTPATIENT
Start: 2018-06-21 | End: 2018-06-22 | Stop reason: HOSPADM

## 2018-06-20 RX ORDER — ONDANSETRON 2 MG/ML
4 INJECTION INTRAMUSCULAR; INTRAVENOUS
Status: DISCONTINUED | OUTPATIENT
Start: 2018-06-20 | End: 2018-06-22 | Stop reason: HOSPADM

## 2018-06-20 RX ORDER — ACETAMINOPHEN 325 MG/1
650 TABLET ORAL
Status: DISCONTINUED | OUTPATIENT
Start: 2018-06-20 | End: 2018-06-22 | Stop reason: HOSPADM

## 2018-06-20 RX ORDER — HYDRALAZINE HYDROCHLORIDE 20 MG/ML
10 INJECTION INTRAMUSCULAR; INTRAVENOUS
Status: DISCONTINUED | OUTPATIENT
Start: 2018-06-20 | End: 2018-06-22 | Stop reason: HOSPADM

## 2018-06-20 RX ORDER — SODIUM CHLORIDE 0.9 % (FLUSH) 0.9 %
5-10 SYRINGE (ML) INJECTION EVERY 8 HOURS
Status: DISCONTINUED | OUTPATIENT
Start: 2018-06-20 | End: 2018-06-22 | Stop reason: HOSPADM

## 2018-06-20 RX ORDER — ATORVASTATIN CALCIUM 40 MG/1
80 TABLET, FILM COATED ORAL
Status: DISCONTINUED | OUTPATIENT
Start: 2018-06-20 | End: 2018-06-22 | Stop reason: HOSPADM

## 2018-06-20 RX ORDER — HYDROCODONE BITARTRATE AND ACETAMINOPHEN 5; 325 MG/1; MG/1
1 TABLET ORAL
Status: DISCONTINUED | OUTPATIENT
Start: 2018-06-20 | End: 2018-06-22 | Stop reason: HOSPADM

## 2018-06-20 RX ORDER — QUINAPRIL HCL AND HYDROCHLOROTHIAZIDE 20; 12.5 MG/1; MG/1
1 TABLET ORAL DAILY
COMMUNITY
End: 2018-06-22

## 2018-06-20 RX ORDER — DILTIAZEM HYDROCHLORIDE 5 MG/ML
10 INJECTION INTRAVENOUS ONCE
Status: COMPLETED | OUTPATIENT
Start: 2018-06-20 | End: 2018-06-20

## 2018-06-20 RX ORDER — NITROGLYCERIN 0.4 MG/1
0.4 TABLET SUBLINGUAL
Status: DISCONTINUED | OUTPATIENT
Start: 2018-06-20 | End: 2018-06-22 | Stop reason: HOSPADM

## 2018-06-20 RX ORDER — MORPHINE SULFATE 2 MG/ML
2 INJECTION, SOLUTION INTRAMUSCULAR; INTRAVENOUS
Status: DISCONTINUED | OUTPATIENT
Start: 2018-06-20 | End: 2018-06-22 | Stop reason: HOSPADM

## 2018-06-20 RX ORDER — SODIUM CHLORIDE 0.9 % (FLUSH) 0.9 %
5-10 SYRINGE (ML) INJECTION AS NEEDED
Status: DISCONTINUED | OUTPATIENT
Start: 2018-06-20 | End: 2018-06-22 | Stop reason: HOSPADM

## 2018-06-20 RX ADMIN — METOROPROLOL TARTRATE 5 MG: 5 INJECTION, SOLUTION INTRAVENOUS at 19:58

## 2018-06-20 RX ADMIN — METOROPROLOL TARTRATE 5 MG: 5 INJECTION, SOLUTION INTRAVENOUS at 19:41

## 2018-06-20 RX ADMIN — FENTANYL CITRATE 75 MCG: 50 INJECTION INTRAMUSCULAR; INTRAVENOUS at 21:21

## 2018-06-20 RX ADMIN — DILTIAZEM HYDROCHLORIDE 10 MG: 5 INJECTION INTRAVENOUS at 20:56

## 2018-06-20 RX ADMIN — SODIUM CHLORIDE 10 MG/HR: 900 INJECTION, SOLUTION INTRAVENOUS at 20:16

## 2018-06-20 RX ADMIN — SODIUM CHLORIDE 1000 ML: 900 INJECTION, SOLUTION INTRAVENOUS at 20:19

## 2018-06-20 RX ADMIN — METOROPROLOL TARTRATE 5 MG: 5 INJECTION, SOLUTION INTRAVENOUS at 20:06

## 2018-06-20 NOTE — IP AVS SNAPSHOT
[facility] 
 
 
 [address] 
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 
[phone] [facility] OFFICE On 2018 for device/site check at 1130 in [place] office  2 [phone] [id number] [facility] 
[phone] Your Scheduled Appointments 2018 12:00 PM EDT Office Visit with Anh Castillo DO  
[facility] CARDIOLOGY [employer] OFFICE ([address] [place] [address]) 2 [address] [place] [address]  
[phone]  11:00 AM EDT Extended Office Visit with Franky Patel PA-C [address]  
[phone] 2018 11:30 AM EDT  
OFFICE DEVICE CHECKS with [facility]LE [facility] 39 [facility] CARDIOLOGY [employer] OFFICE ([address] [place] [address]) 2 [address] [place] [address]  
[phone] This upcoming device check will take place IN OUR OFFICE. Please arrive 15 minutes early to review any necessary paperwork requirements.   If you have any further questions or need to change this appointment, we are happy to help and can be reached at [phone]. Discharge Orders None A check bertha indicates which time of day the medication should be taken. My Medications START taking these medications Instructions Each Dose to Equal  
 Morning Noon Evening Bedtime  
 atorvastatin 80 mg tablet Commonly known as:  LIPITOR Take 1 Tab by mouth nightly. 80 mg  
    
   
   
   
  
  
 dilTIAZem  mg ER capsule Commonly known as:  CARDIZEM CD Take 1 Cap by mouth daily. 180 mg  
    
  
   
   
   
  
 traMADol 50 mg tablet Commonly known as:  ULTRAM  
   
 Take 1 Tab by mouth every six (6) hours as needed for Pain. Max Daily Amount: 200 mg.  
 50 mg CHANGE how you take these medications Instructions Each Dose to Equal  
 Morning Noon Evening Bedtime  
 bisacodyl 5 mg EC tablet Commonly known as:  DULCOLAX What changed:   
- when to take this 
- reasons to take this Take 1 Tab by mouth daily. 5 mg CONTINUE taking these medications Instructions Each Dose to Equal  
 Morning Noon Evening Bedtime  
 apixaban 5 mg tablet Commonly known as:  Arbutus Bon Take 1 Tab by mouth two (2) times a day. 5 mg  
    
  
   
   
   
  
  
 aspirin delayed-release 81 mg tablet Take 1 Tab by mouth daily. 81 mg  
    
  
   
   
   
  
 levothyroxine 88 mcg tablet Commonly known as:  SYNTHROID Take  by mouth Daily (before breakfast). predniSONE 5 mg tablet Commonly known as:  Grace Alvarez Take 5 mg by mouth daily. 5 mg STOP taking these medications   
 quinapril-hydroCHLOROthiazide 20-12.5 mg per tablet Commonly known as:  ACCURETIC Where to Get Your Medications Information on where to get these meds will be given to you by the nurse or doctor. ! Ask your nurse or doctor about these medications  
  atorvastatin 80 mg tablet  
 dilTIAZem  mg ER capsule  
 traMADol 50 mg tablet Opioid Education Prescription Opioids: What You Need to Know: 
 
Prescription opioids can be used to help relieve moderate-to-severe pain and are often prescribed following a surgery or injury, or for certain health conditions. These medications can be an important part of treatment but also come with serious risks. Opioids are strong pain medicines. Examples include hydrocodone, oxycodone, fentanyl, and morphine. Heroin is an example of an illegal opioid. It is important to work with your health care provider to make sure you are getting the safest, most effective care. WHAT ARE THE RISKS AND SIDE EFFECTS OF OPIOID USE? Prescription opioids carry serious risks of addiction and overdose, especially with prolonged use. An opioid overdose, often marked by slow breathing, can cause sudden death. The use of prescription opioids can have a number of side effects as well, even when taken as directed. · Tolerance-meaning you might need to take more of a medication for the same pain relief · Physical dependence-meaning you have symptoms of withdrawal when the medication is stopped. Withdrawal symptoms can include nausea, sweating, chills, diarrhea, stomach cramps, and muscle aches. Withdrawal can last up to several weeks, depending on which drug you took and how long you took it. · Increased sensitivity to pain · Constipation · Nausea, vomiting, and dry mouth · Sleepiness and dizziness · Confusion · Depression · Low levels of testosterone that can result in lower sex drive, energy, and strength · Itching and sweating RISKS ARE GREATER WITH:      
· History of drug misuse, substance use disorder, or overdose · Mental health conditions (such as depression or anxiety) · Sleep apnea · Older age (72 years or older) · Pregnancy Avoid alcohol while taking prescription opioids. Also, unless specifically advised by your health care provider, medications to avoid include: · Benzodiazepines (such as Xanax or Valium) · Muscle relaxants (such as Soma or Flexeril) · Hypnotics (such as Ambien or Lunesta) · Other prescription opioids KNOW YOUR OPTIONS Talk to your health care provider about ways to manage your pain that don't involve prescription opioids. Some of these options may actually work better and have fewer risks and side effects. Options may include: 
· Pain relievers such as acetaminophen, ibuprofen, and naproxen · Some medications that are also used for depression or seizures · Physical therapy and exercise · Counseling to help patients learn how to cope better with triggers of pain and stress. · Application of heat or cold compress · Massage therapy · Relaxation techniques Be Informed Make sure you know the name of your medication, how much and how often to take it, and its potential risks & side effects. IF YOU ARE PRESCRIBED OPIOIDS FOR PAIN: 
· Never take opioids in greater amounts or more often than prescribed. Remember the goal is not to be pain-free but to manage your pain at a tolerable level. · Follow up with your primary care provider to: · Work together to create a plan on how to manage your pain. · Talk about ways to help manage your pain that don't involve prescription opioids. · Talk about any and all concerns and side effects. · Help prevent misuse and abuse. · Never sell or share prescription opioids · Help prevent misuse and abuse. · Store prescription opioids in a secure place and out of reach of others (this may include visitors, children, friends, and family).  
· Safely dispose of unused/unwanted prescription opioids: Find your community drug take-back program or your pharmacy mail-back program, or flush them down the toilet, following guidance from the Food and Drug Administration (www.fda.gov/Drugs/ResourcesForYou). · Visit www.cdc.gov/drugoverdose to learn about the risks of opioid abuse and overdose. · If you believe you may be struggling with addiction, tell your health care provider and ask for guidance or call [employer] at 4-275-421-DYPA. Discharge Instructions Learning About Atrial Fibrillation What is atrial fibrillation? Atrial fibrillation (say \"AY-tree-khadra lms-bjed-ZAG-shun\") is the most common type of irregular heartbeat (arrhythmia). Normally, the heart beats in a strong, steady rhythm. In atrial fibrillation, a problem with the heart's electrical system causes the two upper parts of the heart (the atria) to quiver, or fibrillate. Your heart rate also may be faster than normal. 
Atrial fibrillation can be dangerous because if the heartbeat isn't strong and steady, blood can collect, or pool, in the atria. And pooled blood is more likely to form clots. Clots can travel to the brain, block blood flow, and cause a stroke. Atrial fibrillation can also lead to heart failure. Treatment for atrial fibrillation helps prevent stroke and heart failure. It also helps relieve symptoms. Atrial fibrillation is often caused by another heart problem. It may happen after heart surgery. It may also be caused by other problems, such as an overactive thyroid gland or lung disease. Many people with atrial fibrillation are able to live full and active lives. What are the symptoms? Some people feel symptoms when they have episodes of atrial fibrillation. But other people don't notice any symptoms. If you have symptoms, you may feel: · A fluttering, racing, or pounding feeling in your chest called palpitations. · Weak or tired. · Dizzy or lightheaded. · Short of breath. · Chest pain. · Confused. You may notice signs of atrial fibrillation when you check your pulse. Your pulse may seem uneven or fast. 
What can you expect when you have atrial fibrillation? At first, spells of atrial fibrillation may come on suddenly and last a short time. It may go away on its own or it goes away after treatment. This is called paroxysmal atrial fibrillation. Over time, the spells may last longer and occur more often. They often don't go away on their own. How is it treated? Treatments can help you feel better and prevent future problems, especially stroke and heart failure. The main types of treatment slow the heart rate, control the heart rhythm, and help prevent stroke. Your treatment will depend on the cause of your atrial fibrillation, your symptoms, and your risk for stroke. · Heart rate treatment. Medicine may be used to slow your heart rate. Your heartbeat may still be irregular. But these medicines keep your heart from beating too fast. They may also help relieve your symptoms. · Heart rhythm treatment. Different treatments may be used to try to stop atrial fibrillation and keep it from returning. They can also relieve symptoms. These treatments include medicine, electrical cardioversion to shock the heart back to a normal rhythm, a procedure called catheter ablation, and heart surgery. · Stroke prevention. You and your doctor can decide how to lower your risk. You may decide to take a blood-thinning medicine, such as aspirin or an anticoagulant. How can you live well with it? You can live well and help manage atrial fibrillation by having a heart-healthy lifestyle. To have a heart-healthy lifestyle: · Don't smoke. · Eat heart-healthy foods. · Be active. Talk to your doctor about what type and level of exercise is safe for you. · Stay at a healthy weight. Lose weight if you need to. · Manage stress. · Avoid alcohol if it triggers symptoms. · Manage other health problems such as high blood pressure, high cholesterol, and diabetes. · Avoid getting sick from the flu. Get a flu shot every year. Where can you learn more? Go to http://zoie-nusrat.info/. Enter [phone] in the search box to learn more about \"Learning About Atrial Fibrillation. \" Current as of: September 21, 2016 Content Version: 11.4 © 0929-3997 [employer]. Care instructions adapted under license by [employer]ient (which disclaims liability or warranty for this information). If you have questions about a medical condition or this instruction, always ask your healthcare professional. [address] any warranty or liability for your use of this information. Pacemaker Placement: What to Expect at [facility] Your Recovery Pacemaker placement is surgery to put a pacemaker in your chest. This surgery may be done if you have bradycardia (a slow heart rate). A pacemaker is a small, battery-powered device. It sends electrical signals to the heart. These signals work to keep the heartbeat steady. Thin wires, called leads, carry the signals from the pacemaker to the heart. A pacemaker can prevent or reduce dizziness, fainting, and shortness of breath caused by a slow or unsteady heartbeat. Your chest may be sore where the doctor made the cut (incision) and put in the pacemaker. You also may have a bruise and mild swelling. These symptoms usually get better in 1 to 2 weeks. You may feel a hard ridge along the incision. This usually gets softer in the months after surgery. You may be able to see or feel the outline of the pacemaker under your skin. You will probably be able to go back to work or your usual routine 1 to 2 weeks after surgery. Pacemaker batteries usually last 5 to 15 years. Your doctor will talk to you about how often you will need to have your pacemaker checked. You can safely use most household and office electronics. This includes things such as kitchen appliances, electric power tools, and computers. You will need to stay away from things with strong magnetic and electrical fields. This includes things such as an MRI machine (unless your pacemaker is safe for an MRI), welding equipment, and power generators. You can use a cell phone, but keep it at least 6 inches away from your pacemaker. Check with your doctor about what you need to stay away from, what you need to use with care, and what is okay to use. This care sheet gives you a general idea about how long it will take for you to recover. But each person recovers at a different pace. Follow the steps below to get better as quickly as possible. How can you care for yourself at home? Activity ? · Rest when you feel tired. ? · Be active. Walking is a good choice. ? · For 4 to 6 weeks: ¨ Avoid activities that strain your chest or upper arm muscles. This includes pushing a  or vacuum, or mopping floors. It also includes swimming, or swinging a golf club or tennis racquet. ¨ Do not raise your arm (the one on the side of your body where the pacemaker is located) above your shoulder. ¨ Allow your body to heal. Don't move quickly or lift anything heavy until you are feeling better. ? · Many people are able to return to work within 1 to 2 weeks after surgery. ? · Ask your doctor when it is okay for you to have sex. Diet ? · You can eat your normal diet. If your stomach is upset, try bland, low-fat foods like plain rice, broiled chicken, toast, and yogurt. Medicines ? · Your doctor will tell you if and when you can restart your medicines. He or she will also give you instructions about taking any new medicines. ? · If you take aspirin or some other blood thinner, be sure to talk to your doctor.  He or she will tell you if and when to start taking this medicine again. Make sure that you understand exactly what your doctor wants you to do. ? · Be safe with medicines. Read and follow all instructions on the label. ¨ If the doctor gave you a prescription medicine for pain, take it as prescribed. ¨ If you are not taking a prescription pain medicine, ask your doctor if you can take an over-the-counter medicine. ¨ Do not take aspirin, ibuprofen (Advil, Motrin), naproxen (Aleve), or other nonsteroidal anti-inflammatory drugs (NSAIDs) unless your doctor says it is okay. ? · If your doctor prescribed antibiotics, take them as directed. Do not stop taking them just because you feel better. You need to take the full course of antibiotics. Incision care ? · If you have strips of tape on the incision, leave the tape on for a week or until it falls off.  
? · Keep the incision dry while it heals. Your doctor may recommend sponge baths for about 7 days, but do not get the incision wet. Your doctor will let you know when you may take showers. After a shower, pat the incision dry. ? · Don't use hydrogen peroxide or alcohol on the incision, which can slow healing. You may cover the area with a gauze bandage if it oozes fluid or rubs against clothing. Change the bandage every day. ? · Do not take a bath or get into a hot tub for the first 2 weeks, or until your doctor tells you it is okay. Other instructions ? · Keep a medical ID card with you at all times that says you have a pacemaker. The card should include the  and model information. ? · Wear medical alert jewelry stating that you have a pacemaker. You can buy this at most [employer]. ? · Check your pulse as directed by your doctor. ? · Have your pacemaker checked as often as your doctor recommends. In some cases, this may be done over the phone or the Internet. Your doctor will give you instructions about how to do this. Follow-up care is a key part of your treatment and safety. Be sure to make and go to all appointments, and call your doctor if you are having problems. It's also a good idea to know your test results and keep a list of the medicines you take. When should you call for help? Call 911 anytime you think you may need emergency care. For example, call if: 
? · You passed out (lost consciousness). ? · You have trouble breathing. ?Call your doctor now or seek immediate medical care if: 
? · You are dizzy or light-headed, or you feel like you may faint. ? · You have pain that does not get better after you take pain medicine. ? · You hear an alarm or feel a vibration from your pacemaker. ? · You have loose stitches, or your incision comes open. ? · Bright red blood has soaked through the bandage over your incision. ? · You have signs of infection, such as: 
¨ Increased pain, swelling, warmth, or redness. ¨ Red streaks leading from the incision. ¨ Pus draining from the incision. ¨ A fever. ? Watch closely for changes in your health, and be sure to contact your doctor if: 
? · You have any problems with your pacemaker. Where can you learn more? Go to http://zoie-nusrat.info/. Enter X550 in the search box to learn more about \"Pacemaker Placement: What to Expect at Home. \" Current as of: September 21, 2016 Content Version: 11.4 © 7744-7384 [employer], [employer]. Care instructions adapted under license by [employer] (which disclaims liability or warranty for this information). If you have questions about a medical condition or this instruction, always ask your healthcare professional. [place] [address] any warranty or liability for your use of this information. ACO Transitions of Care Introducing [employer] [address] Shara Murphy offers a voluntary care coordination program to provide high quality service and care to [facility] fee-for-service beneficiaries. Chuck Min was designed to help you enhance your health and well-being through the following services: ? Transitions of Care  support for individuals who are transitioning from one care setting to another (example: Hospital to home). ? Chronic and Complex Care Coordination  support for individuals and caregivers of those with serious or chronic illnesses or with more than one chronic (ongoing) condition and those who take a number of different medications. If you meet specific medical criteria, a [facility] [address] may call you directly to coordinate your care with your primary care physician and your other care providers. For questions about the [facility] programs, please, contact your physicians office. For general questions or additional information about Accountable Care Organizations: 
Please visit www.medicare.gov/acos. html or call 1-800-MEDICARE (3-746.772.7276) TTY users should call 3-176.331.2060. [employer] Engine Announcement We are excited to announce that we are making your provider's discharge notes available to you in Business Engine. You will see these notes when they are completed and signed by the physician that discharged you from your recent hospital stay. If you have any questions or concerns about any information you see in [employer], please call the Health Information Department where you were seen or reach out to your Primary Care Provider for more information about your plan of care. Introducing [facility]! [employer] introduces Business Engine patient portal. Now you can access parts of your medical record, email your doctor's office, and request medication refills online. 1. In your internet browser, go to [email or web address] 2. Click on the First Time User? Click Here link in the Sign In box.  You will see the New Member Sign Up page. 3. Enter your Lavante Access Code exactly as it appears below. You will not need to use this code after youve completed the sign-up process. If you do not sign up before the expiration date, you must request a new code. · Lavante Access Code: [email or web address] Expires: 9/19/2018 11:29 AM 
 
4. Enter the last four digits of your Social Security Number (xxxx) and Date of Birth (mm/dd/yyyy) as indicated and click Submit. You will be taken to the next sign-up page. 5. Create a Volumentalt ID. This will be your Lavante login ID and cannot be changed, so think of one that is secure and easy to remember. 6. Create a [employer]alt password. You can change your password at any time. 7. Enter your Password Reset Question and Answer. This can be used at a later time if you forget your password. 8. Enter your e-mail address. You will receive e-mail notification when new information is available in [facility] [address] 9. Click Sign Up. You can now view and download portions of your medical record. 10. Click the Download Summary menu link to download a portable copy of your medical information. If you have questions, please visit the Frequently Asked Questions section of the Lavante website. Remember, Lavante is NOT to be used for urgent needs. For medical emergencies, dial 911. Now available from your iPhone and Android! Introducing Johan Aldridge As a [employer] patient, I wanted to make you aware of our electronic visit tool called Johan Aldridge. [employer] 24/7 allows you to connect within minutes with a medical provider 24 hours a day, seven days a week via a mobile device or tablet or logging into a secure website from your computer. You can access Johan Aldridge from anywhere in the [place].  
 
A virtual visit might be right for you when you have a simple condition and feel like you just dont want to get out of bed, or cant get away from work for an appointment, when your regular [employer] provider is not available (evenings, weekends or holidays), or when youre out of town and need minor care. Electronic visits cost only $49 and if the [employer] 24/7 provider determines a prescription is needed to treat your condition, one can be electronically transmitted to a nearby pharmacy*. Please take a moment to enroll today if you have not already done so. The enrollment process is free and takes just a few minutes. To enroll, please download the [employer] 24/7 shahram to your tablet or phone, or visit [email or web address] to enroll on your computer. And, as an [address] [place] [address] patient with a [employer] account, the results of your visits will be scanned into your electronic medical record and your primary care provider will be able to view the scanned results. We urge you to continue to see your regular [employer] provider for your ongoing medical care. And while your primary care provider may not be the one available when you seek a Johan Aldridge virtual visit, the peace of mind you get from getting a real diagnosis real time can be priceless. For more information on [employer], view our Frequently Asked Questions (FAQs) at [email or web address]. Sincerely, 
 
Mark Castañeda MD 
Chief Medical Officer [facility] Shara Jeffrey *:  certain medications cannot be prescribed via Johan Fourteen [employer] Unresulted tests-please follow up with your PCP on these results Procedure/Test Authorizing Provider  BNP Mukund Jay MD  
 CBC W/O DIFF Pako Dover NP  
 CBC WITH AUTOMATED DIFF Mukund Jay MD  
 CT UROGRAM WO CONT Mukund Jay MD  
 EKG, 12 LEAD, INITIAL Ximena Moon MD  
 EKG, 12 LEAD, INITIAL Mukund Jay MD  
 EKG, 12 LEAD, INITIAL Renée Miller MD  
 MAGNESIUM Mukund Jay MD  
 METABOLIC PANEL, BASIC Pako Dover NP  
 METABOLIC PANEL, COMPREHENSIVE Sara Gonzales MD  
 PROCALCITONIN Sara Gonzales MD  
 PROTHROMBIN TIME + INR Durward Cordial, MD Brodie Runner, MD  
 XR CHEST PORT Alia Mcgraw MD  
 XR Axel Elena MD  
  
Providers Seen During Your Hospitalization Provider Specialty Primary office phone Sara Gonzales MD Emergency Medicine [phone] Annie Hayden MD Cardiology [phone] Your Primary Care Physician (PCP) Primary Care Physician Office Phone Office Fax 7057 Garry Hernandez, [address] [place], [place] [phone] You are allergic to the following Allergen Reactions Pcn (Penicillins) Swelling Recent Documentation Height Weight BMI Smoking Status 1.803 m 81.4 kg 25.04 kg/m2 Never Smoker Emergency Contacts Name Discharge Info Relation Home Work Mobile Miracle Jovel  Spouse [3] [phone] Celine Schaefer  Daughter [21]   [phone] Patient Belongings The following personal items are in your possession at time of discharge: 
  Dental Appliances: At bedside, Lowers, Partials, Uppers (upper dentures, lower partials)  Visual Aid: Glasses, At bedside      Home Medications: Sent to pharmacy   Jewelry: None  Clothing: At bedside, Footwear, Shirt, Shorts, Undergarments    Other Valuables: None  Personal Items Sent to Safe: n/a Please provide this summary of care documentation to your next provider. Signatures-by signing, you are acknowledging that this After Visit Summary has been reviewed with you and you have received a copy. Patient Signature:  ____________________________________________________________ Date:  ____________________________________________________________  
  
Fawn Ca Provider Signature:  ____________________________________________________________ Date:  ____________________________________________________________

## 2018-06-20 NOTE — IP AVS SNAPSHOT
[address] 
[phone] Patient: Fish Akhtar MRN: [id number] :1935 A check bertha indicates which time of day the medication should be taken. My Medications START taking these medications Instructions Each Dose to Equal  
 Morning Noon Evening Bedtime  
 atorvastatin 80 mg tablet Commonly known as:  LIPITOR Take 1 Tab by mouth nightly. 80 mg  
    
   
   
   
  
  
 dilTIAZem  mg ER capsule Commonly known as:  CARDIZEM CD Take 1 Cap by mouth daily. 180 mg  
    
  
   
   
   
  
 traMADol 50 mg tablet Commonly known as:  ULTRAM  
   
 Take 1 Tab by mouth every six (6) hours as needed for Pain. Max Daily Amount: 200 mg.  
 50 mg CHANGE how you take these medications Instructions Each Dose to Equal  
 Morning Noon Evening Bedtime  
 bisacodyl 5 mg EC tablet Commonly known as:  DULCOLAX What changed:   
- when to take this 
- reasons to take this Take 1 Tab by mouth daily. 5 mg CONTINUE taking these medications Instructions Each Dose to Equal  
 Morning Noon Evening Bedtime  
 apixaban 5 mg tablet Commonly known as:  Mo Bogaert Take 1 Tab by mouth two (2) times a day. 5 mg  
    
  
   
   
   
  
  
 aspirin delayed-release 81 mg tablet Take 1 Tab by mouth daily. 81 mg  
    
  
   
   
   
  
 levothyroxine 88 mcg tablet Commonly known as:  SYNTHROID Take  by mouth Daily (before breakfast). predniSONE 5 mg tablet Commonly known as:  Kellee Ada Take 5 mg by mouth daily. 5 mg STOP taking these medications   
 quinapril-hydroCHLOROthiazide 20-12.5 mg per tablet Commonly known as:  ACCURETIC Where to Get Your Medications Information on where to get these meds will be given to you by the nurse or doctor. ! Ask your nurse or doctor about these medications  
  atorvastatin 80 mg tablet  
 dilTIAZem  mg ER capsule  
 traMADol 50 mg tablet

## 2018-06-20 NOTE — ED TRIAGE NOTES
PT arrived to ED c/o groin pain and blood in his urine. PT was found by EMS to be in afib. PT has a Hx of recent CABG. PT denies any chest pain, or SOB upon arrival to ED.

## 2018-06-20 NOTE — IP AVS SNAPSHOT
Summary of Care Report The Summary of Care report has been created to help improve care coordination. Users with access to [employer] or [address] (Web-based application) may access additional patient information including the Discharge Summary. If you are not currently a [address] user and need more information, please call the number listed below in the [place] [address] section and ask to be connected with Medical Records. Facility Information Name Address Phone [address] [address] [address] [phone] Patient Information Patient Name Sex FABIAN Solis ([id number]) Male 1935 Discharge Information Admitting Provider Service Area Unit Trip Castillo MD / Carrie [facility]Y [facility] / [phone] Discharge Provider Discharge Date/Time Discharge Disposition Destination (none) 2018 (Pending) AHR (none) Patient Language Language ENGLISH [13] Hospital Problems as of 2018  Reviewed: 2018 10:11 PM by Mina Neely NP Class Noted - Resolved Last Modified POA Active Problems Essential hypertension (Chronic)  2018 - Present 2018 by Mina Neely NP Yes Entered by Jim Bryant DO  
  S/P CABG (coronary artery bypass graft) (Chronic)  2018 - Present 2018 by Mina Neely NP Yes Entered by Shreya Simental NP Hypothyroidism (Chronic)  2018 - Present 2018 by Mina Neely NP Yes Entered by Shreya Simental NP  
  PAF (paroxysmal atrial fibrillation) ([facility] [address] (Chronic)  2018 - Present 2018 by Mina Neely NP Yes Entered by Abby Mancilla MD  
  CAD, multiple vessel (Chronic)  2018 - Present 2018 by Mina Neely NP Yes   Entered by Kayla Aleman NP  
 Overview Signed 5/22/2018 11:32 AM by Mack Griggs NP  
    5/18/18 (Dr Migue Guzman) Coronary artery bypass grafting x3 with grafts consisting of: 1. Left internal mammary artery to left anterior descending artery. 2.  Reverse saphenous vein to diagonal.  
3.  Reversed saphenous vein graft to obtuse marginal.  
  
  
  SSS (sick sinus syndrome) ([address]  6/13/2018 - Present 6/20/2018 by Mac Chavez, NP Yes Entered by Sandra Quinn DO  
  * (Principal)Atrial flutter with rapid ventricular response ([address]  6/20/2018 - Present 6/20/2018 by Mac Chavez, NP Yes Entered by Mac Chavez NP History of CVA (cerebrovascular accident) (Chronic)  6/20/2018 - Present 6/20/2018 by Mac Chavez NP Yes Entered by Mac Chavez NP Non-Hospital Problems as of 6/22/2018  Reviewed: 6/20/2018 10:11 PM by Mac Chavez NP Class Noted - Resolved Last Modified Active Problems Current chronic use of systemic steroids (Chronic)  5/18/2018 - Present 5/18/2018 by Chastity Owens NP Entered by Chastity Owens NP Overview Signed 5/18/2018  9:35 AM by Chastity Owens NP Pituitary tumor surgery in 2001 and in 2013--has been on Prednisone 5 mg daily since 2013. Dysphagia due to recent cerebrovascular accident (CVA)  5/24/2018 - Present 5/24/2018 by Mack Griggs NP Entered by Mack Griggs NP You are allergic to the following Allergen Reactions Pcn (Penicillins) Swelling Current Discharge Medication List  
  
START taking these medications Dose & Instructions Dispensing Information Comments  
 atorvastatin 80 mg tablet Commonly known as:  LIPITOR Dose:  80 mg Take 1 Tab by mouth nightly. Quantity:  30 Tab Refills:  11  
   
 dilTIAZem  mg ER capsule Commonly known as:  CARDIZEM CD Dose:  180 mg Take 1 Cap by mouth daily. Quantity:  30 Cap Refills:  11  
   
 traMADol 50 mg tablet Commonly known as:  ULTRAM  
 Dose:  50 mg Take 1 Tab by mouth every six (6) hours as needed for Pain. Max Daily Amount: 200 mg. Quantity:  10 Tab Refills:  0 CONTINUE these medications which have CHANGED Dose & Instructions Dispensing Information Comments  
 bisacodyl 5 mg EC tablet Commonly known as:  DULCOLAX What changed:   
- when to take this 
- reasons to take this Dose:  5 mg Take 1 Tab by mouth daily. Quantity:  1 Tab Refills:  0 CONTINUE these medications which have NOT CHANGED Dose & Instructions Dispensing Information Comments  
 apixaban 5 mg tablet Commonly known as:  Kandy Pillow Dose:  5 mg Take 1 Tab by mouth two (2) times a day. Quantity:  60 Tab Refills:  5  
   
 aspirin delayed-release 81 mg tablet Dose:  81 mg Take 1 Tab by mouth daily. Quantity:  30 Tab Refills:  10  
   
 levothyroxine 88 mcg tablet Commonly known as:  SYNTHROID Take  by mouth Daily (before breakfast). Refills:  0  
   
 predniSONE 5 mg tablet Commonly known as:  Pansy Hummingbird Dose:  5 mg Take 5 mg by mouth daily. Refills:  0 STOP taking these medications Comments  
 quinapril-hydroCHLOROthiazide 20-12.5 mg per tablet Commonly known as:  ACCURETIC Current Immunizations Name Date Influenza High Dose Vaccine PF 11/30/2017, 9/17/2015 Pneumococcal Polysaccharide (PPSV-23) 1/27/2012 TB Skin Test (PPD) Intradermal 5/18/2018 Follow-up Information Follow up With Details Comments Contact Info Franky Patel PA-C  July 5th at 11:00 3 [address] 
[phone] [facility] OFFICE On 7/6/2018 for device/site check at 1130 in [place] office  [address] 
[address] [address] [facility] 
[phone] Discharge Instructions Learning About Atrial Fibrillation What is atrial fibrillation? Atrial fibrillation (say \"AY-tree-khadra eju-zcmv-RWM-shun\") is the most common type of irregular heartbeat (arrhythmia). Normally, the heart beats in a strong, steady rhythm. In atrial fibrillation, a problem with the heart's electrical system causes the two upper parts of the heart (the atria) to quiver, or fibrillate. Your heart rate also may be faster than normal. 
Atrial fibrillation can be dangerous because if the heartbeat isn't strong and steady, blood can collect, or pool, in the atria. And pooled blood is more likely to form clots. Clots can travel to the brain, block blood flow, and cause a stroke. Atrial fibrillation can also lead to heart failure. Treatment for atrial fibrillation helps prevent stroke and heart failure. It also helps relieve symptoms. Atrial fibrillation is often caused by another heart problem. It may happen after heart surgery. It may also be caused by other problems, such as an overactive thyroid gland or lung disease. Many people with atrial fibrillation are able to live full and active lives. What are the symptoms? Some people feel symptoms when they have episodes of atrial fibrillation. But other people don't notice any symptoms. If you have symptoms, you may feel: · A fluttering, racing, or pounding feeling in your chest called palpitations. · Weak or tired. · Dizzy or lightheaded. · Short of breath. · Chest pain. · Confused. You may notice signs of atrial fibrillation when you check your pulse. Your pulse may seem uneven or fast. 
What can you expect when you have atrial fibrillation? At first, spells of atrial fibrillation may come on suddenly and last a short time. It may go away on its own or it goes away after treatment. This is called paroxysmal atrial fibrillation. Over time, the spells may last longer and occur more often. They often don't go away on their own. How is it treated? Treatments can help you feel better and prevent future problems, especially stroke and heart failure. The main types of treatment slow the heart rate, control the heart rhythm, and help prevent stroke. Your treatment will depend on the cause of your atrial fibrillation, your symptoms, and your risk for stroke. · Heart rate treatment. Medicine may be used to slow your heart rate. Your heartbeat may still be irregular. But these medicines keep your heart from beating too fast. They may also help relieve your symptoms. · Heart rhythm treatment. Different treatments may be used to try to stop atrial fibrillation and keep it from returning. They can also relieve symptoms. These treatments include medicine, electrical cardioversion to shock the heart back to a normal rhythm, a procedure called catheter ablation, and heart surgery. · Stroke prevention. You and your doctor can decide how to lower your risk. You may decide to take a blood-thinning medicine, such as aspirin or an anticoagulant. How can you live well with it? You can live well and help manage atrial fibrillation by having a heart-healthy lifestyle. To have a heart-healthy lifestyle: · Don't smoke. · Eat heart-healthy foods. · Be active. Talk to your doctor about what type and level of exercise is safe for you. · Stay at a healthy weight. Lose weight if you need to. · Manage stress. · Avoid alcohol if it triggers symptoms. · Manage other health problems such as high blood pressure, high cholesterol, and diabetes. · Avoid getting sick from the flu. Get a flu shot every year. Where can you learn more? Go to http://zoie-nusrat.info/. Enter [phone] in the search box to learn more about \"Learning About Atrial Fibrillation. \" Current as of: September 21, 2016 Content Version: 11.4 © 9810-8365 [employer].  Care instructions adapted under license by [employer] (which disclaims liability or warranty for this information). If you have questions about a medical condition or this instruction, always ask your healthcare professional. [address] any warranty or liability for your use of this information. Pacemaker Placement: What to Expect at [facility] Your Recovery Pacemaker placement is surgery to put a pacemaker in your chest. This surgery may be done if you have bradycardia (a slow heart rate). A pacemaker is a small, battery-powered device. It sends electrical signals to the heart. These signals work to keep the heartbeat steady. Thin wires, called leads, carry the signals from the pacemaker to the heart. A pacemaker can prevent or reduce dizziness, fainting, and shortness of breath caused by a slow or unsteady heartbeat. Your chest may be sore where the doctor made the cut (incision) and put in the pacemaker. You also may have a bruise and mild swelling. These symptoms usually get better in 1 to 2 weeks. You may feel a hard ridge along the incision. This usually gets softer in the months after surgery. You may be able to see or feel the outline of the pacemaker under your skin. You will probably be able to go back to work or your usual routine 1 to 2 weeks after surgery. Pacemaker batteries usually last 5 to 15 years. Your doctor will talk to you about how often you will need to have your pacemaker checked. You can safely use most household and office electronics. This includes things such as kitchen appliances, electric power tools, and computers. You will need to stay away from things with strong magnetic and electrical fields. This includes things such as an MRI machine (unless your pacemaker is safe for an MRI), welding equipment, and power generators. You can use a cell phone, but keep it at least 6 inches away from your pacemaker. Check with your doctor about what you need to stay away from, what you need to use with care, and what is okay to use. This care sheet gives you a general idea about how long it will take for you to recover. But each person recovers at a different pace. Follow the steps below to get better as quickly as possible. How can you care for yourself at home? Activity ? · Rest when you feel tired. ? · Be active. Walking is a good choice. ? · For 4 to 6 weeks: ¨ Avoid activities that strain your chest or upper arm muscles. This includes pushing a  or vacuum, or mopping floors. It also includes swimming, or swinging a golf club or tennis racquet. ¨ Do not raise your arm (the one on the side of your body where the pacemaker is located) above your shoulder. ¨ Allow your body to heal. Don't move quickly or lift anything heavy until you are feeling better. ? · Many people are able to return to work within 1 to 2 weeks after surgery. ? · Ask your doctor when it is okay for you to have sex. Diet ? · You can eat your normal diet. If your stomach is upset, try bland, low-fat foods like plain rice, broiled chicken, toast, and yogurt. Medicines ? · Your doctor will tell you if and when you can restart your medicines. He or she will also give you instructions about taking any new medicines. ? · If you take aspirin or some other blood thinner, be sure to talk to your doctor. He or she will tell you if and when to start taking this medicine again. Make sure that you understand exactly what your doctor wants you to do. ? · Be safe with medicines. Read and follow all instructions on the label. ¨ If the doctor gave you a prescription medicine for pain, take it as prescribed. ¨ If you are not taking a prescription pain medicine, ask your doctor if you can take an over-the-counter medicine. ¨ Do not take aspirin, ibuprofen (Advil, Motrin), naproxen (Aleve), or other nonsteroidal anti-inflammatory drugs (NSAIDs) unless your doctor says it is okay. ? · If your doctor prescribed antibiotics, take them as directed.  Do not stop taking them just because you feel better. You need to take the full course of antibiotics. Incision care ? · If you have strips of tape on the incision, leave the tape on for a week or until it falls off.  
? · Keep the incision dry while it heals. Your doctor may recommend sponge baths for about 7 days, but do not get the incision wet. Your doctor will let you know when you may take showers. After a shower, pat the incision dry. ? · Don't use hydrogen peroxide or alcohol on the incision, which can slow healing. You may cover the area with a gauze bandage if it oozes fluid or rubs against clothing. Change the bandage every day. ? · Do not take a bath or get into a hot tub for the first 2 weeks, or until your doctor tells you it is okay. Other instructions ? · Keep a medical ID card with you at all times that says you have a pacemaker. The card should include the  and model information. ? · Wear medical alert jewelry stating that you have a pacemaker. You can buy this at most [employer]. ? · Check your pulse as directed by your doctor. ? · Have your pacemaker checked as often as your doctor recommends. In some cases, this may be done over the phone or the Internet. Your doctor will give you instructions about how to do this. Follow-up care is a key part of your treatment and safety. Be sure to make and go to all appointments, and call your doctor if you are having problems. It's also a good idea to know your test results and keep a list of the medicines you take. When should you call for help? Call 911 anytime you think you may need emergency care. For example, call if: 
? · You passed out (lost consciousness). ? · You have trouble breathing. ?Call your doctor now or seek immediate medical care if: 
? · You are dizzy or light-headed, or you feel like you may faint. ? · You have pain that does not get better after you take pain medicine. ? · You hear an alarm or feel a vibration from your pacemaker. ? · You have loose stitches, or your incision comes open. ? · Bright red blood has soaked through the bandage over your incision. ? · You have signs of infection, such as: 
¨ Increased pain, swelling, warmth, or redness. ¨ Red streaks leading from the incision. ¨ Pus draining from the incision. ¨ A fever. ? Watch closely for changes in your health, and be sure to contact your doctor if: 
? · You have any problems with your pacemaker. Where can you learn more? Go to http://zoie-nusrat.info/. Enter G550 in the search box to learn more about \"Pacemaker Placement: What to Expect at Home. \" Current as of: September 21, 2016 Content Version: 11.4 © 0807-6191 [employer]. Care instructions adapted under license by [employer] (which disclaims liability or warranty for this information). If you have questions about a medical condition or this instruction, always ask your healthcare professional. [place] [address] any warranty or liability for your use of this information. Chart Review Routing History No Routing History on File

## 2018-06-21 ENCOUNTER — APPOINTMENT (OUTPATIENT)
Dept: GENERAL RADIOLOGY | Age: 83
DRG: 243 | End: 2018-06-21
Attending: INTERNAL MEDICINE
Payer: MEDICARE

## 2018-06-21 LAB
ANION GAP SERPL CALC-SCNC: 10 MMOL/L (ref 7–16)
ATRIAL RATE: 138 BPM
ATRIAL RATE: 267 BPM
BUN SERPL-MCNC: 13 MG/DL (ref 8–23)
CALCIUM SERPL-MCNC: 8.1 MG/DL (ref 8.3–10.4)
CALCULATED P AXIS, ECG09: -168 DEGREES
CALCULATED P AXIS, ECG09: 0 DEGREES
CALCULATED R AXIS, ECG10: 29 DEGREES
CALCULATED R AXIS, ECG10: 33 DEGREES
CALCULATED T AXIS, ECG11: -138 DEGREES
CALCULATED T AXIS, ECG11: -159 DEGREES
CHLORIDE SERPL-SCNC: 109 MMOL/L (ref 98–107)
CO2 SERPL-SCNC: 25 MMOL/L (ref 21–32)
CREAT SERPL-MCNC: 1.4 MG/DL (ref 0.8–1.5)
DIAGNOSIS, 93000: NORMAL
DIAGNOSIS, 93000: NORMAL
ERYTHROCYTE [DISTWIDTH] IN BLOOD BY AUTOMATED COUNT: 15.1 % (ref 11.9–14.6)
GLUCOSE BLD STRIP.AUTO-MCNC: 82 MG/DL (ref 65–100)
GLUCOSE SERPL-MCNC: 82 MG/DL (ref 65–100)
HCT VFR BLD AUTO: 33.4 % (ref 41.1–50.3)
HGB BLD-MCNC: 10.9 G/DL (ref 13.6–17.2)
MCH RBC QN AUTO: 28.3 PG (ref 26.1–32.9)
MCHC RBC AUTO-ENTMCNC: 32.6 G/DL (ref 31.4–35)
MCV RBC AUTO: 86.8 FL (ref 79.6–97.8)
P-R INTERVAL, ECG05: 136 MS
PLATELET # BLD AUTO: 174 K/UL (ref 150–450)
PMV BLD AUTO: 9.9 FL (ref 10.8–14.1)
POTASSIUM SERPL-SCNC: 3.6 MMOL/L (ref 3.5–5.1)
Q-T INTERVAL, ECG07: 276 MS
Q-T INTERVAL, ECG07: 284 MS
QRS DURATION, ECG06: 96 MS
QRS DURATION, ECG06: 98 MS
QTC CALCULATION (BEZET), ECG08: 379 MS
QTC CALCULATION (BEZET), ECG08: 407 MS
RBC # BLD AUTO: 3.85 M/UL (ref 4.23–5.67)
SODIUM SERPL-SCNC: 144 MMOL/L (ref 136–145)
VENTRICULAR RATE, ECG03: 107 BPM
VENTRICULAR RATE, ECG03: 131 BPM
WBC # BLD AUTO: 7 K/UL (ref 4.3–11.1)

## 2018-06-21 PROCEDURE — 99153 MOD SED SAME PHYS/QHP EA: CPT

## 2018-06-21 PROCEDURE — 02HK3JZ INSERTION OF PACEMAKER LEAD INTO RIGHT VENTRICLE, PERCUTANEOUS APPROACH: ICD-10-PCS | Performed by: INTERNAL MEDICINE

## 2018-06-21 PROCEDURE — 93005 ELECTROCARDIOGRAM TRACING: CPT | Performed by: INTERNAL MEDICINE

## 2018-06-21 PROCEDURE — 74011000250 HC RX REV CODE- 250: Performed by: INTERNAL MEDICINE

## 2018-06-21 PROCEDURE — 74011250637 HC RX REV CODE- 250/637: Performed by: NURSE PRACTITIONER

## 2018-06-21 PROCEDURE — 82962 GLUCOSE BLOOD TEST: CPT

## 2018-06-21 PROCEDURE — 74011250636 HC RX REV CODE- 250/636: Performed by: INTERNAL MEDICINE

## 2018-06-21 PROCEDURE — 33208 INSRT HEART PM ATRIAL & VENT: CPT

## 2018-06-21 PROCEDURE — 71045 X-RAY EXAM CHEST 1 VIEW: CPT

## 2018-06-21 PROCEDURE — 74011000258 HC RX REV CODE- 258: Performed by: EMERGENCY MEDICINE

## 2018-06-21 PROCEDURE — 74011000250 HC RX REV CODE- 250: Performed by: EMERGENCY MEDICINE

## 2018-06-21 PROCEDURE — 65660000000 HC RM CCU STEPDOWN

## 2018-06-21 PROCEDURE — 74011250637 HC RX REV CODE- 250/637: Performed by: INTERNAL MEDICINE

## 2018-06-21 PROCEDURE — 02H63JZ INSERTION OF PACEMAKER LEAD INTO RIGHT ATRIUM, PERCUTANEOUS APPROACH: ICD-10-PCS | Performed by: INTERNAL MEDICINE

## 2018-06-21 PROCEDURE — 99152 MOD SED SAME PHYS/QHP 5/>YRS: CPT

## 2018-06-21 PROCEDURE — 85027 COMPLETE CBC AUTOMATED: CPT | Performed by: NURSE PRACTITIONER

## 2018-06-21 PROCEDURE — 99156 MOD SED OTH PHYS/QHP 5/>YRS: CPT

## 2018-06-21 PROCEDURE — 80048 BASIC METABOLIC PNL TOTAL CA: CPT | Performed by: NURSE PRACTITIONER

## 2018-06-21 PROCEDURE — 36415 COLL VENOUS BLD VENIPUNCTURE: CPT | Performed by: NURSE PRACTITIONER

## 2018-06-21 PROCEDURE — 74011250636 HC RX REV CODE- 250/636

## 2018-06-21 PROCEDURE — 0JH606Z INSERTION OF PACEMAKER, DUAL CHAMBER INTO CHEST SUBCUTANEOUS TISSUE AND FASCIA, OPEN APPROACH: ICD-10-PCS | Performed by: INTERNAL MEDICINE

## 2018-06-21 RX ORDER — SODIUM CHLORIDE 0.9 % (FLUSH) 0.9 %
5-10 SYRINGE (ML) INJECTION EVERY 8 HOURS
Status: DISCONTINUED | OUTPATIENT
Start: 2018-06-21 | End: 2018-06-22 | Stop reason: HOSPADM

## 2018-06-21 RX ORDER — MIDAZOLAM HYDROCHLORIDE 1 MG/ML
.5-2 INJECTION, SOLUTION INTRAMUSCULAR; INTRAVENOUS
Status: DISCONTINUED | OUTPATIENT
Start: 2018-06-21 | End: 2018-06-22 | Stop reason: HOSPADM

## 2018-06-21 RX ORDER — CEFAZOLIN SODIUM/WATER 2 G/20 ML
2 SYRINGE (ML) INTRAVENOUS ONCE
Status: COMPLETED | OUTPATIENT
Start: 2018-06-21 | End: 2018-06-21

## 2018-06-21 RX ORDER — CEFAZOLIN SODIUM/WATER 2 G/20 ML
2 SYRINGE (ML) INTRAVENOUS EVERY 8 HOURS
Status: COMPLETED | OUTPATIENT
Start: 2018-06-21 | End: 2018-06-22

## 2018-06-21 RX ORDER — SODIUM CHLORIDE 0.9 % (FLUSH) 0.9 %
5-10 SYRINGE (ML) INJECTION AS NEEDED
Status: DISCONTINUED | OUTPATIENT
Start: 2018-06-21 | End: 2018-06-22 | Stop reason: HOSPADM

## 2018-06-21 RX ORDER — PHENYLEPHRINE HYDROCHLORIDE 10 MG/ML
100 INJECTION INTRAVENOUS
Status: DISCONTINUED | OUTPATIENT
Start: 2018-06-21 | End: 2018-06-22 | Stop reason: HOSPADM

## 2018-06-21 RX ORDER — NALOXONE HYDROCHLORIDE 0.4 MG/ML
0.4 INJECTION, SOLUTION INTRAMUSCULAR; INTRAVENOUS; SUBCUTANEOUS
Status: DISCONTINUED | OUTPATIENT
Start: 2018-06-21 | End: 2018-06-22 | Stop reason: HOSPADM

## 2018-06-21 RX ORDER — LEVOTHYROXINE SODIUM 88 UG/1
88 TABLET ORAL
Status: DISCONTINUED | OUTPATIENT
Start: 2018-06-21 | End: 2018-06-22 | Stop reason: HOSPADM

## 2018-06-21 RX ORDER — FENTANYL CITRATE 50 UG/ML
25-50 INJECTION, SOLUTION INTRAMUSCULAR; INTRAVENOUS
Status: DISCONTINUED | OUTPATIENT
Start: 2018-06-21 | End: 2018-06-22 | Stop reason: HOSPADM

## 2018-06-21 RX ORDER — LIDOCAINE HYDROCHLORIDE 10 MG/ML
10-200 INJECTION INFILTRATION; PERINEURAL ONCE
Status: COMPLETED | OUTPATIENT
Start: 2018-06-21 | End: 2018-06-21

## 2018-06-21 RX ADMIN — SODIUM CHLORIDE 5 MG/HR: 900 INJECTION, SOLUTION INTRAVENOUS at 06:01

## 2018-06-21 RX ADMIN — Medication 5 ML: at 06:03

## 2018-06-21 RX ADMIN — Medication 5 ML: at 17:24

## 2018-06-21 RX ADMIN — Medication 2 G: at 13:30

## 2018-06-21 RX ADMIN — FENTANYL CITRATE 50 MCG: 50 INJECTION, SOLUTION INTRAMUSCULAR; INTRAVENOUS at 14:01

## 2018-06-21 RX ADMIN — Medication 5 ML: at 21:28

## 2018-06-21 RX ADMIN — NALOXONE HYDROCHLORIDE 0.4 MG: 0.4 INJECTION, SOLUTION INTRAMUSCULAR; INTRAVENOUS; SUBCUTANEOUS at 14:14

## 2018-06-21 RX ADMIN — Medication 5 ML: at 21:27

## 2018-06-21 RX ADMIN — ATORVASTATIN CALCIUM 80 MG: 40 TABLET, FILM COATED ORAL at 21:23

## 2018-06-21 RX ADMIN — PHENYLEPHRINE HYDROCHLORIDE 0.1 MG: 10 INJECTION INTRAVENOUS at 14:14

## 2018-06-21 RX ADMIN — LIDOCAINE HYDROCHLORIDE 25 ML: 10 INJECTION, SOLUTION INFILTRATION; PERINEURAL at 14:03

## 2018-06-21 RX ADMIN — MIDAZOLAM HYDROCHLORIDE 2 MG: 1 INJECTION, SOLUTION INTRAMUSCULAR; INTRAVENOUS at 14:01

## 2018-06-21 RX ADMIN — ASPIRIN 81 MG: 81 TABLET, COATED ORAL at 08:25

## 2018-06-21 RX ADMIN — APIXABAN 5 MG: 5 TABLET, FILM COATED ORAL at 21:23

## 2018-06-21 RX ADMIN — Medication 2 G: at 21:44

## 2018-06-21 RX ADMIN — ATORVASTATIN CALCIUM 80 MG: 40 TABLET, FILM COATED ORAL at 00:02

## 2018-06-21 RX ADMIN — LEVOTHYROXINE SODIUM 88 MCG: 88 TABLET ORAL at 08:28

## 2018-06-21 RX ADMIN — MIDAZOLAM HYDROCHLORIDE 2 MG: 1 INJECTION, SOLUTION INTRAMUSCULAR; INTRAVENOUS at 13:54

## 2018-06-21 NOTE — PROGRESS NOTES
Care Management Interventions  PCP Verified by CM: Yes (Sees Dr Laine Lundberg q 6 months and Sees Dr. Adarsh Blanco for sick days)  Palliative Care Criteria Met (RRAT>21 & CHF Dx)?: No (RRAT 22 Dx Atrial flutter)  Transition of Care Consult (CM Consult): Discharge Planning  Discharge Durable Medical Equipment: No  Physical Therapy Consult: No  Occupational Therapy Consult: No  Speech Therapy Consult: No  Current Support Network: Lives with Spouse  Confirm Follow Up Transport: Family  Plan discussed with Pt/Family/Caregiver: Yes  Freedom of Choice Offered: Yes  Discharge Location  Discharge Placement: Home  Met with patient for d/c planning. Patient alert and oriented x 3, independent of ADL's(if needs assistance from stroke 4 weeks ago then wife assists) and lives with his wife. He has a walker and he does not drive since stroke. Wife provides transportation for patient. He is able to obtain [facility] medications without difficulty [facility] PCP is Dr. Adarsh Blanco but they only see for illness or flu shot and go to Dr. Laine gaines 6 months. Current d/c plan is home with wife when medically stable.

## 2018-06-21 NOTE — ED NOTES
TRANSFER - OUT REPORT:    Verbal report given to NIK Berry(name) on Flor Munoz  being transferred to [facility](unit) for routine progression of care       Report consisted of patients Situation, Background, Assessment and   Recommendations(SBAR). Information from the following report(s) SBAR, Kardex, ED Summary, [facility] and Recent Results was reviewed with the receiving nurse. Lines:   Peripheral IV 06/20/18 Right Antecubital (Active)   Site Assessment Clean, dry, & intact 6/20/2018  8:40 PM   Phlebitis Assessment 0 6/20/2018  8:40 PM   Infiltration Assessment 0 6/20/2018  8:40 PM       Peripheral IV 06/20/18 Left Antecubital (Active)   Site Assessment Clean, dry, & intact 6/20/2018  9:26 PM   Phlebitis Assessment 0 6/20/2018  9:26 PM   Infiltration Assessment 0 6/20/2018  9:26 PM   Dressing Status Clean, dry, & intact 6/20/2018  9:26 PM   Hub Color/Line Status Pink; Infusing 6/20/2018  9:26 PM        Opportunity for questions and clarification was provided.       Patient transported with:   Registered Nurse

## 2018-06-21 NOTE — PROCEDURES
Cardiac Catheterization Procedure Note pacer/loop    Patient ID:     Name: Dea Márquez   Medical Record Number: [id number]   YOB: 1935    Date of Procedure: 6/21/2018    Physician: John Mora MD    Referring rosy    Indications: This is a 82 yrs male who presents with AV node dysfunction permanent. Symptomatic angelic. Pre procedure dx SSS  Post procedure dx SSS    Blood loss less than 5 ml    Sedation. Pt received 2 mg versed and 50 mcg fentanyl for monitored conscious sedation from 2:00 to 2:40. Specimen: None    No complications    No assistants    Time out, Mallampati, and ASA performed    Procedure: left pectoral region was cleaned and prepped in a sterile fashion. Lidocaine was used for local anesthesia. Modified Seldinger technique was used to gain access to the subclavian vein. Pacer pocket was made with sharp and blunt dissection. Sheaths were placed which allowed for placement of a dual chamber device. After appropriate placement of leads the sheaths were removed and leads were attached to the pre pectoral fascia. The pocket was flushed with antibiotic solution. The device was attached to the appropriate leads and set screws were tightened. Closure was then performored with 2.0 vicryl and subcuticular absorbable staples to close the skin. All counts were correct    Dressing was applied to the skin    Pulse generator was a [employer] EDORA 8 DR-T serial number [id number] .      Atrial lead was a SOLIA S 53 serial number [id number]   Threshold   V @   msPT IN AFIB   A wave  1.1 mV   Impedence 880 ohms    Ventricular lead was a SOLIA S 60 serial number [id number]    Threshold 1.1 V @ 0.4 ms   V wave 8.0 mV   [id number] ohms    Settings DDD CLS 60/120

## 2018-06-21 NOTE — PROGRESS NOTES
Problem: Nutrition Deficit  Goal: *Optimize nutritional status  Nutrition  Reason for assessment: Referral received from nursing admission Malnutrition Screening Tool for recently lost \"15-16#\" without trying and eating poorly due to decreased appetite. Assessment:   Diet order(s): NPO  Food/Nutrition Patient History:  Pt seen in company of his wife at bedside. Pt reports a UBW of ~198-204 pounds earlier this year. Pt's wife states that he has been depressed and is a \"picky eater\", therefore, does not eat well when he is in the hospital.  He is s/p CABG (5/2018), and CVA (6/2018), with recent admission to rehab facility. He does eat well at home per patient's wife. She states that when he got home from rehab he had his \"black eyed peas and corn bread. \"  He states that he has never been a big eater and definitely not a big meat eater. Anthropometrics:Height: 5' 11\" (180.3 cm),  Weight: 81.6 kg (179 lb 12.8 oz), Weight Source: Standing scale (comment), Body mass index is 25.08 kg/(m^2). BMI class of normal weight for age >65 years. WT / BMI 6/21/2018 6/13/2018 6/13/2018 5/24/2018   WEIGHT 179 lb 12.8 oz 180 lb 180 lb 191 lb 9.6 oz     WT / BMI 3/23/2018 9/21/2017 8/23/2017 8/17/2017   WEIGHT 200 lb 9.6 oz 199 lb 198 lb 197 lb   Per weights listed in EMR, potential for a 21 pound, 10.5% clinically significant weight loss within 3 months. Macronutrient needs:  EER:  6290-9991 kcal /day (20-25 kcal/kg listed BW)  EPR:  78-94 grams protein/day (1-1.2 grams/kg IBW)  Intake/Comparative Standards: Per RD meal rounds: pt NPO, meeting 0% of needs. Nutrition Diagnosis:   1. Inadequate oral intake r/t decreased ability to consume sufficient oral intake as evidenced by pt currently NPO for procedure. 2. Unintended weight loss r/t multiple recent hospital admissions, as evidenced by pt s/p CABG and CVA within 3 months and weight loss noted above.      Intervention:  Meals and snacks: NPO  Nutrition Supplement Therapy: none at this time. Discharge nutrition plan: Too soon to determine. I expect good meal intake while the pt is home.      Safia Titus Blayne [address], [address] [place] [address], [phone]

## 2018-06-21 NOTE — PROGRESS NOTES
6/21/2018 6:30 AM    Admit Date: 6/20/2018    Admit Diagnosis: Atrial flutter with rapid ventricular response ([address] Atrial*      Subjective:    Patient with SSS permanent AV node dysfunction and symptomatic angelic.  Was scheduled for PPM but readmitted last night with rhythm issues    Objective:      Visit Vitals    /71 (BP 1 Location: Left arm, BP Patient Position: At rest)    Pulse 70    Temp 98.3 °F (36.8 °C)    Resp 18    Ht 5' 11\" (1.803 m)    Wt 81.6 kg (179 lb 12.8 oz)    SpO2 98%    BMI 25.08 kg/m2       ROS:  General ROS: negative for - chills  Hematological and Lymphatic ROS: negative for - blood clots or jaundice  Respiratory ROS: no cough, shortness of breath, or wheezing  Cardiovascular ROS: no chest pain or dyspnea on exertion  Gastrointestinal ROS: no abdominal pain, change in bowel habits, or black or bloody stools  Neurological ROS: no TIA or stroke symptoms    Physical Exam:    Physical Examination: General appearance - alert, well appearing, and in no distress  Mental status - alert, oriented to person, place, and time  Eyes - pupils equal and reactive, extraocular eye movements intact  Neck/lymph - supple, no significant adenopathy  Chest/CV - clear to auscultation, no wheezes, rales or rhonchi, symmetric air entry  Heart - irregularly irregular rhythm with rate 60  Abdomen/GI - soft, nontender, nondistended, no masses or organomegaly  Musculoskeletal - no joint tenderness, deformity or swelling  Extremities - peripheral pulses normal, no pedal edema, no clubbing or cyanosis  Skin - normal coloration and turgor, no rashes, no suspicious skin lesions noted    Current Facility-Administered Medications   Medication Dose Route Frequency    dilTIAZem (CARDIZEM) 100 mg in 0.9% sodium chloride (MBP/ADV) 100 mL infusion  0-15 mg/hr IntraVENous TITRATE    apixaban (ELIQUIS) tablet 2.5 mg  2.5 mg Oral BID    aspirin delayed-release tablet 81 mg  81 mg Oral DAILY    sodium chloride (NS) flush 5-10 mL  5-10 mL IntraVENous Q8H    sodium chloride (NS) flush 5-10 mL  5-10 mL IntraVENous PRN    nitroglycerin (NITROSTAT) tablet 0.4 mg  0.4 mg SubLINGual Q5MIN PRN    morphine injection 2 mg  2 mg IntraVENous Q4H PRN    acetaminophen (TYLENOL) tablet 650 mg  650 mg Oral Q4H PRN    HYDROcodone-acetaminophen (NORCO) 5-325 mg per tablet 1 Tab  1 Tab Oral Q4H PRN    ondansetron (ZOFRAN) injection 4 mg  4 mg IntraVENous Q4H PRN    atorvastatin (LIPITOR) tablet 80 mg  80 mg Oral QHS    hydrALAZINE (APRESOLINE) 20 mg/mL injection 10 mg  10 mg IntraVENous Q6H PRN       Data Review:   @LABRCNT(Na,K,BUN,CREA,WBC,HGB,HCT,PLT,INR,TRP,TCHOL*,Triglyceride*,LDL*,LDLCPOC HDL*,HDL])@    TELEMETRY: flutter    Assessment/Plan:     Principal Problem:    Atrial flutter with rapid ventricular response ([facility] (6/20/2018)    Active Problems:    Essential hypertension (5/16/2018)The current medical regimen is effective;  continue present plan and medications. S/P CABG (coronary artery bypass graft) (5/18/2018)The current medical regimen is effective;  continue present plan and medications. Hypothyroidism (5/18/2018)      PAF (paroxysmal atrial fibrillation) ([facility] (5/20/2018) SSS for PPM today      CAD, multiple vessel (5/22/2018)      Overview:  5/18/18 (Dr Ken Edouard) Coronary artery bypass grafting x3 with grafts       consisting of:      1. Left internal mammary artery to left anterior descending artery.        2.  Reverse saphenous vein to diagonal.       3.  Reversed saphenous vein graft to obtuse marginal.       SSS (sick sinus syndrome) (HCC) (6/13/2018)ppm      History of CVA (cerebrovascular accident) (6/20/2018)          Kamille Hurtado MD

## 2018-06-21 NOTE — PROGRESS NOTES
Report received from [facility] Cath Lab RN. Procedural findings communicated. Intra procedural  medication administration reviewed. Progression of care discussed. Patient received into [facility] 7 post procedure.      Incision site without bleeding or swelling yes    Dressing dry and intact yes    Patient instructed to limit movement to left upper extremity    Routine post procedural vital signs and site assessment initiated yes

## 2018-06-21 NOTE — PROGRESS NOTES
Verbal bedside report received from Vanessa Vila, [facility]. Assumed care of patient. Left subclavian site assessed at bedside with outgoing RN.

## 2018-06-21 NOTE — PROGRESS NOTES
Verbal bedside report given to [facility], oncoming RN. Patient's situation, background, assessment and recommendations provided. Kardex, Mar, and recent results also reviewed. Opportunity for questions provided. Oncoming RN assumed care of patient.

## 2018-06-21 NOTE — PROGRESS NOTES
Patient headed for pacer today. Okay to hold Eliquis this morning.  Also will restart home dose of synthroid per Randel Denver, NP.

## 2018-06-21 NOTE — PROGRESS NOTES
Problem: Afib Pathway: Day 1  Goal: *Optimal pain control at patient's stated goal  Outcome: Resolved/Met Date Met: 06/21/18  Patient complaining of constant groin pain of a 6 on a scale of 0-10. Goal: *Hemodynamically stable  Outcome: Resolved/Met Date Met: 06/21/18  Visit Vitals    /71 (BP 1 Location: Left arm, BP Patient Position: At rest)    Pulse 70    Temp 98.3 °F (36.8 °C)    Resp 18    Ht 5' 11\" (1.803 m)    Wt 81.6 kg (179 lb 12.8 oz)    SpO2 98%    BMI 25.08 kg/m2       Goal: *Stable cardiac rhythm  Outcome: Resolved/Met Date Met: 06/21/18  Patient afib/aflutter, irregular from the 60-70's. Goal: *Lungs clear or at baseline  Outcome: Resolved/Met Date Met: 06/21/18  Lungs clear. Goal: *Describes available resources and support systems  Outcome: Resolved/Met Date Met: 06/21/18  Wife at bedside as support system. Problem: Falls - Risk of  Goal: *Absence of Falls  Document Shi Fall Risk and appropriate interventions in the flowsheet. Outcome: Progressing Towards Goal  Fall Risk Interventions:  Mobility Interventions: Bed/chair exit alarm, Patient to call before getting OOB, PT Consult for mobility concerns    Medication Interventions: Patient to call before getting OOB, Teach patient to arise slowly, Bed/chair exit alarm    Elimination Interventions: Bed/chair exit alarm, Call light in reach, Patient to call for help with toileting needs        Problem: Pressure Injury - Risk of  Goal: *Prevention of pressure injury  Document Mekhi Scale and appropriate interventions in the flowsheet.    Outcome: Progressing Towards Goal  Pressure Injury Interventions:  Sensory Interventions: Assess changes in LOC, Check visual cues for pain, Discuss PT/OT consult with provider, Pressure redistribution bed/mattress (bed type)    Moisture Interventions: Absorbent underpads, Apply protective barrier, creams and emollients, Maintain skin hydration (lotion/cream)    Activity Interventions: Increase time out of bed, Pressure redistribution bed/mattress(bed type), PT/OT evaluation    Mobility Interventions: PT/OT evaluation, Pressure redistribution bed/mattress (bed type)    Nutrition Interventions: Document food/fluid/supplement intake, Offer support with meals,snacks and hydration

## 2018-06-21 NOTE — H&P
[facility] Cardiology History & Physical      Date of  Admission: 6/20/2018  7:23 PM     Primary Care Physician: Dr. Adarsh Blanco  Primary Cardiologist: Dr. Caio Akbar  Admitting Physician: Dr. Hernández Client    CC: rapid HR    HPI:  Walter Perez is a 82 y.o. male with past medical history of CAD with recent CABG, HTN, recent CVA post CABG, and dyslipidemia who presented to the ER with complaints of right groin pain and vomiting. Associated symptoms include hematuria. Patient reports feeling fine prior to today's episode. His wife reports that she has noticed him rubbing his right groin and low back. Upon arrival to the ER, patient was found to be tachycardic. EKG showed possible atrial flutter with RVR. He was given IV lopressor 5mg x3 doses and 20mg IV Cardizem followed by Cardizem drip. He is chronically anticoagulated on Eliquis. He denies chest pain, palpitations, or shortness of breath. CT urogram was done while in the ER to evaluate right groin pain. CT showed moderate hydronephrosis and minimal right hydroureter, no obstructing radiopaque ureteral stone seen suggestive that patient have recently passed a stone. Patient was admitted to the hospital on 5/16 with chest pain/NSTEMI. LHC showed multi-vessel disease and was referred for CABG. He underwent CABG on 5/18. Post-op, he developed AFIB w RVR on POD #2. He was treated with IV amiodarone with conversion back to junctional rhythm and also issues with significant bradycardia. PM placement was discussed and planned for 5/24. Prior to being taken to the cath lab, patient's wife noticed right-sided weakness and aphasia. He was discharged to [address] [place] [address] for further treatment. Past Medical History:   Diagnosis Date    CAD, multiple vessel 5/22/2018 5/18/18 (Dr Bebeto Edouard) Coronary artery bypass grafting x3 with grafts consisting of: 1. Left internal mammary artery to left anterior descending artery.   2.  Reverse saphenous vein to diagonal.  3.  Reversed saphenous vein graft to obtuse marginal.    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 Social History Main Topics    Smoking status: Never Smoker    Smokeless tobacco: Never Used    Alcohol use No    Drug use: No    Sexual activity: Not on file     Other Topics Concern    Not on file     Social History Narrative     Family History   Problem Relation Age of Onset    No Known Problems Mother     No Known Problems Father         Current Facility-Administered Medications   Medication Dose Route Frequency    dilTIAZem (CARDIZEM) 100 mg in 0.9% sodium chloride (MBP/ADV) 100 mL infusion  0-15 mg/hr IntraVENous TITRATE     Current Outpatient Prescriptions   Medication Sig    quinapril-hydroCHLOROthiazide (ACCURETIC) 20-12.5 mg per tablet Take 1 Tab by mouth daily.  apixaban (ELIQUIS) 5 mg tablet Take 1 Tab by mouth two (2) times a day.  aspirin delayed-release 81 mg tablet Take 1 Tab by mouth daily.  levothyroxine (SYNTHROID) 88 mcg tablet Take  by mouth Daily (before breakfast).  bisacodyl (DULCOLAX) 5 mg EC tablet Take 1 Tab by mouth daily. (Patient taking differently: Take 5 mg by mouth as needed.)    predniSONE (DELTASONE) 5 mg tablet Take 5 mg by mouth daily. Review of Systems    Review of Systems   Cardiovascular: Positive for palpitations. Musculoskeletal: Positive for back pain.         Right groin pain          Subjective:     Visit Vitals    /81    Pulse 94    Temp 97.7 °F (36.5 °C)    Resp 15    Ht 5' 11\" (1.803 m)    Wt 81.6 kg (180 lb)    SpO2 100%    BMI 25.1 kg/m2 Physical Exam   Constitutional: He is oriented to person, place, and time and well-developed, well-nourished, and in no distress. HENT:   Head: Normocephalic and atraumatic. Eyes: Pupils are equal, round, and reactive to light. Neck: Neck supple. No JVD present. Cardiovascular: Normal rate. An irregularly irregular rhythm present. Pulmonary/Chest: Effort normal and breath sounds normal.   Abdominal: Soft. Bowel sounds are normal. He exhibits no distension. There is no tenderness. Musculoskeletal: He exhibits no edema. Neurological: He is alert and oriented to person, place, and time. Skin: Skin is warm and dry. Cardiographics  Telemetry: atrial flutter   ECG: atrial flutter with RVR  Echocardiogram: from 5/17/18  -  Left ventricle: Systolic function was at the lower limits of normal. Ejection fraction was estimated in the range of 50 % to 55 %. There were no regional wall motion abnormalities. Wall thickness was mildly increased. -  Left atrium: The atrium was moderately dilated. -  Mitral valve: There was mild regurgitation. -  Tricuspid valve: There was mild regurgitation.     Labs:   Recent Results (from the past 24 hour(s))   EKG, 12 LEAD, INITIAL    Collection Time: 06/20/18  7:28 PM   Result Value Ref Range    Ventricular Rate 131 BPM    Atrial Rate 138 BPM    P-R Interval 136 ms    QRS Duration 96 ms    Q-T Interval 276 ms    QTC Calculation (Bezet) 407 ms    Calculated P Axis -168 degrees    Calculated R Axis 29 degrees    Calculated T Axis -159 degrees    Diagnosis       !! AGE AND GENDER SPECIFIC ECG ANALYSIS !!  Unusual P axis, possible ectopic atrial tachycardia with undetermined rhythm   irregularity  ST & T wave abnormality, consider lateral ischemia  Abnormal ECG  When compared with ECG of 20-JUN-2018 19:27,  Ectopic atrial rhythm has replaced Sinus rhythm  ST now depressed in Inferior leads     CBC WITH AUTOMATED DIFF    Collection Time: 06/20/18  7:31 PM   Result Value Ref Range    WBC 6.3 4.3 - 11.1 K/uL    RBC 4.15 (L) 4.23 - 5.67 M/uL    HGB 11.8 (L) 13.6 - 17.2 g/dL    HCT 35.5 (L) 41.1 - 50.3 %    MCV 85.5 79.6 - 97.8 FL    MCH 28.4 26.1 - 32.9 PG    MCHC 33.2 31.4 - 35.0 g/dL    RDW 14.7 (H) 11.9 - 14.6 %    PLATELET 673 430 - 897 K/uL    MPV 10.2 (L) 10.8 - 14.1 FL    DF AUTOMATED      NEUTROPHILS 73 43 - 78 %    LYMPHOCYTES 23 13 - 44 %    MONOCYTES 3 (L) 4.0 - 12.0 %    EOSINOPHILS 1 0.5 - 7.8 %    BASOPHILS 0 0.0 - 2.0 %    IMMATURE GRANULOCYTES 0 0.0 - 5.0 %    ABS. NEUTROPHILS 4.5 1.7 - 8.2 K/UL    ABS. LYMPHOCYTES 1.4 0.5 - 4.6 K/UL    ABS. MONOCYTES 0.2 0.1 - 1.3 K/UL    ABS. EOSINOPHILS 0.1 0.0 - 0.8 K/UL    ABS. BASOPHILS 0.0 0.0 - 0.2 K/UL    ABS. IMM. GRANS. 0.0 0.0 - 0.5 K/UL   METABOLIC PANEL, COMPREHENSIVE    Collection Time: 06/20/18  7:31 PM   Result Value Ref Range    Sodium 141 136 - 145 mmol/L    Potassium 3.6 3.5 - 5.1 mmol/L    Chloride 105 98 - 107 mmol/L    CO2 25 21 - 32 mmol/L    Anion gap 11 7 - 16 mmol/L    Glucose 165 (H) 65 - 100 mg/dL    BUN 14 8 - 23 MG/DL    Creatinine 1.65 (H) 0.8 - 1.5 MG/DL    GFR est AA 52 (L) >60 ml/min/1.73m2    GFR est non-AA 43 (L) >60 ml/min/1.73m2    Calcium 8.5 8.3 - 10.4 MG/DL    Bilirubin, total 0.5 0.2 - 1.1 MG/DL    ALT (SGPT) 27 12 - 65 U/L    AST (SGOT) 31 15 - 37 U/L    Alk.  phosphatase 133 50 - 136 U/L    Protein, total 6.5 6.3 - 8.2 g/dL    Albumin 3.2 3.2 - 4.6 g/dL    Globulin 3.3 2.3 - 3.5 g/dL    A-G Ratio 1.0 (L) 1.2 - 3.5     BNP    Collection Time: 06/20/18  7:31 PM   Result Value Ref Range     pg/mL   MAGNESIUM    Collection Time: 06/20/18  7:31 PM   Result Value Ref Range    Magnesium 2.0 1.8 - 2.4 mg/dL   PROTHROMBIN TIME + INR    Collection Time: 06/20/18  7:31 PM   Result Value Ref Range    Prothrombin time 18.9 (H) 11.5 - 14.5 sec    INR 1.6     PROCALCITONIN    Collection Time: 06/20/18  7:33 PM   Result Value Ref Range    Procalcitonin <0.1 ng/mL   POC LACTIC ACID    Collection Time: 06/20/18  7:42 PM   Result Value Ref Range    Lactic Acid (POC) 2.6 (H) 0.5 - 1.9 mmol/L   URINE MICROSCOPIC    Collection Time: 06/20/18  8:01 PM   Result Value Ref Range    WBC 0-3 0 /hpf    RBC >100 (H) 0 /hpf    Epithelial cells 0-3 0 /hpf    Bacteria 0 0 /hpf    Casts 0-3 0 /lpf       Patient has been seen and examined by Dr. Kaila Smith and he agrees with the following assessment and plan:     Assessment/Plan:        Atrial flutter with rapid ventricular response -- admit to telemetry. Continue IV Cardizem. Monitor HR closely. Continue Eliquis for anticoagulation, hold AM dose for PPM placement. NPO at midnight. Essential hypertension -- currently uncontrolled in the office. Recently taken off all BP medications due to hypotension. Monitor closely while on IV Cardizem. Add IV hydralazine prn      S/P CABG (coronary artery bypass graft) -- sternal incision CDI      Hypothyroidism -- continue home Synthroid dose      PAF (paroxysmal atrial fibrillation) -- see above      CAD, multiple vessel -- continue ASA. No on statin therapy, will restart lipitor 80mg QHS. Overview:  5/18/18 (Dr [employer]) Coronary artery bypass grafting x3 with grafts       consisting of:      1. Left internal mammary artery to left anterior descending artery. 2.  Reverse saphenous vein to diagonal.       3.  Reversed saphenous vein graft to obtuse marginal.       SSS (sick sinus syndrome) -- see above. History of CVA (cerebrovascular accident) -- secondary to AFIB. Continue anticoagulation. Right sided weakness has resolved per wife, still has trouble with speech at times. Was discharged to rehab from [facility], currently home with wife. Right groin pain -- felt to be from possible passed kidney stone. Continue prn pain medications. Length of stay -- due to need for PPM, patient will be in the hospital for at least 2 midnights, therefore, admitted as inpatient.      Mohini Bolaños NP  6/20/2018 10:12 PM        I have personally seen and examined patient and agree with above assessment. I agree and confirm with findings with additional details/exceptions as listed below:    Presented with rt flank/groin pain; CT urogram suggestive of likely recent stone. Had hematuria also prior to presentation. Noted in the ER with tachycardia with subsequent EKG suggestive of atrial flutter with RVR. Appears some issues with tachypnea per ER on presentation. No current symptoms. Some minimal speech residual deficits from recent CVA post CABG (5/18). Currently back at home from rehab. Prior post CABG with noted PAF; appears subsequent issues with junctional rhythm/significant sinus bradycardia with PPM plan which was subsequently deferred with CVA. Likely underlying SSS which will limit therapy for rate/rhythm control. Currently on eliquis. Plan PPM. Long term, reassess need for rate or rhythm control based on symptoms/medication tolerability. With prior PAF history/current atrial flutter, likely plan AAD therapy if needed prior to consideration for ablation. Further recommendations pending clinical course.       Marina Amezcua MD  6/21/2018

## 2018-06-21 NOTE — PROGRESS NOTES
Verbal bedside report received from [address]. Patient's situation, background, assessment and recommendations were provided. Kardex, Mar, and recent results also reviewed. Opportunity for questions provided. Assumed care of patient.

## 2018-06-21 NOTE — PROGRESS NOTES
TRANSFER - OUT REPORT:    Verbal report given to Sammy Chanel on Reubin Chris  being transferred to [facility](unit) for routine progression of care       Report consisted of patients Situation, Background, Assessment and   Recommendations(SBAR). Information from the following report(s) Procedure Summary was reviewed with the receiving nurse. Lines:   Peripheral IV 06/20/18 Right Antecubital (Active)   Site Assessment Clean, dry, & intact 6/21/2018 12:50 PM   Phlebitis Assessment 0 6/21/2018 12:50 PM   Infiltration Assessment 0 6/21/2018 12:50 PM   Dressing Status Clean, dry, & intact 6/21/2018 12:50 PM   Dressing Type Tape;Transparent 6/21/2018 12:50 PM   Hub Color/Line Status Patent; Infusing 6/21/2018 12:50 PM       Peripheral IV 06/20/18 Left Antecubital (Active)   Site Assessment Clean, dry, & intact 6/21/2018 12:50 PM   Phlebitis Assessment 0 6/21/2018 12:50 PM   Infiltration Assessment 0 6/21/2018 12:50 PM   Dressing Status Clean, dry, & intact 6/21/2018 12:50 PM   Dressing Type Tape;Transparent 6/21/2018 12:50 PM   Hub Color/Line Status Patent 6/21/2018 12:50 PM        Opportunity for questions and clarification was provided.

## 2018-06-21 NOTE — ED PROVIDER NOTES
HPI Comments: Presents with complaint of right groin pain and hematuria. iit started suddenly several hours ago and was associated with nausea and vomiting. EMS reports that when they picked him up his heart rate was elevated in the regular. He was given Zofran for nausea and vomiting. Patient denies shortness of breath although he looks dyspneic. His nausea improved with Zofran. He has not had blood in his urine recently. He is on blood thinners. He denies history of atrial fibrillation or atrial flutter. He did not have any  Catheterization to his right groin. Patient is a 82 y.o. male presenting with palpitations. The history is provided by the patient and the spouse. Palpitations    This is a new problem. The current episode started 3 to 5 hours ago. The problem has been rapidly worsening. The problem occurs constantly. Associated symptoms include irregular heartbeat, nausea, vomiting and shortness of breath. Pertinent negatives include no fever, no chest pain, no near-syncope and no syncope. Risk factors include cardiac disease. His past medical history is significant for past MI and atrial fibrillation (patient stated no but he had after his CABG). Past Medical History:   Diagnosis Date    CAD, multiple vessel 5/22/2018 5/18/18 (Dr Ermelinda Mccord) Coronary artery bypass grafting x3 with grafts consisting of: 1. Left internal mammary artery to left anterior descending artery.   2.  Reverse saphenous vein to diagonal.  3.  Reversed saphenous vein graft to obtuse marginal.    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 Social History Main Topics    Smoking status: Never Smoker    Smokeless tobacco: Never Used    Alcohol use No    Drug use: No    Sexual activity: Not on file     Other Topics Concern    Not on file     Social History Narrative         ALLERGIES: Pcn [penicillins]    Review of Systems   Constitutional: Negative for chills and fever. Respiratory: Positive for shortness of breath. Cardiovascular: Positive for palpitations. Negative for chest pain, syncope and near-syncope. Gastrointestinal: Positive for nausea and vomiting. All other systems reviewed and are negative. Vitals:    06/20/18 2151 06/20/18 2156 06/20/18 2210 06/20/18 2215   BP: (!) 154/114 174/81 154/77 150/76   Pulse: 90 94 88 94   Resp: 14 15 29 25   Temp:       SpO2: 95% 100% 99% 94%   Weight:       Height:                Physical Exam   Constitutional: He is oriented to person, place, and time. He appears well-developed and well-nourished. He appears distressed. HENT:   Head: Normocephalic and atraumatic. Neck: Normal range of motion. Neck supple. Cardiovascular: Regular rhythm. Tachycardia present. Pulmonary/Chest: He is in respiratory distress. He has no wheezes. He has no rales. He exhibits no tenderness. Abdominal: Soft. He exhibits no distension. There is no tenderness. There is no rebound and no guarding. Musculoskeletal: Normal range of motion. He exhibits no edema or tenderness. normal femoral pulse. Neurological: He is alert and oriented to person, place, and time. Skin: Skin is warm. He is diaphoretic. There is pallor. Psychiatric: He has a normal mood and affect. His behavior is normal.   Nursing note and vitals reviewed.        MDM  Number of Diagnoses or Management Options  Atrial flutter with rapid ventricular response ([address]):   Gross hematuria:   Hydronephrosis, unspecified hydronephrosis type:   Diagnosis management comments: Patient with a flutter here. Patient had 3 doses of Lopressor without much change in his rate. He was started on a Cardizem drip at 10 mg for mild decrease in his heart rate and then given a 10 mg bolus and decreased into the 60s and 70s still in A. Flutter. continued on the Cardizem drip. CT scan of his abdomen showed hydronephrosis I suspect he passed a stone. He was given no reexamine his abdomen and he denied any pain with palpation. Discussed with his family at length the findings. He does not appear to have an infection in his urine. Given his A. Fib flutter likely secondary to passing a kidney stone he'll be admitted to the cardiologist.   I reviewed his chart and he was here in May for an NSTEMI  And then referred for a CABG. He had a stroke after going into A. Fib postop day 2  And was sented to [facility] and discharged from there on Eliquis. He has been to the ER for weakness and hypotension and his EKG at that time was  Normal sinus. Given 1 L fluids.      ===================================================================  This patient is critically ill and there is a high probability of of imminent or life threatening deterioration in the patient's condition without immediate management. The nature of the patient's clinical problem is: aflutter with rvr      I have spent 35 minutes in direct patient care, documentation, review of labs/xrays/old records, discussion with Family, Staff, Colleague, Nursing . The time involved in the performance of separately reportable procedures was not counted toward critical care time.      Marcus Sandhoff, MD; 6/20/2018 @10:57 PM  ===================================================================           Amount and/or Complexity of Data Reviewed  Clinical lab tests: reviewed and ordered  Review and summarize past medical records: yes  Discuss the patient with other providers: yes  Independent visualization of images, tracings, or specimens: yes (Aflutter with RVR, aflutter rate controlled)    Risk of Complications, Morbidity, and/or Mortality  Presenting problems: high  Diagnostic procedures: moderate  Management options: high    Patient Progress  Patient progress: improved        ED Course       Procedures

## 2018-06-21 NOTE — PROGRESS NOTES
Patient to tele room via stretcher from Recovery post pacemaker. Left subclavian pacer site WDL. No swelling. No bleeding. Dressing dry and intact. Left arm immobilized in sling. Reviewed left arm limitations and precautions. Voiced understanding.  Bed Rest until 2230

## 2018-06-21 NOTE — PROGRESS NOTES
TRANSFER - IN REPORT:    Verbal report received from Natasha Phelps RN on Maryln Pals being received from ER for routine progression of care      Report consisted of patients Situation, Background, Assessment and Recommendations(SBAR). Information from the following report(s) SBAR, Kardex and ED Summary was reviewed with the receiving nurse. Opportunity for questions and clarification was provided. Assessment completed upon patients arrival to unit and care assumed.

## 2018-06-21 NOTE — PROGRESS NOTES
Bedside and Verbal shift change report given to Mindy Knowles RN (oncoming nurse) by Magdaleno Eduardo RN (offgoing nurse). Report included the following information SBAR, Kardex, Accordion and Recent Results.

## 2018-06-22 VITALS
OXYGEN SATURATION: 97 % | BODY MASS INDEX: 25.13 KG/M2 | RESPIRATION RATE: 18 BRPM | SYSTOLIC BLOOD PRESSURE: 142 MMHG | HEART RATE: 90 BPM | HEIGHT: 71 IN | TEMPERATURE: 99.5 F | WEIGHT: 179.5 LBS | DIASTOLIC BLOOD PRESSURE: 94 MMHG

## 2018-06-22 LAB
ATRIAL RATE: 166 BPM
CALCULATED R AXIS, ECG10: -47 DEGREES
CALCULATED T AXIS, ECG11: -178 DEGREES
DIAGNOSIS, 93000: NORMAL
Q-T INTERVAL, ECG07: 428 MS
QRS DURATION, ECG06: 110 MS
QTC CALCULATION (BEZET), ECG08: 481 MS
VENTRICULAR RATE, ECG03: 76 BPM

## 2018-06-22 PROCEDURE — 74011250637 HC RX REV CODE- 250/637: Performed by: INTERNAL MEDICINE

## 2018-06-22 PROCEDURE — 74011250637 HC RX REV CODE- 250/637: Performed by: NURSE PRACTITIONER

## 2018-06-22 PROCEDURE — 74011250636 HC RX REV CODE- 250/636: Performed by: INTERNAL MEDICINE

## 2018-06-22 RX ORDER — TRAMADOL HYDROCHLORIDE 50 MG/1
50 TABLET ORAL
Qty: 10 TAB | Refills: 0 | Status: SHIPPED | OUTPATIENT
Start: 2018-06-22 | End: 2018-06-27

## 2018-06-22 RX ORDER — ATORVASTATIN CALCIUM 80 MG/1
80 TABLET, FILM COATED ORAL
Qty: 30 TAB | Refills: 11 | Status: SHIPPED | OUTPATIENT
Start: 2018-06-22 | End: 2018-08-10 | Stop reason: SDUPTHER

## 2018-06-22 RX ORDER — DILTIAZEM HYDROCHLORIDE 180 MG/1
180 CAPSULE, COATED, EXTENDED RELEASE ORAL DAILY
Qty: 30 CAP | Refills: 11 | Status: SHIPPED | OUTPATIENT
Start: 2018-06-22 | End: 2018-08-10 | Stop reason: SDUPTHER

## 2018-06-22 RX ORDER — DILTIAZEM HYDROCHLORIDE 180 MG/1
180 CAPSULE, COATED, EXTENDED RELEASE ORAL DAILY
Status: DISCONTINUED | OUTPATIENT
Start: 2018-06-22 | End: 2018-06-22 | Stop reason: HOSPADM

## 2018-06-22 RX ADMIN — Medication 5 ML: at 05:47

## 2018-06-22 RX ADMIN — Medication 2 G: at 05:46

## 2018-06-22 RX ADMIN — ASPIRIN 81 MG: 81 TABLET, COATED ORAL at 09:25

## 2018-06-22 RX ADMIN — DILTIAZEM HYDROCHLORIDE 180 MG: 180 CAPSULE, COATED, EXTENDED RELEASE ORAL at 09:25

## 2018-06-22 RX ADMIN — Medication 10 ML: at 05:47

## 2018-06-22 RX ADMIN — LEVOTHYROXINE SODIUM 88 MCG: 88 TABLET ORAL at 05:47

## 2018-06-22 RX ADMIN — APIXABAN 5 MG: 5 TABLET, FILM COATED ORAL at 09:25

## 2018-06-22 NOTE — PROGRESS NOTES
Verbal bedside report received from [place], [address] [place] [address] RN. Patient's situation, background, assessment and recommendations were provided. Kardex, Mar, and recent results also reviewed. Opportunity for questions provided. Assumed care of patient.

## 2018-06-22 NOTE — PROGRESS NOTES
Discharge instructions and medications reviewed with patient and spouse. Patient and spouse verbalize understanding. All questions answered. IV and monitor removed.

## 2018-06-22 NOTE — PROGRESS NOTES
6/22/2018 6:33 AM    Admit Date: 6/20/2018    Admit Diagnosis: Atrial flutter with rapid ventricular response ([address] Atrial*      Subjective:    Patient sp PPM for SSS. Pauses. Pranay Canales. Maximize rate control now.     Objective:      Visit Vitals    BP (!) 142/94 (BP 1 Location: Right arm, BP Patient Position: At rest)    Pulse 90    Temp 99.5 °F (37.5 °C)    Resp 18    Ht 5' 11\" (1.803 m)    Wt 81.4 kg (179 lb 8 oz)    SpO2 97%    BMI 25.04 kg/m2       ROS:  General ROS: negative for - chills  Hematological and Lymphatic ROS: negative for - blood clots or jaundice  Respiratory ROS: no cough, shortness of breath, or wheezing  Cardiovascular ROS: no chest pain or dyspnea on exertion  Gastrointestinal ROS: no abdominal pain, change in bowel habits, or black or bloody stools  Neurological ROS: no TIA or stroke symptoms    Physical Exam:    Physical Examination: General appearance - alert, well appearing, and in no distress  Mental status - alert, oriented to person, place, and time  Eyes - pupils equal and reactive, extraocular eye movements intact  Neck/lymph - supple, no significant adenopathy  Chest/CV - clear to auscultation, no wheezes, rales or rhonchi, symmetric air entry  Heart - irregularly irregular rhythm with rate 80  Abdomen/GI - soft, nontender, nondistended, no masses or organomegaly  Musculoskeletal - no joint tenderness, deformity or swelling  Extremities - peripheral pulses normal, no pedal edema, no clubbing or cyanosis  Skin - normal coloration and turgor, no rashes, no suspicious skin lesions noted    Current Facility-Administered Medications   Medication Dose Route Frequency    dilTIAZem CD (CARDIZEM CD) capsule 180 mg  180 mg Oral DAILY    levothyroxine (SYNTHROID) tablet 88 mcg  88 mcg Oral 6am    midazolam (VERSED) injection 0.5-2 mg  0.5-2 mg IntraVENous Multiple    fentaNYL citrate (PF) injection 25-50 mcg  25-50 mcg IntraVENous Multiple    PHENYLephrine (CIPRIANO-SYNEPHRINE) injection 0.1 mg  100 mcg IntraVENous Multiple    naloxone (NARCAN) injection 0.4 mg  0.4 mg IntraVENous Multiple    sodium chloride (NS) flush 5-10 mL  5-10 mL IntraVENous Q8H    sodium chloride (NS) flush 5-10 mL  5-10 mL IntraVENous PRN    apixaban (ELIQUIS) tablet 5 mg  5 mg Oral BID    aspirin delayed-release tablet 81 mg  81 mg Oral DAILY    sodium chloride (NS) flush 5-10 mL  5-10 mL IntraVENous Q8H    sodium chloride (NS) flush 5-10 mL  5-10 mL IntraVENous PRN    nitroglycerin (NITROSTAT) tablet 0.4 mg  0.4 mg SubLINGual Q5MIN PRN    morphine injection 2 mg  2 mg IntraVENous Q4H PRN    acetaminophen (TYLENOL) tablet 650 mg  650 mg Oral Q4H PRN    HYDROcodone-acetaminophen (NORCO) 5-325 mg per tablet 1 Tab  1 Tab Oral Q4H PRN    ondansetron (ZOFRAN) injection 4 mg  4 mg IntraVENous Q4H PRN    atorvastatin (LIPITOR) tablet 80 mg  80 mg Oral QHS    hydrALAZINE (APRESOLINE) 20 mg/mL injection 10 mg  10 mg IntraVENous Q6H PRN       Data Review:   @LABRCNT(Na,K,BUN,CREA,WBC,HGB,HCT,PLT,INR,TRP,TCHOL*,Triglyceride*,LDL*,LDLCPOC HDL*,HDL])@    TELEMETRY: AF    Assessment/Plan:     Principal Problem:    Atrial flutter with rapid ventricular response (HCC) (6/20/2018) now with PPM rate control add cardizem    Active Problems:    Essential hypertension (5/16/2018)The current medical regimen is effective;  continue present plan and medications. S/P CABG (coronary artery bypass graft) (5/18/2018)The current medical regimen is effective;  continue present plan and medications. Hypothyroidism (5/18/2018)      PAF (paroxysmal atrial fibrillation) ([facility] (5/20/2018)      CAD, multiple vessel (5/22/2018)      Overview:  5/18/18 (Dr Bebeto Edouard) Coronary artery bypass grafting x3 with grafts       consisting of:      1. Left internal mammary artery to left anterior descending artery.        2.  Reverse saphenous vein to diagonal.       3.  Reversed saphenous vein graft to obtuse marginal.       [facility] (6/13/2018)      History of CVA (cerebrovascular accident) (6/20/2018)          Cosme Byrd MD

## 2018-06-22 NOTE — PROGRESS NOTES
Problem: Falls - Risk of  Goal: *Absence of Falls  Document Shi Fall Risk and appropriate interventions in the flowsheet. Outcome: Progressing Towards Goal  Fall Risk Interventions:  Mobility Interventions: Bed/chair exit alarm, Communicate number of staff needed for ambulation/transfer, Patient to call before getting OOB         Medication Interventions: Bed/chair exit alarm, Evaluate medications/consider consulting pharmacy, Patient to call before getting OOB    Elimination Interventions: Call light in reach, Bed/chair exit alarm, Patient to call for help with toileting needs, Urinal in reach             Problem: Pressure Injury - Risk of  Goal: *Prevention of pressure injury  Document Mekhi Scale and appropriate interventions in the flowsheet.    Outcome: Progressing Towards Goal  Pressure Injury Interventions:  Sensory Interventions: Assess need for specialty bed, Assess changes in LOC, Check visual cues for pain, Monitor skin under medical devices, Pressure redistribution bed/mattress (bed type)    Moisture Interventions: Absorbent underpads, Apply protective barrier, creams and emollients, Maintain skin hydration (lotion/cream)    Activity Interventions: Increase time out of bed, Pressure redistribution bed/mattress(bed type)    Mobility Interventions: HOB 30 degrees or less, Pressure redistribution bed/mattress (bed type)    Nutrition Interventions: Document food/fluid/supplement intake

## 2018-06-22 NOTE — DISCHARGE INSTRUCTIONS
Learning About Atrial Fibrillation  What is atrial fibrillation? Atrial fibrillation (say \"AY-tree-khadra rve-tegz-HRV-shun\") is the most common type of irregular heartbeat (arrhythmia). Normally, the heart beats in a strong, steady rhythm. In atrial fibrillation, a problem with the heart's electrical system causes the two upper parts of the heart (the atria) to quiver, or fibrillate. Your heart rate also may be faster than normal.  Atrial fibrillation can be dangerous because if the heartbeat isn't strong and steady, blood can collect, or pool, in the atria. And pooled blood is more likely to form clots. Clots can travel to the brain, block blood flow, and cause a stroke. Atrial fibrillation can also lead to heart failure. Treatment for atrial fibrillation helps prevent stroke and heart failure. It also helps relieve symptoms. Atrial fibrillation is often caused by another heart problem. It may happen after heart surgery. It may also be caused by other problems, such as an overactive thyroid gland or lung disease. Many people with atrial fibrillation are able to live full and active lives. What are the symptoms? Some people feel symptoms when they have episodes of atrial fibrillation. But other people don't notice any symptoms. If you have symptoms, you may feel:  · A fluttering, racing, or pounding feeling in your chest called palpitations. · Weak or tired. · Dizzy or lightheaded. · Short of breath. · Chest pain. · Confused. You may notice signs of atrial fibrillation when you check your pulse. Your pulse may seem uneven or fast.  What can you expect when you have atrial fibrillation? At first, spells of atrial fibrillation may come on suddenly and last a short time. It may go away on its own or it goes away after treatment. This is called paroxysmal atrial fibrillation. Over time, the spells may last longer and occur more often. They often don't go away on their own.   How is it treated? Treatments can help you feel better and prevent future problems, especially stroke and heart failure. The main types of treatment slow the heart rate, control the heart rhythm, and help prevent stroke. Your treatment will depend on the cause of your atrial fibrillation, your symptoms, and your risk for stroke. · Heart rate treatment. Medicine may be used to slow your heart rate. Your heartbeat may still be irregular. But these medicines keep your heart from beating too fast. They may also help relieve your symptoms. · Heart rhythm treatment. Different treatments may be used to try to stop atrial fibrillation and keep it from returning. They can also relieve symptoms. These treatments include medicine, electrical cardioversion to shock the heart back to a normal rhythm, a procedure called catheter ablation, and heart surgery. · Stroke prevention. You and your doctor can decide how to lower your risk. You may decide to take a blood-thinning medicine, such as aspirin or an anticoagulant. How can you live well with it? You can live well and help manage atrial fibrillation by having a heart-healthy lifestyle. To have a heart-healthy lifestyle:  · Don't smoke. · Eat heart-healthy foods. · Be active. Talk to your doctor about what type and level of exercise is safe for you. · Stay at a healthy weight. Lose weight if you need to. · Manage stress. · Avoid alcohol if it triggers symptoms. · Manage other health problems such as high blood pressure, high cholesterol, and diabetes. · Avoid getting sick from the flu. Get a flu shot every year. Where can you learn more? Go to http://zoie-nusrat.info/. Enter [phone] in the search box to learn more about \"Learning About Atrial Fibrillation. \"  Current as of: September 21, 2016  Content Version: 11.4  © 6751-0528 [employer].  Care instructions adapted under license by [employer] (which disclaims liability or warranty for this information). If you have questions about a medical condition or this instruction, always ask your healthcare professional. [place] [address] any warranty or liability for your use of this information. Pacemaker Placement: What to Expect at [facility] placement is surgery to put a pacemaker in your chest. This surgery may be done if you have bradycardia (a slow heart rate). A pacemaker is a small, battery-powered device. It sends electrical signals to the heart. These signals work to keep the heartbeat steady. Thin wires, called leads, carry the signals from the pacemaker to the heart. A pacemaker can prevent or reduce dizziness, fainting, and shortness of breath caused by a slow or unsteady heartbeat. Your chest may be sore where the doctor made the cut (incision) and put in the pacemaker. You also may have a bruise and mild swelling. These symptoms usually get better in 1 to 2 weeks. You may feel a hard ridge along the incision. This usually gets softer in the months after surgery. You may be able to see or feel the outline of the pacemaker under your skin. You will probably be able to go back to work or your usual routine 1 to 2 weeks after surgery. Pacemaker batteries usually last 5 to 15 years. Your doctor will talk to you about how often you will need to have your pacemaker checked. You can safely use most household and office electronics. This includes things such as kitchen appliances, electric power tools, and computers. You will need to stay away from things with strong magnetic and electrical fields. This includes things such as an MRI machine (unless your pacemaker is safe for an MRI), welding equipment, and power generators. You can use a cell phone, but keep it at least 6 inches away from your pacemaker. Check with your doctor about what you need to stay away from, what you need to use with care, and what is okay to use.   This care sheet gives you a general idea about how long it will take for you to recover. But each person recovers at a different pace. Follow the steps below to get better as quickly as possible. How can you care for yourself at home? Activity  ? · Rest when you feel tired. ? · Be active. Walking is a good choice. ? · For 4 to 6 weeks:  ¨ Avoid activities that strain your chest or upper arm muscles. This includes pushing a  or vacuum, or mopping floors. It also includes swimming, or swinging a golf club or tennis racquet. ¨ Do not raise your arm (the one on the side of your body where the pacemaker is located) above your shoulder. ¨ Allow your body to heal. Don't move quickly or lift anything heavy until you are feeling better. ? · Many people are able to return to work within 1 to 2 weeks after surgery. ? · Ask your doctor when it is okay for you to have sex. Diet  ? · You can eat your normal diet. If your stomach is upset, try bland, low-fat foods like plain rice, broiled chicken, toast, and yogurt. Medicines  ? · Your doctor will tell you if and when you can restart your medicines. He or she will also give you instructions about taking any new medicines. ? · If you take aspirin or some other blood thinner, be sure to talk to your doctor. He or she will tell you if and when to start taking this medicine again. Make sure that you understand exactly what your doctor wants you to do. ? · Be safe with medicines. Read and follow all instructions on the label. ¨ If the doctor gave you a prescription medicine for pain, take it as prescribed. ¨ If you are not taking a prescription pain medicine, ask your doctor if you can take an over-the-counter medicine. ¨ Do not take aspirin, ibuprofen (Advil, Motrin), naproxen (Aleve), or other nonsteroidal anti-inflammatory drugs (NSAIDs) unless your doctor says it is okay. ? · If your doctor prescribed antibiotics, take them as directed.  Do not stop taking them just because you feel better. You need to take the full course of antibiotics. Incision care  ? · If you have strips of tape on the incision, leave the tape on for a week or until it falls off.   ? · Keep the incision dry while it heals. Your doctor may recommend sponge baths for about 7 days, but do not get the incision wet. Your doctor will let you know when you may take showers. After a shower, pat the incision dry. ? · Don't use hydrogen peroxide or alcohol on the incision, which can slow healing. You may cover the area with a gauze bandage if it oozes fluid or rubs against clothing. Change the bandage every day. ? · Do not take a bath or get into a hot tub for the first 2 weeks, or until your doctor tells you it is okay. Other instructions  ? · Keep a medical ID card with you at all times that says you have a pacemaker. The card should include the  and model information. ? · Wear medical alert jewelry stating that you have a pacemaker. You can buy this at most [employer]. ? · Check your pulse as directed by your doctor. ? · Have your pacemaker checked as often as your doctor recommends. In some cases, this may be done over the phone or the Internet. Your doctor will give you instructions about how to do this. Follow-up care is a key part of your treatment and safety. Be sure to make and go to all appointments, and call your doctor if you are having problems. It's also a good idea to know your test results and keep a list of the medicines you take. When should you call for help? Call 911 anytime you think you may need emergency care. For example, call if:  ? · You passed out (lost consciousness). ? · You have trouble breathing. ?Call your doctor now or seek immediate medical care if:  ? · You are dizzy or light-headed, or you feel like you may faint. ? · You have pain that does not get better after you take pain medicine.    ? · You hear an alarm or feel a vibration from your pacemaker. ? · You have loose stitches, or your incision comes open. ? · Bright red blood has soaked through the bandage over your incision. ? · You have signs of infection, such as:  ¨ Increased pain, swelling, warmth, or redness. ¨ Red streaks leading from the incision. ¨ Pus draining from the incision. ¨ A fever. ? Watch closely for changes in your health, and be sure to contact your doctor if:  ? · You have any problems with your pacemaker. Where can you learn more? Go to http://zoie-nusrat.info/. Enter G550 in the search box to learn more about \"Pacemaker Placement: What to Expect at Home. \"  Current as of: September 21, 2016  Content Version: 11.4  © 9382-0395 [employer]. Care instructions adapted under license by [employer] (which disclaims liability or warranty for this information). If you have questions about a medical condition or this instruction, always ask your healthcare professional. [place] [address] any warranty or liability for your use of this information.

## 2018-06-22 NOTE — PROGRESS NOTES
Bedside and Verbal shift change report given to [facility] (oncoming nurse) by self (offgoing nurse). Report included the following information SBAR, Kardex, Intake/Output, MAR, Accordion, Recent Results and Med Rec Status. Left subclavian site assessed at bedside.

## 2018-06-22 NOTE — DISCHARGE SUMMARY
[facility] Cardiology Discharge Summary     Patient ID:  Bernardo Lebron  [id number]  82 y.o.  1935    Admit date: 6/20/2018    Discharge date and time:  6-22-18    Admitting Physician: Pepito Gaytan MD     Discharge Physician: Ana Velez NP/Dr. Larry Menendez    Admission Diagnoses: Atrial flutter with rapid ventricular response [facility])  Atrial flutter with rapid ventricular response [facility])    Discharge Diagnoses:   Patient Active Problem List    Diagnosis Date Noted    Atrial flutter with rapid ventricular response ([facility] 06/20/2018    History of CVA (cerebrovascular accident) 06/20/2018    SSS (sick sinus syndrome) ([facility] 06/13/2018    Dysphagia due to recent cerebrovascular accident (CVA) 05/24/2018    CAD, multiple vessel 05/22/2018    [facility] 05/[facility]    S/P CABG (coronary artery bypass graft) 05/18/2018    Hypothyroidism 05/18/2018    Current chronic use of systemic steroids 05/18/2018    Essential hypertension 05/16/2018       Cardiology Procedures this admission:  Pacemaker Implantation, CXR  Consults: none    Hospital Course: Patient was seen in ED for rapid heart rates. Upon arrival to the ER, patient was found to be tachycardic. EKG showed possible atrial flutter with RVR. He was given IV lopressor 5mg x3 doses and 20mg IV Cardizem followed by Cardizem drip. He is chronically anticoagulated on Eliquis. The patient was admitted to telemetry for further evaluation. The patient was scheduled for PPM out patient but admitted for RVR. He remained on cardizem gtt with planned pacemaker the following day. Pt was taken to the cath lab by Dr. Larry Menendez. Pt received a Biotronik dual chamber pacemaker without complication. Follow up CXR showed no pneumothorax. Pt tolerated the procedure well and was taken to the telemetry floor for recovery. The following morning Pt was up feeling well without any complaints of CP, SOB or palpitations.  The patient's device was interrogated prior to discharge showing normal function. Pt's left subclavian PM site was clean, dry and intact without hematoma. Pt's labs were WNL. Pt was seen and examined by Dr. Rick Rinne and determined stable and ready for discharge. Pt was instructed to follow PM discharge instructions given by nursing staff. The patient will be discharged home on po cardizem for his rate control and continue home dose eliquis. The patient will have device/site check on 7/6/18 at 1130 am in [place] office     DISPOSITION: The patient is being discharged home in stable condition on a low saturated fat, low cholesterol and low salt diet. Pt is instructed to advance activities as tolerated limited to fatigue or shortness of breath. Pt is instructed not to lift anything over 5-10 lbs with affected arm. No pushing or pulling or lifting arm above shoulder. See complete discharge instructions. Pt is instructed to watch PM site for bleeding/oozing, if seen Pt is instructed to apply firm pressure with clean cloth and call office at [phone]. Pt is instructed to watch for signs of infection which include increasing area of redness around site, fever/hot to touch or purulent drainage. Pt is instructed to call office or return to ER for immediate evaluation of any shortness of breath, chest pain or palpitations. Discharge Exam:   Visit Vitals    BP (!) 142/94 (BP 1 Location: Right arm, BP Patient Position: At rest)    Pulse 90    Temp 99.5 °F (37.5 °C)    Resp 18    Ht 5' 11\" (1.803 m)    Wt 81.4 kg (179 lb 8 oz)    SpO2 97%    BMI 25.04 kg/m2      Pt has been seen by Dr. Rick Rinne: see his progress note for exam details.     Recent Results (from the past 24 hour(s))   GLUCOSE, POC    Collection Time: 06/21/18 10:39 AM   Result Value Ref Range    Glucose (POC) 82 65 - 100 mg/dL   EKG, 12 LEAD, INITIAL    Collection Time: 06/21/18  6:08 PM   Result Value Ref Range    Ventricular Rate 76 BPM    Atrial Rate 166 BPM    QRS Duration 110 ms    Q-T Interval 428 ms    QTC Calculation (Bezet) 481 ms    Calculated R Axis -47 degrees    Calculated T Axis -178 degrees    Diagnosis       Atrial fibrillation with occasional ventricular-paced complexes  Left axis deviation  Nonspecific T wave abnormality  Prolonged QT  Abnormal ECG  When compared with ECG of 20-JUN-2018 21:02,  Electronic ventricular pacemaker new  Confirmed by LANEY PETERSON (), Vipin Baron ([id number]) on 6/22/2018 7:53:37 AM           Patient Instructions:   Current Discharge Medication List      START taking these medications    Details   atorvastatin (LIPITOR) 80 mg tablet Take 1 Tab by mouth nightly. Qty: 30 Tab, Refills: 11      dilTIAZem CD (CARDIZEM CD) 180 mg ER capsule Take 1 Cap by mouth daily. Qty: 30 Cap, Refills: 11      traMADol (ULTRAM) 50 mg tablet Take 1 Tab by mouth every six (6) hours as needed for Pain. Max Daily Amount: 200 mg. Qty: 10 Tab, Refills: 0    Associated Diagnoses: SSS (sick sinus syndrome) ([address]         CONTINUE these medications which have NOT CHANGED    Details   apixaban (ELIQUIS) 5 mg tablet Take 1 Tab by mouth two (2) times a day. Qty: 60 Tab, Refills: 5      aspirin delayed-release 81 mg tablet Take 1 Tab by mouth daily. Qty: 30 Tab, Refills: 10      levothyroxine (SYNTHROID) 88 mcg tablet Take  by mouth Daily (before breakfast). bisacodyl (DULCOLAX) 5 mg EC tablet Take 1 Tab by mouth daily. Qty: 1 Tab, Refills: 0      predniSONE (DELTASONE) 5 mg tablet Take 5 mg by mouth daily.          STOP taking these medications       quinapril-hydroCHLOROthiazide (ACCURETIC) 20-12.5 mg per tablet Comments:   Reason for Stopping:                 Signed:  Saskia Oleary NP  6/22/2018  8:03 AM

## 2018-06-25 ENCOUNTER — PATIENT OUTREACH (OUTPATIENT)
Dept: CASE MANAGEMENT | Age: 83
End: 2018-06-25

## 2018-06-25 NOTE — PROGRESS NOTES
Please note patient is engaged with Anita Mcnulty RN CCM. Will notify of d/c and need of outreach. Abby Mazariegos LPN/ Care Coordinator  [facility]  [address] [place] [address] / [address]  [email or web address] note will not be viewable in [address]

## 2018-06-25 NOTE — PROGRESS NOTES
Transition of Care Discharge Follow-up Questionnaire   Date/Time of Call:   6/25/18  Spoke with spouse primary caregiver  3:15 pm   What was the patient hospitalized for?   6/20/18 through 6/22/18 Atrial flutter with rapid ventricular response ([address] -pacemaker placed   · Recent NSTEMI followed by CABG, Afib and then CVA.  At [facility] [address] [facility] admission for CVA tx. Does the patient understand his/her diagnosis and/or treatment and what happened during the hospitalization? Primary caregiver verbalized understanding of medical diagnoses    Did the patient receive discharge instructions? Yes    CM Assessed Risk for Readmission:                     Patient stated Risk for Readmission:      RRAT score of 22:  pacemaker placed 2 lead- Recent NSTEMI followed by CABG, Afib and then CVA.  At [facility] [address] [facility] admission for CVA tx. Any medical issue that may happen, CVA and/or heart symptoms     Review any discharge instructions (see discharge instructions/AVS in [facility]). Ask patient if they understand these. Do they have any questions? Reviewed per [facility] with caregiver    Were home services ordered (nursing, PT, OT, ST, etc.)? Yes, [facility]    If so, has the first visit occurred? If not, why? (Assist with coordination of services if necessary.)   Yes     Was any DME ordered? No      If so, has it been received? If not, why?  (Assist patient in obtaining DME orders &/or equipment if necessary.)   n/a        Complete a review of all medications (new, continued and discontinued meds per the D/C instructions and medication tab in [facility]). Reviewed patients medication per [facility] with caregiver and verbalized understanding and denies any issues          Were all new prescriptions filled?   If not, why?  (Assist patient in obtaining medications if necessary  escalate for CCM &/or SW if ongoing issues are verbalized by pt or anticipated)     Yes        Does the patient understand the purpose and dosing instructions for all medications? (If patient has questions, provide explanation and education.)   Denies any issues at this time and verbalizes understanding      Does the patient have any problems in performing ADLs? (If patient is unable to perform ADLs  what is the limiting factor(s)? Do they have a support system that can assist? If no support system is present, discuss possible assistance that they may be able to obtain. Escalate for CCM/SW if ongoing issues are verbalized by pt or anticipated)       Yes, requires assist with all ADLs and spouse assists as needed      Does the patient have all follow-up appointments scheduled? 7 day f/up with PCP?   (f/up with PCP may be w/in 14 days if patient has a f/up with their specialist w/in 7 days)    7-14 day f/up with specialist?   (or per discharge instructions)    If f/up has not been made  what actions has the care coordinator made to accomplish this? Has transportation been arranged? Yes PCP on 7/5/18   Cardiology 6/27/18  Transportation arranged     Any other questions or concerns expressed by the patient? Not at this time  Denies any issues at this time       Schedule next appointment with VALENTINA WHITESIDE Coordinator or refer to RN Case Manager/ per the workflow guidelines. When is care coordinators next follow-up call scheduled? If referred for CCM  what RN care manager was the referral assigned? In 2 weeks due to out of the office for holiday next week. SUSI Call Completed By:   Bob Meadows RN, BSN, CCM /   c: [phone] / [email or web address]         This note will not be viewable in [address]

## 2018-07-10 ENCOUNTER — PATIENT OUTREACH (OUTPATIENT)
Dept: CASE MANAGEMENT | Age: 83
End: 2018-07-10

## 2018-07-10 NOTE — PROGRESS NOTES
Community Care Management  Follow up Outreach Note Outreach type: Phone call: 7/10/18 @ 11:00am 
 
Home visit:  
 
Reason for follow-up: 
 Hospital from 6/20/18 through 6/22/18 Atrial flutter with rapid ventricular response ([address] -pacemaker placed Recent NSTEMI followed by CABG, Afib and then CVA. At [facility] now after [facility] admission for CVA tx. Disease specific complaints/issues:  
pacemaker placed 2 lead- Recent NSTEMI followed by CABG, Afib and then CVA. At [facility] now after [facility] admission for CVA tx. Patient progress towards goals set from last contact: 
 Patient being followed closely by [address] [place] [address] and cardiologist 
  
CM Assessed Risk for Readmission:  
 
 
 
Patient stated Risk for Readmission: RRAT score of 22:  pacemaker placed 2 lead- Recent NSTEMI followed by CABG, Afib and then CVA. At [facility] now after [facility] admission for CVA tx. Any medical issue that may happen, CVA and/or heart symptoms Has patient attended any PCP or specialist follow-up appointments since last contact? What was outcome of appointment? When is next follow-up scheduled? PCP 7/5/18 Cardiology 7/6/18 Review medications. Any medication changes since last outreach? Does patient have any questions or issues related to their medications? Reviewed and PCP/cardiologist lowered synthroid dosage  no other changes Verbalized understanding of medications and dosing instructions Home health active? If yes  any issue? Progress? [place] home health services Referrals needed? 
(, Diabetes education, HH, etc. ) Not at this time Other issues/Miscellaneous? (Transportation, access to meals, ability to perform ADLs, adequate caregiver support, etc.) Spouse assists patient as needed Good family support Next Outreach Scheduled: In one week Next Steps/Goals: 
 Continue working with home health to regain strength Community Care Manager:  
Adam DENSON, RN, [facility] /  
c: [phone] / [email or web address] This note will not be viewable in [address]

## 2018-07-17 ENCOUNTER — PATIENT OUTREACH (OUTPATIENT)
Dept: CASE MANAGEMENT | Age: 83
End: 2018-07-17

## 2018-07-17 NOTE — PROGRESS NOTES
Community Care Management  Follow up Outreach Note Outreach type: Phone call: 7/17/18 @ 10:00am 
  
Home visit:  
  
Reason for follow-up: Hospital from 6/20/18 through 6/22/18 Atrial flutter with rapid ventricular response (H[facility]) -pacemaker placed Recent NSTEMI followed by CABG, Afib and then CVA.  At [address] [place] [facility] admission for CVA tx. Disease specific complaints/issues:   
pacemaker placed 2 lead- Recent NSTEMI followed by CABG, Afib and then CVA.  At [facility] [address] [place] [facility] admission for CVA tx.   
   
  
Patient progress towards goals set from last contact: Patient being followed closely by [address] [place] [address] and cardiologist 
Teach patient/caregiver energy conservation techniques: Instruct patient to alternate rest periods with periods of activity, use of bedside commode and energy-saving devices, rest/activity balance, encourage patient to ask for assistance with ADLs/IADLs  
CM Assessed Risk for Readmission:  
  
  
  
Patient stated Risk for Readmission: RRAT score of 22:  pacemaker placed 2 lead- Recent NSTEMI followed by CABG, Afib and then CVA.  [address]  [address] [facility] admission for CVA tx.   
  
  
  
  
  
Any medical issue that may happen, CVA and/or heart symptoms    
Has patient attended any PCP or specialist follow-up appointments since last contact? 
  
What was outcome of appointment? 
  
When is next follow-up scheduled? PCP 7/5/18 Cardiology 7/6/18 Review medications. 
  
Any medication changes since last outreach? 
  
Does patient have any questions or issues related to their medications? Reviewed and PCP/cardiologist lowered synthroid dosage  no other changes 
  
Verbalized understanding of medications and dosing instructions Home health active? If yes  any issue? Progress? [place] home health services Referrals needed? 
(SW, Diabetes education, HH, etc. ) Not at this time Other issues/Miscellaneous?  (Transportation, access to meals, ability to perform ADLs, adequate caregiver support, etc.)   
  
Spouse assists patient as needed Good family support  
  
   
Next Outreach Scheduled: In one week  
  
Next Steps/Goals: Continue working with home health to regain strength Teach patient/caregiver energy conservation techniques: Instruct patient to alternate rest periods with periods of activity, use of bedside commode and energy-saving devices, rest/activity balance, encourage patient to ask for assistance with ADLs/IADLs Community Care Manager:  
Ricardo DENSON RN, [facility] /  
c: [phone] / [email or web address] This note will not be viewable in [address]

## 2018-07-18 ENCOUNTER — HOSPITAL ENCOUNTER (INPATIENT)
Age: 83
LOS: 5 days | Discharge: HOME HEALTH | DRG: 872 | End: 2018-07-23
Attending: EMERGENCY MEDICINE | Admitting: FAMILY MEDICINE
Payer: MEDICARE

## 2018-07-18 ENCOUNTER — APPOINTMENT (OUTPATIENT)
Dept: GENERAL RADIOLOGY | Age: 83
DRG: 872 | End: 2018-07-18
Attending: EMERGENCY MEDICINE
Payer: MEDICARE

## 2018-07-18 DIAGNOSIS — I48.92 ATRIAL FLUTTER WITH RAPID VENTRICULAR RESPONSE (HCC): Primary | ICD-10-CM

## 2018-07-18 DIAGNOSIS — N30.01 ACUTE CYSTITIS WITH HEMATURIA: ICD-10-CM

## 2018-07-18 PROBLEM — D64.9 NORMOCYTIC ANEMIA: Status: ACTIVE | Noted: 2018-07-18

## 2018-07-18 PROBLEM — I48.91 ATRIAL FIBRILLATION WITH RVR (HCC): Status: ACTIVE | Noted: 2018-07-18

## 2018-07-18 PROBLEM — I48.19 PERSISTENT ATRIAL FIBRILLATION (HCC): Status: ACTIVE | Noted: 2018-07-18

## 2018-07-18 PROBLEM — I48.0 PAF (PAROXYSMAL ATRIAL FIBRILLATION) (HCC): Chronic | Status: RESOLVED | Noted: 2018-05-20 | Resolved: 2018-07-18

## 2018-07-18 PROBLEM — R10.9 ABDOMINAL PAIN: Status: ACTIVE | Noted: 2018-07-18

## 2018-07-18 PROBLEM — N39.0 UTI (URINARY TRACT INFECTION): Status: ACTIVE | Noted: 2018-07-18

## 2018-07-18 LAB
ALBUMIN SERPL-MCNC: 3.2 G/DL (ref 3.2–4.6)
ALBUMIN/GLOB SERPL: 1 {RATIO} (ref 1.2–3.5)
ALP SERPL-CCNC: 103 U/L (ref 50–136)
ALT SERPL-CCNC: 29 U/L (ref 12–65)
ANION GAP SERPL CALC-SCNC: 12 MMOL/L (ref 7–16)
AST SERPL-CCNC: 24 U/L (ref 15–37)
BACTERIA URNS QL MICRO: ABNORMAL /HPF
BASOPHILS # BLD: 0 K/UL (ref 0–0.2)
BASOPHILS NFR BLD: 0 % (ref 0–2)
BILIRUB SERPL-MCNC: 0.7 MG/DL (ref 0.2–1.1)
BNP SERPL-MCNC: 290 PG/ML
BUN SERPL-MCNC: 14 MG/DL (ref 8–23)
CALCIUM SERPL-MCNC: 8.5 MG/DL (ref 8.3–10.4)
CASTS URNS QL MICRO: 0 /LPF
CHLORIDE SERPL-SCNC: 105 MMOL/L (ref 98–107)
CO2 SERPL-SCNC: 23 MMOL/L (ref 21–32)
CREAT SERPL-MCNC: 1.44 MG/DL (ref 0.8–1.5)
CRYSTALS URNS QL MICRO: 0 /LPF
DIFFERENTIAL METHOD BLD: ABNORMAL
EOSINOPHIL # BLD: 0 K/UL (ref 0–0.8)
EOSINOPHIL NFR BLD: 0 % (ref 0.5–7.8)
EPI CELLS #/AREA URNS HPF: 0 /HPF
ERYTHROCYTE [DISTWIDTH] IN BLOOD BY AUTOMATED COUNT: 16.1 % (ref 11.9–14.6)
GLOBULIN SER CALC-MCNC: 3.2 G/DL (ref 2.3–3.5)
GLUCOSE SERPL-MCNC: 108 MG/DL (ref 65–100)
HCT VFR BLD AUTO: 37 % (ref 41.1–50.3)
HGB BLD-MCNC: 11.9 G/DL (ref 13.6–17.2)
IMM GRANULOCYTES # BLD: 0 K/UL (ref 0–0.5)
IMM GRANULOCYTES NFR BLD AUTO: 0 % (ref 0–5)
LACTATE BLD-SCNC: 2.1 MMOL/L (ref 0.5–1.9)
LYMPHOCYTES # BLD: 0.6 K/UL (ref 0.5–4.6)
LYMPHOCYTES NFR BLD: 7 % (ref 13–44)
MAGNESIUM SERPL-MCNC: 1.7 MG/DL (ref 1.8–2.4)
MCH RBC QN AUTO: 28.6 PG (ref 26.1–32.9)
MCHC RBC AUTO-ENTMCNC: 32.2 G/DL (ref 31.4–35)
MCV RBC AUTO: 88.9 FL (ref 79.6–97.8)
MONOCYTES # BLD: 0.1 K/UL (ref 0.1–1.3)
MONOCYTES NFR BLD: 1 % (ref 4–12)
MUCOUS THREADS URNS QL MICRO: 0 /LPF
NEUTS SEG # BLD: 7.7 K/UL (ref 1.7–8.2)
NEUTS SEG NFR BLD: 92 % (ref 43–78)
OTHER OBSERVATIONS,UCOM: ABNORMAL
PLATELET # BLD AUTO: 184 K/UL (ref 150–450)
PMV BLD AUTO: 9.3 FL (ref 10.8–14.1)
POTASSIUM SERPL-SCNC: 3.8 MMOL/L (ref 3.5–5.1)
PROT SERPL-MCNC: 6.4 G/DL (ref 6.3–8.2)
RBC # BLD AUTO: 4.16 M/UL (ref 4.23–5.67)
RBC #/AREA URNS HPF: >100 /HPF
SODIUM SERPL-SCNC: 140 MMOL/L (ref 136–145)
TROPONIN I BLD-MCNC: 0.1 NG/ML (ref 0.02–0.05)
WBC # BLD AUTO: 8.5 K/UL (ref 4.3–11.1)
WBC URNS QL MICRO: ABNORMAL /HPF
YEAST URNS QL MICRO: ABNORMAL

## 2018-07-18 PROCEDURE — 81015 MICROSCOPIC EXAM OF URINE: CPT | Performed by: INTERNAL MEDICINE

## 2018-07-18 PROCEDURE — 74011000250 HC RX REV CODE- 250: Performed by: EMERGENCY MEDICINE

## 2018-07-18 PROCEDURE — 83880 ASSAY OF NATRIURETIC PEPTIDE: CPT | Performed by: EMERGENCY MEDICINE

## 2018-07-18 PROCEDURE — 65660000000 HC RM CCU STEPDOWN

## 2018-07-18 PROCEDURE — 87086 URINE CULTURE/COLONY COUNT: CPT | Performed by: INTERNAL MEDICINE

## 2018-07-18 PROCEDURE — 74011250636 HC RX REV CODE- 250/636: Performed by: EMERGENCY MEDICINE

## 2018-07-18 PROCEDURE — 85025 COMPLETE CBC W/AUTO DIFF WBC: CPT | Performed by: EMERGENCY MEDICINE

## 2018-07-18 PROCEDURE — 93005 ELECTROCARDIOGRAM TRACING: CPT | Performed by: EMERGENCY MEDICINE

## 2018-07-18 PROCEDURE — 87205 SMEAR GRAM STAIN: CPT | Performed by: EMERGENCY MEDICINE

## 2018-07-18 PROCEDURE — 87040 BLOOD CULTURE FOR BACTERIA: CPT | Performed by: EMERGENCY MEDICINE

## 2018-07-18 PROCEDURE — 83735 ASSAY OF MAGNESIUM: CPT | Performed by: EMERGENCY MEDICINE

## 2018-07-18 PROCEDURE — 87186 SC STD MICRODIL/AGAR DIL: CPT | Performed by: INTERNAL MEDICINE

## 2018-07-18 PROCEDURE — 96365 THER/PROPH/DIAG IV INF INIT: CPT | Performed by: EMERGENCY MEDICINE

## 2018-07-18 PROCEDURE — 96375 TX/PRO/DX INJ NEW DRUG ADDON: CPT | Performed by: EMERGENCY MEDICINE

## 2018-07-18 PROCEDURE — 99285 EMERGENCY DEPT VISIT HI MDM: CPT | Performed by: EMERGENCY MEDICINE

## 2018-07-18 PROCEDURE — 71045 X-RAY EXAM CHEST 1 VIEW: CPT

## 2018-07-18 PROCEDURE — 83605 ASSAY OF LACTIC ACID: CPT

## 2018-07-18 PROCEDURE — 87641 MR-STAPH DNA AMP PROBE: CPT | Performed by: EMERGENCY MEDICINE

## 2018-07-18 PROCEDURE — 87077 CULTURE AEROBIC IDENTIFY: CPT | Performed by: EMERGENCY MEDICINE

## 2018-07-18 PROCEDURE — 80053 COMPREHEN METABOLIC PANEL: CPT | Performed by: EMERGENCY MEDICINE

## 2018-07-18 PROCEDURE — 87088 URINE BACTERIA CULTURE: CPT | Performed by: INTERNAL MEDICINE

## 2018-07-18 PROCEDURE — 84484 ASSAY OF TROPONIN QUANT: CPT

## 2018-07-18 PROCEDURE — 74011250637 HC RX REV CODE- 250/637: Performed by: FAMILY MEDICINE

## 2018-07-18 PROCEDURE — 96366 THER/PROPH/DIAG IV INF ADDON: CPT | Performed by: EMERGENCY MEDICINE

## 2018-07-18 PROCEDURE — 96368 THER/DIAG CONCURRENT INF: CPT | Performed by: EMERGENCY MEDICINE

## 2018-07-18 PROCEDURE — 96367 TX/PROPH/DG ADDL SEQ IV INF: CPT | Performed by: EMERGENCY MEDICINE

## 2018-07-18 PROCEDURE — 74011000258 HC RX REV CODE- 258: Performed by: EMERGENCY MEDICINE

## 2018-07-18 PROCEDURE — 87186 SC STD MICRODIL/AGAR DIL: CPT | Performed by: EMERGENCY MEDICINE

## 2018-07-18 RX ORDER — DILTIAZEM HYDROCHLORIDE 5 MG/ML
20 INJECTION INTRAVENOUS ONCE
Status: COMPLETED | OUTPATIENT
Start: 2018-07-18 | End: 2018-07-18

## 2018-07-18 RX ORDER — LEVOTHYROXINE SODIUM 75 UG/1
75 TABLET ORAL
Status: DISCONTINUED | OUTPATIENT
Start: 2018-07-19 | End: 2018-07-19

## 2018-07-18 RX ORDER — TRAMADOL HYDROCHLORIDE 50 MG/1
50 TABLET ORAL
Status: DISCONTINUED | OUTPATIENT
Start: 2018-07-18 | End: 2018-07-23 | Stop reason: HOSPADM

## 2018-07-18 RX ORDER — DILTIAZEM HYDROCHLORIDE 180 MG/1
180 CAPSULE, COATED, EXTENDED RELEASE ORAL DAILY
Status: DISCONTINUED | OUTPATIENT
Start: 2018-07-19 | End: 2018-07-19

## 2018-07-18 RX ORDER — ADHESIVE BANDAGE
30 BANDAGE TOPICAL DAILY PRN
Status: DISCONTINUED | OUTPATIENT
Start: 2018-07-18 | End: 2018-07-23 | Stop reason: HOSPADM

## 2018-07-18 RX ORDER — ASPIRIN 81 MG/1
81 TABLET ORAL DAILY
Status: DISCONTINUED | OUTPATIENT
Start: 2018-07-19 | End: 2018-07-23 | Stop reason: HOSPADM

## 2018-07-18 RX ORDER — ACETAMINOPHEN 325 MG/1
650 TABLET ORAL
Status: DISCONTINUED | OUTPATIENT
Start: 2018-07-18 | End: 2018-07-23 | Stop reason: HOSPADM

## 2018-07-18 RX ORDER — ATORVASTATIN CALCIUM 40 MG/1
80 TABLET, FILM COATED ORAL
Status: DISCONTINUED | OUTPATIENT
Start: 2018-07-19 | End: 2018-07-23 | Stop reason: HOSPADM

## 2018-07-18 RX ORDER — SODIUM CHLORIDE 0.9 % (FLUSH) 0.9 %
5-10 SYRINGE (ML) INJECTION AS NEEDED
Status: DISCONTINUED | OUTPATIENT
Start: 2018-07-18 | End: 2018-07-23 | Stop reason: HOSPADM

## 2018-07-18 RX ORDER — SODIUM CHLORIDE 0.9 % (FLUSH) 0.9 %
5-10 SYRINGE (ML) INJECTION EVERY 8 HOURS
Status: DISCONTINUED | OUTPATIENT
Start: 2018-07-19 | End: 2018-07-23 | Stop reason: HOSPADM

## 2018-07-18 RX ORDER — MAGNESIUM SULFATE HEPTAHYDRATE 40 MG/ML
2 INJECTION, SOLUTION INTRAVENOUS ONCE
Status: COMPLETED | OUTPATIENT
Start: 2018-07-18 | End: 2018-07-18

## 2018-07-18 RX ADMIN — ATORVASTATIN CALCIUM 80 MG: 40 TABLET, FILM COATED ORAL at 23:53

## 2018-07-18 RX ADMIN — APIXABAN 5 MG: 5 TABLET, FILM COATED ORAL at 23:53

## 2018-07-18 RX ADMIN — MAGNESIUM SULFATE HEPTAHYDRATE 2 G: 40 INJECTION, SOLUTION INTRAVENOUS at 19:16

## 2018-07-18 RX ADMIN — SODIUM CHLORIDE 1000 ML: 900 INJECTION, SOLUTION INTRAVENOUS at 22:36

## 2018-07-18 RX ADMIN — CEFTRIAXONE SODIUM 1 G: 1 INJECTION, POWDER, FOR SOLUTION INTRAMUSCULAR; INTRAVENOUS at 22:16

## 2018-07-18 RX ADMIN — DILTIAZEM HYDROCHLORIDE 20 MG: 5 INJECTION INTRAVENOUS at 17:49

## 2018-07-18 RX ADMIN — SODIUM CHLORIDE 10 MG/HR: 900 INJECTION, SOLUTION INTRAVENOUS at 18:24

## 2018-07-18 RX ADMIN — Medication 10 ML: at 23:55

## 2018-07-18 NOTE — IP AVS SNAPSHOT
[facility] 
 
 
 [facility] 
[phone] Patient: Betina Bustillo MRN: [id number] :1935 About your hospitalization You were admitted on:  2018 You last received care in the:  [facility] 3 TELEMETRY You were discharged on:  2018 Why you were hospitalized Your primary diagnosis was:  Uti (Urinary Tract Infection) Your diagnoses also included:  Atrial Fibrillation With Rvr (Hcc), Essential Hypertension, S/P Cabg (Coronary Artery Bypass Graft), Paf (Paroxysmal Atrial Fibrillation) (Hcc), Cad, Multiple Vessel, History Of Cva (Cerebrovascular Accident), Hypothyroidism, Normocytic Anemia, Persistent Atrial Fibrillation (Hcc), Abdominal Pain, Sepsis Secondary To Uti ([facility]), Gram-Negative Bacteremia Follow-up Information Follow up With Details Comments Contact Info Dahiana Byrd MD On 2018 at 8:30am. [address] 175 Pinky Cortez [address] [place] [address] 
[phone] [facility] On 2018 at 4:30pm. 2 Reshma Solano [id number] [place] [address] 
[phone] Your Scheduled Appointments 2018  8:45 AM EDT Office Visit with Dahiana Byrd MD  
175 Pinky Cortez ([facility]) [address]  
[phone] 2018  4:30 PM EDT HOSPITAL FOLLOW-UP with Joao Blakely, [address] [place] [address] OFFICE ([address] [place] [address]) 2 Reshma Solano 400 Wilfred Alvarez   
[phone] Discharge Orders None A check bertha indicates which time of day the medication should be taken. My Medications START taking these medications Instructions Each Dose to Equal  
 Morning Noon Evening Bedtime  
 cefpodoxime 200 mg tablet Commonly known as:  Claude Arvizu  
   
 Take 0.5 Tabs by mouth two (2) times a day for 9 days. 100 mg CHANGE how you take these medications Instructions Each Dose to Equal  
 Morning Noon Evening Bedtime  
 bisacodyl 5 mg EC tablet Commonly known as:  DULCOLAX What changed:   
- when to take this 
- reasons to take this Take 1 Tab by mouth daily. 5 mg CONTINUE taking these medications Instructions Each Dose to Equal  
 Morning Noon Evening Bedtime  
 apixaban 5 mg tablet Commonly known as:  Manasa Tony Take 1 Tab by mouth two (2) times a day. 5 mg  
    
  
   
   
  
   
  
 aspirin delayed-release 81 mg tablet Take 1 Tab by mouth daily. 81 mg  
    
  
   
   
   
  
 atorvastatin 80 mg tablet Commonly known as:  LIPITOR Take 1 Tab by mouth nightly. 80 mg  
    
   
   
   
  
  
 dilTIAZem  mg ER capsule Commonly known as:  CARDIZEM CD Take 1 Cap by mouth daily. 180 mg  
    
  
   
   
   
  
 levothyroxine 75 mcg tablet Commonly known as:  SYNTHROID Take 1 Tab by mouth Daily (before breakfast). 75 mcg  
    
  
   
   
   
  
 predniSONE 5 mg tablet Commonly known as:  Gigi Sprawls Take 5 mg by mouth daily. 5 mg ULTRAM 50 mg tablet Generic drug:  traMADol Take 50 mg by mouth every six (6) hours as needed for Pain. 50 mg Where to Get Your Medications Information on where to get these meds will be given to you by the nurse or doctor. ! Ask your nurse or doctor about these medications  
  cefpodoxime 200 mg tablet Opioid Education Prescription Opioids: What You Need to Know: 
 
Prescription opioids can be used to help relieve moderate-to-severe pain and are often prescribed following a surgery or injury, or for certain health conditions.   These medications can be an important part of treatment but also come with serious risks. Opioids are strong pain medicines. Examples include hydrocodone, oxycodone, fentanyl, and morphine. Heroin is an example of an illegal opioid. It is important to work with your health care provider to make sure you are getting the safest, most effective care. WHAT ARE THE RISKS AND SIDE EFFECTS OF OPIOID USE? Prescription opioids carry serious risks of addiction and overdose, especially with prolonged use. An opioid overdose, often marked by slow breathing, can cause sudden death. The use of prescription opioids can have a number of side effects as well, even when taken as directed. · Tolerance-meaning you might need to take more of a medication for the same pain relief · Physical dependence-meaning you have symptoms of withdrawal when the medication is stopped. Withdrawal symptoms can include nausea, sweating, chills, diarrhea, stomach cramps, and muscle aches. Withdrawal can last up to several weeks, depending on which drug you took and how long you took it. · Increased sensitivity to pain · Constipation · Nausea, vomiting, and dry mouth · Sleepiness and dizziness · Confusion · Depression · Low levels of testosterone that can result in lower sex drive, energy, and strength · Itching and sweating RISKS ARE GREATER WITH:      
· History of drug misuse, substance use disorder, or overdose · Mental health conditions (such as depression or anxiety) · Sleep apnea · Older age (72 years or older) · Pregnancy Avoid alcohol while taking prescription opioids. Also, unless specifically advised by your health care provider, medications to avoid include: · Benzodiazepines (such as Xanax or Valium) · Muscle relaxants (such as Soma or Flexeril) · Hypnotics (such as Ambien or Lunesta) · Other prescription opioids KNOW YOUR OPTIONS Talk to your health care provider about ways to manage your pain that don't involve prescription opioids. Some of these options may actually work better and have fewer risks and side effects. Options may include: 
· Pain relievers such as acetaminophen, ibuprofen, and naproxen · Some medications that are also used for depression or seizures · Physical therapy and exercise · Counseling to help patients learn how to cope better with triggers of pain and stress. · Application of heat or cold compress · Massage therapy · Relaxation techniques Be Informed Make sure you know the name of your medication, how much and how often to take it, and its potential risks & side effects. IF YOU ARE PRESCRIBED OPIOIDS FOR PAIN: 
· Never take opioids in greater amounts or more often than prescribed. Remember the goal is not to be pain-free but to manage your pain at a tolerable level. · Follow up with your primary care provider to: · Work together to create a plan on how to manage your pain. · Talk about ways to help manage your pain that don't involve prescription opioids. · Talk about any and all concerns and side effects. · Help prevent misuse and abuse. · Never sell or share prescription opioids · Help prevent misuse and abuse. · Store prescription opioids in a secure place and out of reach of others (this may include visitors, children, friends, and family). · Safely dispose of unused/unwanted prescription opioids: Find your community drug take-back program or your pharmacy mail-back program, or flush them down the toilet, following guidance from the Food and Drug Administration (www.fda.gov/Drugs/ResourcesForYou). · Visit www.cdc.gov/drugoverdose to learn about the risks of opioid abuse and overdose. · If you believe you may be struggling with addiction, tell your health care provider and ask for guidance or call [address] [place], [place] [address] at 1-688-971-VRQI. Discharge Instructions Atrial Fibrillation: Care Instructions Your Care Instructions Atrial fibrillation is an irregular and often fast heartbeat. Treating this condition is important for several reasons. It can cause blood clots, which can travel from your heart to your brain and cause a stroke. If you have a fast heartbeat, you may feel lightheaded, dizzy, and weak. An irregular heartbeat can also increase your risk for heart failure. Atrial fibrillation is often the result of another heart condition, such as high blood pressure or coronary artery disease. Making changes to improve your heart condition will help you stay healthy and active. Follow-up care is a key part of your treatment and safety. Be sure to make and go to all appointments, and call your doctor if you are having problems. It's also a good idea to know your test results and keep a list of the medicines you take. How can you care for yourself at home? Medicines 
  · Take your medicines exactly as prescribed. Call your doctor if you think you are having a problem with your medicine. You will get more details on the specific medicines your doctor prescribes.  
  · If your doctor has given you a blood thinner to prevent a stroke, be sure you get instructions about how to take your medicine safely. Blood thinners can cause serious bleeding problems.  
  · Do not take any vitamins, over-the-counter drugs, or herbal products without talking to your doctor first.  
 Lifestyle changes 
  · Do not smoke. Smoking can increase your chance of a stroke and heart attack. If you need help quitting, talk to your doctor about stop-smoking programs and medicines. These can increase your chances of quitting for good.  
  · Eat a heart-healthy diet.  
  · Stay at a healthy weight. Lose weight if you need to.  
  · Limit alcohol to 2 drinks a day for men and 1 drink a day for women. Too much alcohol can cause health problems.   · Avoid colds and flu. Get a pneumococcal vaccine shot. If you have had one before, ask your doctor whether you need another dose. Get a flu shot every year. If you must be around people with colds or flu, wash your hands often. Activity 
  · If your doctor recommends it, get more exercise. Walking is a good choice. Bit by bit, increase the amount you walk every day. Try for at least 30 minutes on most days of the week. You also may want to swim, bike, or do other activities. Your doctor may suggest that you join a cardiac rehabilitation program so that you can have help increasing your physical activity safely.  
  · Start light exercise if your doctor says it is okay. Even a small amount will help you get stronger, have more energy, and manage stress. Walking is an easy way to get exercise. Start out by walking a little more than you did in the hospital. Gradually increase the amount you walk.  
  · When you exercise, watch for signs that your heart is working too hard. You are pushing too hard if you cannot talk while you are exercising. If you become short of breath or dizzy or have chest pain, sit down and rest immediately.  
  · Check your pulse regularly. Place two fingers on the artery at the palm side of your wrist, in line with your thumb. If your heartbeat seems uneven or fast, talk to your doctor. When should you call for help? Call 911 anytime you think you may need emergency care. For example, call if: 
  · You have symptoms of a heart attack. These may include: ¨ Chest pain or pressure, or a strange feeling in the chest. 
¨ Sweating. ¨ Shortness of breath. ¨ Nausea or vomiting. ¨ Pain, pressure, or a strange feeling in the back, neck, jaw, or upper belly or in one or both shoulders or arms. ¨ Lightheadedness or sudden weakness. ¨ A fast or irregular heartbeat.  
After you call 911, the  may tell you to chew 1 adult-strength or 2 to 4 low-dose aspirin. Wait for an ambulance. Do not try to drive yourself.  
  · You have symptoms of a stroke. These may include: 
¨ Sudden numbness, tingling, weakness, or loss of movement in your face, arm, or leg, especially on only one side of your body. ¨ Sudden vision changes. ¨ Sudden trouble speaking. ¨ Sudden confusion or trouble understanding simple statements. ¨ Sudden problems with walking or balance. ¨ A sudden, severe headache that is different from past headaches.  
  · You passed out (lost consciousness).  
 Call your doctor now or seek immediate medical care if: 
  · You have new or increased shortness of breath.  
  · You feel dizzy or lightheaded, or you feel like you may faint.  
  · Your heart rate becomes irregular.  
  · You can feel your heart flutter in your chest or skip heartbeats. Tell your doctor if these symptoms are new or worse.  
 Watch closely for changes in your health, and be sure to contact your doctor if you have any problems. Where can you learn more? Go to http://zoie-nusrat.info/. Enter U020 in the search box to learn more about \"Atrial Fibrillation: Care Instructions. \" Current as of: December 6, 2017 Content Version: 11.7 © 2366-1472 [employer]. Care instructions adapted under license by [employer] (which disclaims liability or warranty for this information). If you have questions about a medical condition or this instruction, always ask your healthcare professional. [place] [address] any warranty or liability for your use of this information. ACO Transitions of Care Introducing [employer] 508 Shara Murphy offers a voluntary care coordination program to provide high quality service and care to [facility] fee-for-service beneficiaries. Jeannette Moore was designed to help you enhance your health and well-being through the following services: ? Transitions of Care  support for individuals who are transitioning from one care setting to another (example: Hospital to home). ? Chronic and Complex Care Coordination  support for individuals and caregivers of those with serious or chronic illnesses or with more than one chronic (ongoing) condition and those who take a number of different medications. If you meet specific medical criteria, a Critical access [address] may call you directly to coordinate your care with your primary care physician and your other care providers. For questions about the [facility] programs, please, contact your physicians office. For general questions or additional information about Accountable Care Organizations: 
Please visit www.medicare.gov/acos. html or call 1-800-MEDICARE (6-884.182.1666) TTY users should call 3-366.345.5534. Introducing [facility] [place] & [facility] SERVICES! [facility] introduces WHILL patient portal. Now you can access parts of your medical record, email your doctor's office, and request medication refills online. 1. In your internet browser, go to [email or web address] 2. Click on the First Time User? Click Here link in the Sign In box. You will see the New Member Sign Up page. 3. Enter your WHILL Access Code exactly as it appears below. You will not need to use this code after youve completed the sign-up process. If you do not sign up before the expiration date, you must request a new code. · WHILL Access Code: [email or web address] Expires: 9/19/2018 11:29 AM 
 
4. Enter the last four digits of your Social Security Number (xxxx) and Date of Birth (mm/dd/yyyy) as indicated and click Submit. You will be taken to the next sign-up page. 5. Create a WHILL ID. This will be your WHILL login ID and cannot be changed, so think of one that is secure and easy to remember. 6. Create a Interactive Fitnesst password. You can change your password at any time. 7. Enter your Password Reset Question and Answer. This can be used at a later time if you forget your password. 8. Enter your e-mail address. You will receive e-mail notification when new information is available in [address] 9. Click Sign Up. You can now view and download portions of your medical record. 10. Click the Download Summary menu link to download a portable copy of your medical information. If you have questions, please visit the Frequently Asked Questions section of the [employer]efan[employer] website. Remember, [employer] is NOT to be used for urgent needs. For medical emergencies, dial 911. Now available from your iPhone and Android! Introducing Johan Aldridge As a [facility] patient, I wanted to make you aware of our electronic visit tool called Johan Aldridge. X-IO 24/iBoxPay allows you to connect within minutes with a medical provider 24 hours a day, seven days a week via a mobile device or tablet or logging into a secure website from your computer. You can access Johan Aldridge from anywhere in the [place]. A virtual visit might be right for you when you have a simple condition and feel like you just dont want to get out of bed, or cant get away from work for an appointment, when your regular Castaneda Tolentino [facility] provider is not available (evenings, weekends or holidays), or when youre out of town and need minor care. Electronic visits cost only $49 and if the X-IO 24/iBoxPay provider determines a prescription is needed to treat your condition, one can be electronically transmitted to a nearby pharmacy*. Please take a moment to enroll today if you have not already done so. The enrollment process is free and takes just a few minutes. To enroll, please download the Vimbly/iBoxPay shahram to your tablet or phone, or visit [email or web address] to enroll on your computer.    
And, as an [address] [place] [address] patient with a [employer] account, the results of your visits will be scanned into your electronic medical record and your primary care provider will be able to view the scanned results. We urge you to continue to see your regular Jessie Borrego provider for your ongoing medical care. And while your primary care provider may not be the one available when you seek a Johan Aldridge virtual visit, the peace of mind you get from getting a real diagnosis real time can be priceless. For more information on eLearning Connectionshuangfin, view our Frequently Asked Questions (FAQs) at [email or web address]. Sincerely, 
 
Yudy Florence MD 
Chief Medical Officer Dangelo Murphy *:  certain medications cannot be prescribed via [employer] Unresulted Labs-Please follow up with your PCP about these lab tests Order Current Status CULTURE, BLOOD Preliminary result Providers Seen During Your Hospitalization Provider Specialty Primary office phone Danyell Anthony MD Emergency Medicine [phone] Keith Muller MD [facility] [phone] Immunizations Administered for This Admission Name Date  
 TB Skin Test (PPD) Intradermal  Deferred (), 7/20/2018 Your Primary Care Physician (PCP) Primary Care Physician Office Phone Office Fax 1013 Garry Hernandez, [address] [place] [address] [phone] You are allergic to the following Allergen Reactions Ativan (Lorazepam) Rash Pcn (Penicillins) Swelling Recent Documentation Height Weight BMI Smoking Status 1.803 m 76.4 kg 23.5 kg/m2 Never Smoker Emergency Contacts Name Discharge Info Relation Home Work Mobile JovelKimiMiracle  Spouse [phone] Celine Schaefer  Daughter [phone] Patient Belongings The following personal items are in your possession at time of discharge: 
  Dental Appliances:  With patient         Home Medications: Sent home Nilesh: Ring  Clothing: At bedside    Other Valuables: None Please provide this summary of care documentation to your next provider. Signatures-by signing, you are acknowledging that this After Visit Summary has been reviewed with you and you have received a copy. Patient Signature:  ____________________________________________________________ Date:  ____________________________________________________________  
  
Orbie Duenas Provider Signature:  ____________________________________________________________ Date:  ____________________________________________________________

## 2018-07-18 NOTE — IP AVS SNAPSHOT
Summary of Care Report The Summary of Care report has been created to help improve care coordination. Users with access to [employer] or [address] (Web-based application) may access additional patient information including the Discharge Summary. If you are not currently a [address] user and need more information, please call the number listed below in the [place] [address] section and ask to be connected with Medical Records. Facility Information Name Address Phone [address] [address] [address] [phone] Patient Information Patient Name Sex FABIAN Ayala ([id number]) Male 1935 Discharge Information Admitting Provider Service Area Unit Brittany Reyes MD / [place] [address] Telemetry / [phone] Discharge Provider Discharge Date/Time Discharge Disposition Destination (none) 2018 Morning (Pending) [facility] (none) Patient Language Language ENGLISH [13] Hospital Problems as of 2018  Reviewed: 2018 11:38 AM by Marisol Alonso DO Class Noted - Resolved Last Modified POA Active Problems Essential hypertension (Chronic)  2018 - Present 2018 by Brittany Reyes MD Yes Entered by Marisol Alonso DO  
  S/P CABG (coronary artery bypass graft) (Chronic)  2018 - Present 2018 by Brittany Reyes MD Yes Entered by Bryn Garsia NP Hypothyroidism (Chronic)  2018 - Present 2018 by Brittany Reyes MD Yes Entered by Bryn Garsia NP  
  CAD, multiple vessel (Chronic)  2018 - Present 2018 by Brittany Reyes MD Yes Entered by Warren Wallace NP Overview Signed 2018 11:32 AM by Warren Wallace NP  
    18 (Dr Shady Diallo) Coronary artery bypass grafting x3 with grafts consisting of: 1.  Left internal mammary artery to left anterior descending artery. 2.  Reverse saphenous vein to diagonal.  
3.  Reversed saphenous vein graft to obtuse marginal.  
  
  
  History of CVA (cerebrovascular accident) (Chronic)  6/20/2018 - Present 7/18/2018 by Vega Victor MD Yes Entered by Korina Burk NP Atrial fibrillation with RVR ([address]  7/18/2018 - Present 7/18/2018 by Korina Burk, NP Yes Entered by Vega Victor MD  
  * (Principal)UTI (urinary tract infection)  7/18/2018 - Present 7/18/2018 by Korina Burk, NP Yes Entered by Vega Victor MD  
  Normocytic anemia  7/18/2018 - Present 7/18/2018 by Vega Victor MD Yes Entered by Vega Victor MD  
  Persistent atrial fibrillation ([address]  7/18/2018 - Present 7/18/2018 by Korina Burk, NP Yes Entered by Korina Burk NP Abdominal pain  7/18/2018 - Present 7/18/2018 by Korina Burk, NP Yes Entered by Korina Burk NP Sepsis secondary to UTI ([address]  7/19/2018 - Present 7/19/2018 by Hipolito Varela MD Yes Entered by Hipolito Varela MD  
  Gram-negative bacteremia  7/19/2018 - Present 7/19/2018 by Hipolito Varela MD Yes Entered by Hipolito Varela MD  
  
Non-Hospital Problems as of 7/23/2018  Reviewed: 6/27/2018 11:38 AM by Juvenal Gibson,  Class Noted - Resolved Last Modified Active Problems Current chronic use of systemic steroids (Chronic)  5/18/2018 - Present 5/18/2018 by Yogi Zhu NP Entered by Yogi Zhu NP Overview Signed 5/18/2018  9:35 AM by Yogi Zhu NP Pituitary tumor surgery in 2001 and in 2013--has been on Prednisone 5 mg daily since 2013. Dysphagia due to recent cerebrovascular accident (CVA)  5/24/2018 - Present 5/24/2018 by Tennis Montgomery, NP   Entered by Tennis Carmel, NP  
  SSS (sick sinus syndrome) ([address]  6/13/2018 - Present 6/20/2018 by Korina Burk NP  
 Entered by Shad Lundberg, DO You are allergic to the following Allergen Reactions Ativan (Lorazepam) Rash Pcn (Penicillins) Swelling Current Discharge Medication List  
  
START taking these medications Dose & Instructions Dispensing Information Comments  
 cefpodoxime 200 mg tablet Commonly known as:  Olivia Barger Dose:  100 mg Take 0.5 Tabs by mouth two (2) times a day for 9 days. Quantity:  9 Tab Refills:  0 CONTINUE these medications which have CHANGED Dose & Instructions Dispensing Information Comments  
 bisacodyl 5 mg EC tablet Commonly known as:  DULCOLAX What changed:   
- when to take this 
- reasons to take this Dose:  5 mg Take 1 Tab by mouth daily. Quantity:  1 Tab Refills:  0 CONTINUE these medications which have NOT CHANGED Dose & Instructions Dispensing Information Comments  
 apixaban 5 mg tablet Commonly known as:  Nica Mckeon Dose:  5 mg Take 1 Tab by mouth two (2) times a day. Quantity:  60 Tab Refills:  5  
   
 aspirin delayed-release 81 mg tablet Dose:  81 mg Take 1 Tab by mouth daily. Quantity:  30 Tab Refills:  10  
   
 atorvastatin 80 mg tablet Commonly known as:  LIPITOR Dose:  80 mg Take 1 Tab by mouth nightly. Quantity:  30 Tab Refills:  11  
   
 dilTIAZem  mg ER capsule Commonly known as:  CARDIZEM CD Dose:  180 mg Take 1 Cap by mouth daily. Quantity:  30 Cap Refills:  11  
   
 levothyroxine 75 mcg tablet Commonly known as:  SYNTHROID Dose:  75 mcg Take 1 Tab by mouth Daily (before breakfast). Quantity:  30 Tab Refills:  1  
   
 predniSONE 5 mg tablet Commonly known as:  Miya Vraner Dose:  5 mg Take 5 mg by mouth daily. Refills:  0  
   
 ULTRAM 50 mg tablet Generic drug:  traMADol Dose:  50 mg Take 50 mg by mouth every six (6) hours as needed for Pain. Refills:  0 Current Immunizations Name Date Influenza High Dose Vaccine PF 11/30/2017, 9/17/2015 Pneumococcal Polysaccharide (PPSV-23) 1/27/2012 TB Skin Test (PPD) Intradermal  Deferred (), 7/20/2018, 5/18/2018 Follow-up Information Follow up With Details Comments Contact Info Mark Molina MD On 7/31/2018 Tuesday July 31st at 8:30am. [address] [facility] 
[phone] [facility] On 8/8/2018 August 8th at 4:30pm. [address]  
[address] [address] [facility] 
[phone] Discharge Instructions Atrial Fibrillation: Care Instructions Your Care Instructions Atrial fibrillation is an irregular and often fast heartbeat. Treating this condition is important for several reasons. It can cause blood clots, which can travel from your heart to your brain and cause a stroke. If you have a fast heartbeat, you may feel lightheaded, dizzy, and weak. An irregular heartbeat can also increase your risk for heart failure. Atrial fibrillation is often the result of another heart condition, such as high blood pressure or coronary artery disease. Making changes to improve your heart condition will help you stay healthy and active. Follow-up care is a key part of your treatment and safety. Be sure to make and go to all appointments, and call your doctor if you are having problems. It's also a good idea to know your test results and keep a list of the medicines you take. How can you care for yourself at home? Medicines 
  · Take your medicines exactly as prescribed. Call your doctor if you think you are having a problem with your medicine. You will get more details on the specific medicines your doctor prescribes.  
  · If your doctor has given you a blood thinner to prevent a stroke, be sure you get instructions about how to take your medicine safely. Blood thinners can cause serious bleeding problems.   · Do not take any vitamins, over-the-counter drugs, or herbal products without talking to your doctor first.  
 Lifestyle changes 
  · Do not smoke. Smoking can increase your chance of a stroke and heart attack. If you need help quitting, talk to your doctor about stop-smoking programs and medicines. These can increase your chances of quitting for good.  
  · Eat a heart-healthy diet.  
  · Stay at a healthy weight. Lose weight if you need to.  
  · Limit alcohol to 2 drinks a day for men and 1 drink a day for women. Too much alcohol can cause health problems.  
  · Avoid colds and flu. Get a pneumococcal vaccine shot. If you have had one before, ask your doctor whether you need another dose. Get a flu shot every year. If you must be around people with colds or flu, wash your hands often. Activity 
  · If your doctor recommends it, get more exercise. Walking is a good choice. Bit by bit, increase the amount you walk every day. Try for at least 30 minutes on most days of the week. You also may want to swim, bike, or do other activities. Your doctor may suggest that you join a cardiac rehabilitation program so that you can have help increasing your physical activity safely.  
  · Start light exercise if your doctor says it is okay. Even a small amount will help you get stronger, have more energy, and manage stress. Walking is an easy way to get exercise. Start out by walking a little more than you did in the hospital. Gradually increase the amount you walk.  
  · When you exercise, watch for signs that your heart is working too hard. You are pushing too hard if you cannot talk while you are exercising. If you become short of breath or dizzy or have chest pain, sit down and rest immediately.  
  · Check your pulse regularly. Place two fingers on the artery at the palm side of your wrist, in line with your thumb. If your heartbeat seems uneven or fast, talk to your doctor. When should you call for help? Call 911 anytime you think you may need emergency care. For example, call if: 
  · You have symptoms of a heart attack. These may include: ¨ Chest pain or pressure, or a strange feeling in the chest. 
¨ Sweating. ¨ Shortness of breath. ¨ Nausea or vomiting. ¨ Pain, pressure, or a strange feeling in the back, neck, jaw, or upper belly or in one or both shoulders or arms. ¨ Lightheadedness or sudden weakness. ¨ A fast or irregular heartbeat. After you call 911, the  may tell you to chew 1 adult-strength or 2 to 4 low-dose aspirin. Wait for an ambulance. Do not try to drive yourself.  
  · You have symptoms of a stroke. These may include: 
¨ Sudden numbness, tingling, weakness, or loss of movement in your face, arm, or leg, especially on only one side of your body. ¨ Sudden vision changes. ¨ Sudden trouble speaking. ¨ Sudden confusion or trouble understanding simple statements. ¨ Sudden problems with walking or balance. ¨ A sudden, severe headache that is different from past headaches.  
  · You passed out (lost consciousness).  
 Call your doctor now or seek immediate medical care if: 
  · You have new or increased shortness of breath.  
  · You feel dizzy or lightheaded, or you feel like you may faint.  
  · Your heart rate becomes irregular.  
  · You can feel your heart flutter in your chest or skip heartbeats. Tell your doctor if these symptoms are new or worse.  
 Watch closely for changes in your health, and be sure to contact your doctor if you have any problems. Where can you learn more? Go to http://zoie-nusrat.info/. Enter U020 in the search box to learn more about \"Atrial Fibrillation: Care Instructions. \" Current as of: December 6, 2017 Content Version: 11.7 © 9273-0611 [employer].  Care instructions adapted under license by "[employer] (which disclaims liability or warranty for this information). If you have questions about a medical condition or this instruction, always ask your healthcare professional. [place] [address] any warranty or liability for your use of this information. Chart Review Routing History No Routing History on File

## 2018-07-18 NOTE — ED TRIAGE NOTES
EMS called out for abd pain w/ n/v and chills. EMS found pt in a.fib with -160bmp, pt denies chest pain, denies shob. 98% Room air, 170/90. Pt does have hx of a.fib and cardizem RX.  Pt denies nausea currently, . 8/10 ABD PAIN

## 2018-07-18 NOTE — ED PROVIDER NOTES
HPI Comments: Presents with complaint of  Heart palpitations shortness of breath. He has no  Chest pain. States symptoms going on for some time. States she's been taking all his medicines. He lives at home with his wife. Poor historian. States he does not know if he has a history of A. Fib or a flutter. he was not given anything by EMS. Patient is a 82 y.o. male presenting with palpitations. The history is provided by the patient. Palpitations    This is a new problem. The current episode started more than 2 days ago. The problem has been gradually worsening. The problem occurs constantly. Associated symptoms include irregular heartbeat and shortness of breath. Pertinent negatives include no fever, no chest pain and no cough. Risk factors include cardiac disease. His past medical history is significant for atrial fibrillation. Past Medical History:   Diagnosis Date    CAD, multiple vessel 5/22/2018 5/18/18 (Dr Richard Armstrong) Coronary artery bypass grafting x3 with grafts consisting of: 1. Left internal mammary artery to left anterior descending artery. 2.  Reverse saphenous vein to diagonal.  3.  Reversed saphenous vein graft to obtuse marginal.     Calculus of kidney     Hypertension     Neurological disorder     Stroke [facility]) 2018       Past Surgical History:   Procedure Laterality Date    HX HEMORRHOIDECTOMY      HX OTHER SURGICAL  2001 and 2013    Pituitary tumor x2--on chronic Prednisone     HX PACEMAKER  2018         Family History:   Problem Relation Age of Onset    No Known Problems Mother     No Known Problems Father        Social History     Social History    Marital status:      Spouse name: N/A    Number of children: N/A    Years of education: N/A     Occupational History    Not on file.      Social History Main Topics    Smoking status: Never Smoker    Smokeless tobacco: Never Used    Alcohol use No    Drug use: No    Sexual activity: Not Currently     Other Topics Concern    Not on file     Social History Narrative         ALLERGIES: Ativan [lorazepam] and Pcn [penicillins]    Review of Systems   Constitutional: Negative for chills and fever. Respiratory: Positive for shortness of breath. Negative for cough. Cardiovascular: Positive for palpitations. Negative for chest pain. All other systems reviewed and are negative. Vitals:    07/18/18 1731 07/18/18 1749 07/18/18 1802   BP: (!) 148/91 150/90 103/55   Pulse: (!) 159 (!) 158 (!) 116   Resp: 18  17   SpO2: 99%  96%   Weight: 79.4 kg (175 lb)     Height: 5' 11\" (1.803 m)              Physical Exam   Constitutional: He is oriented to person, place, and time. He appears well-developed and well-nourished. No distress. Generalized weakness     HENT:   Head: Normocephalic and atraumatic. Neck: Normal range of motion. Neck supple. Cardiovascular: Regular rhythm. Tachycardia present. Pulmonary/Chest: Effort normal. No respiratory distress. He has no wheezes. He has no rales. Abdominal: Soft. He exhibits no distension. There is no tenderness. There is no rebound and no guarding. Musculoskeletal: Normal range of motion. He exhibits no edema. Neurological: He is alert and oriented to person, place, and time. No cranial nerve deficit. Skin: Skin is warm and dry. No rash noted. He is not diaphoretic. No erythema. Psychiatric: He has a normal mood and affect. His behavior is normal.   Nursing note and vitals reviewed. MDM  Number of Diagnoses or Management Options  Acute cystitis with hematuria:   Atrial flutter with rapid ventricular response [facility]):   Diagnosis management comments: Patient was given 20 mg of Cardizem with improvement in his rate still in A. Flutter. He has a hx of flutter and was admitted in June with same and received a pacemaker. He is on Eliquis. Discussed with [facility] cardiology. Placed on a Cardizem drip. Dr Alejandra Berg called at 200. In a procedure.   PM interrogated. Aflutter today at 1400. In and out over past 12 days. 2030 called Keira Duff again no call back. 2100 spoke with Tamia BENTLEY with [facility] Cards. 2200 Cards wants hospitalist to admit. Pt now c/o abd pain and n/v.  Urine with possible infection. Given rocephin. Fluids. BP low but on cardizem gtt. No hypotensive on arrival.  Gtt turned down. Amount and/or Complexity of Data Reviewed  Clinical lab tests: ordered and reviewed  Review and summarize past medical records: yes  Discuss the patient with other providers: yes  Independent visualization of images, tracings, or specimens: yes (Aflutter with RVR.   Repeat with aflutter)    Risk of Complications, Morbidity, and/or Mortality  Presenting problems: high  Diagnostic procedures: moderate  Management options: high    Patient Progress  Patient progress: stable        ED Course       Procedures

## 2018-07-19 PROBLEM — A41.9 SEPSIS SECONDARY TO UTI (HCC): Status: ACTIVE | Noted: 2018-07-19

## 2018-07-19 PROBLEM — N39.0 SEPSIS SECONDARY TO UTI (HCC): Status: ACTIVE | Noted: 2018-07-19

## 2018-07-19 PROBLEM — R78.81 GRAM-NEGATIVE BACTEREMIA: Status: ACTIVE | Noted: 2018-07-19

## 2018-07-19 LAB
ANION GAP SERPL CALC-SCNC: 13 MMOL/L (ref 7–16)
ATRIAL RATE: 187 BPM
ATRIAL RATE: 357 BPM
BASOPHILS # BLD: 0 K/UL (ref 0–0.2)
BASOPHILS NFR BLD: 0 % (ref 0–2)
BUN SERPL-MCNC: 17 MG/DL (ref 8–23)
CALCIUM SERPL-MCNC: 8.1 MG/DL (ref 8.3–10.4)
CALCULATED P AXIS, ECG09: -130 DEGREES
CALCULATED R AXIS, ECG10: 14 DEGREES
CALCULATED R AXIS, ECG10: 7 DEGREES
CALCULATED T AXIS, ECG11: -175 DEGREES
CALCULATED T AXIS, ECG11: 133 DEGREES
CHLORIDE SERPL-SCNC: 107 MMOL/L (ref 98–107)
CO2 SERPL-SCNC: 20 MMOL/L (ref 21–32)
CREAT SERPL-MCNC: 1.7 MG/DL (ref 0.8–1.5)
DIAGNOSIS, 93000: NORMAL
DIAGNOSIS, 93000: NORMAL
DIFFERENTIAL METHOD BLD: ABNORMAL
EOSINOPHIL # BLD: 0.2 K/UL (ref 0–0.8)
EOSINOPHIL NFR BLD: 1 % (ref 0.5–7.8)
ERYTHROCYTE [DISTWIDTH] IN BLOOD BY AUTOMATED COUNT: 16.3 % (ref 11.9–14.6)
GLUCOSE SERPL-MCNC: 83 MG/DL (ref 65–100)
HCT VFR BLD AUTO: 37.2 % (ref 41.1–50.3)
HGB BLD-MCNC: 11.7 G/DL (ref 13.6–17.2)
IMM GRANULOCYTES # BLD: 0.1 K/UL (ref 0–0.5)
IMM GRANULOCYTES NFR BLD AUTO: 1 % (ref 0–5)
LACTATE SERPL-SCNC: 1.9 MMOL/L (ref 0.4–2)
LYMPHOCYTES # BLD: 1.2 K/UL (ref 0.5–4.6)
LYMPHOCYTES NFR BLD: 7 % (ref 13–44)
MAGNESIUM SERPL-MCNC: 2.4 MG/DL (ref 1.8–2.4)
MCH RBC QN AUTO: 28.3 PG (ref 26.1–32.9)
MCHC RBC AUTO-ENTMCNC: 31.5 G/DL (ref 31.4–35)
MCV RBC AUTO: 89.9 FL (ref 79.6–97.8)
MONOCYTES # BLD: 0.5 K/UL (ref 0.1–1.3)
MONOCYTES NFR BLD: 3 % (ref 4–12)
NEUTS SEG # BLD: 14.6 K/UL (ref 1.7–8.2)
NEUTS SEG NFR BLD: 88 % (ref 43–78)
PLATELET # BLD AUTO: 182 K/UL (ref 150–450)
PMV BLD AUTO: 9.4 FL (ref 10.8–14.1)
POTASSIUM SERPL-SCNC: 3.9 MMOL/L (ref 3.5–5.1)
Q-T INTERVAL, ECG07: 258 MS
Q-T INTERVAL, ECG07: 344 MS
QRS DURATION, ECG06: 90 MS
QRS DURATION, ECG06: 92 MS
QTC CALCULATION (BEZET), ECG08: 419 MS
QTC CALCULATION (BEZET), ECG08: 459 MS
RBC # BLD AUTO: 4.14 M/UL (ref 4.23–5.67)
SODIUM SERPL-SCNC: 140 MMOL/L (ref 136–145)
VENTRICULAR RATE, ECG03: 107 BPM
VENTRICULAR RATE, ECG03: 159 BPM
WBC # BLD AUTO: 16.6 K/UL (ref 4.3–11.1)

## 2018-07-19 PROCEDURE — 83605 ASSAY OF LACTIC ACID: CPT | Performed by: INTERNAL MEDICINE

## 2018-07-19 PROCEDURE — 77030019605

## 2018-07-19 PROCEDURE — 74011000258 HC RX REV CODE- 258: Performed by: INTERNAL MEDICINE

## 2018-07-19 PROCEDURE — 74011000250 HC RX REV CODE- 250: Performed by: FAMILY MEDICINE

## 2018-07-19 PROCEDURE — 85025 COMPLETE CBC W/AUTO DIFF WBC: CPT | Performed by: FAMILY MEDICINE

## 2018-07-19 PROCEDURE — 74011250636 HC RX REV CODE- 250/636

## 2018-07-19 PROCEDURE — 36415 COLL VENOUS BLD VENIPUNCTURE: CPT | Performed by: FAMILY MEDICINE

## 2018-07-19 PROCEDURE — 74011250636 HC RX REV CODE- 250/636: Performed by: INTERNAL MEDICINE

## 2018-07-19 PROCEDURE — 74011000258 HC RX REV CODE- 258: Performed by: FAMILY MEDICINE

## 2018-07-19 PROCEDURE — 65660000000 HC RM CCU STEPDOWN

## 2018-07-19 PROCEDURE — 74011250637 HC RX REV CODE- 250/637: Performed by: FAMILY MEDICINE

## 2018-07-19 PROCEDURE — 83735 ASSAY OF MAGNESIUM: CPT | Performed by: INTERNAL MEDICINE

## 2018-07-19 PROCEDURE — 80048 BASIC METABOLIC PNL TOTAL CA: CPT | Performed by: FAMILY MEDICINE

## 2018-07-19 RX ORDER — ONDANSETRON 2 MG/ML
INJECTION INTRAMUSCULAR; INTRAVENOUS
Status: COMPLETED
Start: 2018-07-19 | End: 2018-07-19

## 2018-07-19 RX ORDER — LEVOTHYROXINE SODIUM 50 UG/1
25 TABLET ORAL
Status: DISCONTINUED | OUTPATIENT
Start: 2018-07-20 | End: 2018-07-23 | Stop reason: HOSPADM

## 2018-07-19 RX ORDER — ONDANSETRON 2 MG/ML
4 INJECTION INTRAMUSCULAR; INTRAVENOUS
Status: DISCONTINUED | OUTPATIENT
Start: 2018-07-19 | End: 2018-07-23 | Stop reason: HOSPADM

## 2018-07-19 RX ADMIN — SODIUM CHLORIDE 7.5 MG/HR: 900 INJECTION, SOLUTION INTRAVENOUS at 05:53

## 2018-07-19 RX ADMIN — ONDANSETRON 4 MG: 2 INJECTION INTRAMUSCULAR; INTRAVENOUS at 03:46

## 2018-07-19 RX ADMIN — APIXABAN 5 MG: 5 TABLET, FILM COATED ORAL at 17:18

## 2018-07-19 RX ADMIN — APIXABAN 5 MG: 5 TABLET, FILM COATED ORAL at 09:29

## 2018-07-19 RX ADMIN — Medication 10 ML: at 15:11

## 2018-07-19 RX ADMIN — ASPIRIN 81 MG: 81 TABLET, COATED ORAL at 09:29

## 2018-07-19 RX ADMIN — LEVOTHYROXINE SODIUM 75 MCG: 75 TABLET ORAL at 05:59

## 2018-07-19 RX ADMIN — Medication 10 ML: at 22:09

## 2018-07-19 RX ADMIN — ATORVASTATIN CALCIUM 80 MG: 40 TABLET, FILM COATED ORAL at 22:09

## 2018-07-19 RX ADMIN — SODIUM CHLORIDE 1 G: 900 INJECTION, SOLUTION INTRAVENOUS at 20:28

## 2018-07-19 RX ADMIN — Medication 10 ML: at 05:58

## 2018-07-19 NOTE — PROGRESS NOTES
[facility] CARDIOLOGY PROGRESS NOTE 
      
 
7/19/2018 8:18 AM 
 
Admit Date: 7/18/2018 Subjective:  
Feels better. AF with  bpm on IV Cardizem. ROS: 
Cardiovascular:  As noted above Objective:  
  
Vitals:  
 07/19/18 0031 07/19/18 0037 07/19/18 0430 07/19/18 8478 BP: 95/55 94/59 111/50 Pulse: 74 75 82 Resp:  21 18 Temp:  97.5 °F (36.4 °C) 98.7 °F (37.1 °C) SpO2:  96% 97% Weight:    83.1 kg (183 lb 3.2 oz) Height:      
 
 
Physical Exam: 
General-No Acute Distress Neck- supple, no JVD 
CV- irregular rate and rhythm no MRG Lung- clear bilaterally Abd- soft, nontender, nondistended Ext- no edema bilaterally. Skin- warm and dry Data Review:  
Recent Labs  
   07/19/18 
 0406  07/18/18 
 1740 NA  140  140  
K  3.9  3.8 MG   --   1.7*  
BUN  17  14 CREA  1.70*  1.44 GLU  83  108* WBC  16.6*  8.5 HGB  11.7*  11.9*  
HCT  37.2*  37.0*  
PLT  182  184 Assessment/Plan:  
 
Principal Problem: 
  UTI (urinary tract infection) (7/18/2018) Active Problems: 
  Essential hypertension (5/16/2018) S/P CABG (coronary artery bypass graft) (5/18/2018) Hypothyroidism (5/18/2018) CAD, multiple vessel (5/22/2018) History of CVA (cerebrovascular accident) (6/20/2018) Atrial fibrillation with RVR ([address] (7/18/2018) Normocytic anemia (7/18/2018) Persistent atrial fibrillation ([address] (7/18/2018) - recurrent post CABg. Abdominal pain (7/18/2018), UTI on antibiotics. Comment: continue IV Cardizem, Eliquis. May need cardioversion, antiarrhythmic therapy prior to discharge.   
   
 
 
Yumiko Cisse MD 
7/19/2018 8:18 AM

## 2018-07-19 NOTE — ED NOTES
Patient continues with heart rate above 100 bpm. Cardizem titrated up to 15. Magnesium drip continues at this time. Patient resting in bed. No signs of distress. Respirations even and unlabored. Family at bedside. Call light within reach.

## 2018-07-19 NOTE — PROGRESS NOTES
Skin assessed with second RN and is clean dry and intact. Sacrum pink but blanchable, allevyn placed for preventative measures. Healing scar on left subclavian area from recent pacer implantation. Midsternal scar from recent CABG surgery. Heels intact.

## 2018-07-19 NOTE — PROGRESS NOTES
Bedside and Verbal shift change report given to self & Yajaira Choudhury RN (oncoming nurse) by Keith Webb RN (offgoing nurse). Report included the following information SBAR, Kardex, ED Summary, Intake/Output, MAR, Recent Results and Cardiac Rhythm Afib 70s-100s.

## 2018-07-19 NOTE — ED NOTES
Magnesium drip complete. Cardizem continues at 15 ml/hr. Family at bedside. Call light within reach.

## 2018-07-19 NOTE — CONSULTS
[facility] Cardiology Consult                Date of  Admission: 7/18/2018  5:28 PM     Primary Care Physician:  Dr. Carias [facility]  Primary Cardiologist: Dr. Wander Olmstead  Referring Physician: Dr. Odonnell Core Physician: Dr. Bimal Squires    CC/Reason for consult: AFIB management      Aaorn Garcia is a 82 y.o. male with past medical history of multivessel CAD with recent CABG, recent CVA, recent PPM implantation due to PAF/SSS, HTN and hypothyroidism who presented to the ER via EMS with complaints of abdominal pain and vomiting. Patient describes his abdominal pain as 8/10 pain around his umbilicus. Denies back pain or pain with urination. Associated symptoms include chills. Upon EMS arrival, patient was found to be tachycardiac with HR in 140-160s. EKG done in the ER shows AFIB w RVR. Device interrogation done in the ER showed AFIB burden of 78%. Patient is currently anticoagulated on Eliquis BID. Urine obtained in the ER is dark tea colored. Per patient's wife 2 days ago his urine was normal color. Patient is currently fair rate controlled in AFIB on Cardizem drip at 12.5 mg/hr. We will defer medical management to hospitalist for admission. Patient Active Problem List   Diagnosis Code    Essential hypertension I10    S/P CABG (coronary artery bypass graft) Z95.1    Hypothyroidism E03.9    Current chronic use of systemic steroids Z79.52    PAF (paroxysmal atrial fibrillation) ([facility]) I48.0    CAD, multiple vessel I25.10    Dysphagia due to recent cerebrovascular accident (CVA) I69.391    SSS (sick sinus syndrome) ([facility]) I49.5    History of CVA (cerebrovascular accident) Z80.78    Atrial fibrillation with RVR ([facility]) I48.91    UTI (urinary tract infection) N39.0    Normocytic anemia D64.9       Past Medical History:   Diagnosis Date    CAD, multiple vessel 5/22/2018 5/18/18 (Dr Radha Mcbride) Coronary artery bypass grafting x3 with grafts consisting of: 1. Left internal mammary artery to left anterior descending artery. 2.  Reverse saphenous vein to diagonal.  3.  Reversed saphenous vein graft to obtuse marginal.    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     HX PACEMAKER  2018     Allergies   Allergen Reactions    Ativan [Lorazepam] Rash    Pcn [Penicillins] Swelling      Family History   Problem Relation Age of Onset    No Known Problems Mother     No Known Problems Father         Current Facility-Administered Medications   Medication Dose Route Frequency    dilTIAZem (CARDIZEM) 100 mg in 0.9% sodium chloride (MBP/ADV) 100 mL infusion  0-15 mg/hr IntraVENous TITRATE    sodium chloride 0.9 % bolus infusion 1,000 mL  1,000 mL IntraVENous ONCE    [START ON 7/19/2018] cefTRIAXone (ROCEPHIN) 1 g in 0.9% sodium chloride (MBP/ADV) 50 mL  1 g IntraVENous Q24H       Review of Systems   Constitutional: Positive for chills. Gastrointestinal: Positive for abdominal pain, nausea and vomiting. Genitourinary:        Dark color for 2 days   All other systems reviewed and are negative. Physical Exam  Vitals:    07/18/18 2145 07/18/18 2226 07/18/18 2236 07/18/18 2318   BP: (!) 87/54 106/56 111/55 107/62   Pulse: (!) 102 86 85 100   Resp: 30 27 30 22   Temp:    98.1 °F (36.7 °C)   SpO2: 93% 93% 96% 93%   Weight:    83.3 kg (183 lb 11.2 oz)   Height:    5' 11\" (1.803 m)       Physical Exam:  Physical Exam   Constitutional:   Appears ill   Cardiovascular: An irregularly irregular rhythm present. Tachycardia present. Pulmonary/Chest: Effort normal and breath sounds normal.   Abdominal: Soft. There is tenderness. Tenderness and pain around navel   Neurological: He is alert. Drowsy, answers questions appropriately   Skin: Skin is warm and dry.        Cardiographics    Telemetry: AFIB  ECG: atrial fibrillation, rate fairly controlled  Echocardiogram: from 5/17/18  -  Left ventricle: Systolic function was at the lower limits of normal. Ejection fraction was estimated in the range of 50 % to 55 %. There were no regional wall motion abnormalities. Wall thickness was mildly increased. -  Left atrium: The atrium was moderately dilated. -  Mitral valve: There was mild regurgitation. -  Tricuspid valve: There was mild regurgitation. Labs:   Recent Labs      07/18/18   1740   NA  140   K  3.8   MG  1.7*   BUN  14   CREA  1.44   GLU  108*   WBC  8.5   HGB  11.9*   HCT  37.0*   PLT  184        Assessment/Plan:     Assessment:        UTI (urinary tract infection) -- micro with  WBC, > 100 RBC and 4+ bacteria. Started on IV antibiotics per primarry team.       Essential hypertension -- initially SBP in the 140, now mild hypotension with IV Cardizem. Will hold oral Cardizem while on Cardizem drip. Continue to monitor closely. S/P CABG (coronary artery bypass graft) -- denies chest pain. Continue ASA and statin       Hypothyroidism -- on Synthroid as outpatient. CAD, multiple vessel -- see above      Overview:  5/18/18 (Dr Cesar Samuels) Coronary artery bypass grafting x3 with grafts consisting of:      1. Left internal mammary artery to left anterior descending artery. 2.  Reverse saphenous vein to diagonal.       3.  Reversed saphenous vein graft to obtuse marginal.       History of CVA (cerebrovascular accident)       Atrial fibrillation with RVR -- secondary response to acute infection. Rate currently controlled on IV Cardizem. Continue Eliquis BID for CVA prevention. Normocytic anemia -- stable. Continue to monitor. Persistent atrial fibrillation -- see above and device interrogation on chart. Abdominal pain -- per primary team.      Thank you very much for this referral. We appreciate the opportunity to participate in this patient's care.  We will follow along with above stated Vicente Pang, SHEREE  Consulting MD: Demetrice Alcantar    Attending Addendum    Patient independently seen and examined by me. Agree with above note by physician extender with the following additions and exceptions:  82 y.o. male with past medical history of multivessel CAD with recent CABG, recent CVA, recent PPM implantation due to PAF/SSS, HTN and hypothyroidism who presented to the ER via EMS with complaints of abdominal pain and vomiting was noted to have afib with RVR and cardiology was consulted. I saw and examined the patient 7/18 with Femi Belcher NP. Ford findings are:  No CP or EMERSON  CV- irreg irreg, normal rate, without murmur  Lungs- Clear bilaterally  Ext- no edema    Plan: rate control with IV diltiazem and supportive care       --cont eliquis for cardioembolic protection       --further plan as noted above    Yoselyn RODARTE  [address] [facility]

## 2018-07-19 NOTE — ED NOTES
TRANSFER - OUT REPORT:    Verbal report given to NIK Ferrari(name) on Margarito Giang  being transferred to 322(unit) for routine progression of care       Report consisted of patients Situation, Background, Assessment and   Recommendations(SBAR). Information from the following report(s) SBAR, ED Summary and Cardiac Rhythm Afib was reviewed with the receiving nurse. Lines:   Peripheral IV 07/18/18 Right Antecubital (Active)       Peripheral IV 07/18/18 Right Hand (Active)        Opportunity for questions and clarification was provided.       Patient transported with:   Registered Nurse

## 2018-07-19 NOTE — PROGRESS NOTES
Care Management Interventions  PCP Verified by CM: Yes  Palliative Care Criteria Met (RRAT>21 & CHF Dx)?: No (RRAT 29 DX atrial fib)  Transition of Care Consult (CM Consult): Discharge Planning  Discharge Durable Medical Equipment: No  Physical Therapy Consult: No  Occupational Therapy Consult: No  Speech Therapy Consult: No  Current Support Network: Lives with Spouse  Confirm Follow Up Transport: Family (wife drives)  Plan discussed with Pt/Family/Caregiver: Yes  Freedom of Choice Offered: Yes  Discharge Location  Discharge Placement: Home  Met with patient for d/c planning. Patient alert and oriented x 3, independent of ADL's and lives with his wife. He has a cane to use as needed and his wife provides the transportation. He obtains his medications at [place] [address] without difficulty. PCP Dr. Jyoti Sousa 3 weeks. Current d/c plan is home with wife when medically stable.

## 2018-07-19 NOTE — ED NOTES
Patient blood pressure 84/54. Eve Espinosa MD made aware. Order received to titrate Cardizem to 12.5 ml/hr.

## 2018-07-19 NOTE — PROGRESS NOTES
Bedside and Verbal shift change report given to self and Marcelina Castellanos RN (oncoming nurse) by Brianna Berman RN and Shayy Kaiser RN (offgoing nurse). Report included the following information SBAR, Kardex, ED Summary, Procedure Summary, Intake/Output, MAR, Recent Results and Cardiac Rhythm 70-130s. Cardizem gtt infusing and hourly BP cycling on [place] at bedside.

## 2018-07-19 NOTE — PROGRESS NOTES
TRANSFER - IN REPORT:    Verbal report received from NIK Roman on Harris Fry being received from self for routine progression of care      Report consisted of patients Situation, Background, Assessment and Recommendations(SBAR). Information from the following report(s) SBAR, Kardex, ED Summary, [facility] and Recent Results was reviewed with the receiving nurse. Opportunity for questions and clarification was provided. Assessment completed upon patients arrival to unit and care assumed.

## 2018-07-19 NOTE — ED NOTES
Patient BP 87/54. MD made aware. Order received to titrate Cardizem down. Cardizem at 10 ml/hr at this time. Family at bedside. Call light within reach.

## 2018-07-19 NOTE — H&P
HOSPITALIST INITIAL HISTORY AND PHYSICAL 
 
NAME:  Francisco Javier Taveras Age:  82 y.o. 
:   1935 MRN:   [id number] PCP: Jennie Jaquez MD 
Consulting MD: Treatment Team: Attending Provider: Joshua Parnell MD; Primary Nurse: Anjel Young; Consulting Provider: Richelle Jimenez MD 
 
CHIEF COMPLAINT: nausea, vomiting HISTORY OF PRESENT ILLNESS:  
Francisco Javier Taveras is an 82 y.o. male with a past medical history of CAD s/p 3vCABG in , CVA, HTN  who presents to the ER with complaint of nausea, vomiting, chills that started this morning. The patient reports that he had chills initially after breakfast, followed by nausea and one episode of vomiting. He also describes some right flank discomfort. Currently, he reports feeling better, denies any pain, nausea, SOB, chest pain. Denies any current chills, fevers. REVIEW OF SYSTEMS: Comprehensive ROS performed and negative except as stated in HPI. Past Medical History:  
Diagnosis Date  CAD, multiple vessel 2018 (Dr Yisel Graham) Coronary artery bypass grafting x3 with grafts consisting of: 1. Left internal mammary artery to left anterior descending artery. 2.  Reverse saphenous vein to diagonal.  3.  Reversed saphenous vein graft to obtuse marginal.   
 Calculus of kidney  Hypertension  Neurological disorder  Stroke [facility])  Past Surgical History:  
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 Taking? apixaban (ELIQUIS) 5 mg tablet   No No  
Sig: Take 1 Tab by mouth two (2) times a day. aspirin delayed-release 81 mg tablet   No No  
Sig: Take 1 Tab by mouth daily. atorvastatin (LIPITOR) 80 mg tablet   No No  
Sig: Take 1 Tab by mouth nightly.   
bisacodyl (DULCOLAX) 5 mg EC tablet   No No  
Sig: Take 1 Tab by mouth daily. Patient taking differently: Take 5 mg by mouth as needed. dilTIAZem CD (CARDIZEM CD) 180 mg ER capsule   No No  
Sig: Take 1 Cap by mouth daily. levothyroxine (SYNTHROID) 75 mcg tablet   No No  
Sig: Take 1 Tab by mouth Daily (before breakfast). predniSONE (DELTASONE) 5 mg tablet   Yes No  
Sig: Take 5 mg by mouth daily. traMADol (ULTRAM) 50 mg tablet   Yes No  
Sig: Take 50 mg by mouth every six (6) hours as needed for Pain. Facility-Administered Medications: None Allergies Allergen Reactions  Ativan [Lorazepam] Rash  Pcn [Penicillins] Swelling FAMILY HISTORY: Reviewed. Negative except Family History Problem Relation Age of Onset  No Known Problems Mother  No Known Problems Father Social History Substance Use Topics  Smoking status: Never Smoker  Smokeless tobacco: Never Used  Alcohol use No  
 
 
 
Objective:  
 
Visit Vitals  /55  Pulse 85  Temp 98.4 °F (36.9 °C)  Resp 30  
 Ht 5' 11\" (1.803 m)  Wt 79.4 kg (175 lb)  SpO2 96%  BMI 24.41 kg/m2 Temp (24hrs), Av.4 °F (36.9 °C), Min:98.4 °F (36.9 °C), Max:98.4 °F (36.9 °C) Oxygen Therapy O2 Sat (%): 96 % (18) Pulse via Oximetry: 85 beats per minute (18) O2 Device: Room air (18 1731) Physical Exam: 
General:    The patient is a pleasant elderly male in no acute distress. Head:   Normocephalic/atraumatic. Eyes:  No palpebral pallor or scleral icterus. ENT:  External auricular and nasal exam within normal limits. Mucous membranes are moist. 
Neck:  Supple, non-tender, no JVD. Lungs:   Clear to auscultation bilaterally without wheezes or crackles. No respiratory distress or accessory muscle use. Heart:   Regular rate and rhythm, without murmurs, rubs, or gallops. Abdomen:   Soft, non-tender, non-distended with normoactive bowel sounds.   
Genitourinary: No tenderness over the bladder or bilateral CVAs. 
Extremities: Without clubbing, cyanosis, or edema. Skin:     Normal color, texture, and turgor. No rashes, lesions, or jaundice. Pulses: Radial and dorsalis pedis pulses present 2+ bilaterally. Capillary refill <2s. Neurologic: CN II-XII grossly intact and symmetrical.  
  Moving all four extremities well with no focal deficits. Psychiatric: Pleasant demeanor, appropriate affect. Alert and oriented x 3 Data Review:  
Recent Results (from the past 24 hour(s)) EKG, 12 LEAD, INITIAL Collection Time: 07/18/18  5:36 PM  
Result Value Ref Range Ventricular Rate 159 BPM  
 Atrial Rate 187 BPM  
 QRS Duration 90 ms Q-T Interval 258 ms QTC Calculation (Bezet) 419 ms Calculated R Axis 14 degrees Calculated T Axis -175 degrees Diagnosis    
  !! AGE AND GENDER SPECIFIC ECG ANALYSIS !! Supraventricular tachycardia ST & T wave abnormality, consider inferolateral ischemia Abnormal ECG When compared with ECG of 21-JUN-2018 18:08, Sinus rhythm has replaced Electronic ventricular pacemaker Vent. rate has increased BY  83 BPM 
  
CBC WITH AUTOMATED DIFF Collection Time: 07/18/18  5:40 PM  
Result Value Ref Range WBC 8.5 4.3 - 11.1 K/uL  
 RBC 4.16 (L) 4.23 - 5.67 M/uL  
 HGB 11.9 (L) 13.6 - 17.2 g/dL HCT 37.0 (L) 41.1 - 50.3 % MCV 88.9 79.6 - 97.8 FL  
 MCH 28.6 26.1 - 32.9 PG  
 MCHC 32.2 31.4 - 35.0 g/dL  
 RDW 16.1 (H) 11.9 - 14.6 % PLATELET 621 921 - 689 K/uL MPV 9.3 (L) 10.8 - 14.1 FL  
 DF AUTOMATED NEUTROPHILS 92 (H) 43 - 78 % LYMPHOCYTES 7 (L) 13 - 44 % MONOCYTES 1 (L) 4.0 - 12.0 % EOSINOPHILS 0 (L) 0.5 - 7.8 % BASOPHILS 0 0.0 - 2.0 % IMMATURE GRANULOCYTES 0 0.0 - 5.0 %  
 ABS. NEUTROPHILS 7.7 1.7 - 8.2 K/UL  
 ABS. LYMPHOCYTES 0.6 0.5 - 4.6 K/UL  
 ABS. MONOCYTES 0.1 0.1 - 1.3 K/UL  
 ABS. EOSINOPHILS 0.0 0.0 - 0.8 K/UL  
 ABS. BASOPHILS 0.0 0.0 - 0.2 K/UL  
 ABS. IMM. GRANS. 0.0 0.0 - 0.5 K/UL METABOLIC PANEL, COMPREHENSIVE  Collection Time: 07/18/18  5:40 PM  
Result Value Ref Range Sodium 140 136 - 145 mmol/L Potassium 3.8 3.5 - 5.1 mmol/L Chloride 105 98 - 107 mmol/L  
 CO2 23 21 - 32 mmol/L Anion gap 12 7 - 16 mmol/L Glucose 108 (H) 65 - 100 mg/dL BUN 14 8 - 23 MG/DL Creatinine 1.44 0.8 - 1.5 MG/DL  
 GFR est AA >60 >60 ml/min/1.73m2 GFR est non-AA 50 (L) >60 ml/min/1.73m2 Calcium 8.5 8.3 - 10.4 MG/DL Bilirubin, total 0.7 0.2 - 1.1 MG/DL  
 ALT (SGPT) 29 12 - 65 U/L  
 AST (SGOT) 24 15 - 37 U/L Alk. phosphatase 103 50 - 136 U/L Protein, total 6.4 6.3 - 8.2 g/dL Albumin 3.2 3.2 - 4.6 g/dL Globulin 3.2 2.3 - 3.5 g/dL A-G Ratio 1.0 (L) 1.2 - 3.5 BNP Collection Time: 07/18/18  5:40 PM  
Result Value Ref Range  pg/mL MAGNESIUM Collection Time: 07/18/18  5:40 PM  
Result Value Ref Range Magnesium 1.7 (L) 1.8 - 2.4 mg/dL POC TROPONIN-I Collection Time: 07/18/18  5:44 PM  
Result Value Ref Range Troponin-I (POC) 0.1 (H) 0.02 - 0.05 ng/ml EKG, 12 LEAD, INITIAL Collection Time: 07/18/18  6:06 PM  
Result Value Ref Range Ventricular Rate 107 BPM  
 Atrial Rate 357 BPM  
 QRS Duration 92 ms Q-T Interval 344 ms QTC Calculation (Bezet) 459 ms Calculated P Axis -130 degrees Calculated R Axis 7 degrees Calculated T Axis 133 degrees Diagnosis    
  !! AGE AND GENDER SPECIFIC ECG ANALYSIS !! Atrial flutter with variable A-V block ST & T wave abnormality, consider lateral ischemia Abnormal ECG When compared with ECG of 18-JUL-2018 17:36, Atrial flutter has replaced Sinus rhythm Nonspecific T wave abnormality has replaced inverted T waves in Inferior  
leads URINE MICROSCOPIC Collection Time: 07/18/18  9:45 PM  
Result Value Ref Range WBC  0 /hpf  
 RBC >100 0 /hpf Epithelial cells 0 0 /hpf Bacteria 4+ (H) 0 /hpf Casts 0 0 /lpf Crystals, urine 0 0 /LPF Mucus 0 0 /lpf Yeast OCCASIONAL  Other observations RESULTS VERIFIED MANUALLY POC LACTIC ACID Collection Time: 07/18/18 10:24 PM  
Result Value Ref Range Lactic Acid (POC) 2.1 (H) 0.5 - 1.9 mmol/L Imaging Sushma Coffee /Studies: Xr Chest [facility] Result Date: 7/18/2018 IMPRESSION: No acute finding. Assessment and Plan:  
 
Principal Problem: 
  Atrial fibrillation with RVR ([facility] (7/18/2018) Improved, rate in the low 100s now. Admit to telemetry, continue IV diltiazem drip. Likely triggered by UTI. Will treat per below. Will consult cardiology. Active Problems: 
  PAF (paroxysmal atrial fibrillation) ([facility] (5/20/2018) Per above. Continue Eliquis. UTI (urinary tract infection) (7/18/2018) Per ER dipstick report. Rocephin. Urine culture pending. Normocytic anemia (7/18/2018) Stable, follow CBC. CAD, multiple vessel (5/22/2018) Stable, continue home meds. Essential hypertension (5/16/2018) Stable, continue home meds S/P CABG (coronary artery bypass graft) (5/18/2018) Stabl History of CVA (cerebrovascular accident) (6/20/2018) Stable. Hypothyroidism (5/18/2018) Stable. DVT Prophylaxis: Eliquis Code Status: FULL CODE Disposition: Admit to telemetry for evaluation and treatment as per above. Anticipated discharge: 2-3 days Signed By: Marj Mao MD   
 July 18, 2018

## 2018-07-19 NOTE — PROGRESS NOTES
Hospitalist Progress Note    2018  Admit Date: 2018  5:28 PM   NAME: Remi Montesinos   :  1935   MRN:  [id number]   Attending: Bentley Amezcua MD  PCP:  Nelly Eagle MD    SUBJECTIVE:   82 y.o. male with PMH of CAD s/p 3vCABG in , CVA, HTN  who presented to the ER with complaint of nausea, vomiting, chills and abd pain. Found to have sepsis with UTI and also Afib/AFL with RVR. Seen by cards and started on Abx and Cardiazem gtt. : Feels better, more alert and awake. No CP or abd pain. Wife at bedside. Nursing notes and chart reviewed. Review of Systems negative with exception of pertinent positives noted above. PHYSICAL EXAM     Visit Vitals    /56    Pulse (!) 115    Temp 98.8 °F (37.1 °C)    Resp 18    Ht 5' 11\" (1.803 m)    Wt 83.1 kg (183 lb 3.2 oz)    SpO2 94%    BMI 25.55 kg/m2      Temp (24hrs), Av.7 °F (37.1 °C), Min:97.5 °F (36.4 °C), Max:99.7 °F (37.6 °C)    Oxygen Therapy  O2 Sat (%): 94 % (18 1644)  Pulse via Oximetry: 85 beats per minute (18 2236)  O2 Device: Room air (18 1644)    Intake/Output Summary (Last 24 hours) at 18 1832  Last data filed at 18 1749   Gross per 24 hour   Intake            348.5 ml   Output              305 ml   Net             43.5 ml         General: No acute distress. Alert.    Head:  AT/NC  Lungs:  CTABL. Heart:  RRR, no murmur, rub, or gallop  Abdomen: Soft, non-distended, non-tender, +bs  Extremities: No cyanosis or clubbing. Neurologic:  No focal deficits. Moves all extremities. Skin:  No Obvious Rash  Psych:  Normal affect. LABS AND STUDIES:  Personally reviewed all labs, meds, and studies for past 24hrs.       ASSESSMENT      Active Hospital Problems    Diagnosis Date Noted    Sepsis secondary to UTI ([address] 2018    Gram-negative bacteremia 2018    [facility]    UTI (urinary tract infection) 2018    Normocytic anemia 07/18/2018    Persistent atrial fibrillation ([facility] 07/18/2018    Abdominal pain 07/18/2018    History of CVA (cerebrovascular accident) 06/20/2018    CAD, multiple vessel 05/22/2018 5/18/18 (Dr Loyda Robrets) Coronary artery bypass grafting x3 with grafts consisting of:  1. Left internal mammary artery to left anterior descending artery. 2.  Reverse saphenous vein to diagonal.   3.  Reversed saphenous vein graft to obtuse marginal.       S/P CABG (coronary artery bypass graft) 05/18/2018    Hypothyroidism 05/18/2018    Essential hypertension 05/16/2018           PLAN:    · Sepsis due to UTI: DC rocephin, start cefepime, Ucx Pending, Blood Cx with Gm -ve bacteria, repeat Blood Cx tomorrow. · A Fib RVR/CAD: Cards on board. Appreciate input. On cardiazem gtt and eliquis. · Hypothyroidism: low TSH and high FT4, decrease synthroid to 25mcg and will need repeat TSH in 4-6 weeks outpt. need to inform pt prior to dc. · HTN: BP at goal.     Dispo: Pending improvement. PT eval.     DVT ppx: On eliquis  Discussed plan with pt who is in agreement. All questions answered.     Signed By: Luann Morales MD     July 19, 2018

## 2018-07-19 NOTE — PROGRESS NOTES
Verbal bedside report received from Jose Guadalupe Mccormick RN. Assumed care of patient. Cardizem IV drip verified at bedside with outgoing RN.

## 2018-07-20 LAB
ANION GAP SERPL CALC-SCNC: 10 MMOL/L (ref 7–16)
BASOPHILS # BLD: 0 K/UL (ref 0–0.2)
BASOPHILS NFR BLD: 0 % (ref 0–2)
BUN SERPL-MCNC: 26 MG/DL (ref 8–23)
CALCIUM SERPL-MCNC: 7.9 MG/DL (ref 8.3–10.4)
CHLORIDE SERPL-SCNC: 105 MMOL/L (ref 98–107)
CO2 SERPL-SCNC: 23 MMOL/L (ref 21–32)
CREAT SERPL-MCNC: 2.17 MG/DL (ref 0.8–1.5)
DIFFERENTIAL METHOD BLD: ABNORMAL
EOSINOPHIL # BLD: 0.3 K/UL (ref 0–0.8)
EOSINOPHIL NFR BLD: 3 % (ref 0.5–7.8)
ERYTHROCYTE [DISTWIDTH] IN BLOOD BY AUTOMATED COUNT: 16.1 % (ref 11.9–14.6)
GLUCOSE SERPL-MCNC: 64 MG/DL (ref 65–100)
HCT VFR BLD AUTO: 32.8 % (ref 41.1–50.3)
HGB BLD-MCNC: 10.3 G/DL (ref 13.6–17.2)
IMM GRANULOCYTES # BLD: 0 K/UL (ref 0–0.5)
IMM GRANULOCYTES NFR BLD AUTO: 0 % (ref 0–5)
LYMPHOCYTES # BLD: 1 K/UL (ref 0.5–4.6)
LYMPHOCYTES NFR BLD: 10 % (ref 13–44)
MAGNESIUM SERPL-MCNC: 2.4 MG/DL (ref 1.8–2.4)
MCH RBC QN AUTO: 28.2 PG (ref 26.1–32.9)
MCHC RBC AUTO-ENTMCNC: 31.4 G/DL (ref 31.4–35)
MCV RBC AUTO: 89.9 FL (ref 79.6–97.8)
MONOCYTES # BLD: 0.5 K/UL (ref 0.1–1.3)
MONOCYTES NFR BLD: 5 % (ref 4–12)
NEUTS SEG # BLD: 8.4 K/UL (ref 1.7–8.2)
NEUTS SEG NFR BLD: 82 % (ref 43–78)
PLATELET # BLD AUTO: 140 K/UL (ref 150–450)
PMV BLD AUTO: 9.7 FL (ref 10.8–14.1)
POTASSIUM SERPL-SCNC: 3.7 MMOL/L (ref 3.5–5.1)
RBC # BLD AUTO: 3.65 M/UL (ref 4.23–5.67)
SODIUM SERPL-SCNC: 138 MMOL/L (ref 136–145)
WBC # BLD AUTO: 10.3 K/UL (ref 4.3–11.1)

## 2018-07-20 PROCEDURE — 74011000258 HC RX REV CODE- 258: Performed by: INTERNAL MEDICINE

## 2018-07-20 PROCEDURE — 74011250637 HC RX REV CODE- 250/637: Performed by: NURSE PRACTITIONER

## 2018-07-20 PROCEDURE — 86580 TB INTRADERMAL TEST: CPT | Performed by: FAMILY MEDICINE

## 2018-07-20 PROCEDURE — 36415 COLL VENOUS BLD VENIPUNCTURE: CPT | Performed by: INTERNAL MEDICINE

## 2018-07-20 PROCEDURE — 83735 ASSAY OF MAGNESIUM: CPT | Performed by: INTERNAL MEDICINE

## 2018-07-20 PROCEDURE — 74011000302 HC RX REV CODE- 302: Performed by: FAMILY MEDICINE

## 2018-07-20 PROCEDURE — 74011000258 HC RX REV CODE- 258: Performed by: FAMILY MEDICINE

## 2018-07-20 PROCEDURE — 87040 BLOOD CULTURE FOR BACTERIA: CPT | Performed by: INTERNAL MEDICINE

## 2018-07-20 PROCEDURE — 74011250637 HC RX REV CODE- 250/637: Performed by: INTERNAL MEDICINE

## 2018-07-20 PROCEDURE — 77030020263 HC SOL INJ SOD CL0.9% LFCR 1000ML

## 2018-07-20 PROCEDURE — 74011250636 HC RX REV CODE- 250/636: Performed by: INTERNAL MEDICINE

## 2018-07-20 PROCEDURE — 85025 COMPLETE CBC W/AUTO DIFF WBC: CPT | Performed by: INTERNAL MEDICINE

## 2018-07-20 PROCEDURE — 80048 BASIC METABOLIC PNL TOTAL CA: CPT | Performed by: INTERNAL MEDICINE

## 2018-07-20 PROCEDURE — 74011000250 HC RX REV CODE- 250: Performed by: FAMILY MEDICINE

## 2018-07-20 PROCEDURE — 65660000000 HC RM CCU STEPDOWN

## 2018-07-20 PROCEDURE — 74011250637 HC RX REV CODE- 250/637: Performed by: FAMILY MEDICINE

## 2018-07-20 RX ORDER — SODIUM CHLORIDE 9 MG/ML
75 INJECTION, SOLUTION INTRAVENOUS CONTINUOUS
Status: DISCONTINUED | OUTPATIENT
Start: 2018-07-20 | End: 2018-07-21

## 2018-07-20 RX ORDER — DILTIAZEM HYDROCHLORIDE 60 MG/1
60 TABLET, FILM COATED ORAL EVERY 6 HOURS
Status: DISCONTINUED | OUTPATIENT
Start: 2018-07-20 | End: 2018-07-22

## 2018-07-20 RX ADMIN — TUBERCULIN PURIFIED PROTEIN DERIVATIVE 5 UNITS: 5 INJECTION, SOLUTION INTRADERMAL at 13:56

## 2018-07-20 RX ADMIN — Medication 5 ML: at 22:19

## 2018-07-20 RX ADMIN — ASPIRIN 81 MG: 81 TABLET, COATED ORAL at 08:52

## 2018-07-20 RX ADMIN — SODIUM CHLORIDE 1 G: 900 INJECTION, SOLUTION INTRAVENOUS at 19:58

## 2018-07-20 RX ADMIN — SODIUM CHLORIDE 7.5 MG/HR: 900 INJECTION, SOLUTION INTRAVENOUS at 00:13

## 2018-07-20 RX ADMIN — DILTIAZEM HYDROCHLORIDE 60 MG: 60 TABLET, FILM COATED ORAL at 17:40

## 2018-07-20 RX ADMIN — APIXABAN 5 MG: 5 TABLET, FILM COATED ORAL at 08:52

## 2018-07-20 RX ADMIN — Medication 5 ML: at 05:43

## 2018-07-20 RX ADMIN — LEVOTHYROXINE SODIUM 25 MCG: 50 TABLET ORAL at 05:42

## 2018-07-20 RX ADMIN — APIXABAN 2.5 MG: 2.5 TABLET, FILM COATED ORAL at 17:40

## 2018-07-20 RX ADMIN — Medication 10 ML: at 11:23

## 2018-07-20 RX ADMIN — DILTIAZEM HYDROCHLORIDE 60 MG: 60 TABLET, FILM COATED ORAL at 11:19

## 2018-07-20 RX ADMIN — SODIUM CHLORIDE 75 ML/HR: 900 INJECTION, SOLUTION INTRAVENOUS at 15:30

## 2018-07-20 RX ADMIN — ATORVASTATIN CALCIUM 80 MG: 40 TABLET, FILM COATED ORAL at 22:17

## 2018-07-20 NOTE — PROGRESS NOTES
[facility] CARDIOLOGY PROGRESS NOTE 
      
 
7/20/2018 9:17 AM 
 
Admit Date: 7/18/2018 Subjective:  
Feeling much better but does not want to get up yet. No cp. On IV Cardizem for AF with po Eliquis. Echo 5/2018 showed normal LV function. Continues IV antibiotics for urosepsis. ROS: 
Cardiovascular:  As noted above Objective:  
  
Vitals:  
 07/20/18 0254 07/20/18 2101 07/20/18 0449 07/20/18 7849 BP: 111/59 116/59 99/55 102/82 Pulse: 100 100 (!) 107 97 Resp:   20 Temp:   98.5 °F (36.9 °C) SpO2:   94% Weight:   83.9 kg (185 lb) Height:      
 
 
Physical Exam: 
General-No Acute Distress Neck- supple, no JVD 
CV- irregular rhythm no MRG Lung- clear bilaterally Abd- soft, nontender, nondistended Ext- no edema bilaterally. Skin- warm and dry Data Review:  
Recent Labs  
   07/20/18 
 0351  07/19/18 
 0406 NA  138  140  
K  3.7  3.9 MG  2.4  2.4 BUN  26*  17  
CREA  2.17*  1.70* GLU  64*  83 WBC  10.3  16.6* HGB  10.3*  11.7* HCT  32.8*  37.2*  
PLT  140*  182 Assessment/Plan:  
 
Principal Problem: 
  UTI (urinary tract infection) (7/18/2018) Active Problems: 
  Essential hypertension (5/16/2018) S/P CABG (coronary artery bypass graft) (5/18/2018) Hypothyroidism (5/18/2018) CAD, multiple vessel (5/22/2018) History of CVA (cerebrovascular accident) (6/20/2018) Atrial fibrillation - HR controlled with IV Cardizem. Normocytic anemia (7/18/2018) Persistent atrial fibrillation ([address] (7/18/2018 Sepsis secondary to UTI ([address] (7/19/2018) Comment: Stop IV Cardizem, switch to po. Continue Eliquis. Consider cardioversion when recovers from urosepsis.   
   
 
Alexandre Puckett MD 
7/20/2018 9:17 AM

## 2018-07-20 NOTE — PROGRESS NOTES
Bedside and Verbal shift change report given to self & Derrick Valdovinos RN (oncoming nurse) by Siva Tapia RN (offgoing nurse). Report included the following information SBAR, Kardex, Intake/Output, MAR, Recent Results and Cardiac Rhythm A Fib, 80s-110s. Cardizem drip verified at 7.5 ml/hr.

## 2018-07-20 NOTE — PROGRESS NOTES
Bedside and Verbal shift change report given to Faviola Hoffman and Megan Morales RNs (oncoming nurses) by self Martin jimenez). Report included the following information SBAR, Kardex, ED Summary, Procedure Summary, Intake/Output, MAR, Recent Results and Cardiac Rhythm A-fib 80s-100s.

## 2018-07-20 NOTE — PROGRESS NOTES
Bedside and Verbal shift change report given to Nevin Arboleda RN & Nohemi Mcguire RN (oncoming nurse) by self & Elvia Leon RN (offgoing nurse). Report included the following information SBAR, Kardex, Intake/Output, MAR, Recent Results and Cardiac Rhythm A fib, 60s-100s at times. Discussed change from Cardizem gtt to Cardizem PO.

## 2018-07-20 NOTE — PROGRESS NOTES
Bedside and Verbal shift change report given to Self and Garret Rose (oncoming nurse) by Gal Mathews and Garret Cortés (offgoing nurse). Report included the following information SBAR, Kardex, Intake/Output, MAR, Recent Results, Med Rec Status and Cardiac Rhythm A-Fib 60s-90s.

## 2018-07-20 NOTE — PROGRESS NOTES
Problem: Falls - Risk of  Goal: *Absence of Falls  Document Shi Fall Risk and appropriate interventions in the flowsheet. Outcome: Progressing Towards Goal  Fall Risk Interventions:  Mobility Interventions: Bed/chair exit alarm, Communicate number of staff needed for ambulation/transfer, Patient to call before getting OOB         Medication Interventions: Bed/chair exit alarm, Patient to call before getting OOB, Teach patient to arise slowly    Elimination Interventions: Bed/chair exit alarm, Call light in reach, Patient to call for help with toileting needs, Urinal in reach       Patient progressing towards goal with no falls on current admission. Patient without confusion, agitation, or sensory perception deficits. Patient has not ambulated on current shift and presents with generalized weakness. Personal belongings are within reach. Bed is in the low and locked position with side rails up x3 and [place] bed alarm in place and active. Yellow gripper socks to bilateral feet. Call light within reach and patient verbalizes understanding of use. Problem: Afib Pathway: Day 2  Goal: *Hemodynamically stable  Outcome: Progressing Towards Goal  Patient's HR has been more controlled on Cardizem gtt, now running 90s-110s, but he has been hypotensive at times while on the Cardizem gtt. Goal: *Optimal pain control at patient's stated goal  Outcome: Resolved/Met Date Met: 07/19/18  Patient has denied pain on current shift. Goal: *Stable cardiac rhythm  Outcome: Progressing Towards Goal  Patient remains in A-fib, but has had improvement in rate control with Cardizem gtt.

## 2018-07-20 NOTE — PROGRESS NOTES
Problem: Falls - Risk of  Goal: *Absence of Falls  Document Shi Fall Risk and appropriate interventions in the flowsheet. Outcome: Progressing Towards Goal  Fall Risk Interventions:  Mobility Interventions: Bed/chair exit alarm, Communicate number of staff needed for ambulation/transfer, Patient to call before getting OOB         Medication Interventions: Evaluate medications/consider consulting pharmacy, Patient to call before getting OOB, Teach patient to arise slowly    Elimination Interventions: Call light in reach, Patient to call for help with toileting needs, Urinal in reach, Bed/chair exit alarm       Patient has made no attempts during this shift to get out of bed by himself. Bed alarm is in place & active. Pt wife has been at bedside most of the day and is aware patient may not get up by himself. Pt has gripper socks on. Problem: Patient Education: Go to Patient Education Activity  Goal: Patient/Family Education  Outcome: Progressing Towards Goal  Keeping patient wife up to date on plan of care. She verbalizes understanding of the plan & asks questions as appropriate. Problem: Afib Pathway: Day 2  Goal: Activity/Safety  Outcome: Progressing Towards Goal  Patient denied PT this morning, Dr. Cesia Mcconnell wanted patient to rest as well. Planning to have therapy return before end of shift. Goal: Nutrition/Diet  Outcome: Progressing Towards Goal  Patient on a cardiac diet. Pt ate more breakfast this AM than yesterday AM. Minimal intake at lunch. Will continue to encourage fluids and consumption of food. Goal: Discharge Planning  Outcome: Progressing Towards Goal  Discussing plan to send patient home or to a rehab facility. Not sure which option will be best at this point. Explained to pt placement of his ppd in the event of placement in a facility. Goal: Medications  Outcome: Progressing Towards Goal  Patient has been taken off his Cardizem drip and placed on PO 60 mg.  Tolerated first dose well with HR sustaining 70s-80s. Goal: Respiratory  Outcome: Progressing Towards Goal  Patient has maintained O2 sats 91-95% on room air. No dyspnea noted. Goal: Treatments/Interventions/Procedures  Outcome: Progressing Towards Goal  Patient on continuous cardiac monitoring. Lobo Botello removed from the room as patient is no longer on cardizem drip. Intake and output being measured strictly due to patient's UTI, increasing creatinine, & BUN. Goal: Psychosocial  Outcome: Progressing Towards Goal  Patient seems to be tolerating diagnosis and treatment well. No indications of anxiety. Offering support for wife as well. Goal: *Hemodynamically stable  Outcome: Progressing Towards Goal  Patient has been hypotensive at times, HR currently 70s-80s but periods of 90s-100s. Goal: *Stable cardiac rhythm  Outcome: Progressing Towards Goal  Cardiac rhythm still Afib.  Rate under greater control remaining in 70s-80s rather than 80s-110s that she was running yesterday, 07/19

## 2018-07-20 NOTE — PROGRESS NOTES
PT Note:  PT orders received/reviewed and evaluation attempted. Patient was refusing to get out of bed on first attempt. For second attempt pt's visitor reported per MD pt should rest this AM.  Evaluation will be re-attempted as schedule and patient's status allow.   Thanks,  Mayco Muñiz, PT, DPT

## 2018-07-20 NOTE — PROGRESS NOTES
Hospitalist Progress Note Admit Date:  2018  5:28 PM  
Name:  Dio Dean Age:  82 y.o. 
:  1935 MRN:  [id number] PCP:  Mayra Mercer MD 
Treatment Team: Attending Provider: Keena Alfaro MD; Consulting Provider: Ruma Fisher MD; Consulting Provider: Tyler Paz MD; Utilization Review: Lin Garcia; Care Manager: Lia Capellan RN Subjective:  
CC:N/V, chills, abdominal pain Pt is an 81 yo male with PMH of CAD, S/P CABG x 3v , CVA, HTN. Pt presented to ER with report of N/V, chills, abd pain since that morning. Pt had chills after breakfast, developed nausea, and then vomited x 1. Per spouse he had flank pain on the right too. Pt afebrile since admit, tachycardic at times up to 115. Urine was found to be 4+ bacteria, sent for culture. H&H 10.3/32.8, WBC up to 16.6. Creatinine was normal initially but now up to 2.17. Pt initially placed on diltiazem drip 2/2 Afib with RVR. Now on po diltiazem. Started on IV cefepime for the urosepsis. On exam pt very sleepy, per wife he didn't sleep most of the night. Has not been able to eat much, has vomited most everything back up. Mucous membranes dry. Objective:  
Patient Vitals for the past 24 hrs: 
 Temp Pulse Resp BP SpO2  
18 1119 - 89 - 131/74 -  
18 0925 98.5 °F (36.9 °C) 82 18 92/51 92 %  
18 0546 - 97 - 102/82 -  
18 0449 98.5 °F (36.9 °C) (!) 107 20 99/55 94 %  
18 0344 - 100 - 116/59 -  
18 0244 - 100 - 111/59 -  
18 0219 - 97 - - -  
18 0144 - (!) 112 - 101/60 -  
18 0044 98.2 °F (36.8 °C) (!) 115 20 111/52 94 % 18 2344 - (!) 107 - 103/62 -  
18 2244 - (!) 101 - 118/56 -  
18 2144 - (!) 106 - 117/60 -  
18 2042 98.6 °F (37 °C) (!) 109 20 131/71 95 % 18 1944 - - - 128/57 -  
18 1644 98.8 °F (37.1 °C) (!) 115 18 101/56 94 % 18 1516 99.4 °F (37.4 °C) 96 - 120/57 -  
18 1217 98.7 °F (37.1 °C) 96 18 98/70 95 % Oxygen Therapy O2 Sat (%): 92 % (07/20/18 0925) Pulse via Oximetry: 85 beats per minute (07/18/18 2236) O2 Device: Room air (07/20/18 1129) Intake/Output Summary (Last 24 hours) at 07/20/18 1210 Last data filed at 07/20/18 1201 Gross per 24 hour Intake              875 ml Output              500 ml Net              375 ml General:    Well nourished. Sleeping but rousable. Head:  Normocephalic, atraumatic Eyes:  Extraocular movements intact, normal sclera Neck:  Supple, no lymphadenopathy or JVD 
CV:   RRR. No  Murmurs, clicks, or gallops Lungs:   Unlabored, no cyanosis Abdomen:   Soft, nondistended, nontender. Extremities: Warm and dry. No cyanosis or edema. Skin:     No rashes or jaundice. Neuro:  No gross focal deficits Psych:  Mood and affect appropriate Data Review: 
I have reviewed all labs, meds, telemetry events, and studies from the last 24 hours. Recent Results (from the past 24 hour(s)) LACTIC ACID Collection Time: 07/19/18  5:22 PM  
Result Value Ref Range Lactic acid 1.9 0.4 - 2.0 MMOL/L  
CBC WITH AUTOMATED DIFF Collection Time: 07/20/18  3:51 AM  
Result Value Ref Range WBC 10.3 4.3 - 11.1 K/uL  
 RBC 3.65 (L) 4.23 - 5.67 M/uL  
 HGB 10.3 (L) 13.6 - 17.2 g/dL HCT 32.8 (L) 41.1 - 50.3 % MCV 89.9 79.6 - 97.8 FL  
 MCH 28.2 26.1 - 32.9 PG  
 MCHC 31.4 31.4 - 35.0 g/dL  
 RDW 16.1 (H) 11.9 - 14.6 % PLATELET 692 (L) 469 - 450 K/uL MPV 9.7 (L) 10.8 - 14.1 FL  
 DF AUTOMATED NEUTROPHILS 82 (H) 43 - 78 % LYMPHOCYTES 10 (L) 13 - 44 % MONOCYTES 5 4.0 - 12.0 % EOSINOPHILS 3 0.5 - 7.8 % BASOPHILS 0 0.0 - 2.0 % IMMATURE GRANULOCYTES 0 0.0 - 5.0 %  
 ABS. NEUTROPHILS 8.4 (H) 1.7 - 8.2 K/UL  
 ABS. LYMPHOCYTES 1.0 0.5 - 4.6 K/UL  
 ABS. MONOCYTES 0.5 0.1 - 1.3 K/UL  
 ABS. EOSINOPHILS 0.3 0.0 - 0.8 K/UL  
 ABS. BASOPHILS 0.0 0.0 - 0.2 K/UL  
 ABS. IMM. GRANS. 0.0 0.0 - 0.5 K/UL METABOLIC PANEL, BASIC  
 Collection Time: 07/20/18  3:51 AM  
Result Value Ref Range Sodium 138 136 - 145 mmol/L Potassium 3.7 3.5 - 5.1 mmol/L Chloride 105 98 - 107 mmol/L  
 CO2 23 21 - 32 mmol/L Anion gap 10 7 - 16 mmol/L Glucose 64 (L) 65 - 100 mg/dL BUN 26 (H) 8 - 23 MG/DL Creatinine 2.17 (H) 0.8 - 1.5 MG/DL  
 GFR est AA 38 (L) >60 ml/min/1.73m2 GFR est non-AA 31 (L) >60 ml/min/1.73m2 Calcium 7.9 (L) 8.3 - 10.4 MG/DL MAGNESIUM Collection Time: 07/20/18  3:51 AM  
Result Value Ref Range Magnesium 2.4 1.8 - 2.4 mg/dL All Micro Results Procedure Component Value Units Date/Time CULTURE, BLOOD [[id number]] Order Status:  Sent Specimen:  Blood from Blood CULTURE, BLOOD [[id number]]  (Abnormal) Collected:  07/18/18 2214 Order Status:  Completed Specimen:  Blood from Blood Updated:  07/20/18 1003 Special Requests: --     
  NO SPECIAL REQUESTS 
RIGHT 
HAND 
  
  GRAM STAIN GRAM NEGATIVE RODS ANAEROBIC BOTTLE POSITIVE     
        
  GRAM POS COCCI IN CLUSTERS  
   AEROBIC BOTTLE POSITIVE RESULTS VERIFIED, PHONED TO AND READ BACK BY [address] [place] [address] AT 0796 Culture result: GRAM NEGATIVE RODS (A) IDENTIFICATION AND SUSCEPTIBILITY TO FOLLOW CULTURE IN PROGRESS,FURTHER UPDATES TO FOLLOW  
      
  SA target DNA sequence not detected within the sample. Test performed by PCR 
AEROBIC BOTTLE POSITIVE CULTURE, BLOOD [[id number]]  (Abnormal) Collected:  07/18/18 2215 Order Status:  Completed Specimen:  Blood from Blood Updated:  07/20/18 3010 Special Requests: --     
  NO SPECIAL REQUESTS 
RIGHT Antecubital 
  
  GRAM STAIN GRAM NEGATIVE RODS ANAEROBIC BOTTLE POSITIVE RESULTS VERIFIED, PHONED TO AND READ BACK BY MELLISSA CARROLL RN AT 0723 ON 7/19/18 SG  
  Culture result: GRAM NEGATIVE RODS (A) CULTURE IN PROGRESS,FURTHER UPDATES TO FOLLOW  CULTURE, URINE [[id number]]  (Abnormal) Collected:  07/18/18 5011 Order Status:  Completed Specimen:  Urine from Clean catch Updated:  07/20/18 5633 Special Requests: NO SPECIAL REQUESTS Culture result:      
  >100,000 COLONIES/mL GRAM NEGATIVE RODS (A) IDENTIFICATION AND SUSCEPTIBILITY TO FOLLOW Current Meds: 
Current Facility-Administered Medications Medication Dose Route Frequency  dilTIAZem (CARDIZEM) IR tablet 60 mg  60 mg Oral Q6H  
 ondansetron (ZOFRAN) injection 4 mg  4 mg IntraVENous Q6H PRN  
 levothyroxine (SYNTHROID) tablet 25 mcg  25 mcg Oral ACB  cefepime (MAXIPIME) 1 g in 0.9% sodium chloride (MBP/ADV) 50 mL ADV  1 g IntraVENous Q24H  
 aspirin delayed-release tablet 81 mg  81 mg Oral DAILY  atorvastatin (LIPITOR) tablet 80 mg  80 mg Oral QHS  traMADol (ULTRAM) tablet 50 mg  50 mg Oral Q6H PRN  
 sodium chloride (NS) flush 5-10 mL  5-10 mL IntraVENous Q8H  
 sodium chloride (NS) flush 5-10 mL  5-10 mL IntraVENous PRN  
 acetaminophen (TYLENOL) tablet 650 mg  650 mg Oral Q4H PRN  
 magnesium hydroxide (MILK OF MAGNESIA) 400 mg/5 mL oral suspension 30 mL  30 mL Oral DAILY PRN  
 apixaban (ELIQUIS) tablet 5 mg  5 mg Oral BID Diet: DIET CARDIAC Other Studies (last 24 hours): No results found. Assessment and Plan:  
 
Hospital Problems as of 7/20/2018  Date Reviewed: 6/27/2018 Codes Class Noted - Resolved POA Sepsis secondary to UTI [facility]) ICD-10-CM: A41.9, N39.0 ICD-9-CM: 038.9, 995.91, 599.0  7/19/2018 - Present Yes Gram-negative bacteremia ICD-10-CM: R78.81 ICD-9-CM: 790.7, 041.85  7/19/2018 - Present Yes Atrial fibrillation with RVR (HCC) ICD-10-CM: I48.91 
ICD-9-CM: 427.31  7/18/2018 - Present Yes * (Principal)UTI (urinary tract infection) ICD-10-CM: N39.0 ICD-9-CM: 599.0  7/18/2018 - Present Yes Normocytic anemia ICD-10-CM: D64.9 ICD-9-CM: 285.9  7/18/2018 - Present Yes  Persistent atrial fibrillation ([facility] [address] ICD-10-CM: I48.1 ICD-9-CM: 427.31  7/18/2018 - Present Yes Abdominal pain ICD-10-CM: R10.9 ICD-9-CM: 789.00  7/18/2018 - Present Yes History of CVA (cerebrovascular accident) (Chronic) ICD-10-CM: I51.80 
ICD-9-CM: V12.54  6/20/2018 - Present Yes  
   
 CAD, multiple vessel (Chronic) ICD-10-CM: I25.10 ICD-9-CM: 414.00  5/22/2018 - Present Yes Overview Signed 5/22/2018 11:32 AM by Warren Wallace NP  
   5/18/18 (Dr Shady Diallo) Coronary artery bypass grafting x3 with grafts consisting of: 1. Left internal mammary artery to left anterior descending artery. 2.  Reverse saphenous vein to diagonal.  
3.  Reversed saphenous vein graft to obtuse marginal.  
  
  
   
 S/P CABG (coronary artery bypass graft) (Chronic) ICD-10-CM: Z95.1 ICD-9-CM: V45.81  5/18/2018 - Present Yes Hypothyroidism (Chronic) ICD-10-CM: E03.9 ICD-9-CM: 244.9  5/18/2018 - Present Yes Essential hypertension (Chronic) ICD-10-CM: I10 
ICD-9-CM: 401.9  5/16/2018 - Present Yes RESOLVED: PAF (paroxysmal atrial fibrillation) (HCC) (Chronic) ICD-10-CM: I48.0 ICD-9-CM: 427.31  5/20/2018 - 7/18/2018 Yes A/P: 1. Sepsis 
-Urine culture in process -Blood cultures with GNR, re-culture ordered for 7/20 
-Continue Cefepime 2. Afib with RVR/CAD 
-Cardiology following 
-Continue diltiazem 
-Continue apixiban 3. SALEEM  
-Cr 2.17 
-Gentle IV hydration at 75ml/hr 
-Monitor BMP 4. HTN 
-Stable 
-Monitor 5. Hypothyroidism 
-Decreased levothyroxine to 25mcg/day and recheck 4-6 wks. DC planning/Dispo:  Pending - PT eval ordered DVT ppx:  apixaban Case reviewed with supervising physician - AIRAM Calzada MD 
 
Signed: 
LALITHA Gilman

## 2018-07-20 NOTE — PROGRESS NOTES
Upon shift assessment of patient, it was apparent patient not only communicated better, but appeared to be feeling better than yesterday. Antibiotics were started last night, which may be contributing to patients noticeable changes. Patient's CVA residual is slower speech per wife; however, pt opted to speak more often this morning with no indication of stuttering or slow speech.

## 2018-07-21 LAB
ANION GAP SERPL CALC-SCNC: 10 MMOL/L (ref 7–16)
BACTERIA SPEC CULT: ABNORMAL
BASOPHILS # BLD: 0 K/UL (ref 0–0.2)
BASOPHILS NFR BLD: 0 % (ref 0–2)
BUN SERPL-MCNC: 34 MG/DL (ref 8–23)
CALCIUM SERPL-MCNC: 7.6 MG/DL (ref 8.3–10.4)
CHLORIDE SERPL-SCNC: 107 MMOL/L (ref 98–107)
CO2 SERPL-SCNC: 22 MMOL/L (ref 21–32)
CREAT SERPL-MCNC: 2.07 MG/DL (ref 0.8–1.5)
DIFFERENTIAL METHOD BLD: ABNORMAL
EOSINOPHIL # BLD: 0.5 K/UL (ref 0–0.8)
EOSINOPHIL NFR BLD: 7 % (ref 0.5–7.8)
ERYTHROCYTE [DISTWIDTH] IN BLOOD BY AUTOMATED COUNT: 16 % (ref 11.9–14.6)
GLUCOSE SERPL-MCNC: 56 MG/DL (ref 65–100)
HCT VFR BLD AUTO: 31 % (ref 41.1–50.3)
HGB BLD-MCNC: 9.7 G/DL (ref 13.6–17.2)
IMM GRANULOCYTES # BLD: 0 K/UL (ref 0–0.5)
IMM GRANULOCYTES NFR BLD AUTO: 0 % (ref 0–5)
LYMPHOCYTES # BLD: 0.7 K/UL (ref 0.5–4.6)
LYMPHOCYTES NFR BLD: 10 % (ref 13–44)
MCH RBC QN AUTO: 28 PG (ref 26.1–32.9)
MCHC RBC AUTO-ENTMCNC: 31.3 G/DL (ref 31.4–35)
MCV RBC AUTO: 89.3 FL (ref 79.6–97.8)
MM INDURATION POC: 0 MM (ref 0–5)
MONOCYTES # BLD: 0.5 K/UL (ref 0.1–1.3)
MONOCYTES NFR BLD: 7 % (ref 4–12)
NEUTS SEG # BLD: 5.5 K/UL (ref 1.7–8.2)
NEUTS SEG NFR BLD: 76 % (ref 43–78)
PLATELET # BLD AUTO: 134 K/UL (ref 150–450)
PMV BLD AUTO: 9.5 FL (ref 10.8–14.1)
POTASSIUM SERPL-SCNC: 4.2 MMOL/L (ref 3.5–5.1)
PPD POC: NEGATIVE NEGATIVE
RBC # BLD AUTO: 3.47 M/UL (ref 4.23–5.67)
SERVICE CMNT-IMP: ABNORMAL
SODIUM SERPL-SCNC: 139 MMOL/L (ref 136–145)
WBC # BLD AUTO: 7.3 K/UL (ref 4.3–11.1)

## 2018-07-21 PROCEDURE — 97161 PT EVAL LOW COMPLEX 20 MIN: CPT

## 2018-07-21 PROCEDURE — 74011250637 HC RX REV CODE- 250/637: Performed by: INTERNAL MEDICINE

## 2018-07-21 PROCEDURE — 85025 COMPLETE CBC W/AUTO DIFF WBC: CPT | Performed by: INTERNAL MEDICINE

## 2018-07-21 PROCEDURE — 80048 BASIC METABOLIC PNL TOTAL CA: CPT | Performed by: INTERNAL MEDICINE

## 2018-07-21 PROCEDURE — 74011250637 HC RX REV CODE- 250/637: Performed by: NURSE PRACTITIONER

## 2018-07-21 PROCEDURE — 36415 COLL VENOUS BLD VENIPUNCTURE: CPT | Performed by: INTERNAL MEDICINE

## 2018-07-21 PROCEDURE — 74011250636 HC RX REV CODE- 250/636: Performed by: INTERNAL MEDICINE

## 2018-07-21 PROCEDURE — 74011250637 HC RX REV CODE- 250/637: Performed by: FAMILY MEDICINE

## 2018-07-21 PROCEDURE — 74011000258 HC RX REV CODE- 258: Performed by: INTERNAL MEDICINE

## 2018-07-21 PROCEDURE — 65660000000 HC RM CCU STEPDOWN

## 2018-07-21 RX ADMIN — SODIUM CHLORIDE 1 G: 900 INJECTION, SOLUTION INTRAVENOUS at 20:11

## 2018-07-21 RX ADMIN — Medication 10 ML: at 13:05

## 2018-07-21 RX ADMIN — DILTIAZEM HYDROCHLORIDE 60 MG: 60 TABLET, FILM COATED ORAL at 05:34

## 2018-07-21 RX ADMIN — ASPIRIN 81 MG: 81 TABLET, COATED ORAL at 09:35

## 2018-07-21 RX ADMIN — APIXABAN 2.5 MG: 2.5 TABLET, FILM COATED ORAL at 17:34

## 2018-07-21 RX ADMIN — Medication 5 ML: at 22:07

## 2018-07-21 RX ADMIN — ATORVASTATIN CALCIUM 80 MG: 40 TABLET, FILM COATED ORAL at 22:06

## 2018-07-21 RX ADMIN — Medication 10 ML: at 05:35

## 2018-07-21 RX ADMIN — DILTIAZEM HYDROCHLORIDE 60 MG: 60 TABLET, FILM COATED ORAL at 12:31

## 2018-07-21 RX ADMIN — APIXABAN 2.5 MG: 2.5 TABLET, FILM COATED ORAL at 09:35

## 2018-07-21 RX ADMIN — ACETAMINOPHEN 650 MG: 325 TABLET ORAL at 22:07

## 2018-07-21 RX ADMIN — DILTIAZEM HYDROCHLORIDE 60 MG: 60 TABLET, FILM COATED ORAL at 17:34

## 2018-07-21 RX ADMIN — LEVOTHYROXINE SODIUM 25 MCG: 50 TABLET ORAL at 05:34

## 2018-07-21 RX ADMIN — DILTIAZEM HYDROCHLORIDE 60 MG: 60 TABLET, FILM COATED ORAL at 00:09

## 2018-07-21 NOTE — PROGRESS NOTES
Problem: Mobility Impaired (Adult and Pediatric)  Goal: *Acute Goals and Plan of Care (Insert Text)  LTG:  (1.)Mr. Calderon Amado will move from supine to sit and sit to supine , scoot up and down and roll side to side with INDEPENDENT within 7 treatment day(s) from flat surface without handrail. (2.)Mr. Calderon Amado will transfer from bed to chair and chair to bed with MODIFIED INDEPENDENCE using the least restrictive device within 7 treatment day(s). (3.)Mr. Calderon Amado will ambulate with MODIFIED INDEPENDENCE for 500+ feet with the least restrictive device within 7 treatment day(s). (4.)Mr. Calderon Amado will be independent with HEP for B LE strengthening and balance within 7 treatment days. ________________________________________________________________________________________    PHYSICAL THERAPY: Initial Assessment, PM 7/21/2018  INPATIENT: Hospital Day: 4  [place] / Product Type: Medicare /      NAME/AGE/GENDER: Jorge Green is a 82 y.o. male   PRIMARY DIAGNOSIS: Atrial fibrillation with RVR ([address] UTI (urinary tract infection) UTI (urinary tract infection)        ICD-10: Treatment Diagnosis:    · Difficulty in walking, Not elsewhere classified (R26.2)   Precaution/Allergies:  Ativan [lorazepam] and Pcn [penicillins]      ASSESSMENT:     Mr. Calderon Amado presents with decreased bed mobility, transfers, ambulation, balance, activity tolerance and overall general functional mobility s/p hospital admission with above. Pt to ED on 7/18/18 for n/v, abdominal pain. Pt A & O x 4 this date, on room air, spouse present. Pt required encouragement to participate in mobility. Pt relates uses str cane in community, in home independent with ambulation. Pt reports having HHPT 2 days/week for past 2-3 weeks; spouse relates has been improving at home until this recent admit. Pt required CGA to MIN A for bed mobility, transfers and ambulation with RW into hallway for 100'.  Pt with accelerated gait, decreased balance at times. Pt left up in chair in room, encouraged for OOB activity. PT to follow for acute care needs to address deficits noted above. Spouse and pt relate would like to return home at discharge, resuming HHPT. This section established at most recent assessment   PROBLEM LIST (Impairments causing functional limitations):  1. Decreased Strength  2. Decreased ADL/Functional Activities  3. Decreased Transfer Abilities  4. Decreased Ambulation Ability/Technique  5. Decreased Balance  6. Decreased Activity Tolerance  7. Increased Fatigue  8. Decreased [place] with Home Exercise Program   INTERVENTIONS PLANNED: (Benefits and precautions of physical therapy have been discussed with the patient.)  1. Balance Exercise  2. Bed Mobility  3. Family Education  4. Gait Training  5. Home Exercise Program (HEP)  6. Therapeutic Activites  7. Therapeutic Exercise/Strengthening  8. Transfer Training  9. Group Therapy     TREATMENT PLAN: Frequency/Duration: 3 times a week for duration of hospital stay  Rehabilitation Potential For Stated Goals: Good     RECOMMENDED REHABILITATION/EQUIPMENT: (at time of discharge pending progress): Due to the probability of continued deficits (see above) this patient will likely need continued skilled physical therapy after discharge. Equipment:    None at this time              HISTORY:   History of Present Injury/Illness (Reason for Referral):  Olivia Andrade is an 82 y.o. male with a past medical history of CAD s/p 3vCABG in 5/18, CVA, HTN  who presents to the ER with complaint of nausea, vomiting, chills that started this morning. The patient reports that he had chills initially after breakfast, followed by nausea and one episode of vomiting. He also describes some right flank discomfort. Currently, he reports feeling better, denies any pain, nausea, SOB, chest pain.  Denies any current chills, fevers.     Past Medical History/Comorbidities:   Mr. 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 Mr. Shawnee Ramirez  has a past surgical history that includes hx hemorrhoidectomy; hx other surgical (2001 and 2013); and hx pacemaker (2018). Social History/Living Environment:   Home Environment: Private residence  # Steps to Enter: 0  One/Two Story Residence: One story  Living Alone: No  Support Systems: Friends \ neighbors, Spouse/Significant Other/Partner  Patient Expects to be Discharged to[employer] residence  Current DME Used/Available at Home: [employer], straight, Grab bars, Shower chair, Walker, rolling  Tub or Shower Type: Shower  Prior Level of Function/Work/Activity:  Lives with spouse; uses cane with ambulation, some assist with ADLs, does not drive  Personal Factors:          Sex:  male        Age:  82 y.o. Overall Behavior:  Somewhat agreeable   Number of Personal Factors/Comorbidities that affect the Plan of Care:  Age, CAD, CVA 3+: HIGH COMPLEXITY   EXAMINATION:   Most Recent Physical Functioning:   Gross Assessment:  AROM: Generally decreased, functional  Strength: Generally decreased, functional  Coordination: Generally decreased, functional               Posture:  Posture (WDL): Exceptions to WDL  Posture Assessment:  Forward head, Rounded shoulders  Balance:  Sitting: Intact  Standing: Impaired  Standing - Static: Good;Fair  Standing - Dynamic : Fair Bed Mobility:  Supine to Sit: Minimum assistance  Sit to Supine:  (left up in chair)  Scooting: Supervision  Wheelchair Mobility:     Transfers:  Sit to Stand: Contact guard assistance;Minimum assistance  Stand to Sit: Contact guard assistance  Bed to Chair: Contact guard assistance;Minimum assistance  Gait:     Base of Support: Narrowed  Speed/Ann: Accelerated  Step Length: Left shortened;Right shortened  Swing Pattern: Left symmetrical;Right symmetrical  Gait Abnormalities: Decreased step clearance  Distance (ft): 100 Feet (ft)  Assistive Device: Gait belt;Walker, rolling  Ambulation - Level of Assistance: Contact guard assistance  Interventions: Safety awareness training;Verbal cues      Body Structures Involved:  1. Muscles Body Functions Affected:  1. Movement Related Activities and Participation Affected:  1. General Tasks and Demands  2. Mobility  3. Self Care   Number of elements that affect the Plan of Care: 4+: HIGH COMPLEXITY   CLINICAL PRESENTATION:   Presentation: Evolving clinical presentation with changing clinical characteristics: MODERATE COMPLEXITY   CLINICAL DECISION MAKIN [facility] Mobility Inpatient Short Form  How much difficulty does the patient currently have. .. Unable A Lot A Little None   1. Turning over in bed (including adjusting bedclothes, sheets and blankets)? [] 1   [] 2   [] 3   [x] 4   2. Sitting down on and standing up from a chair with arms ( e.g., wheelchair, bedside commode, etc.)   [] 1   [] 2   [x] 3   [] 4   3. Moving from lying on back to sitting on the side of the bed? [] 1   [] 2   [x] 3   [] 4   How much help from another person does the patient currently need. .. Total A Lot A Little None   4. Moving to and from a bed to a chair (including a wheelchair)? [] 1   [] 2   [x] 3   [] 4   5. Need to walk in hospital room? [] 1   [] 2   [x] 3   [] 4   6. Climbing 3-5 steps with a railing? [] 1   [] 2   [x] 3   [] 4   © , Trustees of [facility] [employer] [employer], under license to [employer]. All rights reserved      Score:  Initial: 19 Most Recent: X (Date: -- )    Interpretation of Tool:  Represents activities that are increasingly more difficult (i.e. Bed mobility, Transfers, Gait). Score 24 23 22-20 19-15 14-10 9-7 6     Modifier CH CI CJ CK CL CM CN      ?  Mobility - Walking and Moving Around:     - CURRENT STATUS: CK - 40%-59% impaired, limited or restricted    - GOAL STATUS: CJ - 20%-39% impaired, limited or restricted    - D/C STATUS:  ---------------To be determined---------------  [place] / [place] / Product Type: Medicare /      Medical Necessity:     · Patient is expected to demonstrate progress in strength, range of motion, balance and coordination to decrease assistance required with overall functional mobility, transfers, ambulation. · Patient demonstrates good rehab potential due to higher previous functional level. Reason for Services/Other Comments:  · Patient continues to require present interventions due to patient's inability to perform bed mobility, transfers, ambulation safely and effectively. Use of outcome tool(s) and clinical judgement create a POC that gives a: Clear prediction of patient's progress: LOW COMPLEXITY            TREATMENT:   (In addition to Assessment/Re-Assessment sessions the following treatments were rendered)   Pre-treatment Symptoms/Complaints:  \"I guess if you are going to make me\"  Pain: Initial:   Pain Intensity 1: 0  Post Session:  0/10 post session in chair     Assessment/Reassessment only, no treatment provided today    Braces/Orthotics/Lines/Etc:   · O2 Device: Room air  Treatment/Session Assessment:    · Response to Treatment:  Tolerated well  · Interdisciplinary Collaboration:   o Physical Therapist  o Registered Nurse  · After treatment position/precautions:   o Up in chair  o Bed alarm/tab alert on  o Bed/Chair-wheels locked  o Bed in low position  o Call light within reach  o Family at bedside   · Compliance with Program/Exercises: Will assess as treatment progresses. · Recommendations/Intent for next treatment session: \"Next visit will focus on advancements to more challenging activities\".   Total Treatment Duration:  PT Patient Time In/Time Out  Time In: 3173  Time Out: [place], PT

## 2018-07-21 NOTE — PROGRESS NOTES
Bedside and Verbal shift change report given to self (oncoming nurse) by Hilary Judge RN (offgoing nurse). Report included the following information SBAR, Kardex, ED Summary, Procedure Summary, Intake/Output, MAR, Recent Results and Cardiac Rhythm a-fib.

## 2018-07-21 NOTE — PROGRESS NOTES
[facility] CARDIOLOGY PROGRESS NOTE 
      
 
7/21/2018 10:13 AM 
 
Admit Date: 7/18/2018 Subjective:  
Feeling better now, getting OOB. Rate controlled Afib on tele now. NSTEMI, followed by 3v CABG on 5/18/18. CVA after CABG. Echo 5/2018: EF 50-55%. Carotid US 5/2018: mild dz PPM 6/2018 for Afib and SSS 
 
ROS: 
Cardiovascular:  As noted above Objective:  
  
Vitals:  
 07/21/18 0007 07/21/18 0113 07/21/18 8434 07/21/18 1448 BP: 119/69 119/65 122/64 114/62 Pulse: 80 70 74 72 Resp:  16 16 18 Temp:  99.2 °F (37.3 °C) 98.8 °F (37.1 °C) 97.3 °F (36.3 °C) SpO2:  94% 96% 95% Weight:   83.2 kg (183 lb 8 oz) Height:      
 
 
Physical Exam: 
General-No Acute Distress Neck- supple, no JVD 
CV- irreg irreg, no MRG Lung- clear bilaterally with dec BS in the bases Abd- soft, nontender, nondistended Ext- no edema bilaterally. Skin- warm and dry Data Review:  
Recent Labs  
   07/21/18 
 0419  07/20/18 
 0351  07/19/18 
 0406 NA  139  138  140  
K  4.2  3.7  3.9 MG   --   2.4  2.4 BUN  34*  26*  17  
CREA  2.07*  2.17*  1.70* GLU  56*  64*  83 WBC  7.3  10.3  16.6* HGB  9.7*  10.3*  11.7* HCT  31.0*  32.8*  37.2*  
PLT  134*  140*  182 Assessment/Plan:  
 
Principal Problem: 
  UTI (urinary tract infection) (7/18/2018) Better from urosepsis, per primary team 
 
Active Problems: 
  Essential hypertension (5/16/2018) Continue meds S/P CABG (coronary artery bypass graft) (5/18/2018) Remain on ASA, statin and CCB 
 
  CAD, multiple vessel (5/22/2018) No CP, continue meds History of CVA (cerebrovascular accident) (6/20/2018) On NOAC Atrial fibrillation with RVR ([address] (7/18/2018) Rate controlled on PO meds, follow, remain on NOAC Normocytic anemia (7/18/2018) follow Follow, needs rehab. We will follow, check AM labs.   
 
 
 
 
Indio Gruber DO 
7/21/2018 10:13 AM

## 2018-07-21 NOTE — PROGRESS NOTES
Bedside and Verbal shift change report given to Anthony Ventura, RN (oncoming nurse) by Self and Sabi Cedeno, RNs (offgoing nurse). Report included the following information SBAR, Kardex, ED Summary, Intake/Output, MAR, Recent Results, Med Rec Status and Cardiac Rhythm A Fib with V pacing on demand. Imani Draper

## 2018-07-21 NOTE — PROGRESS NOTES
Problem: Falls - Risk of  Goal: *Absence of Falls  Document Shi Fall Risk and appropriate interventions in the flowsheet. Outcome: Progressing Towards Goal  Fall Risk Interventions:  Mobility Interventions: Bed/chair exit alarm, Communicate number of staff needed for ambulation/transfer, Patient to call before getting OOB      Patient progressing towards goal with no falls on current admission. Patient without confusion, agitation, or sensory perception deficits. [place] bed alarm in place and active. Personal belongings are within reach. Bed is in the low and locked position with side rails up x2. Yellow gripper socks to bilateral feet. Call light within reach and patient verbalizes understanding of use. Medication Interventions: Bed/chair exit alarm, Evaluate medications/consider consulting pharmacy, Teach patient to arise slowly    Elimination Interventions: Call light in reach, Patient to call for help with toileting needs, Urinal in reach             Problem: Afib Pathway: Day 3  Goal: Nutrition/Diet  Outcome: Not Progressing Towards Goal  Patient with very poor appetite. Patient nibbles on food, consuming less than 15% of each meal.   Goal: Medications  Outcome: Resolved/Met Date Met: 07/20/18  Patient now on PO Cardizem, tolerating well. Other medications administered per MAR. Problem: Pressure Injury - Risk of  Goal: *Prevention of pressure injury  Document M[place]hi Scale and appropriate interventions in the flowsheet. Outcome: Progressing Towards Goal  Pressure Injury Interventions:   Allevyn in place. Patient encouraged to hydrate orally; supplementary fluids infusing. Patient encouraged to reposition.      Moisture Interventions: Absorbent underpads, Check for incontinence Q2 hours and as needed    Activity Interventions: Increase time out of bed    Mobility Interventions: PT/OT evaluation, HOB 30 degrees or less    Nutrition Interventions: Document food/fluid/supplement intake, Discuss nutritional consult with provider, Offer support with meals,snacks and hydration

## 2018-07-21 NOTE — PROGRESS NOTES
Hospitalist Progress Note Admit Date:  2018  5:28 PM  
Name:  Smita Oconnor Age:  82 y.o. 
:  1935 MRN:  [id number] PCP:  Ivanna Mast MD 
Treatment Team: Attending Provider: Al Tavera MD; Consulting Provider: Carmen Camp MD; Consulting Provider: Adilene Vasquez MD; Utilization [facility] [place]; Care Manager: Iva Garzon RN Subjective:  
CC:N/V, chills, abdominal pain Pt is an 81 yo male with PMH of CAD, S/P CABG x 3v , CVA, HTN. Pt presented to ER with report of N/V, chills, abd pain since that morning. Pt had chills after breakfast, developed nausea, and then vomited x 1. Per spouse he had flank pain on the right too. Pt afebrile since admit, tachycardic at times up to 115. Urine was found to be 4+ bacteria, sent for culture and grew >100K e coli. Bld cultures positive with e coli and staph. Both susceptible to cefepime. Bld culture repeated on  and no growth so far. H&H 9.7/31.0 today, WBC initially up to 16.6 but now down to 7.3. Creatinine jabier to 2.17 and after some IVF dropped to 2.07. Pt initially placed on diltiazem drip 2/2 Afib with RVR, transitioned to po diltiazem on . Pt has a pacer, tele shows continue Afib with pacer. Pt more awake today, per spouse he was able to get up to the BR, had a good BM. Denies any pain. Declined breakfast, per wife he usually sleeps late and doesn't eat before lunch time. Taking PO fluids well so will d/c IVF. Afebrile, VSS. Continues on IV cefepime for the urosepsis.   
 
 
Objective:  
 
Patient Vitals for the past 24 hrs: 
 Temp Pulse Resp BP SpO2  
18 0446 98.8 °F (37.1 °C) 74 16 122/64 96 %  
18 0113 99.2 °F (37.3 °C) 70 16 119/65 94 %  
18 0007 - 80 - 119/69 -  
18 2051 98.8 °F (37.1 °C) 76 18 128/64 97 %  
18 1738 - 83 - 93/52 -  
18 1655 99 °F (37.2 °C) 77 19 117/62 95 %  
18 1255 98.3 °F (36.8 °C) 84 18 100/58 91 %  
18 1119 - 89 - 131/74 -  
07/20/18 0925 98.5 °F (36.9 °C) 82 18 92/51 92 % Oxygen Therapy O2 Sat (%): 96 % (07/21/18 0446) Pulse via Oximetry: 85 beats per minute (07/18/18 2236) O2 Device: Room air (07/21/18 0428) Intake/Output Summary (Last 24 hours) at 07/21/18 5928 Last data filed at 07/21/18 0292 Gross per 24 hour Intake              330 ml Output              675 ml Net             -345 ml General:    Well nourished. Awake and alert. Head:  Normocephalic, atraumatic Eyes:  Extraocular movements intact, normal sclera Neck:  Supple, no lymphadenopathy or JVD 
CV:   RRR. No  Murmurs, clicks, or gallops Lungs:   Unlabored, no cyanosis Abdomen:   Soft, nondistended, nontender. Extremities: Warm and dry. No cyanosis or edema. Skin:     No rashes or jaundice. Neuro:  No gross focal deficits Psych:  Mood and affect appropriate Data Review: 
I have reviewed all labs, meds, telemetry events, and studies from the last 24 hours. Recent Results (from the past 24 hour(s)) CULTURE, BLOOD Collection Time: 07/20/18  3:17 PM  
Result Value Ref Range Special Requests: LEFT 
FOREARM Culture result: NO GROWTH AFTER 14 HOURS    
CBC WITH AUTOMATED DIFF Collection Time: 07/21/18  4:19 AM  
Result Value Ref Range WBC 7.3 4.3 - 11.1 K/uL  
 RBC 3.47 (L) 4.23 - 5.67 M/uL HGB 9.7 (L) 13.6 - 17.2 g/dL HCT 31.0 (L) 41.1 - 50.3 % MCV 89.3 79.6 - 97.8 FL  
 MCH 28.0 26.1 - 32.9 PG  
 MCHC 31.3 (L) 31.4 - 35.0 g/dL  
 RDW 16.0 (H) 11.9 - 14.6 % PLATELET 664 (L) 142 - 450 K/uL MPV 9.5 (L) 10.8 - 14.1 FL  
 DF AUTOMATED NEUTROPHILS 76 43 - 78 % LYMPHOCYTES 10 (L) 13 - 44 % MONOCYTES 7 4.0 - 12.0 % EOSINOPHILS 7 0.5 - 7.8 % BASOPHILS 0 0.0 - 2.0 % IMMATURE GRANULOCYTES 0 0.0 - 5.0 %  
 ABS. NEUTROPHILS 5.5 1.7 - 8.2 K/UL  
 ABS. LYMPHOCYTES 0.7 0.5 - 4.6 K/UL  
 ABS. MONOCYTES 0.5 0.1 - 1.3 K/UL  
 ABS. EOSINOPHILS 0.5 0.0 - 0.8 K/UL  
 ABS.  BASOPHILS 0.0 0.0 - 0.2 K/UL  
 ABS. IMM. GRANS. 0.0 0.0 - 0.5 K/UL METABOLIC PANEL, BASIC Collection Time: 07/21/18  4:19 AM  
Result Value Ref Range Sodium 139 136 - 145 mmol/L Potassium 4.2 3.5 - 5.1 mmol/L Chloride 107 98 - 107 mmol/L  
 CO2 22 21 - 32 mmol/L Anion gap 10 7 - 16 mmol/L Glucose 56 (L) 65 - 100 mg/dL BUN 34 (H) 8 - 23 MG/DL Creatinine 2.07 (H) 0.8 - 1.5 MG/DL  
 GFR est AA 40 (L) >60 ml/min/1.73m2 GFR est non-AA 33 (L) >60 ml/min/1.73m2 Calcium 7.6 (L) 8.3 - 10.4 MG/DL All Micro Results Procedure Component Value Units Date/Time CULTURE, BLOOD [[id number]]  (Abnormal)  (Susceptibility) Collected:  07/18/18 2214 Order Status:  Completed Specimen:  Blood from Blood Updated:  07/21/18 1546 Special Requests: --     
  NO SPECIAL REQUESTS 
RIGHT 
HAND 
  
  GRAM STAIN GRAM NEGATIVE RODS ANAEROBIC BOTTLE POSITIVE     
        
  GRAM POS COCCI IN CLUSTERS  
   AEROBIC BOTTLE POSITIVE RESULTS VERIFIED, PHONED TO AND READ BACK BY [address] [place] [address] AT 6550 Culture result:      
  ESCHERICHIA COLI ANAEROBIC BOTTLE POSITIVE (A) STAPHYLOCOCCUS SPECIES IDENTIFICATION AND SUSCEPTIBILITY TO FOLLOW (A)  
      
  SA target DNA sequence not detected within the sample. Test performed by PCR 
AEROBIC BOTTLE POSITIVE CULTURE IN PROGRESS,FURTHER UPDATES TO FOLLOW CULTURE, URINE [[id number]]  (Abnormal)  (Susceptibility) Collected:  07/18/18 2145 Order Status:  Completed Specimen:  Urine from Clean catch Updated:  07/21/18 4176 Special Requests: NO SPECIAL REQUESTS Culture result:      
  >100,000 COLONIES/mL ESCHERICHIA COLI (A) CULTURE, BLOOD [[id number]] Collected:  07/20/18 1517 Order Status:  Completed Specimen:  Blood from Blood Updated:  07/21/18 1181 Special Requests: --     
  LEFT 
FOREARM   Culture result: NO GROWTH AFTER 14 HOURS     
 CULTURE, BLOOD [[id number]]  (Abnormal) Collected: 07/18/18 2215 Order Status:  Completed Specimen:  Blood from Blood Updated:  07/20/18 4388 Special Requests: --     
  NO SPECIAL REQUESTS 
RIGHT Antecubital 
  
  GRAM STAIN GRAM NEGATIVE RODS ANAEROBIC BOTTLE POSITIVE RESULTS VERIFIED, PHONED TO AND READ BACK BY MELLISSA CARROLL RN AT 3741 ON 7/19/18 SG  
  Culture result: GRAM NEGATIVE RODS (A) CULTURE IN PROGRESS,FURTHER UPDATES TO FOLLOW Current Meds: 
Current Facility-Administered Medications Medication Dose Route Frequency  dilTIAZem (CARDIZEM) IR tablet 60 mg  60 mg Oral Q6H  
 apixaban (ELIQUIS) tablet 2.5 mg  2.5 mg Oral BID  tuberculin injection 5 Units  5 Units IntraDERMal ONCE  
 0.9% sodium chloride infusion  75 mL/hr IntraVENous CONTINUOUS  
 ondansetron (ZOFRAN) injection 4 mg  4 mg IntraVENous Q6H PRN  
 levothyroxine (SYNTHROID) tablet 25 mcg  25 mcg Oral ACB  cefepime (MAXIPIME) 1 g in 0.9% sodium chloride (MBP/ADV) 50 mL ADV  1 g IntraVENous Q24H  
 aspirin delayed-release tablet 81 mg  81 mg Oral DAILY  atorvastatin (LIPITOR) tablet 80 mg  80 mg Oral QHS  traMADol (ULTRAM) tablet 50 mg  50 mg Oral Q6H PRN  
 sodium chloride (NS) flush 5-10 mL  5-10 mL IntraVENous Q8H  
 sodium chloride (NS) flush 5-10 mL  5-10 mL IntraVENous PRN  
 acetaminophen (TYLENOL) tablet 650 mg  650 mg Oral Q4H PRN  
 magnesium hydroxide (MILK OF MAGNESIA) 400 mg/5 mL oral suspension 30 mL  30 mL Oral DAILY PRN Diet: DIET CARDIAC Other Studies (last 24 hours): No results found. Assessment and Plan:  
 
Hospital Problems as of 7/21/2018  Date Reviewed: 6/27/2018 Codes Class Noted - Resolved POA Sepsis secondary to UTI [facility]) ICD-10-CM: A41.9, N39.0 ICD-9-CM: 038.9, 995.91, 599.0  7/19/2018 - Present Yes Gram-negative bacteremia ICD-10-CM: R78.81 ICD-9-CM: 790.7, 041.85  7/19/2018 - Present Yes  Atrial fibrillation with RVR ([address] ICD-10-CM: I48.91 
ICD-9-CM: 427.31  7/18/2018 - Present Yes * (Principal)UTI (urinary tract infection) ICD-10-CM: N39.0 ICD-9-CM: 599.0  7/18/2018 - Present Yes Normocytic anemia ICD-10-CM: D64.9 ICD-9-CM: 285.9  7/18/2018 - Present Yes Persistent atrial fibrillation (HCC) ICD-10-CM: I48.1 ICD-9-CM: 427.31  7/18/2018 - Present Yes Abdominal pain ICD-10-CM: R10.9 ICD-9-CM: 789.00  7/18/2018 - Present Yes History of CVA (cerebrovascular accident) (Chronic) ICD-10-CM: C91.37 
ICD-9-CM: V12.54  6/20/2018 - Present Yes  
   
 CAD, multiple vessel (Chronic) ICD-10-CM: I25.10 ICD-9-CM: 414.00  5/22/2018 - Present Yes Overview Signed 5/22/2018 11:32 AM by Paula Begum NP  
   5/18/18 (Dr Melanie Garcia) Coronary artery bypass grafting x3 with grafts consisting of: 1. Left internal mammary artery to left anterior descending artery. 2.  Reverse saphenous vein to diagonal.  
3.  Reversed saphenous vein graft to obtuse marginal.  
  
  
   
 S/P CABG (coronary artery bypass graft) (Chronic) ICD-10-CM: Z95.1 ICD-9-CM: V45.81  5/18/2018 - Present Yes Hypothyroidism (Chronic) ICD-10-CM: E03.9 ICD-9-CM: 244.9  5/18/2018 - Present Yes Essential hypertension (Chronic) ICD-10-CM: I10 
ICD-9-CM: 401.9  5/16/2018 - Present Yes RESOLVED: PAF (paroxysmal atrial fibrillation) (HCC) (Chronic) ICD-10-CM: I48.0 ICD-9-CM: 427.31  5/20/2018 - 7/18/2018 Yes A/P: 1. Sepsis 
-Urine culture with >100K e coli 
-Blood cultures with GNR, re-cultured 7/20 NGTD 
-Continue Cefepime 2. Afib with RVR/CAD 
-Cardiology following 
-Continue diltiazem 
-Continue apixiban 3. SALEEM  
-Cr 2.07 
-Monitor BMP 4. HTN 
-Stable 
-Monitor 5. Hypothyroidism 
-Decreased levothyroxine to 25mcg/day and recheck 4-6 wks. DC planning/Dispo:  Pending - PT eval pending DVT ppx:  apixaban Case reviewed with supervising physician - SARITHA Obando MD 
 
Signed: 
LALITHA Swanson

## 2018-07-21 NOTE — PROGRESS NOTES
Administered early AM medications per MAR. Moments later patient had an episode of vomiting of small amounts of emesis. No further episode. Denies nausea and needing to vomit.

## 2018-07-22 ENCOUNTER — APPOINTMENT (OUTPATIENT)
Dept: GENERAL RADIOLOGY | Age: 83
DRG: 872 | End: 2018-07-22
Attending: NURSE PRACTITIONER
Payer: MEDICARE

## 2018-07-22 LAB
ALBUMIN SERPL-MCNC: 2 G/DL (ref 3.2–4.6)
ALBUMIN/GLOB SERPL: 0.6 {RATIO} (ref 1.2–3.5)
ALP SERPL-CCNC: 92 U/L (ref 50–136)
ALT SERPL-CCNC: 19 U/L (ref 12–65)
ANION GAP SERPL CALC-SCNC: 9 MMOL/L (ref 7–16)
AST SERPL-CCNC: 27 U/L (ref 15–37)
BACTERIA SPEC CULT: ABNORMAL
BASOPHILS # BLD: 0 K/UL (ref 0–0.2)
BASOPHILS NFR BLD: 0 % (ref 0–2)
BILIRUB SERPL-MCNC: 0.5 MG/DL (ref 0.2–1.1)
BNP SERPL-MCNC: 156 PG/ML
BUN SERPL-MCNC: 29 MG/DL (ref 8–23)
CALCIUM SERPL-MCNC: 7.6 MG/DL (ref 8.3–10.4)
CHLORIDE SERPL-SCNC: 109 MMOL/L (ref 98–107)
CO2 SERPL-SCNC: 23 MMOL/L (ref 21–32)
CREAT SERPL-MCNC: 1.87 MG/DL (ref 0.8–1.5)
DIFFERENTIAL METHOD BLD: ABNORMAL
EOSINOPHIL # BLD: 0.6 K/UL (ref 0–0.8)
EOSINOPHIL NFR BLD: 10 % (ref 0.5–7.8)
ERYTHROCYTE [DISTWIDTH] IN BLOOD BY AUTOMATED COUNT: 15.7 % (ref 11.9–14.6)
GLOBULIN SER CALC-MCNC: 3.1 G/DL (ref 2.3–3.5)
GLUCOSE SERPL-MCNC: 82 MG/DL (ref 65–100)
GRAM STN SPEC: ABNORMAL
HCT VFR BLD AUTO: 31.4 % (ref 41.1–50.3)
HGB BLD-MCNC: 10 G/DL (ref 13.6–17.2)
IMM GRANULOCYTES # BLD: 0 K/UL (ref 0–0.5)
IMM GRANULOCYTES NFR BLD AUTO: 0 % (ref 0–5)
LYMPHOCYTES # BLD: 0.7 K/UL (ref 0.5–4.6)
LYMPHOCYTES NFR BLD: 12 % (ref 13–44)
MAGNESIUM SERPL-MCNC: 2.5 MG/DL (ref 1.8–2.4)
MCH RBC QN AUTO: 27.9 PG (ref 26.1–32.9)
MCHC RBC AUTO-ENTMCNC: 31.8 G/DL (ref 31.4–35)
MCV RBC AUTO: 87.7 FL (ref 79.6–97.8)
MM INDURATION POC: 0 MM (ref 0–5)
MONOCYTES # BLD: 0.5 K/UL (ref 0.1–1.3)
MONOCYTES NFR BLD: 8 % (ref 4–12)
NEUTS SEG # BLD: 4.2 K/UL (ref 1.7–8.2)
NEUTS SEG NFR BLD: 70 % (ref 43–78)
PLATELET # BLD AUTO: 134 K/UL (ref 150–450)
PMV BLD AUTO: 9.7 FL (ref 10.8–14.1)
POTASSIUM SERPL-SCNC: 4.1 MMOL/L (ref 3.5–5.1)
PPD POC: NEGATIVE NEGATIVE
PROT SERPL-MCNC: 5.1 G/DL (ref 6.3–8.2)
RBC # BLD AUTO: 3.58 M/UL (ref 4.23–5.67)
SERVICE CMNT-IMP: ABNORMAL
SERVICE CMNT-IMP: ABNORMAL
SODIUM SERPL-SCNC: 141 MMOL/L (ref 136–145)
WBC # BLD AUTO: 6 K/UL (ref 4.3–11.1)

## 2018-07-22 PROCEDURE — 94760 N-INVAS EAR/PLS OXIMETRY 1: CPT

## 2018-07-22 PROCEDURE — 80053 COMPREHEN METABOLIC PANEL: CPT | Performed by: INTERNAL MEDICINE

## 2018-07-22 PROCEDURE — 74011000258 HC RX REV CODE- 258: Performed by: INTERNAL MEDICINE

## 2018-07-22 PROCEDURE — 74011000250 HC RX REV CODE- 250: Performed by: FAMILY MEDICINE

## 2018-07-22 PROCEDURE — 65660000000 HC RM CCU STEPDOWN

## 2018-07-22 PROCEDURE — C8929 TTE W OR WO FOL WCON,DOPPLER: HCPCS

## 2018-07-22 PROCEDURE — 94640 AIRWAY INHALATION TREATMENT: CPT

## 2018-07-22 PROCEDURE — 85025 COMPLETE CBC W/AUTO DIFF WBC: CPT | Performed by: INTERNAL MEDICINE

## 2018-07-22 PROCEDURE — 36415 COLL VENOUS BLD VENIPUNCTURE: CPT | Performed by: INTERNAL MEDICINE

## 2018-07-22 PROCEDURE — 74011250636 HC RX REV CODE- 250/636: Performed by: INTERNAL MEDICINE

## 2018-07-22 PROCEDURE — 83880 ASSAY OF NATRIURETIC PEPTIDE: CPT | Performed by: INTERNAL MEDICINE

## 2018-07-22 PROCEDURE — 74011250637 HC RX REV CODE- 250/637: Performed by: FAMILY MEDICINE

## 2018-07-22 PROCEDURE — 71045 X-RAY EXAM CHEST 1 VIEW: CPT

## 2018-07-22 PROCEDURE — 74011000250 HC RX REV CODE- 250: Performed by: INTERNAL MEDICINE

## 2018-07-22 PROCEDURE — 74011250637 HC RX REV CODE- 250/637: Performed by: INTERNAL MEDICINE

## 2018-07-22 PROCEDURE — 74011250637 HC RX REV CODE- 250/637: Performed by: NURSE PRACTITIONER

## 2018-07-22 PROCEDURE — 77030027138 HC INCENT SPIROMETER -A

## 2018-07-22 PROCEDURE — 77030019605

## 2018-07-22 PROCEDURE — 74011250636 HC RX REV CODE- 250/636: Performed by: FAMILY MEDICINE

## 2018-07-22 PROCEDURE — 83735 ASSAY OF MAGNESIUM: CPT | Performed by: INTERNAL MEDICINE

## 2018-07-22 RX ORDER — DILTIAZEM HYDROCHLORIDE 180 MG/1
180 CAPSULE, COATED, EXTENDED RELEASE ORAL DAILY
Status: DISCONTINUED | OUTPATIENT
Start: 2018-07-22 | End: 2018-07-23 | Stop reason: HOSPADM

## 2018-07-22 RX ORDER — FUROSEMIDE 10 MG/ML
40 INJECTION INTRAMUSCULAR; INTRAVENOUS ONCE
Status: COMPLETED | OUTPATIENT
Start: 2018-07-22 | End: 2018-07-22

## 2018-07-22 RX ORDER — ALBUTEROL SULFATE 0.83 MG/ML
2.5 SOLUTION RESPIRATORY (INHALATION)
Status: DISCONTINUED | OUTPATIENT
Start: 2018-07-22 | End: 2018-07-23 | Stop reason: HOSPADM

## 2018-07-22 RX ADMIN — FUROSEMIDE 40 MG: 10 INJECTION, SOLUTION INTRAMUSCULAR; INTRAVENOUS at 10:07

## 2018-07-22 RX ADMIN — DILTIAZEM HYDROCHLORIDE 180 MG: 180 CAPSULE, COATED, EXTENDED RELEASE ORAL at 10:07

## 2018-07-22 RX ADMIN — ALBUTEROL SULFATE 2.5 MG: 2.5 SOLUTION RESPIRATORY (INHALATION) at 11:21

## 2018-07-22 RX ADMIN — PERFLUTREN 1 ML: 6.52 INJECTION, SUSPENSION INTRAVENOUS at 09:00

## 2018-07-22 RX ADMIN — ASPIRIN 81 MG: 81 TABLET, COATED ORAL at 09:00

## 2018-07-22 RX ADMIN — ALBUTEROL SULFATE 2.5 MG: 2.5 SOLUTION RESPIRATORY (INHALATION) at 09:02

## 2018-07-22 RX ADMIN — DILTIAZEM HYDROCHLORIDE 60 MG: 60 TABLET, FILM COATED ORAL at 05:03

## 2018-07-22 RX ADMIN — DILTIAZEM HYDROCHLORIDE 60 MG: 60 TABLET, FILM COATED ORAL at 00:22

## 2018-07-22 RX ADMIN — Medication 10 ML: at 05:04

## 2018-07-22 RX ADMIN — APIXABAN 2.5 MG: 2.5 TABLET, FILM COATED ORAL at 09:00

## 2018-07-22 RX ADMIN — Medication 10 ML: at 14:48

## 2018-07-22 RX ADMIN — SODIUM CHLORIDE 1 G: 900 INJECTION, SOLUTION INTRAVENOUS at 20:15

## 2018-07-22 RX ADMIN — APIXABAN 2.5 MG: 2.5 TABLET, FILM COATED ORAL at 22:21

## 2018-07-22 RX ADMIN — LEVOTHYROXINE SODIUM 25 MCG: 50 TABLET ORAL at 09:01

## 2018-07-22 RX ADMIN — ALBUTEROL SULFATE 2.5 MG: 2.5 SOLUTION RESPIRATORY (INHALATION) at 19:45

## 2018-07-22 RX ADMIN — ALBUTEROL SULFATE 2.5 MG: 2.5 SOLUTION RESPIRATORY (INHALATION) at 16:12

## 2018-07-22 RX ADMIN — Medication 10 ML: at 22:21

## 2018-07-22 RX ADMIN — ATORVASTATIN CALCIUM 80 MG: 40 TABLET, FILM COATED ORAL at 22:21

## 2018-07-22 NOTE — PROGRESS NOTES
[facility] CARDIOLOGY PROGRESS NOTE 
      
 
7/22/2018 10:13 AM 
 
Admit Date: 7/18/2018 Subjective:  
Feeling better now, getting OOB. Rate controlled Afib on tele now. Asx in Afib, was in Afib before admission. Some SOB now. No CP, pressure. NSTEMI, followed by 3v CABG on 5/18/18. CVA after CABG. Echo 5/2018: EF 50-55%. Carotid US 5/2018: mild dz PPM 6/2018 for Afib and SSS 
 
ROS: 
Cardiovascular:  As noted above Objective:  
  
Vitals:  
 07/22/18 0210 07/22/18 2373 07/22/18 2454 07/22/18 9296 BP:  124/68 130/63 Pulse:  69 68 Resp:  20 18 Temp:  98.3 °F (36.8 °C) 99.2 °F (37.3 °C) SpO2:  98% 97% 98% Weight: 79.5 kg (175 lb 4.8 oz) Height:      
 
 
Physical Exam: 
General-No Acute Distress Neck- supple, no JVD 
CV- irreg irreg, no MRG Lung- clear bilaterally with dec BS in the bases Abd- soft, nontender, nondistended Ext- no edema bilaterally. Skin- warm and dry Data Review:  
Recent Labs  
   07/22/18 
 0428  07/21/18 
 0419  07/20/18 
 0351 NA  141  139  138  
K  4.1  4.2  3.7 MG  2.5*   --   2.4 BUN  29*  34*  26* CREA  1.87*  2.07*  2.17* GLU  82  56*  64* WBC  6.0  7.3  10.3 HGB  10.0*  9.7*  10.3* HCT  31.4*  31.0*  32.8*  
PLT  134*  134*  140* Assessment/Plan:  
 
Principal Problem: 
  UTI (urinary tract infection) (7/18/2018) Better from urosepsis, per primary team, on abx Active Problems: 
  Essential hypertension (5/16/2018) Continue meds, follow S/P CABG (coronary artery bypass graft) (5/18/2018) Remain on ASA, statin and CCB. CAD, multiple vessel (5/22/2018) No CP, continue meds History of CVA (cerebrovascular accident) (6/20/2018) On NOAC Atrial fibrillation with RVR ([address] (7/18/2018) Rate controlled on PO meds, follow, remain on NOAC. Change back to long acting CCB today. Normocytic anemia (7/18/2018) Follow Volume Overload: dose of IV lasix today, re-assess in the AM.  Follow labs. Follow, needs rehab. We will follow, check AM labs.   
 
 
 
 
Dean Doyle DO 
7/22/2018 9:30 AM

## 2018-07-22 NOTE — PROGRESS NOTES
Hospitalist Progress Note Admit Date:  2018  5:28 PM  
Name:  Kailee Carpenter Age:  82 y.o. 
:  1935 MRN:  [id number] PCP:  Sona Wesley MD 
Treatment Team: Attending Provider: Marj Mao MD; Consulting Provider: Ana Hallman MD; Consulting Provider: Loi Caldwell MD; Utilization Review: Rainer Santos; Care Manager: Miguel Angel Flynn RN Subjective:  
CC:N/V, chills, abdominal pain Pt is an 81 yo male with PMH of CAD, S/P CABG x 3v , CVA, HTN. Pt presented to ER with report of N/V, chills, abd pain since that morning. Pt had chills after breakfast, developed nausea, and then vomited x 1. Per spouse he had flank pain on the right too. Pt afebrile since admit, tachycardic at times up to 115. Urine was found to be 4+ bacteria, sent for culture and grew >100K e coli. Bld cultures positive with e coli and staph. Both susceptible to cefepime. H&H 10/34 today, WBC initially up to 16.6 but now down to 6.  2nd blood culture with no growth x 2 days. Continues on IV cefepime for the urosepsis. Creatinine jabier to 2.17 but is now 1.87. Pt initially placed on diltiazem drip / Afib with RVR, transitioned to po diltiazem on . Pt has a pacer, tele shows continue Afib with pacer. Pt back in bed this morning after a bath. Pt continues to decline breakfast, per wife his appetite is fair. Taking PO fluids well. Afebrile, VSS. Discussed discharge planning with pt/spouse. Their preference is for pt to return home and continue the home health care services they have been receiving prior to admit.    
 
 
Objective:  
 
Patient Vitals for the past 24 hrs: 
 Temp Pulse Resp BP SpO2  
18 0447 98.3 °F (36.8 °C) 69 20 124/68 98 %  
18 0054 97.1 °F (36.2 °C) 70 20 120/73 96 %  
18 0022 - 73 - 120/73 -  
07/21/18 2056 99.1 °F (37.3 °C) 77 22 122/61 96 %  
18 1621 97 °F (36.1 °C) 70 18 124/62 96 %  
18 1305 97.6 °F (36.4 °C) 83 18 118/68 96 % 07/21/18 1231 - 75 - 144/58 -  
07/21/18 0822 97.3 °F (36.3 °C) 72 18 114/62 95 % Oxygen Therapy O2 Sat (%): 98 % (07/22/18 0447) Pulse via Oximetry: 85 beats per minute (07/18/18 2236) O2 Device: Room air (07/22/18 0750) Intake/Output Summary (Last 24 hours) at 07/22/18 2691 Last data filed at 07/22/18 1980 Gross per 24 hour Intake              350 ml Output              500 ml Net             -150 ml General:    Well nourished. Awake and alert. Head:  Normocephalic, atraumatic Eyes:  Extraocular movements intact, normal sclera Neck:  Supple, no lymphadenopathy or JVD 
CV:   RRR. No  Murmurs, clicks, or gallops Lungs:   Unlabored, no cyanosis. Bilateral wheezing. Abdomen:   Soft, nondistended, nontender. Extremities: Warm and dry. No cyanosis or edema. Skin:     No rashes or jaundice. Neuro:  No gross focal deficits Psych:  Mood and affect appropriate Data Review: 
I have reviewed all labs, meds, telemetry events, and studies from the last 24 hours. Recent Results (from the past 24 hour(s)) PLEASE READ & DOCUMENT PPD TEST IN 24 HRS Collection Time: 07/21/18  2:00 PM  
Result Value Ref Range PPD negative Negative  
 mm Induration 0 mm CBC WITH AUTOMATED DIFF Collection Time: 07/22/18  4:28 AM  
Result Value Ref Range WBC 6.0 4.3 - 11.1 K/uL  
 RBC 3.58 (L) 4.23 - 5.67 M/uL  
 HGB 10.0 (L) 13.6 - 17.2 g/dL HCT 31.4 (L) 41.1 - 50.3 % MCV 87.7 79.6 - 97.8 FL  
 MCH 27.9 26.1 - 32.9 PG  
 MCHC 31.8 31.4 - 35.0 g/dL  
 RDW 15.7 (H) 11.9 - 14.6 % PLATELET 923 (L) 494 - 450 K/uL MPV 9.7 (L) 10.8 - 14.1 FL  
 DF AUTOMATED NEUTROPHILS 70 43 - 78 % LYMPHOCYTES 12 (L) 13 - 44 % MONOCYTES 8 4.0 - 12.0 % EOSINOPHILS 10 (H) 0.5 - 7.8 % BASOPHILS 0 0.0 - 2.0 % IMMATURE GRANULOCYTES 0 0.0 - 5.0 %  
 ABS. NEUTROPHILS 4.2 1.7 - 8.2 K/UL  
 ABS. LYMPHOCYTES 0.7 0.5 - 4.6 K/UL  
 ABS. MONOCYTES 0.5 0.1 - 1.3 K/UL  
 ABS.  EOSINOPHILS 0.6 0.0 - 0.8 K/UL  
 ABS. BASOPHILS 0.0 0.0 - 0.2 K/UL  
 ABS. IMM. GRANS. 0.0 0.0 - 0.5 K/UL METABOLIC PANEL, COMPREHENSIVE Collection Time: 07/22/18  4:28 AM  
Result Value Ref Range Sodium 141 136 - 145 mmol/L Potassium 4.1 3.5 - 5.1 mmol/L Chloride 109 (H) 98 - 107 mmol/L  
 CO2 23 21 - 32 mmol/L Anion gap 9 7 - 16 mmol/L Glucose 82 65 - 100 mg/dL BUN 29 (H) 8 - 23 MG/DL Creatinine 1.87 (H) 0.8 - 1.5 MG/DL  
 GFR est AA 45 (L) >60 ml/min/1.73m2 GFR est non-AA 37 (L) >60 ml/min/1.73m2 Calcium 7.6 (L) 8.3 - 10.4 MG/DL Bilirubin, total 0.5 0.2 - 1.1 MG/DL  
 ALT (SGPT) 19 12 - 65 U/L  
 AST (SGOT) 27 15 - 37 U/L Alk. phosphatase 92 50 - 136 U/L Protein, total 5.1 (L) 6.3 - 8.2 g/dL Albumin 2.0 (L) 3.2 - 4.6 g/dL Globulin 3.1 2.3 - 3.5 g/dL A-G Ratio 0.6 (L) 1.2 - 3.5 BNP Collection Time: 07/22/18  4:28 AM  
Result Value Ref Range  pg/mL MAGNESIUM Collection Time: 07/22/18  4:28 AM  
Result Value Ref Range Magnesium 2.5 (H) 1.8 - 2.4 mg/dL All Micro Results Procedure Component Value Units Date/Time CULTURE, BLOOD [[id number]]  (Abnormal)  (Susceptibility) Collected:  07/18/18 1131 Order Status:  Completed Specimen:  Blood from Blood Updated:  07/22/18 3658 Special Requests: --     
  NO SPECIAL REQUESTS 
RIGHT 
HAND 
  
  GRAM STAIN GRAM NEGATIVE RODS ANAEROBIC BOTTLE POSITIVE     
        
  GRAM POS COCCI IN CLUSTERS  
   AEROBIC BOTTLE POSITIVE RESULTS VERIFIED, PHONED TO AND READ BACK BY [address] [place] [address] AT 0520 Culture result:      
  ESCHERICHIA COLI ANAEROBIC BOTTLE POSITIVE (A) STAPHYLOCOCCUS EPIDERMIDIS Use Rifampin only in conjunction with another antimicrobial agent. Do not use as Monotherapy. AEROBIC BOTTLE POSITIVE (A)  
      
  SA target DNA sequence not detected within the sample. Test performed by PCR 
AEROBIC BOTTLE POSITIVE  CULTURE, BLOOD [[id number]]  (Abnormal) Collected:  07/18/18 2215 Order Status:  Completed Specimen:  Blood from Blood Updated:  07/22/18 9257 Special Requests: --     
  NO SPECIAL REQUESTS 
RIGHT Antecubital 
  
  GRAM STAIN GRAM NEGATIVE RODS ANAEROBIC BOTTLE POSITIVE RESULTS VERIFIED, PHONED TO AND READ BACK BY MELLISSA CARROLL RN AT 7843 ON 7/19/18 SG  
  Culture result: GRAM NEGATIVE RODS (A) REFER TO ACC [id number] FOR ID AND SUSCEPTIBILITY CULTURE, BLOOD [[id number]] Collected:  07/20/18 1517 Order Status:  Completed Specimen:  Blood from Blood Updated:  07/22/18 7393 Special Requests: --     
  LEFT 
FOREARM Culture result: NO GROWTH 2 DAYS     
 CULTURE, URINE [[id number]]  (Abnormal)  (Susceptibility) Collected:  07/18/18 2145 Order Status:  Completed Specimen:  Urine from Clean catch Updated:  07/21/18 0822 Special Requests: NO SPECIAL REQUESTS Culture result:      
  >100,000 COLONIES/mL ESCHERICHIA COLI (A) Current Meds: 
Current Facility-Administered Medications Medication Dose Route Frequency  albuterol (PROVENTIL VENTOLIN) nebulizer solution 2.5 mg  2.5 mg Nebulization Q4H RT  
 dilTIAZem (CARDIZEM) IR tablet 60 mg  60 mg Oral Q6H  
 apixaban (ELIQUIS) tablet 2.5 mg  2.5 mg Oral BID  ondansetron (ZOFRAN) injection 4 mg  4 mg IntraVENous Q6H PRN  
 levothyroxine (SYNTHROID) tablet 25 mcg  25 mcg Oral ACB  cefepime (MAXIPIME) 1 g in 0.9% sodium chloride (MBP/ADV) 50 mL ADV  1 g IntraVENous Q24H  
 aspirin delayed-release tablet 81 mg  81 mg Oral DAILY  atorvastatin (LIPITOR) tablet 80 mg  80 mg Oral QHS  traMADol (ULTRAM) tablet 50 mg  50 mg Oral Q6H PRN  
 sodium chloride (NS) flush 5-10 mL  5-10 mL IntraVENous Q8H  
 sodium chloride (NS) flush 5-10 mL  5-10 mL IntraVENous PRN  
 acetaminophen (TYLENOL) tablet 650 mg  650 mg Oral Q4H PRN  
 magnesium hydroxide (MILK OF MAGNESIA) 400 mg/5 mL oral suspension 30 mL  30 mL Oral DAILY PRN Diet: 
DIET CARDIAC Other Studies (last 24 hours): No results found. Assessment and Plan:  
 
Hospital Problems as of 7/22/2018  Date Reviewed: 6/27/2018 Codes Class Noted - Resolved POA Sepsis secondary to UTI [facility]) ICD-10-CM: A41.9, N39.0 ICD-9-CM: 038.9, 995.91, 599.0  7/19/2018 - Present Yes Gram-negative bacteremia ICD-10-CM: R78.81 ICD-9-CM: 790.7, 041.85  7/19/2018 - Present Yes Atrial fibrillation with RVR (HCC) ICD-10-CM: I48.91 
ICD-9-CM: 427.31  7/18/2018 - Present Yes * (Principal)UTI (urinary tract infection) ICD-10-CM: N39.0 ICD-9-CM: 599.0  7/18/2018 - Present Yes Normocytic anemia ICD-10-CM: D64.9 ICD-9-CM: 285.9  7/18/2018 - Present Yes Persistent atrial fibrillation (HCC) ICD-10-CM: I48.1 ICD-9-CM: 427.31  7/18/2018 - Present Yes Abdominal pain ICD-10-CM: R10.9 ICD-9-CM: 789.00  7/18/2018 - Present Yes History of CVA (cerebrovascular accident) (Chronic) ICD-10-CM: D13.27 
ICD-9-CM: V12.54  6/20/2018 - Present Yes  
   
 CAD, multiple vessel (Chronic) ICD-10-CM: I25.10 ICD-9-CM: 414.00  5/22/2018 - Present Yes Overview Signed 5/22/2018 11:32 AM by Gregoria Nyhan, SHEREE  
   5/18/18 (Dr Santiago Hastings) Coronary artery bypass grafting x3 with grafts consisting of: 1. Left internal mammary artery to left anterior descending artery. 2.  Reverse saphenous vein to diagonal.  
3.  Reversed saphenous vein graft to obtuse marginal.  
  
  
   
 S/P CABG (coronary artery bypass graft) (Chronic) ICD-10-CM: Z95.1 ICD-9-CM: V45.81  5/18/2018 - Present Yes Hypothyroidism (Chronic) ICD-10-CM: E03.9 ICD-9-CM: 244.9  5/18/2018 - Present Yes Essential hypertension (Chronic) ICD-10-CM: I10 
ICD-9-CM: 401.9  5/16/2018 - Present Yes RESOLVED: PAF (paroxysmal atrial fibrillation) (HCC) (Chronic) ICD-10-CM: I48.0 ICD-9-CM: 427.31  5/20/2018 - 7/18/2018 Yes A/P: 1. Sepsis 
-Urine culture with >100K e coli 
-Blood cultures with GNR, re-cultured 7/20 NGTD 
-Continue Cefepime 2. Afib with RVR/CAD 
-Cardiology following 
-Continue diltiazem 
-Continue apixiban 3. SALEEM  
-Cr 2.07 
-Monitor BMP 4. HTN 
-Stable 
-Monitor 5. Hypothyroidism 
-Decreased levothyroxine to 25mcg/day and recheck 4-6 wks. 6.Wheezing 
-Albuterol nebs prn 
-CXR to r/o fluid volume overload 
-echo in light of 1st blood culture with strep epi even though that is likely a contaminant DC planning/Dispo:  Home with [facility] - CM consult DVT ppx:  apixaban Case reviewed with supervising physician - SARIHTA Knox MD 
 
Signed: 
LALITHA Hammer

## 2018-07-22 NOTE — PROGRESS NOTES
Bedside and Verbal shift change report given to Martin Dunne, RN (oncoming nurse) by self Martin jimenez). Report included the following information SBAR, Kardex, ED Summary, Procedure Summary, Intake/Output, MAR, Recent Results and Cardiac Rhythm A-fib with pacing on demand.

## 2018-07-22 NOTE — PROGRESS NOTES
Problem: Falls - Risk of  Goal: *Absence of Falls  Document Shi Fall Risk and appropriate interventions in the flowsheet. Outcome: Progressing Towards Goal  Fall Risk Interventions:  Mobility Interventions: Bed/chair exit alarm, Communicate number of staff needed for ambulation/transfer, Patient to call before getting OOB, PT Consult for mobility concerns, PT Consult for assist device competence         Medication Interventions: Bed/chair exit alarm, Patient to call before getting OOB, Teach patient to arise slowly    Elimination Interventions: Bed/chair exit alarm, Call light in reach, Patient to call for help with toileting needs      Patient progressing towards goal with no falls on current admission. Patient without confusion, agitation, or sensory perception deficits. Patient has very weak and unsteady gait on ambulation, requiring at least one person. Personal belongings are within reach. Bed is in the low and locked position with side rails up x2 and [place] bed alarm on. Yellow gripper socks to bilateral feet. Call light within reach and patient verbalizes understanding of use. Problem: Pressure Injury - Risk of  Goal: *Prevention of pressure injury  Document Mekhi Scale and appropriate interventions in the flowsheet.    Pressure Injury Interventions:       Moisture Interventions: Absorbent underpads, Check for incontinence Q2 hours and as needed, Limit adult briefs    Activity Interventions: Increase time out of bed, PT/OT evaluation    Mobility Interventions: PT/OT evaluation, Trapeze to reposition    Nutrition Interventions: Document food/fluid/supplement intake, Offer support with meals,snacks and hydration    Friction and Shear Interventions: Lift sheet, Apply protective barrier, creams and emollients, Minimize layers, Foam dressings/transparent film/skin sealants

## 2018-07-22 NOTE — PROGRESS NOTES
CM continues to follow pt. Plan remains for pt to transition home with spouse upon discharge from the facility. Pt requests to continue [facility] at discharge.

## 2018-07-23 VITALS
DIASTOLIC BLOOD PRESSURE: 70 MMHG | OXYGEN SATURATION: 97 % | HEART RATE: 102 BPM | HEIGHT: 71 IN | BODY MASS INDEX: 23.59 KG/M2 | SYSTOLIC BLOOD PRESSURE: 146 MMHG | TEMPERATURE: 98.3 F | RESPIRATION RATE: 20 BRPM | WEIGHT: 168.5 LBS

## 2018-07-23 LAB
ANION GAP SERPL CALC-SCNC: 12 MMOL/L (ref 7–16)
BNP SERPL-MCNC: 139 PG/ML
BUN SERPL-MCNC: 22 MG/DL (ref 8–23)
CALCIUM SERPL-MCNC: 7.9 MG/DL (ref 8.3–10.4)
CHLORIDE SERPL-SCNC: 107 MMOL/L (ref 98–107)
CO2 SERPL-SCNC: 23 MMOL/L (ref 21–32)
CREAT SERPL-MCNC: 1.69 MG/DL (ref 0.8–1.5)
GLUCOSE SERPL-MCNC: 89 MG/DL (ref 65–100)
MAGNESIUM SERPL-MCNC: 2 MG/DL (ref 1.8–2.4)
POTASSIUM SERPL-SCNC: 3.3 MMOL/L (ref 3.5–5.1)
SODIUM SERPL-SCNC: 142 MMOL/L (ref 136–145)

## 2018-07-23 PROCEDURE — 74011250637 HC RX REV CODE- 250/637: Performed by: INTERNAL MEDICINE

## 2018-07-23 PROCEDURE — 83735 ASSAY OF MAGNESIUM: CPT | Performed by: INTERNAL MEDICINE

## 2018-07-23 PROCEDURE — 74011000250 HC RX REV CODE- 250: Performed by: INTERNAL MEDICINE

## 2018-07-23 PROCEDURE — 74011250637 HC RX REV CODE- 250/637: Performed by: FAMILY MEDICINE

## 2018-07-23 PROCEDURE — 83880 ASSAY OF NATRIURETIC PEPTIDE: CPT | Performed by: INTERNAL MEDICINE

## 2018-07-23 PROCEDURE — 80048 BASIC METABOLIC PNL TOTAL CA: CPT | Performed by: INTERNAL MEDICINE

## 2018-07-23 PROCEDURE — 94640 AIRWAY INHALATION TREATMENT: CPT

## 2018-07-23 PROCEDURE — 36415 COLL VENOUS BLD VENIPUNCTURE: CPT | Performed by: INTERNAL MEDICINE

## 2018-07-23 PROCEDURE — 94760 N-INVAS EAR/PLS OXIMETRY 1: CPT

## 2018-07-23 RX ORDER — CEFPODOXIME PROXETIL 200 MG/1
100 TABLET, FILM COATED ORAL 2 TIMES DAILY
Qty: 9 TAB | Refills: 0 | Status: SHIPPED | OUTPATIENT
Start: 2018-07-23 | End: 2018-08-01

## 2018-07-23 RX ADMIN — Medication 5 ML: at 05:28

## 2018-07-23 RX ADMIN — ALBUTEROL SULFATE 2.5 MG: 2.5 SOLUTION RESPIRATORY (INHALATION) at 00:09

## 2018-07-23 RX ADMIN — ASPIRIN 81 MG: 81 TABLET, COATED ORAL at 09:00

## 2018-07-23 RX ADMIN — APIXABAN 2.5 MG: 2.5 TABLET, FILM COATED ORAL at 09:00

## 2018-07-23 RX ADMIN — LEVOTHYROXINE SODIUM 25 MCG: 50 TABLET ORAL at 09:00

## 2018-07-23 RX ADMIN — DILTIAZEM HYDROCHLORIDE 180 MG: 180 CAPSULE, COATED, EXTENDED RELEASE ORAL at 09:00

## 2018-07-23 RX ADMIN — ALBUTEROL SULFATE 2.5 MG: 2.5 SOLUTION RESPIRATORY (INHALATION) at 08:07

## 2018-07-23 NOTE — PROGRESS NOTES
Bedside and Verbal shift change report given to Lupe Heller RN (oncoming nurse) by self [employer] nurse). Report included the following information SBAR, Kardex, Intake/Output, MAR, Recent Results and Cardiac Rhythm A Fib.

## 2018-07-23 NOTE — PROGRESS NOTES
Care Management Interventions  PCP Verified by CM: Yes  Palliative Care Criteria Met (RRAT>21 & CHF Dx)?: No (RRAT 29 DX atrial fib)  Mode of Transport at Discharge: Other (see comment) (wife to provide transportation)  Transition of Care Consult (CM Consult): [address]: No  Reason Outside [place]: Patient already serviced by other home care/hospice agency  Discharge Durable Medical Equipment: No  Physical Therapy Consult: Yes  Occupational Therapy Consult: No  Speech Therapy Consult: No  Current Support Network: Lives with Spouse  Confirm Follow Up Transport: Family (wife drives)  Plan discussed with Pt/Family/Caregiver: Yes  Freedom of Choice Offered: Yes  Discharge Location  Discharge Placement: Home with home health  Patient ready for d/c today. Patient is current with [facility] health and this is to be resumed at discharge. Clinical information to be faxed to [facility] at [phone]. Patient and wife in agreement with d/c plans.  Patient to d/c home with [facility]

## 2018-07-23 NOTE — DISCHARGE INSTRUCTIONS
Atrial Fibrillation: Care Instructions  Your Care Instructions    Atrial fibrillation is an irregular and often fast heartbeat. Treating this condition is important for several reasons. It can cause blood clots, which can travel from your heart to your brain and cause a stroke. If you have a fast heartbeat, you may feel lightheaded, dizzy, and weak. An irregular heartbeat can also increase your risk for heart failure. Atrial fibrillation is often the result of another heart condition, such as high blood pressure or coronary artery disease. Making changes to improve your heart condition will help you stay healthy and active. Follow-up care is a key part of your treatment and safety. Be sure to make and go to all appointments, and call your doctor if you are having problems. It's also a good idea to know your test results and keep a list of the medicines you take. How can you care for yourself at home? Medicines    · Take your medicines exactly as prescribed. Call your doctor if you think you are having a problem with your medicine. You will get more details on the specific medicines your doctor prescribes.     · If your doctor has given you a blood thinner to prevent a stroke, be sure you get instructions about how to take your medicine safely. Blood thinners can cause serious bleeding problems.     · Do not take any vitamins, over-the-counter drugs, or herbal products without talking to your doctor first.    Lifestyle changes    · Do not smoke. Smoking can increase your chance of a stroke and heart attack. If you need help quitting, talk to your doctor about stop-smoking programs and medicines. These can increase your chances of quitting for good.     · Eat a heart-healthy diet.     · Stay at a healthy weight. Lose weight if you need to.     · Limit alcohol to 2 drinks a day for men and 1 drink a day for women. Too much alcohol can cause health problems.     · Avoid colds and flu.  Get a pneumococcal vaccine shot. If you have had one before, ask your doctor whether you need another dose. Get a flu shot every year. If you must be around people with colds or flu, wash your hands often. Activity    · If your doctor recommends it, get more exercise. Walking is a good choice. Bit by bit, increase the amount you walk every day. Try for at least 30 minutes on most days of the week. You also may want to swim, bike, or do other activities. Your doctor may suggest that you join a cardiac rehabilitation program so that you can have help increasing your physical activity safely.     · Start light exercise if your doctor says it is okay. Even a small amount will help you get stronger, have more energy, and manage stress. Walking is an easy way to get exercise. Start out by walking a little more than you did in the hospital. Gradually increase the amount you walk.     · When you exercise, watch for signs that your heart is working too hard. You are pushing too hard if you cannot talk while you are exercising. If you become short of breath or dizzy or have chest pain, sit down and rest immediately.     · Check your pulse regularly. Place two fingers on the artery at the palm side of your wrist, in line with your thumb. If your heartbeat seems uneven or fast, talk to your doctor. When should you call for help? Call 911 anytime you think you may need emergency care. For example, call if:    · You have symptoms of a heart attack. These may include:  ¨ Chest pain or pressure, or a strange feeling in the chest.  ¨ Sweating. ¨ Shortness of breath. ¨ Nausea or vomiting. ¨ Pain, pressure, or a strange feeling in the back, neck, jaw, or upper belly or in one or both shoulders or arms. ¨ Lightheadedness or sudden weakness. ¨ A fast or irregular heartbeat. After you call 911, the  may tell you to chew 1 adult-strength or 2 to 4 low-dose aspirin. Wait for an ambulance.  Do not try to drive yourself.     · You have symptoms of a stroke. These may include:  ¨ Sudden numbness, tingling, weakness, or loss of movement in your face, arm, or leg, especially on only one side of your body. ¨ Sudden vision changes. ¨ Sudden trouble speaking. ¨ Sudden confusion or trouble understanding simple statements. ¨ Sudden problems with walking or balance. ¨ A sudden, severe headache that is different from past headaches.     · You passed out (lost consciousness).    Call your doctor now or seek immediate medical care if:    · You have new or increased shortness of breath.     · You feel dizzy or lightheaded, or you feel like you may faint.     · Your heart rate becomes irregular.     · You can feel your heart flutter in your chest or skip heartbeats. Tell your doctor if these symptoms are new or worse.    Watch closely for changes in your health, and be sure to contact your doctor if you have any problems. Where can you learn more? Go to http://zoie-nusrat.info/. Enter U020 in the search box to learn more about \"Atrial Fibrillation: Care Instructions. \"  Current as of: December 6, 2017  Content Version: 11.7  © 7570-6862 [employer]. Care instructions adapted under license by [employer] (which disclaims liability or warranty for this information). If you have questions about a medical condition or this instruction, always ask your healthcare professional. [place] [address] any warranty or liability for your use of this information.

## 2018-07-23 NOTE — PROGRESS NOTES
Discharge instructions reviewed with patient. Prescription given for vantin and med info sheet provided. Opportunity for questions provided. Patient voiced understanding of all discharge instructions. IV(s) and heart monitor removed by primary RN.

## 2018-07-23 NOTE — PROGRESS NOTES
Bedside and Verbal shift change report given to self (oncoming nurse) by Bushra Segura RN (offgoing nurse). Report included the following information SBAR, Kardex, ED Summary, Procedure Summary, Intake/Output, MAR, Recent Results and Cardiac Rhythm A Fib.

## 2018-07-23 NOTE — PROGRESS NOTES
[facility] CARDIOLOGY PROGRESS NOTE 
      
 
7/23/2018 7:43 AM 
 
Admit Date: 7/18/2018 Subjective:  
Patient denies any chest pain or dyspnea. No edema. Hopes to go home today. In rate controlled atrial fibrillation. ROS: 
Cardiovascular:  As noted above Objective:  
  
Vitals:  
 07/22/18 2110 07/23/18 0009 07/23/18 0132 07/23/18 1876 BP: 126/76  120/75 133/72 Pulse: 97  100 95 Resp: 19 18 19 Temp: 98 °F (36.7 °C)  97.5 °F (36.4 °C) 100.2 °F (37.9 °C) SpO2: 98% 98% 99% 99% Weight:    76.4 kg (168 lb 8 oz) Height:      
 
 
Physical Exam: 
General-No Acute Distress Neck- supple, no JVD 
CV- regular rate and rhythm no MRG Lung- clear bilaterally Abd- soft, nontender, nondistended Ext- no edema bilaterally. Skin- warm and dry Data Review:  
Recent Labs  
   07/23/18 
 0444  07/22/18 
 0428  07/21/18 
 0419 NA  142  141  139  
K  3.3*  4.1  4.2 MG  2.0  2.5*   --   
BUN  22  29*  34* CREA  1.69*  1.87*  2.07* GLU  89  82  56* WBC   --   6.0  7.3 HGB   --   10.0*  9.7* HCT   --   31.4*  31.0*  
PLT   --   134*  134* No results found for: NHUNG Dick Assessment/Plan:  
 
Principal Problem: 
  UTI (urinary tract infection) (7/18/2018) Antibiotics per primary. Active Problems: 
  Essential hypertension (5/16/2018) BP controlled. S/P CABG (coronary artery bypass graft) (5/18/2018) No angina. Continue aggressive risk factor modification. Hypothyroidism (5/18/2018) ON synthroid. History of CVA (cerebrovascular accident) (6/20/2018) ON Eliquis. Appropriately dose. Atrial fibrillation with RVR ([address] (7/18/2018) Continue rate controlled on PO Cardizem. Continue Eliquis. Sepsis secondary to UTI ([address] (7/19/2018) Per primary team.  
 
  Gram-negative bacteremia (7/19/2018) Per primary team.  
 
STable from CV standpoint. Will sign off.   Follow up in our office 2-3 weeks after discharge.   
 
 
 
 
 
 
 
 
Bharati Waldrop MD 
7/23/2018 7:43 AM

## 2018-07-23 NOTE — PROGRESS NOTES
Problem: Falls - Risk of  Goal: *Absence of Falls  Document Shi Fall Risk and appropriate interventions in the flowsheet. Outcome: Progressing Towards Goal  Fall Risk Interventions:  Mobility Interventions: Bed/chair exit alarm, Patient to call before getting OOB  Medication Interventions: Patient to call before getting OOB, Teach patient to arise slowly, Bed/chair exit alarm  Elimination Interventions: Bed/chair exit alarm, Call light in reach, Patient to call for help with toileting needs        Problem: Pressure Injury - Risk of  Goal: *Prevention of pressure injury  Document Mekhi Scale and appropriate interventions in the flowsheet.    Outcome: Progressing Towards Goal  Pressure Injury Interventions:  Moisture Interventions: Check for incontinence Q2 hours and as needed, Apply protective barrier, creams and emollients  Activity Interventions: Increase time out of bed, Pressure redistribution bed/mattress(bed type)  Mobility Interventions: Pressure redistribution bed/mattress (bed type)  Nutrition Interventions: Document food/fluid/supplement intake  Friction and Shear Interventions: Foam dressings/transparent film/skin sealants, Apply protective barrier, creams and emollients

## 2018-07-24 ENCOUNTER — PATIENT OUTREACH (OUTPATIENT)
Dept: CASE MANAGEMENT | Age: 83
End: 2018-07-24

## 2018-07-24 NOTE — PROGRESS NOTES
Transition of Care Discharge Follow-up Questionnaire Date/Time of Call: 7/24/18 SUSI call done by PCP office on 7/23/18  4:15 pm  
What was the patient hospitalized for? 7/18/18-7/23/18 UTI  
6/20/18 through 6/22/18 Atrial flutter with rapid ventricular response ([address] -pacemaker placed Recent NSTEMI followed by CABG, Afib and then CVA.  At [facility] & [facility] [address] [facility] admission for CVA tx.  
   
Does the patient understand his/her diagnosis and/or treatment and what happened during the hospitalization? 
    
Primary caregiver verbalized understanding of medical diagnoses Did the patient receive discharge instructions? Yes   
CM Assessed Risk for Readmission:  
  
  
  
  
  
  
  
  
  
Patient stated Risk for Readmission:  
  RRAT score of 19:  pacemaker placed 2 lead- Recent NSTEMI followed by CABG, Afib and then CVA.  At Critical access hospital [address] & [address] [facility] admission for CVA tx.   
  
  
  
  
  
Any medical issue that may happen, CVA and/or heart symptoms Review any discharge instructions (see discharge instructions/AVS in [facility]). Ask patient if they understand these. Do they have any questions? 
  Reviewed per connect care with caregiver Were home services ordered (nursing, PT, OT, ST, etc.)? Yes, [facility] If so, has the first visit occurred? If not, why? (Assist with coordination of services if necessary.) Yes Was any DME ordered? No   
If so, has it been received? If not, why?  (Assist patient in obtaining DME orders &/or equipment if necessary. )   
n/a  
   
Complete a review of all medications (new, continued and discontinued meds per the D/C instructions and medication tab in Connect[facility]). Reviewed patients medication per connect care with caregiver and verbalized understanding and denies any issues  
  
   
Were all new prescriptions filled? If not, why?  (Assist patient in obtaining medications if necessary  escalate for CCM &/or SW if ongoing issues are verbalized by pt or anticipated)   Yes  
  Does the patient understand the purpose and dosing instructions for all medications? (If patient has questions, provide explanation and education.) Denies any issues at this time and verbalizes understanding Does the patient have any problems in performing ADLs? (If patient is unable to perform ADLs  what is the limiting factor(s)? Do they have a support system that can assist? If no support system is present, discuss possible assistance that they may be able to obtain. Escalate for CCM/SW if ongoing issues are verbalized by pt or anticipated)   
  
Yes, requires assist with all ADLs and spouse assists as needed Does the patient have all follow-up appointments scheduled?   
  
7 day f/up with PCP?  
(f/up with PCP may be w/in 14 days if patient has a f/up with their specialist w/in 7 days) 
  
7-14 day f/up with specialist?  
(or per discharge instructions) 
  If f/up has not been made  what actions has the care coordinator made to accomplish this? 
  
Has transportation been arranged? 
  Yes PCP on 7/31/18 Cardiology 8/8/18 Transportation arranged Any other questions or concerns expressed by the patient? 
  Not at this time Denies any issues at this time Schedule next appointment with VALENTINA WHITESIDE Coordinator or refer to RN Case Manager/ per the workflow guidelines. 
  
  
When is care coordinators next follow-up call scheduled? 
  
  
If referred for CCM  what RN care manager was the referral assigned?   
  
In 1 week  
SUSI Call Completed By:  
Laina Sauceda RN, BSN, CCM /  
c: [phone] / [email or web address] 
  This note will not be viewable in [address]

## 2018-07-24 NOTE — PROGRESS NOTES
This note will not be viewable in [address] Patient discharged 7/23/18. Patient is engaged with Janet Chowdhury RN, CCM. Information forwarded to Bang Bonilla to complete [facility] call and continued follow up.

## 2018-07-24 NOTE — DISCHARGE SUMMARY
Hospitalist Discharge Summary     Admit Date:  2018  5:28 PM   Name:  Olviia Andrade   Age:  82 y.o.  :  1935   MRN:  [id number]   PCP:  Nallely Simon MD  Treatment Team: Utilization Review: Mati Puentes    Problem List for this Hospitalization:  Hospital Problems as of 2018  Date Reviewed: 2018          Codes Class Noted - Resolved POA    Sepsis secondary to UTI [facility]) ICD-10-CM: A41.9, N39.0  ICD-9-CM: 038.9, 995.91, 599.0  2018 - Present Yes        Gram-negative bacteremia ICD-10-CM: R78.81  ICD-9-CM: 790.7, 041.85  2018 - Present Yes        Atrial fibrillation with RVR ([facility] [address] ICD-10-CM: I48.91  ICD-9-CM: 427.31  2018 - Present Yes        * (Principal)UTI (urinary tract infection) ICD-10-CM: N39.0  ICD-9-CM: 599.0  2018 - Present Yes        Normocytic anemia ICD-10-CM: D64.9  ICD-9-CM: 285.9  2018 - Present Yes        Persistent atrial fibrillation ([facility] ICD-10-CM: I48.1  ICD-9-CM: 427.31  2018 - Present Yes        Abdominal pain ICD-10-CM: R10.9  ICD-9-CM: 789.00  2018 - Present Yes        History of CVA (cerebrovascular accident) (Chronic) ICD-10-CM: Y88.11  ICD-9-CM: V12.54  2018 - Present Yes        CAD, multiple vessel (Chronic) ICD-10-CM: I25.10  ICD-9-CM: 414.00  2018 - Present Yes    Overview Signed 2018 11:32 AM by Dionne Chin NP      18 (Dr Loyda Roberts) Coronary artery bypass grafting x3 with grafts consisting of:  1. Left internal mammary artery to left anterior descending artery.    2.  Reverse saphenous vein to diagonal.   3.  Reversed saphenous vein graft to obtuse marginal.              S/P CABG (coronary artery bypass graft) (Chronic) ICD-10-CM: Z95.1  ICD-9-CM: V45.81  2018 - Present Yes        Hypothyroidism (Chronic) ICD-10-CM: E03.9  ICD-9-CM: 244.9  2018 - Present Yes        Essential hypertension (Chronic) ICD-10-CM: I10  ICD-9-CM: 401.9  2018 - Present Yes        RESOLVED: PAF (paroxysmal atrial fibrillation) (HCC) (Chronic) ICD-10-CM: I48.0  ICD-9-CM: 427.31  5/20/2018 - 7/18/2018 Yes                Admission HPI from 7/18/2018:    \"  \"    Hospital Course:  Pt is an 83 yo male with PMH of CAD, S/P CABG x 3v 5/18, CVA, HTN. Pt presented to ER with report of N/V, chills, abd pain since that morning. Pt had chills after breakfast, developed nausea, and then vomited x 1. Per spouse he had flank pain on the right too.       Pt afebrile since admit, tachycardic at times up to 115. Urine was found to be 4+ bacteria, sent for culture and grew >100K e coli. Bld cultures positive with e coli and staph. Both susceptible to cefepime. H&H 10/31.4 today, WBC initially up to 16.6 but then down to 6.  2nd blood culture with no growth x 2 days. Creatinine jabier to 2.17 but is now 1.69. Pt initially placed on diltiazem drip 2/2 Afib with RVR, transitioned to po diltiazem on 7/20. Pt has a pacer, tele shows continue Afib with pacer. Pt treated with IV cefepime for the urosepsis, will d/c to home with PO Vantin.      Follow up instructions and discharge meds at bottom of this note. Plan was discussed with patient, spouse. All questions answered. Patient was stable at time of discharge. Diagnostic Imaging/Tests:   Xr Chest Port    Result Date: 7/18/2018  Portable Chest, 1 view,  7/18/2018. Indication: Tachycardia. Comparison: 6/21/2018 There are post median sternotomy changes. The heart is considered normal for the limited inflation. Left subclavian pacing device noted. No alveolar consolidation. Left lower lobe atelectasis/pleural fluid has resolved. IMPRESSION: No acute finding.         Echocardiogram results:  Results for orders placed or performed during the hospital encounter of 07/18/18   2D ECHO COMPLETE ADULT (TTE) W OR WO CONTR    Narrative    Vesta Calloway [address] [facility], [facility] [address] [facility]  [phone]    Transthoracic Echocardiogram  2D, M-mode, Doppler, and Color Doppler    Patient: Judith Virgen  MR #: [id number]  : 1935  Age: 82 years  Gender: Male  Study date: 2018  Account #: [de-identified]  Height: 71 in  Weight: 174.7 lb  BSA: 1.99 mï¾²  Status:Routine  Location: [facility]  BP: 130/ 63    Allergies: LORAZEPAM, PENICILLINS    Sonographer:  Tremayne Sosa [facility]  Group:  [facility]  Referring Physician:  Mony Waldron MD  Reading Physician:  Lucy Molina. Marianne De La Rosa DO [facility]    INDICATIONS: Strep epi bid culture    PROCEDURE: This was a routine study. A transthoracic echocardiogram was  performed. The study included complete 2D imaging, M-mode, complete spectral  Doppler, and color Doppler. Intravenous contrast (Definity, 2 ml) was  administered. This was a technically difficult study. LEFT VENTRICLE: Size was normal. Systolic function was at the lower limits of  normal. Ejection fraction was estimated in the range of 50 % to 55 %. There  were no regional wall motion abnormalities. Septal wall motion is c/w  pacemaker. Wall thickness was normal. The study was not technically   sufficient  to allow evaluation of LV diastolic function. VENTRICULAR SEPTUM: Thickness was normal. No ventricular septal defect was  identified. RIGHT VENTRICLE: The size was normal. Systolic function was normal. A pacing  wire was present. Systolic pressure was at the upper limits of normal.  Estimated peak pressure was in the range of 25-30 mmHg. LEFT ATRIUM: LA volume index is 43.3ml/m2. The atrium was moderately dilated. ATRIAL SEPTUM: The interatrial septum was intact. RIGHT ATRIUM: The atrium was mildly dilated. SYSTEMIC VEINS: IVC: The inferior vena cava was not well visualized. AORTIC VALVE: The valve was trileaflet. There was no evidence for stenosis. There was no regurgitation. MITRAL VALVE: There was mild thickening. No echocardiographic evidence for  prolapse. There was no evidence for stenosis.  There was mild to moderate  regurgitation. TRICUSPID VALVE: The valve structure was normal. There was mild   regurgitation. PULMONIC VALVE: The valve structure was normal. There was trivial  regurgitation. PERICARDIUM: There was no pericardial effusion. AORTA: The root exhibited normal size. SUMMARY:    -  Left ventricle: Systolic function was at the lower limits of normal.  Ejection fraction was estimated in the range of 50 % to 55 %. There were no  regional wall motion abnormalities. Septal wall motion is c/w pacemaker.    -  Right ventricle: Systolic pressure was at the upper limits of normal.  Estimated peak pressure was in the range of 25-30 mmHg. -  Left atrium: LA volume index is 43.3ml/m2. The atrium was moderately  dilated. -  Right atrium: The atrium was mildly dilated. -  Mitral valve: There was mild to moderate regurgitation.    -  Tricuspid valve: There was mild regurgitation. SYSTEM MEASUREMENT TABLES    2D mode  AoR Diam (2D): 3.2 cm  LA Dimension (2D): 4.8 cm  Left Atrium Systolic Volume Index; Method of Disks, Biplane; 2D mode;: 43.3  ml/m2  IVS/LVPW (2D): 1  IVSd (2D): 1.2 cm  LVIDd (2D): 4.6 cm  LVIDs (2D): 2.4 cm  LVPWd (2D): 1.2 cm  RVIDd (2D): 3.4 cm    Unspecified Scan Mode  Peak Grad; Mean; Antegrade Flow: 10 mm[Hg]  Vmax; Antegrade Flow: 155 cm/s  Peak Grad; Mean; Regurgitant Flow: 17 mm[Hg]  Vmax; Regurgitant Flow: 244 cm/s    Prepared and signed by    Lupillo Garcia.  [facility]  Signed 22-Jul-2018 17:53:10           All Micro Results     Procedure Component Value Units Date/Time    CULTURE, BLOOD [[id number]] Collected:  07/20/18 1517    Order Status:  Completed Specimen:  Blood from Blood Updated:  07/23/18 1256     Special Requests: --        LEFT  FOREARM       Culture result: NO GROWTH 3 DAYS       CULTURE, BLOOD [[id number]]  (Abnormal)  (Susceptibility) Collected:  07/18/18 9693    Order Status:  Completed Specimen:  Blood from Blood Updated:  07/22/18 0053     Special Requests: --        NO SPECIAL REQUESTS  RIGHT  HAND       GRAM STAIN GRAM NEGATIVE RODS         ANAEROBIC BOTTLE POSITIVE                 GRAM POS COCCI IN CLUSTERS      AEROBIC BOTTLE POSITIVE                 RESULTS VERIFIED, PHONED TO AND READ BACK BY MELLISSA REGALADO AT 0804     Culture result:         ESCHERICHIA COLI ANAEROBIC BOTTLE POSITIVE (A)              STAPHYLOCOCCUS EPIDERMIDIS Use Rifampin only in conjunction with another antimicrobial agent. Do not use as Monotherapy. AEROBIC BOTTLE POSITIVE (A)            SA target DNA sequence not detected within the sample.  Test performed by PCR  AEROBIC BOTTLE POSITIVE      CULTURE, BLOOD [[id number]]  (Abnormal) Collected:  07/18/18 2215    Order Status:  Completed Specimen:  Blood from Blood Updated:  07/22/18 0645     Special Requests: --        NO SPECIAL REQUESTS  RIGHT  Antecubital       GRAM STAIN GRAM NEGATIVE RODS         ANAEROBIC BOTTLE POSITIVE                 RESULTS VERIFIED, PHONED TO AND READ BACK BY MELLISSA CARROLL RN AT 9314 ON 7/19/18 SG     Culture result: GRAM NEGATIVE RODS (A)         REFER TO ACC [id number] FOR ID AND SUSCEPTIBILITY    CULTURE, URINE [[id number]]  (Abnormal)  (Susceptibility) Collected:  07/18/18 2145    Order Status:  Completed Specimen:  Urine from Clean catch Updated:  07/21/18 0653     Special Requests: NO SPECIAL REQUESTS        Culture result:         >100,000 COLONIES/mL ESCHERICHIA COLI (A)          Labs: Results:       BMP, Mg, Phos Recent Labs      07/23/18 0444 07/22/18 0428   NA  142  141   K  3.3*  4.1   CL  107  109*   CO2  23  23   AGAP  12  9   BUN  22  29*   CREA  1.69*  1.87*   CA  7.9*  7.6*   GLU  89  82   MG  2.0  2.5*      CBC Recent Labs      07/22/18 0428   WBC  6.0   RBC  3.58*   HGB  10.0*   HCT  31.4*   PLT  134*   GRANS  70   LYMPH  12*   EOS  10*   MONOS  8   BASOS  0   IG  0   ANEU  4.2   ABL  0.7   KARELY  0.6   ABM  0.5   ABB  0.0   AIG  0.0      LFT Recent Labs      07/22/18 0428   SGOT  27 ALT  19   AP  92   TP  5.1*   ALB  2.0*   GLOB  3.1   AGRAT  0.6*      Cardiac Testing Lab Results   Component Value Date/Time     07/23/2018 04:44 AM     07/22/2018 04:28 AM     07/18/2018 05:40 PM    Troponin-I, Qt. 13.30 () 05/17/2018 04:21 AM    Troponin-I, Qt. 2.66 () 05/16/2018 08:52 PM    Troponin-I, Qt. 0.04 05/16/2018 06:45 PM      Coagulation Tests Lab Results   Component Value Date/Time    Prothrombin time 18.9 (H) 06/20/2018 07:31 PM    Prothrombin time 18.8 (H) 05/18/2018 06:09 PM    Prothrombin time 13.2 05/18/2018 03:51 AM    INR 1.6 06/20/2018 07:31 PM    INR 1.6 05/18/2018 06:09 PM    INR 1.1 05/18/2018 03:51 AM    aPTT 31.9 05/18/2018 06:09 PM    aPTT 24.8 05/18/2018 03:51 AM    aPTT 95.1 (HH) 05/17/2018 04:21 AM      A1c Lab Results   Component Value Date/Time    Hemoglobin A1c 6.0 05/18/2018 03:51 AM      Lipid Panel Lab Results   Component Value Date/Time    Cholesterol, total 183 05/17/2018 04:21 AM    HDL Cholesterol 46 05/17/2018 04:21 AM    LDL, calculated 123 (H) 05/17/2018 04:21 AM    VLDL, calculated 14 05/17/2018 04:21 AM    Triglyceride 70 05/17/2018 04:21 AM    CHOL/HDL Ratio 4.0 05/17/2018 04:21 AM      Thyroid Panel Lab Results   Component Value Date/Time    TSH 0.061 (L) 07/05/2018 11:56 AM    TSH 0.041 (L) 09/15/2017 10:16 AM    T4, Free 1.81 (H) 07/05/2018 11:56 AM        Most Recent UA Lab Results   Component Value Date/Time    Color PINK 05/17/2018 04:18 PM    Appearance CLOUDY 05/17/2018 04:18 PM    Specific gravity 1.031 (H) 05/17/2018 04:18 PM    pH (UA) 8.0 05/17/2018 04:18 PM    Protein TRACE (A) 05/17/2018 04:18 PM    Glucose NEGATIVE  05/17/2018 04:18 PM    Ketone NEGATIVE  05/17/2018 04:18 PM    Bilirubin NEGATIVE  05/17/2018 04:18 PM    Blood LARGE (A) 05/17/2018 04:18 PM    Urobilinogen 1.0 05/17/2018 04:18 PM    Nitrites NEGATIVE  05/17/2018 04:18 PM    Leukocyte Esterase SMALL (A) 05/17/2018 04:18 PM        Allergies   Allergen Reactions  Ativan [Lorazepam] Rash    Pcn [Penicillins] Swelling     Immunization History   Administered Date(s) Administered    Influenza High Dose Vaccine PF 09/17/2015, 11/30/2017    Pneumococcal Polysaccharide (PPSV-23) 01/27/2012    TB Skin Test (PPD) Intradermal 05/18/2018, 07/20/2018       All Labs from Last 24 Hrs:  No results found for this or any previous visit (from the past 24 hour(s)). Discharge Exam:  No data found. Oxygen Therapy  O2 Sat (%): 97 % (07/23/18 0808)  Pulse via Oximetry: 101 beats per minute (07/23/18 0808)  O2 Device: Room air (07/23/18 0829)  No intake or output data in the 24 hours ending 07/24/18 0832    General:    Well nourished. Alert. No distress. Eyes:   Normal sclera. Extraocular movements intact. ENT:  Normocephalic, atraumatic. Moist mucous membranes  CV:   Regular rate and rhythm. No murmur, rub, or gallop. Lungs:  Clear to auscultation bilaterally. No wheezing, rhonchi, or rales. Abdomen: Soft, nontender, nondistended. Bowel sounds normal.   Extremities: Warm and dry. No cyanosis or edema. Neurologic: CN II-XII grossly intact. Sensation intact. Skin:     No rashes or jaundice. Psych:  Normal mood and affect. Discharge Info:   Discharge Medication List as of 7/23/2018 10:30 AM      START taking these medications    Details   cefpodoxime (VANTIN) 200 mg tablet Take 0.5 Tabs by mouth two (2) times a day for 9 days. , Print, Disp-9 Tab, R-0         CONTINUE these medications which have NOT CHANGED    Details   levothyroxine (SYNTHROID) 75 mcg tablet Take 1 Tab by mouth Daily (before breakfast). , Normal, Disp-30 Tab, R-1      traMADol (ULTRAM) 50 mg tablet Take 50 mg by mouth every six (6) hours as needed for Pain., Historical Med      atorvastatin (LIPITOR) 80 mg tablet Take 1 Tab by mouth nightly. , Print, Disp-30 Tab, R-11      dilTIAZem CD (CARDIZEM CD) 180 mg ER capsule Take 1 Cap by mouth daily. , Print, Disp-30 Cap, R-11      apixaban (ELIQUIS) 5 mg tablet Take 1 Tab by mouth two (2) times a day., Normal, Disp-60 Tab, R-5      aspirin delayed-release 81 mg tablet Take 1 Tab by mouth daily. , Print, Disp-30 Tab, R-10      bisacodyl (DULCOLAX) 5 mg EC tablet Take 1 Tab by mouth daily. , Print, Disp-1 Tab, R-0      predniSONE (DELTASONE) 5 mg tablet Take 5 mg by mouth daily. , Historical Med               Disposition: home with [facility]  Activity: PT/OT per Home Health  Diet:      Follow-up Appointments   Procedures    FOLLOW UP VISIT Appointment in: Other ([place] Atlas) Follow up with cardiology as instructed Follow up with PCP in 2 wks     Follow up with cardiology as instructed  Follow up with PCP in 2 wks     Standing Status:   Standing     Number of Occurrences:   1     Order Specific Question:   Appointment in     Answer: Other (Specify)         Follow-up Information     Follow up With Details Comments [facility] Iva Vega MD On 7/31/2018 Tuesday July 31st at 8:30am. [facility] [address]  [phone]      [facility] On 8/8/2018 August 8th at 4:30pm. [address] [place]   [address] [facility] [address] [address]  [phone]          Case reviewed with supervising physician - José Antonio Akbar MD    Time spent in patient discharge planning and coordination 35 minutes.     Signed:  LALITHA Fernandez

## 2018-07-25 LAB
BACTERIA SPEC CULT: NORMAL
SERVICE CMNT-IMP: NORMAL

## 2018-07-31 ENCOUNTER — PATIENT OUTREACH (OUTPATIENT)
Dept: CASE MANAGEMENT | Age: 83
End: 2018-07-31

## 2018-07-31 NOTE — PROGRESS NOTES
Community Care Management  Follow up Outreach Note Outreach type: Phone call: 7/31/18 @ 10:00am 
   
Home visit:  
   
Reason for follow-up: Hospital from 6/20/18 through 6/22/18 Atrial flutter with rapid ventricular response ([address] -pacemaker placed Recent NSTEMI followed by CABG, Afib and then CVA.  [address] [facility] admission for CVA tx. Disease specific complaints/issues:    
pacemaker placed 2 lead- Recent NSTEMI followed by CABG, Afib and then CVA.  At [facility] & [address] [facility] admission for CVA tx.   
    
   
Patient progress towards goals set from last contact: Patient being followed closely by [address] [place] [address] and cardiologist 
Teach patient/caregiver energy conservation techniques: Instruct patient to alternate rest periods with periods of activity, use of bedside commode and energy-saving devices, rest/activity balance, encourage patient to ask for assistance with ADLs/IADLs  
CM Assessed Risk for Readmission:  
   
   
   
Patient stated Risk for Readmission: RRAT score of 22:  pacemaker placed 2 lead- Recent NSTEMI followed by CABG, Afib and then CVA.  [address] [facility] admission for CVA tx.   
   
   
   
   
   
Any medical issue that may happen, CVA and/or heart symptoms    
Has patient attended any PCP or specialist follow-up appointments since last contact? 
   
What was outcome of appointment? 
   
When is next follow-up scheduled? PCP 8/8/18 Cardiology 7/6/18 Review medications. 
   
Any medication changes since last outreach? 
   
Does patient have any questions or issues related to their medications? Meds Reviewed Medication evaluation done by cardiology on 7/25/18 Verbalized understanding of medications and dosing instructions Home health active? If yes Sadia Baer issue? Progress? [facility] home health services Referrals needed? 
(SW, Diabetes education, HH, etc. ) Not at this time Other issues/Miscellaneous?  (Transportation, access to meals, ability to perform ADLs, adequate caregiver support, etc.)    
   
Spouse assists patient as needed Good family support  
Ashanti Mendes  
Next Outreach Scheduled: In one week  
   
Next Steps/Goals: Continue working with home health to regain strength Teach patient/caregiver energy conservation techniques: Instruct patient to alternate rest periods with periods of activity, use of bedside commode and energy-saving devices, rest/activity balance, encourage patient to ask for assistance with ADLs/IADLs Community Care Manager:  
Nicolette DENSON, RN, [facility] /  
c: [phone] / [email or web address] This note will not be viewable in [address]

## 2018-08-10 ENCOUNTER — PATIENT OUTREACH (OUTPATIENT)
Dept: CASE MANAGEMENT | Age: 83
End: 2018-08-10

## 2018-08-10 PROBLEM — I49.5 SSS (SICK SINUS SYNDROME) (HCC): Status: RESOLVED | Noted: 2018-06-13 | Resolved: 2018-08-10

## 2018-08-10 PROBLEM — I48.91 ATRIAL FIBRILLATION WITH RVR (HCC): Status: RESOLVED | Noted: 2018-07-18 | Resolved: 2018-08-10

## 2018-08-10 PROBLEM — I69.391 DYSPHAGIA DUE TO RECENT CEREBROVASCULAR ACCIDENT (CVA): Status: RESOLVED | Noted: 2018-05-24 | Resolved: 2018-08-10

## 2018-08-10 NOTE — PROGRESS NOTES
Community Care Management  Follow up Outreach Note   Outreach type: Phone call: 8/10/18      Home visit:       Reason for follow-up: Hospital from 6/20/18 through 6/22/18 Atrial flutter with rapid ventricular response ([address] -pacemaker placed   Recent NSTEMI followed by CABG, Afib and then CVA.  [address] [facility] admission for CVA tx. Disease specific complaints/issues:     pacemaker placed 2 lead- Recent NSTEMI followed by CABG, Afib and then CVA.  At [facility] [address] [facility] admission for CVA tx.             Patient progress towards goals set from last contact: Patient being followed closely by [address] [place] [address] and cardiologist  Teach patient/caregiver energy conservation techniques: Instruct patient to alternate rest periods with periods of activity, use of bedside commode and energy-saving devices, rest/activity balance, encourage patient to ask for assistance with ADLs/IADLs   CM Assessed Risk for Readmission:               Patient stated Risk for Readmission: RRAT score of 22:  pacemaker placed 2 lead- Recent NSTEMI followed by CABG, Afib and then CVA.  [address] [facility] admission for CVA tx.                        Any medical issue that may happen, CVA and/or heart symptoms     Has patient attended any PCP or specialist follow-up appointments since last contact?      What was outcome of appointment?      When is next follow-up scheduled? Good report at cardiology on 8/10/18   PCP 8/8/18  Cardiology 7/6/18   Review medications.      Any medication changes since last outreach?      Does patient have any questions or issues related to their medications? Meds Reviewed   Medication evaluation done by cardiology on 7/25/18 and 8/10/18   Verbalized understanding of medications and dosing instructions    Home health active? If yes Catrina Lay issue? Progress?  [facility] and progressing well with PT    Referrals needed?  (SW, Diabetes education, HH, etc. ) Not at this time    Other issues/Miscellaneous? (Transportation, access to meals, ability to perform ADLs, adequate caregiver support, etc.)         Spouse assists patient as needed  Good family support            Next Outreach Scheduled: In one week - will consider graduating at that time if patient remains stable this week        Next Steps/Goals: Continue working with home health to regain strength   Teach patient/caregiver energy conservation techniques: Instruct patient to alternate rest periods with periods of activity, use of bedside commode and energy-saving devices, rest/activity balance, encourage patient to ask for assistance with ADLs/IADLs   Community Care Manager:   María DENSON, RN, [facility] /   c: [phone] / [email or web address]      This note will not be viewable in [employer].

## 2018-08-17 ENCOUNTER — PATIENT OUTREACH (OUTPATIENT)
Dept: CASE MANAGEMENT | Age: 83
End: 2018-08-17

## 2018-08-17 NOTE — PROGRESS NOTES
Community Care Management  Follow up Outreach Note   Outreach type: Phone call: 8/17/18       Home visit:       Reason for follow-up: Hospital from 6/20/18 through 6/22/18 Atrial flutter with rapid ventricular response ([address] -pacemaker placed   Recent NSTEMI followed by CABG, Afib and then CVA.  At [address] [facility] admission for CVA tx. Disease specific complaints/issues:     pacemaker placed 2 lead- Recent NSTEMI followed by CABG, Afib and then CVA.  At [facility] [address] [facility] admission for CVA tx.             Patient progress towards goals set from last contact: Patient being followed closely by [address] [place] [address] and cardiologist  Teach patient/caregiver energy conservation techniques: Instruct patient to alternate rest periods with periods of activity, use of bedside commode and energy-saving devices, rest/activity balance, encourage patient to ask for assistance with ADLs/IADLs   CM Assessed Risk for Readmission:               Patient stated Risk for Readmission: RRAT score of 22:  pacemaker placed 2 lead- Recent NSTEMI followed by CABG, Afib and then CVA.  [address] [facility] admission for CVA tx.                        Any medical issue that may happen, CVA and/or heart symptoms     Has patient attended any PCP or specialist follow-up appointments since last contact?      What was outcome of appointment?      When is next follow-up scheduled? Good report at cardiology on 8/10/18   PCP 8/8/18  Cardiology 7/6/18   Review medications.      Any medication changes since last outreach?      Does patient have any questions or issues related to their medications? Meds Reviewed   Medication evaluation done by cardiology on 7/25/18 and 8/10/18   Verbalized understanding of medications and dosing instructions    Home health active? If yes Marlynn Gosselin issue? Progress?  [facility] and progressing well with PT    Referrals needed?  (SW, Diabetes education, HH, etc. ) Not at this time    Other issues/Miscellaneous? (Transportation, access to meals, ability to perform ADLs, adequate caregiver support, etc.)         Spouse assists patient as needed  Good family support            Next Outreach Scheduled: Case closed and patient graduated at this time - stable at this time         Next Steps/Goals: Case closed and patient graduated at this time - stable at this time    Continue working with home health to regain strength   Teach patient/caregiver energy conservation techniques: Instruct patient to alternate rest periods with periods of activity, use of bedside commode and energy-saving devices, rest/activity balance, encourage patient to ask for assistance with ADLs/IADLs   Community Care Manager:   Nicolette DENSON, RN, [facility] /   c: [phone] / [email or web address]     This note will not be viewable in [address]

## 2018-09-06 ENCOUNTER — HOSPITAL ENCOUNTER (OUTPATIENT)
Dept: LAB | Age: 83
Discharge: HOME | End: 2018-09-06
Payer: MEDICARE

## 2018-09-06 DIAGNOSIS — I25.10 CAD, MULTIPLE VESSEL: Chronic | ICD-10-CM

## 2018-09-06 LAB
ANION GAP SERPL CALC-SCNC: 7 MMOL/L
BNP SERPL-MCNC: 290 PG/ML
BUN SERPL-MCNC: 18 MG/DL (ref 8–23)
CALCIUM SERPL-MCNC: 8.5 MG/DL (ref 8.3–10.4)
CHLORIDE SERPL-SCNC: 107 MMOL/L (ref 98–107)
CO2 SERPL-SCNC: 28 MMOL/L (ref 21–32)
CREAT SERPL-MCNC: 1.3 MG/DL (ref 0.8–1.5)
GLUCOSE SERPL-MCNC: 150 MG/DL (ref 65–100)
MAGNESIUM SERPL-MCNC: 2.1 MG/DL (ref 1.8–2.4)
POTASSIUM SERPL-SCNC: 4.3 MMOL/L (ref 3.5–5.1)
SODIUM SERPL-SCNC: 142 MMOL/L (ref 136–145)

## 2018-09-06 PROCEDURE — 36415 COLL VENOUS BLD VENIPUNCTURE: CPT

## 2018-09-06 PROCEDURE — 80048 BASIC METABOLIC PNL TOTAL CA: CPT

## 2018-09-06 PROCEDURE — 83880 ASSAY OF NATRIURETIC PEPTIDE: CPT

## 2018-09-06 PROCEDURE — 83735 ASSAY OF MAGNESIUM: CPT

## 2019-03-22 ENCOUNTER — HOSPITAL ENCOUNTER (OUTPATIENT)
Dept: LAB | Age: 84
Discharge: HOME | End: 2019-03-22
Payer: MEDICARE

## 2019-03-22 DIAGNOSIS — I25.10 CAD, MULTIPLE VESSEL: Chronic | ICD-10-CM

## 2019-03-22 LAB
ALBUMIN SERPL-MCNC: 3.2 G/DL (ref 3.2–4.6)
ALBUMIN/GLOB SERPL: 1 {RATIO}
ALP SERPL-CCNC: 92 U/L (ref 50–136)
ALT SERPL-CCNC: 48 U/L (ref 12–65)
ANION GAP SERPL CALC-SCNC: 9 MMOL/L
AST SERPL-CCNC: 33 U/L (ref 15–37)
BASOPHILS # BLD: 0.1 K/UL (ref 0–0.2)
BASOPHILS NFR BLD: 1 % (ref 0–2)
BILIRUB SERPL-MCNC: 0.5 MG/DL (ref 0.2–1.1)
BUN SERPL-MCNC: 17 MG/DL (ref 8–23)
CALCIUM SERPL-MCNC: 8.7 MG/DL (ref 8.3–10.4)
CHLORIDE SERPL-SCNC: 109 MMOL/L (ref 98–107)
CHOLEST SERPL-MCNC: 123 MG/DL
CO2 SERPL-SCNC: 25 MMOL/L (ref 21–32)
CREAT SERPL-MCNC: 1.3 MG/DL (ref 0.8–1.5)
DIFFERENTIAL METHOD BLD: ABNORMAL
EOSINOPHIL # BLD: 0.2 K/UL (ref 0–0.8)
EOSINOPHIL NFR BLD: 2 % (ref 0.5–7.8)
ERYTHROCYTE [DISTWIDTH] IN BLOOD BY AUTOMATED COUNT: 16.5 % (ref 11.9–14.6)
GLOBULIN SER CALC-MCNC: 3.1 G/DL
GLUCOSE SERPL-MCNC: 84 MG/DL (ref 65–100)
HCT VFR BLD AUTO: 35.3 % (ref 41.1–50.3)
HDLC SERPL-MCNC: 45 MG/DL (ref 40–60)
HDLC SERPL: 2.7 {RATIO}
HGB BLD-MCNC: 10.7 G/DL (ref 13.6–17.2)
IMM GRANULOCYTES # BLD AUTO: 0 K/UL (ref 0–0.5)
IMM GRANULOCYTES NFR BLD AUTO: 0 % (ref 0–5)
LDLC SERPL CALC-MCNC: 64 MG/DL
LIPID PROFILE,FLP: NORMAL
LYMPHOCYTES # BLD: 2.1 K/UL (ref 0.5–4.6)
LYMPHOCYTES NFR BLD: 28 % (ref 13–44)
MAGNESIUM SERPL-MCNC: 2.1 MG/DL (ref 1.8–2.4)
MCH RBC QN AUTO: 26 PG (ref 26.1–32.9)
MCHC RBC AUTO-ENTMCNC: 30.3 G/DL (ref 31.4–35)
MCV RBC AUTO: 85.7 FL (ref 79.6–97.8)
MONOCYTES # BLD: 0.6 K/UL (ref 0.1–1.3)
MONOCYTES NFR BLD: 8 % (ref 4–12)
NEUTS SEG # BLD: 4.5 K/UL (ref 1.7–8.2)
NEUTS SEG NFR BLD: 60 % (ref 43–78)
NRBC # BLD: 0 K/UL (ref 0–0.2)
PLATELET # BLD AUTO: 214 K/UL (ref 150–450)
PMV BLD AUTO: 10.1 FL (ref 9.4–12.3)
POTASSIUM SERPL-SCNC: 3.6 MMOL/L (ref 3.5–5.1)
PROT SERPL-MCNC: 6.3 G/DL (ref 6.3–8.2)
RBC # BLD AUTO: 4.12 M/UL (ref 4.23–5.6)
SODIUM SERPL-SCNC: 143 MMOL/L (ref 136–145)
TRIGL SERPL-MCNC: 70 MG/DL (ref 35–150)
TSH SERPL DL<=0.005 MIU/L-ACNC: 0.22 UIU/ML (ref 0.36–3.74)
VLDLC SERPL CALC-MCNC: 14 MG/DL (ref 6–23)
WBC # BLD AUTO: 7.5 K/UL (ref 4.3–11.1)

## 2019-03-22 PROCEDURE — 83735 ASSAY OF MAGNESIUM: CPT

## 2019-03-22 PROCEDURE — 85025 COMPLETE CBC W/AUTO DIFF WBC: CPT

## 2019-03-22 PROCEDURE — 80053 COMPREHEN METABOLIC PANEL: CPT

## 2019-03-22 PROCEDURE — 84443 ASSAY THYROID STIM HORMONE: CPT

## 2019-03-22 PROCEDURE — 80061 LIPID PANEL: CPT

## 2019-03-22 PROCEDURE — 36415 COLL VENOUS BLD VENIPUNCTURE: CPT

## 2019-03-22 NOTE — PROGRESS NOTES
Please call him, labs are ok, lipids good. BUT, TSH off, needs PCP to address. Will he see PCP soon, please end to PCP.   Thanks

## 2019-07-29 ENCOUNTER — HOSPITAL ENCOUNTER (OUTPATIENT)
Dept: LAB | Age: 84
Discharge: HOME | End: 2019-07-29
Payer: MEDICARE

## 2019-07-29 DIAGNOSIS — I48.19 PERSISTENT ATRIAL FIBRILLATION (HCC): ICD-10-CM

## 2019-07-29 DIAGNOSIS — Z79.52 CURRENT CHRONIC USE OF SYSTEMIC STEROIDS: Chronic | ICD-10-CM

## 2019-07-29 DIAGNOSIS — I10 ESSENTIAL HYPERTENSION: Chronic | ICD-10-CM

## 2019-07-29 LAB
ALBUMIN SERPL-MCNC: 3.2 G/DL (ref 3.2–4.6)
ALBUMIN/GLOB SERPL: 1.1 {RATIO} (ref 1.2–3.5)
ALP SERPL-CCNC: 100 U/L (ref 50–136)
ALT SERPL-CCNC: 30 U/L (ref 12–65)
ANION GAP SERPL CALC-SCNC: 7 MMOL/L (ref 7–16)
AST SERPL-CCNC: 24 U/L (ref 15–37)
BILIRUB SERPL-MCNC: 0.5 MG/DL (ref 0.2–1.1)
BNP SERPL-MCNC: 427 PG/ML
BUN SERPL-MCNC: 17 MG/DL (ref 8–23)
CALCIUM SERPL-MCNC: 8.4 MG/DL (ref 8.3–10.4)
CHLORIDE SERPL-SCNC: 108 MMOL/L (ref 98–107)
CO2 SERPL-SCNC: 27 MMOL/L (ref 21–32)
CREAT SERPL-MCNC: 1.4 MG/DL (ref 0.8–1.5)
GLOBULIN SER CALC-MCNC: 2.9 G/DL (ref 2.3–3.5)
GLUCOSE SERPL-MCNC: 110 MG/DL (ref 65–100)
POTASSIUM SERPL-SCNC: 3.7 MMOL/L (ref 3.5–5.1)
PROT SERPL-MCNC: 6.1 G/DL (ref 6.3–8.2)
SODIUM SERPL-SCNC: 142 MMOL/L (ref 136–145)

## 2019-07-29 PROCEDURE — 36415 COLL VENOUS BLD VENIPUNCTURE: CPT

## 2019-07-29 PROCEDURE — 80053 COMPREHEN METABOLIC PANEL: CPT

## 2019-07-29 PROCEDURE — 83880 ASSAY OF NATRIURETIC PEPTIDE: CPT

## 2019-08-29 ENCOUNTER — HOSPITAL ENCOUNTER (EMERGENCY)
Age: 84
Discharge: HOME | End: 2019-08-29
Payer: MEDICARE

## 2019-08-29 ENCOUNTER — APPOINTMENT (OUTPATIENT)
Dept: GENERAL RADIOLOGY | Age: 84
End: 2019-08-29
Payer: MEDICARE

## 2019-08-29 VITALS
TEMPERATURE: 98.7 F | DIASTOLIC BLOOD PRESSURE: 105 MMHG | BODY MASS INDEX: 27.02 KG/M2 | SYSTOLIC BLOOD PRESSURE: 191 MMHG | OXYGEN SATURATION: 100 % | HEART RATE: 92 BPM | RESPIRATION RATE: 18 BRPM | HEIGHT: 71 IN | WEIGHT: 193 LBS

## 2019-08-29 DIAGNOSIS — S60.419A ABRASION OF FINGER, INITIAL ENCOUNTER: Primary | ICD-10-CM

## 2019-08-29 PROCEDURE — 73140 X-RAY EXAM OF FINGER(S): CPT

## 2019-08-29 PROCEDURE — 75810000454 HC CHEMICAL CAUTERY TISSUE

## 2019-08-29 PROCEDURE — 74011000250 HC RX REV CODE- 250

## 2019-08-29 PROCEDURE — 77030022017 HC DRSG HEMO QCLOT ZMED -A

## 2019-08-29 PROCEDURE — 99282 EMERGENCY DEPT VISIT SF MDM: CPT

## 2019-08-29 PROCEDURE — 75810000283 HC INJECTION NERVE BLOCK

## 2019-08-29 RX ORDER — SILVER NITRATE 38.21; 12.74 MG/1; MG/1
1 STICK TOPICAL ONCE
Status: COMPLETED | OUTPATIENT
Start: 2019-08-29 | End: 2019-08-29

## 2019-08-29 RX ORDER — TRANEXAMIC ACID 100 MG/ML
1 INJECTION, SOLUTION INTRAVENOUS ONCE
Status: COMPLETED | OUTPATIENT
Start: 2019-08-29 | End: 2019-08-29

## 2019-08-29 RX ORDER — LIDOCAINE HYDROCHLORIDE AND EPINEPHRINE 10; 10 MG/ML; UG/ML
10 INJECTION, SOLUTION INFILTRATION; PERINEURAL
Status: COMPLETED | OUTPATIENT
Start: 2019-08-29 | End: 2019-08-29

## 2019-08-29 RX ADMIN — LIDOCAINE HYDROCHLORIDE,EPINEPHRINE BITARTRATE 100 MG: 10; .01 INJECTION, SOLUTION INFILTRATION; PERINEURAL at 05:07

## 2019-08-29 RX ADMIN — SILVER NITRATE APPLICATORS 1 APPLICATOR: 25; 75 STICK TOPICAL at 05:06

## 2019-08-29 RX ADMIN — TRANEXAMIC ACID 1000 MG: 1 INJECTION, SOLUTION INTRAVENOUS at 05:09

## 2019-08-29 NOTE — DISCHARGE INSTRUCTIONS
Patient Education        Scrapes (Abrasions): Care Instructions  Your Care Instructions  Scrapes (abrasions) are wounds where your skin has been rubbed or torn off. Most scrapes do not go deep into the skin, but some may remove several layers of skin. Scrapes usually don't bleed much, but they may ooze pinkish fluid. Scrapes on the head or face may appear worse than they are. They may bleed a lot because of the good blood supply to this area. Most scrapes heal well and may not need a bandage. They usually heal within 3 to 7 days. A large, deep scrape may take 1 to 2 weeks or longer to heal. A scab may form on some scrapes. Follow-up care is a key part of your treatment and safety. Be sure to make and go to all appointments, and call your doctor if you are having problems. It's also a good idea to know your test results and keep a list of the medicines you take. How can you care for yourself at home? · If your doctor told you how to care for your wound, follow your doctor's instructions. If you did not get instructions, follow this general advice:  ? Wash the scrape with clean water 2 times a day. Don't use hydrogen peroxide or alcohol, which can slow healing. ? You may cover the scrape with a thin layer of petroleum jelly, such as Vaseline, and a nonstick bandage. ? Apply more petroleum jelly and replace the bandage as needed. · Prop up the injured area on a pillow anytime you sit or lie down during the next 3 days. Try to keep it above the level of your heart. This will help reduce swelling. · Be safe with medicines. Take pain medicines exactly as directed. ? If the doctor gave you a prescription medicine for pain, take it as prescribed. ? If you are not taking a prescription pain medicine, ask your doctor if you can take an over-the-counter medicine. When should you call for help?   Call your doctor now or seek immediate medical care if:    · You have signs of infection, such as:  ? Increased pain, swelling, warmth, or redness around the scrape. ? Red streaks leading from the scrape. ? Pus draining from the scrape. ? A fever.     · The scrape starts to bleed, and blood soaks through the bandage. Oozing small amounts of blood is normal.    Watch closely for changes in your health, and be sure to contact your doctor if the scrape is not getting better each day. Where can you learn more? Go to http://zoie-nusrat.info/. Enter A374 in the search box to learn more about \"Scrapes (Abrasions): Care Instructions. \"  Current as of: September 23, 2018  Content Version: 12.1  © 6270-2450 Healthwise, [employer]. Care instructions adapted under license by [employer] (which disclaims liability or warranty for this information). If you have questions about a medical condition or this instruction, always ask your healthcare professional. [address] any warranty or liability for your use of this information.

## 2019-08-29 NOTE — ED NOTES
I have reviewed discharge instructions with the patient. The patient verbalized understanding. Patient left ED via Discharge Method: ambulatory to Home with family  Opportunity for questions and clarification provided. Patient given 0 scripts. To continue your aftercare when you leave the hospital, you may receive an automated call from our care team to check in on how you are doing. This is a free service and part of our promise to provide the best care and service to meet your aftercare needs.  If you have questions, or wish to unsubscribe from this service please call [phone]. Thank you for Choosing our [facility] Emergency Department.

## 2019-08-29 NOTE — ED TRIAGE NOTES
Patient reports cutting right middle finger while taking out trash. Tripped and fell, scraping hand on the pavement. Denies hitting head or LOC. Takes blood thinners.  Unknown last tetanus shot

## 2019-08-29 NOTE — ED PROVIDER NOTES
45-year-old male bleeding from his right long finger P IP. Patient scraped his knuckle when he fell earlier yesterday the bleeding has been controlled but when they took the bandage off tonight started bleeding again and they could not get it stopped. The patient is on Eliquis. The history is provided by the patient. Laceration    The incident occurred yesterday. The laceration is located on the right hand. The laceration is 1 cm in size. The injury mechanism is a blunt object. Past Medical History:   Diagnosis Date    CAD, multiple vessel 5/22/2018 5/18/18 (Dr Yesy Caban) Coronary artery bypass grafting x3 with grafts consisting of: 1. Left internal mammary artery to left anterior descending artery.   2.  Reverse saphenous vein to diagonal.  3.  Reversed saphenous vein graft to obtuse marginal.    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     HX PACEMAKER  2018         Family History:   Problem Relation Age of Onset    No Known Problems Mother     No Known Problems Father        Social History     Socioeconomic History    Marital status:      Spouse name: Not on file    Number of children: Not on file    Years of education: Not on file    Highest education level: Not on file   Occupational History    Not on file   Social Needs    Financial resource strain: Not on file    Food insecurity:     Worry: Not on file     Inability: Not on file    Transportation needs:     Medical: Not on file     Non-medical: Not on file   Tobacco Use    Smoking status: Never Smoker    Smokeless tobacco: Never Used   Substance and Sexual Activity    Alcohol use: No    Drug use: No    Sexual activity: Not Currently   Lifestyle    Physical activity:     Days per week: Not on file     Minutes per session: Not on file    Stress: Not on file   Relationships    Social connections:     Talks on phone: Not on file     Gets together: Not on file     Attends Congregational service: Not on file     Active member of club or organization: Not on file     Attends meetings of clubs or organizations: Not on file     Relationship status: Not on file    Intimate partner violence:     Fear of current or ex partner: Not on file     Emotionally abused: Not on file     Physically abused: Not on file     Forced sexual activity: Not on file   Other Topics Concern    Not on file   Social History Narrative    Not on file         ALLERGIES: Ativan [lorazepam] and Pcn [penicillins]    Review of Systems   Constitutional: Negative. Negative for activity change. HENT: Negative. Eyes: Negative. Respiratory: Negative. Cardiovascular: Negative. Gastrointestinal: Negative. Genitourinary: Negative. Musculoskeletal: Negative. Skin: Negative. Neurological: Negative. Psychiatric/Behavioral: Negative. All other systems reviewed and are negative. Vitals:    08/29/19 0236   BP: (!) 191/105   Pulse: 92   Resp: 18   Temp: 98.7 °F (37.1 °C)   SpO2: 100%   Weight: 87.5 kg (193 lb)   Height: 5' 11\" (1.803 m)            Physical Exam   Constitutional: He is oriented to person, place, and time. He appears well-developed and well-nourished. No distress. HENT:   Head: Normocephalic and atraumatic. Right Ear: External ear normal.   Left Ear: External ear normal.   Nose: Nose normal.   Mouth/Throat: Oropharynx is clear and moist. No oropharyngeal exudate. Eyes: Pupils are equal, round, and reactive to light. Conjunctivae and EOM are normal. Right eye exhibits no discharge. Left eye exhibits no discharge. No scleral icterus. Neck: Normal range of motion. Neck supple. No JVD present. No tracheal deviation present. Cardiovascular: Normal rate, regular rhythm and intact distal pulses.    Pulmonary/Chest: Effort normal and breath sounds normal. No stridor. No respiratory distress. He has no wheezes. He exhibits no tenderness. Abdominal: Soft. Bowel sounds are normal. He exhibits no distension and no mass. There is no tenderness. Musculoskeletal: Normal range of motion. He exhibits no edema or tenderness. Hands:  Neurological: He is alert and oriented to person, place, and time. No cranial nerve deficit. Skin: Skin is warm and dry. No rash noted. He is not diaphoretic. No erythema. No pallor. Psychiatric: He has a normal mood and affect. His behavior is normal. Thought content normal.   Nursing note and vitals reviewed. MDM  Number of Diagnoses or Management Options  Diagnosis management comments: Initial direct pressure did not stop hemorrhage quick clot gauze was used with minimal success. Attempted tourniquet and closed wound was unsuccessful. Patient was having a lot of pain with the procedure so a digital block was performed. Attempt to cauterize the bleeding did not work TXA was placed into the wound helped some but still oozing. Quick clot was used again with direct pressure elevation ice seems to have subsided at this time. Patient is instructed on how to watch for poor circulation in the distal fingertip with a dressing on the finger if any pale or looks slow capillary refill is noted then he is to return immediately remove the bandage immediately. Her graph patient is asked to return to the ED in 24 hours for dressing change.          Procedures

## 2019-08-30 ENCOUNTER — HOSPITAL ENCOUNTER (EMERGENCY)
Age: 84
Discharge: HOME | End: 2019-08-30
Attending: EMERGENCY MEDICINE
Payer: MEDICARE

## 2019-08-30 VITALS
OXYGEN SATURATION: 98 % | DIASTOLIC BLOOD PRESSURE: 88 MMHG | HEART RATE: 65 BPM | TEMPERATURE: 98 F | SYSTOLIC BLOOD PRESSURE: 145 MMHG | BODY MASS INDEX: 27.58 KG/M2 | HEIGHT: 71 IN | WEIGHT: 197 LBS | RESPIRATION RATE: 16 BRPM

## 2019-08-30 DIAGNOSIS — Z51.89 VISIT FOR WOUND CHECK: Primary | ICD-10-CM

## 2019-08-30 PROCEDURE — 99283 EMERGENCY DEPT VISIT LOW MDM: CPT | Performed by: EMERGENCY MEDICINE

## 2019-08-30 NOTE — ED PROVIDER NOTES
Presents with complaint of needing wound check. Patient had abrasion and is on Eliquis and required quick clot to stop the bleeding and is back to have the dressing changed. The history is provided by the patient. Finger Pain    This is a new problem. The current episode started yesterday. The problem occurs constantly. The pain is present in the right hand. The quality of the pain is described as aching and pounding. The pain is moderate. Associated symptoms include stiffness. Past Medical History:   Diagnosis Date    CAD, multiple vessel 5/22/2018 5/18/18 (Dr Adeola Fajardo) Coronary artery bypass grafting x3 with grafts consisting of: 1. Left internal mammary artery to left anterior descending artery.   2.  Reverse saphenous vein to diagonal.  3.  Reversed saphenous vein graft to obtuse marginal.    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     HX PACEMAKER  2018         Family History:   Problem Relation Age of Onset    No Known Problems Mother     No Known Problems Father        Social History     Socioeconomic History    Marital status:      Spouse name: Not on file    Number of children: Not on file    Years of education: Not on file    Highest education level: Not on file   Occupational History    Not on file   Social Needs    Financial resource strain: Not on file    Food insecurity:     Worry: Not on file     Inability: Not on file    Transportation needs:     Medical: Not on file     Non-medical: Not on file   Tobacco Use    Smoking status: Never Smoker    Smokeless tobacco: Never Used   Substance and Sexual Activity    Alcohol use: No    Drug use: No    Sexual activity: Not Currently   Lifestyle    Physical activity:     Days per week: Not on file     Minutes per session: Not on file    Stress: Not on file   Relationships    Social connections:     Talks on phone: Not on file     Gets together: Not on file     Attends Moravian service: Not on file     Active member of club or organization: Not on file     Attends meetings of clubs or organizations: Not on file     Relationship status: Not on file    Intimate partner violence:     Fear of current or ex partner: Not on file     Emotionally abused: Not on file     Physically abused: Not on file     Forced sexual activity: Not on file   Other Topics Concern    Not on file   Social History Narrative    Not on file         ALLERGIES: Ativan [lorazepam] and Pcn [penicillins]    Review of Systems   Musculoskeletal: Positive for joint swelling and stiffness. Skin: Positive for color change. All other systems reviewed and are negative. Vitals:    08/30/19 0843   BP: (!) 156/101   Pulse: 62   Resp: 18   Temp: 98.1 °F (36.7 °C)   SpO2: 96%   Weight: 89.4 kg (197 lb)   Height: 5' 11\" (1.803 m)            Physical Exam   Constitutional: He is oriented to person, place, and time. Musculoskeletal: Normal range of motion. He exhibits edema and tenderness. Neurological: He is alert and oriented to person, place, and time.         MDM  Number of Diagnoses or Management Options  Visit for wound check:   Diagnosis management comments: Pt with no active bleeding, dressing changes, given return precautions       Amount and/or Complexity of Data Reviewed  Review and summarize past medical records: yes    Risk of Complications, Morbidity, and/or Mortality  Presenting problems: minimal  Diagnostic procedures: minimal  Management options: minimal    Patient Progress  Patient progress: improved         Procedures

## 2019-08-30 NOTE — ED NOTES
I have reviewed discharge instructions with the patient. The patient verbalized understanding. Patient left ED via Discharge Method: ambulatory to Home with his wife. Opportunity for questions and clarification provided. Patient given 0 scripts. To continue your aftercare when you leave the hospital, you may receive an automated call from our care team to check in on how you are doing. This is a free service and part of our promise to provide the best care and service to meet your aftercare needs.  If you have questions, or wish to unsubscribe from this service please call [phone]. Thank you for Choosing our [facility] Emergency Department.

## 2019-08-30 NOTE — ED TRIAGE NOTES
Pt states he was here yesterday for right middle finger injury. Oren Vences and injured finger. Told to come back this morning to have it redressed.

## 2019-09-23 ENCOUNTER — HOSPITAL ENCOUNTER (OUTPATIENT)
Dept: LAB | Age: 84
Discharge: HOME | End: 2019-09-23
Payer: MEDICARE

## 2019-09-23 DIAGNOSIS — I10 ESSENTIAL HYPERTENSION: Chronic | ICD-10-CM

## 2019-09-23 DIAGNOSIS — Z86.73 HISTORY OF CVA (CEREBROVASCULAR ACCIDENT): Chronic | ICD-10-CM

## 2019-09-23 LAB
ALBUMIN SERPL-MCNC: 3.2 G/DL (ref 3.2–4.6)
ALBUMIN/GLOB SERPL: 1 {RATIO} (ref 1.2–3.5)
ALP SERPL-CCNC: 94 U/L (ref 50–136)
ALT SERPL-CCNC: 30 U/L (ref 12–65)
ANION GAP SERPL CALC-SCNC: 5 MMOL/L (ref 7–16)
AST SERPL-CCNC: 23 U/L (ref 15–37)
BASOPHILS # BLD: 0.1 K/UL (ref 0–0.2)
BASOPHILS NFR BLD: 1 % (ref 0–2)
BILIRUB SERPL-MCNC: 0.5 MG/DL (ref 0.2–1.1)
BNP SERPL-MCNC: 709 PG/ML
BUN SERPL-MCNC: 18 MG/DL (ref 8–23)
CALCIUM SERPL-MCNC: 8.5 MG/DL (ref 8.3–10.4)
CHLORIDE SERPL-SCNC: 110 MMOL/L (ref 98–107)
CHOLEST SERPL-MCNC: 119 MG/DL
CO2 SERPL-SCNC: 27 MMOL/L (ref 21–32)
CREAT SERPL-MCNC: 1.4 MG/DL (ref 0.8–1.5)
DIFFERENTIAL METHOD BLD: ABNORMAL
EOSINOPHIL # BLD: 0.2 K/UL (ref 0–0.8)
EOSINOPHIL NFR BLD: 3 % (ref 0.5–7.8)
ERYTHROCYTE [DISTWIDTH] IN BLOOD BY AUTOMATED COUNT: 17 % (ref 11.9–14.6)
GLOBULIN SER CALC-MCNC: 3.3 G/DL (ref 2.3–3.5)
GLUCOSE SERPL-MCNC: 80 MG/DL (ref 65–100)
HCT VFR BLD AUTO: 33.7 % (ref 41.1–50.3)
HDLC SERPL-MCNC: 49 MG/DL (ref 40–60)
HDLC SERPL: 2.4 {RATIO}
HGB BLD-MCNC: 10 G/DL (ref 13.6–17.2)
IMM GRANULOCYTES # BLD AUTO: 0 K/UL (ref 0–0.5)
IMM GRANULOCYTES NFR BLD AUTO: 0 % (ref 0–5)
LDLC SERPL CALC-MCNC: 51.6 MG/DL
LIPID PROFILE,FLP: NORMAL
LYMPHOCYTES # BLD: 1.8 K/UL (ref 0.5–4.6)
LYMPHOCYTES NFR BLD: 27 % (ref 13–44)
MAGNESIUM SERPL-MCNC: 1.9 MG/DL (ref 1.8–2.4)
MCH RBC QN AUTO: 24.3 PG (ref 26.1–32.9)
MCHC RBC AUTO-ENTMCNC: 29.7 G/DL (ref 31.4–35)
MCV RBC AUTO: 81.8 FL (ref 79.6–97.8)
MONOCYTES # BLD: 0.5 K/UL (ref 0.1–1.3)
MONOCYTES NFR BLD: 7 % (ref 4–12)
NEUTS SEG # BLD: 4.1 K/UL (ref 1.7–8.2)
NEUTS SEG NFR BLD: 61 % (ref 43–78)
NRBC # BLD: 0 K/UL (ref 0–0.2)
PLATELET # BLD AUTO: 223 K/UL (ref 150–450)
PMV BLD AUTO: 8.9 FL (ref 9.4–12.3)
POTASSIUM SERPL-SCNC: 3.7 MMOL/L (ref 3.5–5.1)
PROT SERPL-MCNC: 6.5 G/DL (ref 6.3–8.2)
RBC # BLD AUTO: 4.12 M/UL (ref 4.23–5.6)
SODIUM SERPL-SCNC: 142 MMOL/L (ref 136–145)
TRIGL SERPL-MCNC: 92 MG/DL (ref 35–150)
TSH SERPL DL<=0.005 MIU/L-ACNC: 0.71 UIU/ML (ref 0.36–3.74)
VLDLC SERPL CALC-MCNC: 18.4 MG/DL (ref 6–23)
WBC # BLD AUTO: 6.6 K/UL (ref 4.3–11.1)

## 2019-09-23 PROCEDURE — 83735 ASSAY OF MAGNESIUM: CPT

## 2019-09-23 PROCEDURE — 80053 COMPREHEN METABOLIC PANEL: CPT

## 2019-09-23 PROCEDURE — 83880 ASSAY OF NATRIURETIC PEPTIDE: CPT

## 2019-09-23 PROCEDURE — 85025 COMPLETE CBC W/AUTO DIFF WBC: CPT

## 2019-09-23 PROCEDURE — 36415 COLL VENOUS BLD VENIPUNCTURE: CPT

## 2019-09-23 PROCEDURE — 80061 LIPID PANEL: CPT

## 2019-09-23 PROCEDURE — 84443 ASSAY THYROID STIM HORMONE: CPT

## 2019-09-23 NOTE — PROGRESS NOTES
Please call him, labs are ok, but can take more lasix as needed. Take lasix 40mg as needed for edema, SOB. Take extra K dose as well. Lipids are good, remain on other meds. How is he feeling? More EMERSON, SOB, edema?   Thanks

## 2019-10-09 ENCOUNTER — HOSPITAL ENCOUNTER (OUTPATIENT)
Dept: LAB | Age: 84
Discharge: HOME | End: 2019-10-09
Attending: INTERNAL MEDICINE
Payer: MEDICARE

## 2019-10-09 DIAGNOSIS — I49.5 SSS (SICK SINUS SYNDROME) (HCC): ICD-10-CM

## 2019-10-09 DIAGNOSIS — I48.0 PAF (PAROXYSMAL ATRIAL FIBRILLATION) (HCC): ICD-10-CM

## 2019-10-09 DIAGNOSIS — I10 ESSENTIAL HYPERTENSION: ICD-10-CM

## 2019-10-09 LAB
ANION GAP SERPL CALC-SCNC: 8 MMOL/L (ref 7–16)
BNP SERPL-MCNC: 451 PG/ML
BUN SERPL-MCNC: 17 MG/DL (ref 8–23)
CALCIUM SERPL-MCNC: 8.9 MG/DL (ref 8.3–10.4)
CHLORIDE SERPL-SCNC: 109 MMOL/L (ref 98–107)
CO2 SERPL-SCNC: 25 MMOL/L (ref 21–32)
CREAT SERPL-MCNC: 1.2 MG/DL (ref 0.8–1.5)
GLUCOSE SERPL-MCNC: 94 MG/DL (ref 65–100)
POTASSIUM SERPL-SCNC: 3.5 MMOL/L (ref 3.5–5.1)
SODIUM SERPL-SCNC: 142 MMOL/L (ref 136–145)

## 2019-10-09 PROCEDURE — 83880 ASSAY OF NATRIURETIC PEPTIDE: CPT

## 2019-10-09 PROCEDURE — 80048 BASIC METABOLIC PNL TOTAL CA: CPT

## 2019-10-09 PROCEDURE — 36415 COLL VENOUS BLD VENIPUNCTURE: CPT

## 2019-10-17 PROBLEM — I42.0 DILATED CARDIOMYOPATHY (HCC): Status: ACTIVE | Noted: 2019-10-17

## 2019-10-17 PROBLEM — I50.22 SYSTOLIC CHF, CHRONIC (HCC): Status: ACTIVE | Noted: 2019-10-17

## 2019-10-21 NOTE — PROGRESS NOTES
Called to pre-assess for [facility] poss with Dr Ronan Pelaez , Scheduled 10/22/19. No answer & no voicemail, unable to leave message.

## 2019-10-22 ENCOUNTER — HOSPITAL ENCOUNTER (OUTPATIENT)
Dept: CARDIAC CATH/INVASIVE PROCEDURES | Age: 84
Discharge: HOME | End: 2019-10-22
Attending: INTERNAL MEDICINE | Admitting: INTERNAL MEDICINE
Payer: MEDICARE

## 2019-10-22 VITALS
DIASTOLIC BLOOD PRESSURE: 70 MMHG | SYSTOLIC BLOOD PRESSURE: 138 MMHG | WEIGHT: 196 LBS | OXYGEN SATURATION: 99 % | HEIGHT: 71 IN | BODY MASS INDEX: 27.44 KG/M2 | RESPIRATION RATE: 18 BRPM | HEART RATE: 82 BPM

## 2019-10-22 LAB
ANION GAP SERPL CALC-SCNC: 6 MMOL/L (ref 7–16)
ATRIAL RATE: 101 BPM
BUN SERPL-MCNC: 23 MG/DL (ref 8–23)
CALCIUM SERPL-MCNC: 8.5 MG/DL (ref 8.3–10.4)
CALCULATED R AXIS, ECG10: 23 DEGREES
CALCULATED T AXIS, ECG11: -148 DEGREES
CHLORIDE SERPL-SCNC: 112 MMOL/L (ref 98–107)
CO2 SERPL-SCNC: 25 MMOL/L (ref 21–32)
CREAT SERPL-MCNC: 1.42 MG/DL (ref 0.8–1.5)
DIAGNOSIS, 93000: NORMAL
ERYTHROCYTE [DISTWIDTH] IN BLOOD BY AUTOMATED COUNT: 17 % (ref 11.9–14.6)
GLUCOSE SERPL-MCNC: 93 MG/DL (ref 65–100)
HCT VFR BLD AUTO: 35.8 % (ref 41.1–50.3)
HGB BLD-MCNC: 10.4 G/DL (ref 13.6–17.2)
INR PPP: 1.1
MAGNESIUM SERPL-MCNC: 2.4 MG/DL (ref 1.8–2.4)
MCH RBC QN AUTO: 24.1 PG (ref 26.1–32.9)
MCHC RBC AUTO-ENTMCNC: 29.1 G/DL (ref 31.4–35)
MCV RBC AUTO: 83.1 FL (ref 79.6–97.8)
NRBC # BLD: 0 K/UL (ref 0–0.2)
PLATELET # BLD AUTO: 227 K/UL (ref 150–450)
PMV BLD AUTO: 10.2 FL (ref 9.4–12.3)
POTASSIUM SERPL-SCNC: 3.9 MMOL/L (ref 3.5–5.1)
PROTHROMBIN TIME: 14.8 SEC (ref 11.7–14.5)
Q-T INTERVAL, ECG07: 342 MS
QRS DURATION, ECG06: 94 MS
QTC CALCULATION (BEZET), ECG08: 465 MS
RBC # BLD AUTO: 4.31 M/UL (ref 4.23–5.6)
SODIUM SERPL-SCNC: 143 MMOL/L (ref 136–145)
VENTRICULAR RATE, ECG03: 111 BPM
WBC # BLD AUTO: 8.7 K/UL (ref 4.3–11.1)

## 2019-10-22 PROCEDURE — 93005 ELECTROCARDIOGRAM TRACING: CPT | Performed by: INTERNAL MEDICINE

## 2019-10-22 PROCEDURE — 99152 MOD SED SAME PHYS/QHP 5/>YRS: CPT

## 2019-10-22 PROCEDURE — 85027 COMPLETE CBC AUTOMATED: CPT

## 2019-10-22 PROCEDURE — 74011000250 HC RX REV CODE- 250: Performed by: INTERNAL MEDICINE

## 2019-10-22 PROCEDURE — 77030004534 HC CATH ANGI DX INFN CARD -A

## 2019-10-22 PROCEDURE — 93459 L HRT ART/GRFT ANGIO: CPT

## 2019-10-22 PROCEDURE — 77030004558 HC CATH ANGI DX SUPR TORQ CARD -A

## 2019-10-22 PROCEDURE — 77030029997 HC DEV COM RDL R BND TELE -B

## 2019-10-22 PROCEDURE — C1894 INTRO/SHEATH, NON-LASER: HCPCS

## 2019-10-22 PROCEDURE — 77030004559 HC CATH ANGI DX SUPT CARD -B

## 2019-10-22 PROCEDURE — 74011250636 HC RX REV CODE- 250/636: Performed by: INTERNAL MEDICINE

## 2019-10-22 PROCEDURE — 83735 ASSAY OF MAGNESIUM: CPT

## 2019-10-22 PROCEDURE — C1769 GUIDE WIRE: HCPCS

## 2019-10-22 PROCEDURE — 74011636320 HC RX REV CODE- 636/320: Performed by: INTERNAL MEDICINE

## 2019-10-22 PROCEDURE — 80048 BASIC METABOLIC PNL TOTAL CA: CPT

## 2019-10-22 PROCEDURE — 85610 PROTHROMBIN TIME: CPT

## 2019-10-22 RX ORDER — LIDOCAINE HYDROCHLORIDE 10 MG/ML
6 INJECTION INFILTRATION; PERINEURAL ONCE
Status: COMPLETED | OUTPATIENT
Start: 2019-10-22 | End: 2019-10-22

## 2019-10-22 RX ORDER — HEPARIN SODIUM 200 [USP'U]/100ML
3 INJECTION, SOLUTION INTRAVENOUS CONTINUOUS
Status: DISCONTINUED | OUTPATIENT
Start: 2019-10-22 | End: 2019-10-22 | Stop reason: HOSPADM

## 2019-10-22 RX ORDER — METOPROLOL TARTRATE 5 MG/5ML
5 INJECTION INTRAVENOUS ONCE
Status: COMPLETED | OUTPATIENT
Start: 2019-10-22 | End: 2019-10-22

## 2019-10-22 RX ORDER — MIDAZOLAM HYDROCHLORIDE 1 MG/ML
.5-2 INJECTION, SOLUTION INTRAMUSCULAR; INTRAVENOUS
Status: DISCONTINUED | OUTPATIENT
Start: 2019-10-22 | End: 2019-10-22 | Stop reason: HOSPADM

## 2019-10-22 RX ORDER — GUAIFENESIN 100 MG/5ML
81-324 LIQUID (ML) ORAL ONCE
Status: DISCONTINUED | OUTPATIENT
Start: 2019-10-22 | End: 2019-10-22 | Stop reason: HOSPADM

## 2019-10-22 RX ORDER — SODIUM CHLORIDE 9 MG/ML
75 INJECTION, SOLUTION INTRAVENOUS CONTINUOUS
Status: DISCONTINUED | OUTPATIENT
Start: 2019-10-22 | End: 2019-10-22 | Stop reason: HOSPADM

## 2019-10-22 RX ORDER — FENTANYL CITRATE 50 UG/ML
25-50 INJECTION, SOLUTION INTRAMUSCULAR; INTRAVENOUS
Status: DISCONTINUED | OUTPATIENT
Start: 2019-10-22 | End: 2019-10-22 | Stop reason: HOSPADM

## 2019-10-22 RX ADMIN — METOPROLOL TARTRATE 5 MG: 5 INJECTION INTRAVENOUS at 13:28

## 2019-10-22 RX ADMIN — MIDAZOLAM 2 MG: 1 INJECTION INTRAMUSCULAR; INTRAVENOUS at 13:08

## 2019-10-22 RX ADMIN — VERAPAMIL HYDROCHLORIDE 2 ML: 2.5 INJECTION INTRAVENOUS at 13:23

## 2019-10-22 RX ADMIN — LIDOCAINE HYDROCHLORIDE 6 ML: 10 INJECTION, SOLUTION INFILTRATION; PERINEURAL at 13:21

## 2019-10-22 RX ADMIN — FENTANYL CITRATE 50 MCG: 50 INJECTION INTRAMUSCULAR; INTRAVENOUS at 13:09

## 2019-10-22 RX ADMIN — HEPARIN SODIUM 3 ML/HR: 200 INJECTION, SOLUTION INTRAVENOUS at 13:09

## 2019-10-22 RX ADMIN — IOPAMIDOL 125 ML: 755 INJECTION, SOLUTION INTRAVENOUS at 13:41

## 2019-10-22 RX ADMIN — MIDAZOLAM 2 MG: 1 INJECTION INTRAMUSCULAR; INTRAVENOUS at 13:29

## 2019-10-22 NOTE — PROGRESS NOTES
Discharge instructions and home medications reviewed with patient. Time allowed for questions and answers.  Discharged to home by wheel chair

## 2019-10-22 NOTE — DISCHARGE INSTRUCTIONS

## 2019-10-22 NOTE — PROGRESS NOTES
Report received from [address] [facility] [id number]. Procedural findings communicated. Intra procedural  medication administration reviewed. Progression of care discussed.      Patient received into CPRU [place] 5 post sheath removal.     right Radial access site without bleeding or swelling     TR band dry and intact     Patient instructed to limit movement to right upper extremity    Routine post procedural vital signs and site assessment initiated

## 2019-10-22 NOTE — PROCEDURES
[facility]  CARDIAC CATH    Name:  Srikanth Duron  MR#:  [id number]  :  1935  ACCOUNT #:  [de-identified]  DATE OF SERVICE:  10/22/2019    PRIMARY CARDIOLOGIST:  Horacio Burgess A. Flint Spatz, DO    PRIMARY CARE PHYSICIAN:  Khang Stanford. MD Bess    BRIEF HISTORY:  The patient is a very pleasant 72-year-old gentleman with history of atherosclerotic coronary disease, prior coronary bypass grafting due to complex three-vessel atherosclerotic coronary disease. He had coronary bypass grafting in 2018 with LIMA to the LAD, vein graft to the diagonal, vein graft to the obtuse marginal.  He presents now due to historically normal left ventricular systolic function, now with severe LV systolic dysfunction and moderate to severe mitral regurgitation for repeat coronary artery angiogram to assess for ischemic-mediated etiology of worsening left ventricular systolic function and shortness of breath. PREOPERATIVE DIAGNOSIS:  New-onset systolic heart failure. POSTOPERATIVE DIAGNOSIS:  Stable atherosclerotic coronary disease with severe left ventricular systolic dysfunction and severe mitral regurgitation. PROCEDURES PERFORMED:  Bilateral selective coronary angiography, left internal mammary angiography, saphenous vein graft injection x2 and left ventriculography. SURGEON:  Gay Poole MD    ASSISTANT:  None. COMPLICATIONS:  None. ANESTHESIA:  Conscious sedation. Start time 1320, end time 1341. MEDICATIONS:  4 mg of Versed, 50 mcg of fentanyl    MONITORING RN:  Darcie Gonzalez. IMPLANTS:  None. SPECIMENS:  None. ESTIMATED BLOOD LOSS:  Less than 5 mL. PROCEDURE:  After informed consent, he was prepped and draped in the usual sterile fashion. Left wrist was infiltrated with lidocaine. Left radial artery was accessed. A 6-Egyptian sheath was advanced. A 5-Egyptian left internal mammary artery catheter was utilized for internal mammary artery injection.   A 6-Egyptian JL3.5 was utilized for left coronary injection. A 6-Libyan JR5 was utilized for right coronary injection as well as saphenous vein graft injection x2. A 6-Libyan angled pigtail was utilized for left ventriculography. Isovue contrast utilized. FINDINGS:  1. Left ventricle: Moderately dilated left ventricular cavity with severe LV systolic dysfunction. Ejection fraction of 20-25%. There appears to be 3+ mitral regurgitation present. Left ventricular end-diastolic pressure measured at 40 mmHg. There is no aortic valve gradient. 2.  Left main:  Left main is moderate in size, bifurcates into LAD and circumflex vessels. Appears angiographically normal.  3.  Left anterior descending coronary: It is a moderate-sized vessel. It is occluded in the proximal portion. 4.  Left circumflex coronary: It is a moderate-sized vessel. There is a moderate-sized first obtuse marginal, has 20-30% proximal stenosis. There is a small superior branch which is patent. The inferior branch is larger in nature, has competitive flow due to patent vein graft. The ongoing AV circumflex is small in size, but appears to have no significant disease. 5.  Right coronary: It is a moderate-sized anatomically dominant vessel, has 30-40% diffuse pattern mid and distal disease. There is a moderate-sized posterior descending which is disease-free, gives collaterals to some of the LAD septals. The ongoing posterolateral branch has 30-40% mid stenosis. The far distal aspects of the posterolateral branch has 50-70% stenosis at a trifurcation terminating in three small branches which remains unchanged from prior to surgery. 6.  Left internal mammary artery to LAD:  This is a moderate-sized graft, good proximal takeoff, good distal anastomosis and fills a relatively small caliber LAD antegrade to the apex and retrograde to the more proximal vessel. The midportion of the vessel appears diffusely diseased and retrograde filling.   7.  Saphenous vein graft to the diagonal:  This is a relatively large graft, good proximal takeoff, good distal anastomosis, actually fills a fairly large diagonal system, appears disease-free. 8.  Saphenous vein graft to the obtuse marginal is a large graft, good proximal takeoff, good distal anastomosis and fills a relatively large bifurcating obtuse marginal branch. Successful hemostasis with pneumatic radial band. CONCLUSIONS:  1.  Stable-appearing atherosclerotic coronary disease with three of three grafts widely patent as described above. 2.  Small vessel distal disease involving the terminal branch of the right posterolateral branch, which is best managed medically. 3.  The patient is noted to be in atrial fibrillation with rapid ventricular response throughout procedure, responding to IV metoprolol. 4.  New-onset cardiomyopathy with severe LV systolic dysfunction and moderate to severe mitral regurgitation. RECOMMENDATIONS:  Concern for potential tachycardia-mediated cardiomyopathy. Consideration could be given to aggressive titration of the rate control therapy versus upgrading underlying device to BiV ICD versus BiV pacemaker and consideration of AV node ablation. Thank you for allowing us to participate in the care of this patient. Any questions or concerns, please feel free to contact me.       Aileen Chadwick MD      [id number]  D:  10/22/2019 13:48  T:  10/22/2019 14:53  JOB #:  [id number]  CC:  DO Kat Elizabeth MD

## 2019-10-22 NOTE — PROGRESS NOTES
TRANSFER - OUT REPORT:    Verbal report given to Kiera(name) on Patel Garcia.  being transferred to cpru(unit) for routine progression of care       Report consisted of patients Situation, Background, Assessment and   Recommendations(SBAR). Information from the following report(s) SBAR was reviewed with the receiving nurse. Opportunity for questions and clarification was provided. Procedure: [facility]   Finding Summary: no interventions(cath/pci/pacer settings)  Location: L wrist    Closure Device: trband(yes/no/description)  Post Site Assessment: no bleeding no hematoma       Intra Procedure Meds:    Versed: 4mg  Fentanyl: 50mcg  Lopressor: 5mg           Peripheral IV 10/22/19 Right Antecubital (Active)        Post-Procedure Site Assessment (1)  Wound Type: Catheter entry/exit  Location: Wrist  Orientation : Right  Hemostasis : TR Band  Site Assessment: No bleeding, No hematoma                       is allergic to ativan [lorazepam] and pcn [penicillins]. Past Medical History:   Diagnosis Date    CAD, multiple vessel 5/22/2018 5/18/18 (Dr Victorino Leavitt) Coronary artery bypass grafting x3 with grafts consisting of: 1. Left internal mammary artery to left anterior descending artery.   2.  Reverse saphenous vein to diagonal.  3.  Reversed saphenous vein graft to obtuse marginal.     Calculus of kidney     Hypertension     Neurological disorder     Stroke ([facility] [address] 2018     Visit Vitals  /75   Pulse (!) 108   Resp 18   Ht 5' 11\" (1.803 m)   Wt 88.9 kg (196 lb)   SpO2 97%   BMI 27.34 kg/m²

## 2019-10-22 NOTE — PROGRESS NOTES
Patient received to [address] [place] [address] room # 7  Ambulatory from [facility]. Patient scheduled for [facility] today with Dr Trnii Hernandez. Procedure reviewed & questions answered, voiced good understanding consent obtained & placed on chart. All medications and medical history reviewed. Will prep patient per orders. Patient & family updated on plan of care. The patient has a fraility score of 3-MANAGING WELL, based on patient A&Ox3, patient able to ambulate to room without difficulty.

## 2020-01-10 ENCOUNTER — HOSPITAL ENCOUNTER (OUTPATIENT)
Age: 85
Setting detail: OBSERVATION
Discharge: HOME HEALTH | End: 2020-01-12
Attending: EMERGENCY MEDICINE | Admitting: INTERNAL MEDICINE
Payer: MEDICARE

## 2020-01-10 DIAGNOSIS — R19.7 DIARRHEA, UNSPECIFIED TYPE: Primary | ICD-10-CM

## 2020-01-10 PROBLEM — I48.91 ATRIAL FIBRILLATION WITH RVR (HCC): Status: ACTIVE | Noted: 2020-01-10

## 2020-01-10 PROBLEM — R55 SYNCOPE: Status: ACTIVE | Noted: 2020-01-10

## 2020-01-10 LAB
ALBUMIN SERPL-MCNC: 3.4 G/DL (ref 3.2–4.6)
ALBUMIN/GLOB SERPL: 1 {RATIO} (ref 1.2–3.5)
ALP SERPL-CCNC: 95 U/L (ref 50–136)
ALT SERPL-CCNC: 26 U/L (ref 12–65)
ANION GAP SERPL CALC-SCNC: 9 MMOL/L (ref 7–16)
AST SERPL-CCNC: 26 U/L (ref 15–37)
BASOPHILS # BLD: 0 K/UL (ref 0–0.2)
BASOPHILS NFR BLD: 1 % (ref 0–2)
BILIRUB SERPL-MCNC: 0.9 MG/DL (ref 0.2–1.1)
BUN SERPL-MCNC: 15 MG/DL (ref 8–23)
CALCIUM SERPL-MCNC: 9.4 MG/DL (ref 8.3–10.4)
CHLORIDE SERPL-SCNC: 106 MMOL/L (ref 98–107)
CO2 SERPL-SCNC: 23 MMOL/L (ref 21–32)
CREAT SERPL-MCNC: 1.67 MG/DL (ref 0.8–1.5)
DIFFERENTIAL METHOD BLD: ABNORMAL
EOSINOPHIL # BLD: 0.1 K/UL (ref 0–0.8)
EOSINOPHIL NFR BLD: 1 % (ref 0.5–7.8)
ERYTHROCYTE [DISTWIDTH] IN BLOOD BY AUTOMATED COUNT: 17.9 % (ref 11.9–14.6)
FLUAV AG NPH QL IA: NEGATIVE
FLUBV AG NPH QL IA: NEGATIVE
GLOBULIN SER CALC-MCNC: 3.5 G/DL (ref 2.3–3.5)
GLUCOSE SERPL-MCNC: 96 MG/DL (ref 65–100)
HCT VFR BLD AUTO: 40.3 % (ref 41.1–50.3)
HGB BLD-MCNC: 12 G/DL (ref 13.6–17.2)
IMM GRANULOCYTES # BLD AUTO: 0 K/UL (ref 0–0.5)
IMM GRANULOCYTES NFR BLD AUTO: 0 % (ref 0–5)
LACTATE BLD-SCNC: 1.55 MMOL/L (ref 0.5–1.9)
LACTATE BLD-SCNC: 2.29 MMOL/L (ref 0.5–1.9)
LYMPHOCYTES # BLD: 1.4 K/UL (ref 0.5–4.6)
LYMPHOCYTES NFR BLD: 22 % (ref 13–44)
MAGNESIUM SERPL-MCNC: 2.1 MG/DL (ref 1.8–2.4)
MCH RBC QN AUTO: 23.8 PG (ref 26.1–32.9)
MCHC RBC AUTO-ENTMCNC: 29.8 G/DL (ref 31.4–35)
MCV RBC AUTO: 80 FL (ref 79.6–97.8)
MONOCYTES # BLD: 0.6 K/UL (ref 0.1–1.3)
MONOCYTES NFR BLD: 9 % (ref 4–12)
NEUTS SEG # BLD: 4.3 K/UL (ref 1.7–8.2)
NEUTS SEG NFR BLD: 67 % (ref 43–78)
NRBC # BLD: 0 K/UL (ref 0–0.2)
PLATELET # BLD AUTO: 236 K/UL (ref 150–450)
PMV BLD AUTO: 9.9 FL (ref 9.4–12.3)
POTASSIUM SERPL-SCNC: 3.7 MMOL/L (ref 3.5–5.1)
PROT SERPL-MCNC: 6.9 G/DL (ref 6.3–8.2)
RBC # BLD AUTO: 5.04 M/UL (ref 4.23–5.6)
SODIUM SERPL-SCNC: 138 MMOL/L (ref 136–145)
SPECIMEN SOURCE: NORMAL
WBC # BLD AUTO: 6.4 K/UL (ref 4.3–11.1)

## 2020-01-10 PROCEDURE — 83735 ASSAY OF MAGNESIUM: CPT

## 2020-01-10 PROCEDURE — 99285 EMERGENCY DEPT VISIT HI MDM: CPT | Performed by: EMERGENCY MEDICINE

## 2020-01-10 PROCEDURE — 80053 COMPREHEN METABOLIC PANEL: CPT

## 2020-01-10 PROCEDURE — 96361 HYDRATE IV INFUSION ADD-ON: CPT | Performed by: EMERGENCY MEDICINE

## 2020-01-10 PROCEDURE — 83605 ASSAY OF LACTIC ACID: CPT

## 2020-01-10 PROCEDURE — 74011250637 HC RX REV CODE- 250/637: Performed by: EMERGENCY MEDICINE

## 2020-01-10 PROCEDURE — 85025 COMPLETE CBC W/AUTO DIFF WBC: CPT

## 2020-01-10 PROCEDURE — 74011000250 HC RX REV CODE- 250: Performed by: EMERGENCY MEDICINE

## 2020-01-10 PROCEDURE — 87804 INFLUENZA ASSAY W/OPTIC: CPT

## 2020-01-10 PROCEDURE — 96374 THER/PROPH/DIAG INJ IV PUSH: CPT | Performed by: EMERGENCY MEDICINE

## 2020-01-10 PROCEDURE — 96375 TX/PRO/DX INJ NEW DRUG ADDON: CPT | Performed by: EMERGENCY MEDICINE

## 2020-01-10 PROCEDURE — 74011250636 HC RX REV CODE- 250/636: Performed by: EMERGENCY MEDICINE

## 2020-01-10 PROCEDURE — 99218 HC RM OBSERVATION: CPT

## 2020-01-10 RX ORDER — DILTIAZEM HYDROCHLORIDE 5 MG/ML
INJECTION INTRAVENOUS
Status: ACTIVE
Start: 2020-01-10 | End: 2020-01-11

## 2020-01-10 RX ORDER — DILTIAZEM HYDROCHLORIDE 5 MG/ML
20 INJECTION INTRAVENOUS
Status: COMPLETED | OUTPATIENT
Start: 2020-01-10 | End: 2020-01-10

## 2020-01-10 RX ORDER — METOPROLOL TARTRATE 5 MG/5ML
5 INJECTION INTRAVENOUS
Status: DISCONTINUED | OUTPATIENT
Start: 2020-01-10 | End: 2020-01-12 | Stop reason: HOSPADM

## 2020-01-10 RX ORDER — HYOSCYAMINE SULFATE 0.12 MG/1
0.25 TABLET SUBLINGUAL
Status: COMPLETED | OUTPATIENT
Start: 2020-01-10 | End: 2020-01-10

## 2020-01-10 RX ORDER — ONDANSETRON 2 MG/ML
4 INJECTION INTRAMUSCULAR; INTRAVENOUS
Status: COMPLETED | OUTPATIENT
Start: 2020-01-10 | End: 2020-01-10

## 2020-01-10 RX ADMIN — ONDANSETRON 4 MG: 2 INJECTION INTRAMUSCULAR; INTRAVENOUS at 17:07

## 2020-01-10 RX ADMIN — SODIUM CHLORIDE 1000 ML: 900 INJECTION, SOLUTION INTRAVENOUS at 17:06

## 2020-01-10 RX ADMIN — HYOSCYAMINE SULFATE 0.25 MG: 0.12 TABLET ORAL; SUBLINGUAL at 17:07

## 2020-01-10 RX ADMIN — DILTIAZEM HYDROCHLORIDE 20 MG: 5 INJECTION INTRAVENOUS at 20:01

## 2020-01-10 NOTE — ED TRIAGE NOTES
Patient comes via EMS from home for weakness and diarrhea x2 days. Patient's wife was seen yesterday for same symptoms. Denies any pain at this time.

## 2020-01-10 NOTE — ED PROVIDER NOTES
82-year-old gentleman presents with concerns about diarrhea that started on Wednesday afternoon. Patient says that he has had several episodes of very loose stools. He said with this he has had no significant abdominal pain and no vomiting. He has had some intermittent nausea. He denies any fevers but has had some subjective chills. Patient's wife had similar symptoms and was apparently seen in our ER yesterday for the same type of problem. Elements of this note were created using speech recognition software. As such, errors of speech recognition may be present. Past Medical History:   Diagnosis Date    CAD, multiple vessel 5/22/2018 5/18/18 (Dr Dinora Camacho) Coronary artery bypass grafting x3 with grafts consisting of: 1. Left internal mammary artery to left anterior descending artery.   2.  Reverse saphenous vein to diagonal.  3.  Reversed saphenous vein graft to obtuse marginal.     Calculus of kidney     Hypertension     Neurological disorder     Stroke [facility]) 2018       Past Surgical History:   Procedure Laterality Date    HX HEMORRHOIDECTOMY      HX OTHER SURGICAL  2001 and 2013    Pituitary tumor x2--on chronic Prednisone     HX PACEMAKER  2018         Family History:   Problem Relation Age of Onset    No Known Problems Mother     No Known Problems Father        Social History     Socioeconomic History    Marital status:      Spouse name: Not on file    Number of children: Not on file    Years of education: Not on file    Highest education level: Not on file   Occupational History    Not on file   Social Needs    Financial resource strain: Not on file    Food insecurity:     Worry: Not on file     Inability: Not on file    Transportation needs:     Medical: Not on file     Non-medical: Not on file   Tobacco Use    Smoking status: Never Smoker    Smokeless tobacco: Never Used   Substance and Sexual Activity    Alcohol use: No    Drug use: No    Sexual activity: Not Currently   Lifestyle    Physical activity:     Days per week: Not on file     Minutes per session: Not on file    Stress: Not on file   Relationships    Social connections:     Talks on phone: Not on file     Gets together: Not on file     Attends [employer] service: Not on file     Active member of club or organization: Not on file     Attends meetings of clubs or organizations: Not on file     Relationship status: Not on file    Intimate partner violence:     Fear of current or ex partner: Not on file     Emotionally abused: Not on file     Physically abused: Not on file     Forced sexual activity: Not on file   Other Topics Concern    Not on file   Social History Narrative    Not on file         ALLERGIES: Ativan [lorazepam] and Pcn [penicillins]    Review of Systems   Constitutional: Negative for chills and fever. HENT: Negative for congestion and rhinorrhea. Respiratory: Negative for cough and wheezing. Cardiovascular: Negative for chest pain and palpitations. Gastrointestinal: Positive for diarrhea and nausea. Negative for constipation and vomiting. Skin: Negative for color change and wound. Vitals:    01/10/20 1518   BP: 96/58   Pulse: 89   Resp: 18   Temp: 97.5 °F (36.4 °C)   SpO2: 97%   Weight: 88 kg (194 lb)   Height: 5' 11\" (1.803 m)            Physical Exam  Vitals signs and nursing note reviewed. Constitutional:       Appearance: Normal appearance. HENT:      Mouth/Throat:      Mouth: Mucous membranes are moist.   Cardiovascular:      Rate and Rhythm: Normal rate and regular rhythm. Heart sounds: Normal heart sounds. Pulmonary:      Effort: Pulmonary effort is normal.      Breath sounds: Normal breath sounds. Abdominal:      General: Bowel sounds are normal.      Palpations: Abdomen is soft. Comments: No tenderness to palpation in his abdomen. Skin:     General: Skin is warm and dry. Neurological:      General: No focal deficit present.       Mental Status: He is alert and oriented to person, place, and time. MDM  Number of Diagnoses or Management Options  Diarrhea, unspecified type:   Diagnosis management comments: Patient's creatinine is mildly elevated with a slight decrease in his GFR. I suspect some mild dehydration given his diarrhea. I will give him a liter bolus of IV fluid as well as some Levsin and Zofran for his symptoms. Overall patient said he is feeling better but now his heart rate has spiked up to the 1 4150 with A. fib with RVR. For I will give him a dose of Cardizem. I will also recheck his lactic acid. 9:42 PM Patients HR remains low 100s-120s after diltiazem.   Given obvious dehydration, difficulty absorbing medications and initially elevated lactic acid I discussed with hospitalist for admission for hydration and further management of afib/rvr       Amount and/or Complexity of Data Reviewed  Clinical lab tests: ordered and reviewed  Tests in the medicine section of CPT®: ordered and reviewed  Review and summarize past medical records: yes    Risk of Complications, Morbidity, and/or Mortality  Presenting problems: high  Diagnostic procedures: high  Management options: high    Patient Progress  Patient progress: stable         Procedures

## 2020-01-10 NOTE — ED NOTES
Pt resting quietly, no acute distress noted. States he feels better at this time. Family at bedside.

## 2020-01-11 ENCOUNTER — APPOINTMENT (OUTPATIENT)
Dept: ULTRASOUND IMAGING | Age: 85
End: 2020-01-11
Attending: INTERNAL MEDICINE
Payer: MEDICARE

## 2020-01-11 LAB
BACTERIA URNS QL MICRO: NORMAL /HPF
CASTS URNS QL MICRO: 0 /LPF
CRYSTALS URNS QL MICRO: 0 /LPF
EPI CELLS #/AREA URNS HPF: NORMAL /HPF
MUCOUS THREADS URNS QL MICRO: 0 /LPF
RBC #/AREA URNS HPF: NORMAL /HPF
WBC URNS QL MICRO: NORMAL /HPF

## 2020-01-11 PROCEDURE — 93880 EXTRACRANIAL BILAT STUDY: CPT

## 2020-01-11 PROCEDURE — 99218 HC RM OBSERVATION: CPT

## 2020-01-11 PROCEDURE — 74011250637 HC RX REV CODE- 250/637

## 2020-01-11 PROCEDURE — 81015 MICROSCOPIC EXAM OF URINE: CPT

## 2020-01-11 PROCEDURE — 74011250636 HC RX REV CODE- 250/636: Performed by: NURSE PRACTITIONER

## 2020-01-11 PROCEDURE — 74011250636 HC RX REV CODE- 250/636: Performed by: INTERNAL MEDICINE

## 2020-01-11 PROCEDURE — 96361 HYDRATE IV INFUSION ADD-ON: CPT | Performed by: EMERGENCY MEDICINE

## 2020-01-11 PROCEDURE — 74011250637 HC RX REV CODE- 250/637: Performed by: INTERNAL MEDICINE

## 2020-01-11 PROCEDURE — 96375 TX/PRO/DX INJ NEW DRUG ADDON: CPT

## 2020-01-11 RX ORDER — PREDNISONE 10 MG/1
5 TABLET ORAL DAILY
Status: DISCONTINUED | OUTPATIENT
Start: 2020-01-11 | End: 2020-01-12 | Stop reason: HOSPADM

## 2020-01-11 RX ORDER — SODIUM CHLORIDE 0.9 % (FLUSH) 0.9 %
5-40 SYRINGE (ML) INJECTION AS NEEDED
Status: DISCONTINUED | OUTPATIENT
Start: 2020-01-11 | End: 2020-01-12 | Stop reason: HOSPADM

## 2020-01-11 RX ORDER — ATORVASTATIN CALCIUM 40 MG/1
TABLET, FILM COATED ORAL
Status: COMPLETED
Start: 2020-01-11 | End: 2020-01-11

## 2020-01-11 RX ORDER — LEVOTHYROXINE SODIUM 50 UG/1
TABLET ORAL
Status: COMPLETED
Start: 2020-01-11 | End: 2020-01-11

## 2020-01-11 RX ORDER — SODIUM CHLORIDE 0.9 % (FLUSH) 0.9 %
5-40 SYRINGE (ML) INJECTION EVERY 8 HOURS
Status: DISCONTINUED | OUTPATIENT
Start: 2020-01-11 | End: 2020-01-12 | Stop reason: HOSPADM

## 2020-01-11 RX ORDER — FAMOTIDINE 10 MG/ML
INJECTION INTRAVENOUS
Status: ACTIVE
Start: 2020-01-11 | End: 2020-01-11

## 2020-01-11 RX ORDER — LEVOTHYROXINE SODIUM 50 UG/1
50 TABLET ORAL
Status: DISCONTINUED | OUTPATIENT
Start: 2020-01-11 | End: 2020-01-12 | Stop reason: HOSPADM

## 2020-01-11 RX ORDER — PREDNISONE 10 MG/1
TABLET ORAL
Status: ACTIVE
Start: 2020-01-11 | End: 2020-01-11

## 2020-01-11 RX ORDER — ATORVASTATIN CALCIUM 40 MG/1
TABLET, FILM COATED ORAL
Status: ACTIVE
Start: 2020-01-11 | End: 2020-01-12

## 2020-01-11 RX ORDER — SODIUM CHLORIDE 9 MG/ML
75 INJECTION, SOLUTION INTRAVENOUS CONTINUOUS
Status: DISCONTINUED | OUTPATIENT
Start: 2020-01-11 | End: 2020-01-12 | Stop reason: HOSPADM

## 2020-01-11 RX ORDER — SODIUM CHLORIDE, SODIUM LACTATE, POTASSIUM CHLORIDE, CALCIUM CHLORIDE 600; 310; 30; 20 MG/100ML; MG/100ML; MG/100ML; MG/100ML
100 INJECTION, SOLUTION INTRAVENOUS CONTINUOUS
Status: DISCONTINUED | OUTPATIENT
Start: 2020-01-11 | End: 2020-01-11

## 2020-01-11 RX ORDER — ASPIRIN 81 MG/1
81 TABLET ORAL DAILY
Status: DISCONTINUED | OUTPATIENT
Start: 2020-01-11 | End: 2020-01-12 | Stop reason: HOSPADM

## 2020-01-11 RX ORDER — METOPROLOL SUCCINATE 25 MG/1
12.5 TABLET, EXTENDED RELEASE ORAL DAILY
Status: DISCONTINUED | OUTPATIENT
Start: 2020-01-11 | End: 2020-01-12 | Stop reason: HOSPADM

## 2020-01-11 RX ORDER — ONDANSETRON 2 MG/ML
4 INJECTION INTRAMUSCULAR; INTRAVENOUS
Status: DISCONTINUED | OUTPATIENT
Start: 2020-01-11 | End: 2020-01-12 | Stop reason: HOSPADM

## 2020-01-11 RX ORDER — GUAIFENESIN 100 MG/5ML
LIQUID (ML) ORAL
Status: DISCONTINUED
Start: 2020-01-11 | End: 2020-01-11 | Stop reason: WASHOUT

## 2020-01-11 RX ORDER — ATORVASTATIN CALCIUM 40 MG/1
80 TABLET, FILM COATED ORAL
Status: DISCONTINUED | OUTPATIENT
Start: 2020-01-11 | End: 2020-01-12 | Stop reason: HOSPADM

## 2020-01-11 RX ADMIN — SODIUM CHLORIDE, SODIUM LACTATE, POTASSIUM CHLORIDE, AND CALCIUM CHLORIDE 100 ML/HR: 600; 310; 30; 20 INJECTION, SOLUTION INTRAVENOUS at 02:39

## 2020-01-11 RX ADMIN — LEVOTHYROXINE SODIUM 50 MCG: 50 TABLET ORAL at 09:17

## 2020-01-11 RX ADMIN — Medication 10 ML: at 22:23

## 2020-01-11 RX ADMIN — SODIUM CHLORIDE 100 ML/HR: 900 INJECTION, SOLUTION INTRAVENOUS at 10:35

## 2020-01-11 RX ADMIN — FAMOTIDINE 20 MG: 10 INJECTION INTRAVENOUS at 10:26

## 2020-01-11 RX ADMIN — ATORVASTATIN CALCIUM 80 MG: 40 TABLET, FILM COATED ORAL at 22:19

## 2020-01-11 RX ADMIN — ATORVASTATIN CALCIUM 80 MG: 40 TABLET, FILM COATED ORAL at 02:37

## 2020-01-11 RX ADMIN — Medication 5 ML: at 08:04

## 2020-01-11 RX ADMIN — APIXABAN 2.5 MG: 2.5 TABLET, FILM COATED ORAL at 22:19

## 2020-01-11 NOTE — PROGRESS NOTES
Pt resting in bed, call light in reach, hourly rounds completed, report will be given to oncoming RN.

## 2020-01-11 NOTE — PROGRESS NOTES
Visit with patient in ER.   Provided supportive presence  Patient has good support from his family per the notes  Noted that wife was in ER yesterday for same reason    Virginie Powell, staff   C: [phone] /  [email or web address]

## 2020-01-11 NOTE — CONSULTS
[facility] Cardiology Consult                Date of  Admission: 1/10/2020  4:32 PM     Primary Care Physician: Dr Chalo Cox  Primary Cardiologist: Dr Makenzie Gautam  Referring Physician: Dr Frank Salazar Physician: Dr Gabby Goddard    CC/Reason for consult: a fib      Saranya Moore is a 82 y.o. male admitted for Atrial fibrillation with RVR ([address] [I48.91]  Syncope [R55]. He has a h/o CKD, hypothyroid, pituitary tumor on chronic prednisone, had a NSTEMI 5-2018 w CABG and LIMA to LAD, SVG to OM and diag, then w new a fib and CVA, back in NSR on amiodarone, noted to have SSS s/p Biotronik PM and started on ASA and eliquis. He was seen for a fib w RVR and rate controlled. Recent PM check showed permanent a fib. Echo  w E 30-35%, mod-severe MR, mod LAE. He was seen by Dr Charissa Camacho and scheduled for AV node ablation and upgrade to BIV ICD this Tuesday. However he developed N/V/D (which his wife had recently had) and while on the commode had an episode characterized as syncope, though when speaking w wife who was with the patient was very weak when sitting on the toilet, became light headed but had no CP, SOB, palpitations and did not lose consciousness, laid on the floor for five minutes then was able to get up. He was brought to the ER where he \"went into a fib\" though review of the monitor show he was in a fib when first put on the monitor. HR increased to 150 but he was given cardizem and HR since then has remained . /58, given a liter of NS, toprol 12.5 mg ordered as per PTA dose. Pt feels better after hydration with no complaints except nausea and would like to be discharged. Cr yesterday 1.67 up from 1.42. No CXR or EKG. Influenza negative.      Patient Active Problem List   Diagnosis Code    Essential hypertension I10    S/P CABG (coronary artery bypass graft) Z95.1    Hypothyroidism E03.9    Current chronic use of systemic steroids Z79.52    CAD, multiple vessel I25.10    History of CVA (cerebrovascular accident) Z80.78    UTI (urinary tract infection) N39.0    Normocytic anemia D64.9    Persistent atrial fibrillation I48.19    Abdominal pain R10.9    Sepsis secondary to UTI (HCC) A41.9, N39.0    Gram-negative bacteremia H78.12    Systolic CHF, chronic (HCC) I50.22    Dilated cardiomyopathy (HCC) I42.0    Syncope R55    Atrial fibrillation with RVR (HCC) I48.91       Past Medical History:   Diagnosis Date    CAD, multiple vessel 5/22/2018 5/18/18 (Dr Hiram Andrea) Coronary artery bypass grafting x3 with grafts consisting of: 1. Left internal mammary artery to left anterior descending artery.   2.  Reverse saphenous vein to diagonal.  3.  Reversed saphenous vein graft to obtuse marginal.    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     HX PACEMAKER  2018     Allergies   Allergen Reactions    Ativan [Lorazepam] Rash    Pcn [Penicillins] Swelling      Family History   Problem Relation Age of Onset    No Known Problems Mother     No Known Problems Father       Social History     Tobacco Use    Smoking status: Never Smoker    Smokeless tobacco: Never Used   Substance Use Topics    Alcohol use: No        Current Facility-Administered Medications   Medication Dose Route Frequency    apixaban (ELIQUIS) tablet 2.5 mg  2.5 mg Oral BID    aspirin delayed-release tablet 81 mg  81 mg Oral DAILY    atorvastatin (LIPITOR) tablet 80 mg  80 mg Oral QHS    levothyroxine (SYNTHROID) tablet 50 mcg  50 mcg Oral ACB    metoprolol succinate (TOPROL-XL) XL tablet 12.5 mg  12.5 mg Oral DAILY    predniSONE (DELTASONE) tablet 5 mg  5 mg Oral DAILY    sodium chloride (NS) flush 5-40 mL  5-40 mL IntraVENous Q8H    sodium chloride (NS) flush 5-40 mL  5-40 mL IntraVENous PRN    aspirin 81 mg chewable tablet        predniSONE (DELTASONE) 10 mg tablet        famotidine (PF) (PEPCID) 20 mg/2 mL injection        0.9% sodium chloride infusion  100 mL/hr IntraVENous CONTINUOUS    ondansetron (ZOFRAN) injection 4 mg  4 mg IntraVENous Q4H PRN    metoprolol (LOPRESSOR) injection 5 mg  5 mg IntraVENous Q6H PRN     Current Outpatient Medications   Medication Sig    metoprolol succinate (TOPROL-XL) 25 mg XL tablet Take 1 Tab by mouth daily. (Patient taking differently: Take 12.5 mg by mouth daily.)    levothyroxine (SYNTHROID) 50 mcg tablet One pill per day    atorvastatin (LIPITOR) 80 mg tablet Take 1 Tab by mouth nightly.  predniSONE (DELTASONE) 5 mg tablet Take 1 Tab by mouth daily.  furosemide (LASIX) 20 mg tablet Take 1 Tab by mouth daily. (Patient taking differently: Take 20 mg by mouth every other day.)    apixaban (ELIQUIS) 5 mg tablet Take 1 Tab by mouth two (2) times a day.  aspirin delayed-release 81 mg tablet Take 1 Tab by mouth daily.  bisacodyl (DULCOLAX) 5 mg EC tablet Take 1 Tab by mouth daily.  (Patient taking differently: Take 5 mg by mouth as needed.)       Review of Symptoms:  General: no weight change,  + weakness, no fever or chills  Skin: no rashes, lumps, or other skin changes  HEENT: no headache, dizziness, lightheadedness, vision changes, hearing changes, tinnitus, vertigo, sinus pressure/pain, bleeding gums, sore throat, or hoarseness  Neck: no swollen glands, goiter, pain or stiffness  Respiratory: no cough, sputum, hemoptysis, no dyspnea, wheezing  Cardiovascular: + as per HPI  Gastrointestinal: + N/V/D  Urinary: no frequency, urgency , hematuria, burning/pain with urination, recent flank pain, polyuria, nocturia, or difficulty urinating  Peripheral Vascular: no claudication, leg cramps, prior DVTs, swelling of calves, legs, or feet, color change, or swelling with redness or tenderness  Musculoskeletal: no muscle or joint pain/stiffness, joint swelling, erythema of joints, or back pain  Psychiatric: no depression or excessive stress  Neurological: no sensory or motor loss, seizures, syncope, tremors, numbness, no dementia  Hematologic: no anemia, easy bruising or bleeding  Endocrine: + thyroid problems, heat or cold intolerance, excessive sweating, polyuria, polydipsia, no  diabetes.        Physical Exam  Vitals:    01/11/20 0536 01/11/20 0621 01/11/20 0732 01/11/20 0919   BP: 134/59 102/68 121/78 114/58   Pulse: 88 76 (!) 106 (!) 112   Resp: 20 20 24 27   Temp:   98.6 °F (37 °C)    SpO2: 96% 96% 93% 96%   Weight:       Height:           Physical Exam:  General: Well Developed, Well Nourished, No Acute Distress, RA, laying flat   HEENT: pupils equal and round, no abnormalities noted, laying flat  Neck: supple, no JVD, no carotid bruits  Heart: S1S2 irregularly irregular 2/6 murmur   Lungs: Clear throughout auscultation bilaterally without adventitious sounds  Abd: soft, nontender, nondistended, with good bowel sounds  Ext: warm, no edema, calves supple/nontender, pulses 2+ bilaterally  Skin: warm and dry  Psychiatric: Normal mood and affect  Neurologic: Alert and oriented X 3      Cardiographics    Telemetry: a fib rate     Labs:   Recent Labs     01/10/20  1522      K 3.7   MG 2.1   BUN 15   CREA 1.67*   GLU 96   WBC 6.4   HGB 12.0*   HCT 40.3*           Assessment/Plan:     Assessment:   Atrial fibrillation with RVR (HCC) (1/10/2020)- permanent a fib w rate increase driven by underlying gastroenteritis, improved w hydration, cont toprol and eliquis, will notify Dr Ana Barajas of possible infectious process and he will notify pt Monday if decision is made to delay AV node ablation and ICD upgrade     Essential hypertension (5/16/2018)- cont toprol (BP actually runs low)     CAD S/P CABG (coronary artery bypass graft) (5/18/2018)- cont ASA, statin, BB    Current chronic use of systemic steroids (5/18/2018)      Overview: Pituitary tumor surgery in 2001 and in 2013--has been on Prednisone 5 mg       daily since 2013. History of CVA (cerebrovascular accident) (6/20/2018)- ASA, eliquis, statin    Systolic CHF, chronic ([address] (10/17/2019)- EF 30-35% w mod-severe MR, cont lasix every other day if eating well and no diarrhea, cont Toprol, no ACE/ARB due to hypotension     Syncope (1/10/2020)- wife denies LOC    Thank you very much for this referral. We appreciate the opportunity to participate in this patient's care. We will follow along with above stated plan.     Cornelius Clark PA-C  Consulting MD: Maria Luisa Harrison

## 2020-01-11 NOTE — PROGRESS NOTES
01/11/20 1406   Dual Skin Pressure Injury Assessment   Dual Skin Pressure Injury Assessment WDL   Second Care Provider (Based on [address] [place] [address]) Joseph Diaz Rn    Skin Integumentary   Skin Integumentary (WDL) WDL   Wound Prevention and Protection Methods   Orientation of Wound Prevention Posterior   Location of Wound Prevention Sacrum/Coccyx   Dressing Present  No   Wound Offloading (Prevention Methods) Bed, pressure reduction mattress      Dual skin assessment completed with Elias Alarcon RN.

## 2020-01-11 NOTE — ED NOTES
Cardiology at bedside. Okay with Pt being Discharged.  Yarelis Baig NP notified and wants to keep Pt for observation due to vomiting this am.

## 2020-01-11 NOTE — ED NOTES
Pt's sister in law, Hari Castillo, is going out to sit in the lobby.  She would like to be notified if the pt gets a room so that she can go up with him

## 2020-01-11 NOTE — H&P
Hospitalist Note     Admit Date:  1/10/2020  4:32 PM   Name:  Lila Wills. Age:  82 y.o.  :  1935   MRN:  [id number]   PCP:  Ofelia Gannon MD  Treatment Team: Primary Nurse: Marguerite Almaguer RN    HPI/Subjective: The patient is an 82-year-old male with a past medical history of HTN, CAD (status post CABG), hypothyroidism, CVA, atrial fibrillation, and CHF who presents with to the emergency room with chief complaint of diarrhea. Patient reports he has had diarrhea for the past 2 days. He reports at least 5 liquid bowel movements today. He reports his wife had similar episode and presented to the emergency department yesterday was sent home. He reports he had episode of syncope earlier today while sitting on his toilet. Patient reports he had loss of consciousness for about 5 minutes. He reports he did not fall from toilet or hit his head. He denies any other complaints at this time. He denies any fever, chills, chest pain, palpitations, difficulty breathing, abdominal pain, urinary symptoms, arthralgias or myalgias. ED course:  Went into atrial fibrillation with RVR    10 systems reviewed and negative except as noted in HPI. Past Medical History:   Diagnosis Date    CAD, multiple vessel 2018 (Dr Mac Almeida) Coronary artery bypass grafting x3 with grafts consisting of: 1. Left internal mammary artery to left anterior descending artery.   2.  Reverse saphenous vein to diagonal.  3.  Reversed saphenous vein graft to obtuse marginal.    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     HX PACEMAKER  2018      Allergies   Allergen Reactions    Ativan [Lorazepam] Rash    Pcn [Penicillins] Swelling      Social History     Tobacco Use    Smoking status: Never Smoker    Smokeless tobacco: Never Used   Substance Use Topics    Alcohol use: No      Family History   Problem Relation Age of Onset    No Known Problems Mother     No Known Problems Father       Immunization History   Administered Date(s) Administered    Influenza High Dose Vaccine PF 09/17/2015, 11/30/2017, 09/25/2018    Pneumococcal Polysaccharide (PPSV-23) 01/27/2012    TB Skin Test (PPD) Intradermal 05/18/2018, 07/20/2018     PTA Medications:  Prior to Admission Medications   Prescriptions Last Dose Informant Patient Reported? Taking? apixaban (ELIQUIS) 5 mg tablet   No No   Sig: Take 1 Tab by mouth two (2) times a day. aspirin delayed-release 81 mg tablet   No No   Sig: Take 1 Tab by mouth daily. atorvastatin (LIPITOR) 80 mg tablet   No No   Sig: Take 1 Tab by mouth nightly. bisacodyl (DULCOLAX) 5 mg EC tablet   No No   Sig: Take 1 Tab by mouth daily. Patient taking differently: Take 5 mg by mouth as needed. furosemide (LASIX) 20 mg tablet   No No   Sig: Take 1 Tab by mouth daily. Patient taking differently: Take 20 mg by mouth as needed. levothyroxine (SYNTHROID) 50 mcg tablet   No No   Sig: One pill per day   metoprolol succinate (TOPROL-XL) 25 mg XL tablet   No No   Sig: Take 1 Tab by mouth daily. Patient taking differently: Take 12.5 mg by mouth daily. predniSONE (DELTASONE) 5 mg tablet   No No   Sig: Take 1 Tab by mouth daily.       Facility-Administered Medications: None       Objective:     Patient Vitals for the past 24 hrs:   Temp Pulse Resp BP SpO2   01/10/20 2141  (!) 118      01/10/20 2132  (!) 103 11  99 %   01/10/20 2130  (!) 129  139/63 91 %   01/10/20 2059  82  122/73 95 %   01/10/20 1959  (!) 149 23 123/74 100 %   01/10/20 1944  (!) 140  130/56 93 %   01/1935  (!) 125 26 129/59 100 %   01/10/20 1930  (!) 120 18 120/56 99 %   01/10/20 1900  (!) 132  139/85 92 %   01/10/20 1744  (!) 108 16 132/73 98 %   01/10/20 1715  89 13 134/65 98 % 01/10/20 1708  96 24 118/69 97 %   01/10/20 1518 97.5 °F (36.4 °C) 89 18 96/58 97 %     Oxygen Therapy  O2 Sat (%): 99 % (01/10/20 2132)  Pulse via Oximetry: 107 beats per minute (01/10/20 2132)  O2 Device: Room air (01/10/20 1518)    Estimated body mass index is 27.06 kg/m² as calculated from the following:    Height as of this encounter: 5' 11\" (1.803 m). Weight as of this encounter: 88 kg (194 lb). No intake or output data in the 24 hours ending 01/10/20 2230    *Note that automatically entered I/Os may not be accurate; dependent on patient compliance with collection and accurate  by assistants. Physical Exam:  General: Elderly male in no acute distress  Eyes: EOMI, nonicteric  ENT: Dry mucous membranes  Cardiovascular: Irregularly irregular, tachycardic  Respiratory: No wheezes, rales, or rhonchi; clear to auscultation bilaterally  Gastrointestinal: Soft, nontender, nondistended, bowel sounds active  Genitourinary: No suprapubic tenderness  Musculoskeletal: No joint swelling appreciated  Skin: No lesions on examined area  Neurological: Alert and oriented, no focal deficits noted  Psychiatric: Normal mood and affect    I reviewed the labs, imaging, EKGs, telemetry, and other studies done this admission. Data Review:   Recent Results (from the past 24 hour(s))   CBC WITH AUTOMATED DIFF    Collection Time: 01/10/20  3:22 PM   Result Value Ref Range    WBC 6.4 4.3 - 11.1 K/uL    RBC 5.04 4.23 - 5.6 M/uL    HGB 12.0 (L) 13.6 - 17.2 g/dL    HCT 40.3 (L) 41.1 - 50.3 %    MCV 80.0 79.6 - 97.8 FL    MCH 23.8 (L) 26.1 - 32.9 PG    MCHC 29.8 (L) 31.4 - 35.0 g/dL    RDW 17.9 (H) 11.9 - 14.6 %    PLATELET 835 278 - 149 K/uL    MPV 9.9 9.4 - 12.3 FL    ABSOLUTE NRBC 0.00 0.0 - 0.2 K/uL    DF AUTOMATED      NEUTROPHILS 67 43 - 78 %    LYMPHOCYTES 22 13 - 44 %    MONOCYTES 9 4.0 - 12.0 %    EOSINOPHILS 1 0.5 - 7.8 %    BASOPHILS 1 0.0 - 2.0 %    IMMATURE GRANULOCYTES 0 0.0 - 5.0 %    ABS.  NEUTROPHILS 4.3 1.7 - 8.2 K/UL    ABS. LYMPHOCYTES 1.4 0.5 - 4.6 K/UL    ABS. MONOCYTES 0.6 0.1 - 1.3 K/UL    ABS. EOSINOPHILS 0.1 0.0 - 0.8 K/UL    ABS. BASOPHILS 0.0 0.0 - 0.2 K/UL    ABS. IMM. GRANS. 0.0 0.0 - 0.5 K/UL   METABOLIC PANEL, COMPREHENSIVE    Collection Time: 01/10/20  3:22 PM   Result Value Ref Range    Sodium 138 136 - 145 mmol/L    Potassium 3.7 3.5 - 5.1 mmol/L    Chloride 106 98 - 107 mmol/L    CO2 23 21 - 32 mmol/L    Anion gap 9 7 - 16 mmol/L    Glucose 96 65 - 100 mg/dL    BUN 15 8 - 23 MG/DL    Creatinine 1.67 (H) 0.8 - 1.5 MG/DL    GFR est AA 51 (L) >60 ml/min/1.73m2    GFR est non-AA 42 (L) >60 ml/min/1.73m2    Calcium 9.4 8.3 - 10.4 MG/DL    Bilirubin, total 0.9 0.2 - 1.1 MG/DL    ALT (SGPT) 26 12 - 65 U/L    AST (SGOT) 26 15 - 37 U/L    Alk. phosphatase 95 50 - 136 U/L    Protein, total 6.9 6.3 - 8.2 g/dL    Albumin 3.4 3.2 - 4.6 g/dL    Globulin 3.5 2.3 - 3.5 g/dL    A-G Ratio 1.0 (L) 1.2 - 3.5     MAGNESIUM    Collection Time: 01/10/20  3:22 PM   Result Value Ref Range    Magnesium 2.1 1.8 - 2.4 mg/dL   INFLUENZA A & B AG (RAPID TEST)    Collection Time: 01/10/20  3:23 PM   Result Value Ref Range    Influenza A Ag NEGATIVE  NEG      Influenza B Ag NEGATIVE  NEG      Source NASOPHARYNGEAL     POC LACTIC ACID    Collection Time: 01/10/20  3:24 PM   Result Value Ref Range    Lactic Acid (POC) 2.29 (H) 0.5 - 1.9 mmol/L   POC LACTIC ACID    Collection Time: 01/10/20  7:53 PM   Result Value Ref Range    Lactic Acid (POC) 1.55 0.5 - 1.9 mmol/L       All Micro Results     Procedure Component Value Units Date/Time    C. DIFFICILE/EPI PCR [[id number]]     Order Status:  Sent Specimen:  Stool     INFLUENZA A & B AG (RAPID TEST) [[id number]] Collected:  01/10/20 1523    Order Status:  Completed Specimen:  Nasopharyngeal from Nasal washing Updated:  01/10/20 1545     Influenza A Ag NEGATIVE         Comment: NEGATIVE FOR THE PRESENCE OF INFLUENZA A ANTIGEN  INFECTION DUE TO INFLUENZA A CANNOT BE RULED OUT.   BECAUSE THE ANTIGEN PRESENT IN THE SAMPLE MAY BE BELOW  THE DETECTION LIMIT OF THE TEST. A NEGATIVE TEST IS PRESUMPTIVE AND IT IS RECOMMENDED THAT THESE RESULTS BE CONFIRMED BY VIRAL CULTURE OR AN FDA-CLEARED INFLUENZA A AND B MOLECULAR ASSAY. Influenza B Ag NEGATIVE         Comment: NEGATIVE FOR THE PRESENCE OF INFLUENZA B ANTIGEN  INFECTION DUE TO INFLUENZA B CANNOT BE RULED OUT. BECAUSE THE ANTIGEN PRESENT IN THE SAMPLE MAY BE BELOW  THE DETECTION LIMIT OF THE TEST. A NEGATIVE TEST IS PRESUMPTIVE AND IT IS RECOMMENDED THAT THESE RESULTS BE CONFIRMED BY VIRAL CULTURE OR AN FDA-CLEARED INFLUENZA A AND B MOLECULAR ASSAY. Source NASOPHARYNGEAL             Current Facility-Administered Medications   Medication Dose Route Frequency    metoprolol (LOPRESSOR) injection 5 mg  5 mg IntraVENous Q6H PRN     Current Outpatient Medications   Medication Sig    metoprolol succinate (TOPROL-XL) 25 mg XL tablet Take 1 Tab by mouth daily. (Patient taking differently: Take 12.5 mg by mouth daily.)    levothyroxine (SYNTHROID) 50 mcg tablet One pill per day    atorvastatin (LIPITOR) 80 mg tablet Take 1 Tab by mouth nightly.  predniSONE (DELTASONE) 5 mg tablet Take 1 Tab by mouth daily.  furosemide (LASIX) 20 mg tablet Take 1 Tab by mouth daily. (Patient taking differently: Take 20 mg by mouth as needed.)    apixaban (ELIQUIS) 5 mg tablet Take 1 Tab by mouth two (2) times a day.  aspirin delayed-release 81 mg tablet Take 1 Tab by mouth daily.  bisacodyl (DULCOLAX) 5 mg EC tablet Take 1 Tab by mouth daily. (Patient taking differently: Take 5 mg by mouth as needed.)       Other Studies:  No results found.     Assessment and Plan:     Hospital Problems as of 1/10/2020 Date Reviewed: 11/19/2019          Codes Class Noted - Resolved POA    Syncope ICD-10-CM: R55  ICD-9-CM: 780.2  1/10/2020 - Present Unknown        Atrial fibrillation with RVR ([facility] [address] ICD-10-CM: I48.91  ICD-9-CM: 427.31  1/10/2020 - Present Unknown              Plan:    Atrial fibrillation with RVR  HR: 90s120s  Most likely related to dehydration from diarrhea    Plan:  -Continue home Eliquis  -Continue home rate control medications  -Metoprolol IV 5 mg every 6 as needed  -IVF      Syncope  Most likely related to dehydration and atrial fibrillation    Plan:  -IVF  -Telemetry monitoring  -Carotid Dopplers      Diarrhea  C. difficile pending  Most likely viral gastroenteritis    Plan:  -Follow-up C.  Difficile    CAD  -Continue home medications    Hypertension  -Continue home medications    CVA  -Continue home medications    CHF  -Monitor volume status      Signed:  Shoshana Johns MD

## 2020-01-11 NOTE — ED NOTES
TRANSFER - OUT REPORT:    Verbal report given to Lois Mistry RN(name) on George Becerra.  being transferred to 60(unit) for routine progression of care       Report consisted of patients Situation, Background, Assessment and   Recommendations(SBAR). Information from the following report(s) SBAR, ED Summary, Intake/Output, MAR, Recent Results and Med Rec Status was reviewed with the receiving nurse. Lines:   Peripheral IV 01/10/20 Left Antecubital (Active)        Opportunity for questions and clarification was provided.       Patient transported with:   [employer]

## 2020-01-11 NOTE — PROGRESS NOTES
Hospitalist Progress Note    Subjective:   Daily Progress Note: 1/11/2020 1019    Patient presents to ER 1/10 afternoon with complaints of weakness and diarrhea x 2 days with wife being seen here 1/9 for same symptoms. No pain. Intermittent  nausea, no vomiting. Had brief episode of syncope just PTA while sitting on toilet without fall or head injury. Chills without fever. Hypotensive to 96/58 on arrival.  Dehydrated. Administered IV, levsin, zofran. Feeling better, then HR spikes to 150's, in atrial fib with RVR, administered cardizem. Known PAF, SSS recent PPM placement, chronic atrial fib on eliquis. Scheduled for AV node ablation and upgrade of pacemaker to biventricular ICD 1/15. Mild creatinine bump on arrival.      1/11:  Cardiology in for consult, need to reschedule procedure and wait apprx 1-2 weeks until he recovers from current acute illness. Office to call patient. Now wants to go home. Encouraged to stay by myself and Cardiology for electrolye replacement. Patient denies knowing when he is in AF with RVR. ADDITIONAL HISTORY:  3 Vessel CABG, hypertension, kidney stone, stroke, pituitary tumor on chronic steroids, pacemaker, hypothyroidism, stroke, CHF with EF 30-35% , chronic atrial fib on eliquis, SSS, moderate to severe MR. Current Facility-Administered Medications   Medication Dose Route Frequency    atorvastatin (LIPITOR) tablet 80 mg  80 mg Oral QHS    lactated Ringers infusion  100 mL/hr IntraVENous CONTINUOUS    metoprolol (LOPRESSOR) injection 5 mg  5 mg IntraVENous Q6H PRN     Current Outpatient Medications   Medication Sig    metoprolol succinate (TOPROL-XL) 25 mg XL tablet Take 1 Tab by mouth daily. (Patient taking differently: Take 12.5 mg by mouth daily.)    levothyroxine (SYNTHROID) 50 mcg tablet One pill per day    atorvastatin (LIPITOR) 80 mg tablet Take 1 Tab by mouth nightly.  predniSONE (DELTASONE) 5 mg tablet Take 1 Tab by mouth daily.     furosemide (LASIX) 20 mg tablet Take 1 Tab by mouth daily. (Patient taking differently: Take 20 mg by mouth as needed.)    apixaban (ELIQUIS) 5 mg tablet Take 1 Tab by mouth two (2) times a day.  aspirin delayed-release 81 mg tablet Take 1 Tab by mouth daily.  bisacodyl (DULCOLAX) 5 mg EC tablet Take 1 Tab by mouth daily. (Patient taking differently: Take 5 mg by mouth as needed.)      Review of Systems  A comprehensive review of systems was negative except for that written in the HPI. Objective:     Visit Vitals  /68   Pulse 76   Temp 97.5 °F (36.4 °C)   Resp 20   Ht 5' 11\" (1.803 m)   Wt 88 kg (194 lb)   SpO2 96%   BMI 27.06 kg/m²      O2 Device: Room air    Temp (24hrs), Av.5 °F (36.4 °C), Min:97.5 °F (36.4 °C), Max:97.5 °F (36.4 °C)    General appearance: Oriented and alert, cooperative, no pain at present. Morbidly obese. Wife at bedside    Head: Normocephalic, without obvious abnormality, atraumatic  Eyes: conjunctivae/corneas clear. PERRL  Neck: supple, symmetrical, trachea midline, no carotid bruit and no JVD  Lungs: Mildly diminished in bases, otherwise clear to auscultation bilaterally  Heart: Irregular rate and rhythm, Pacemaker, + murmur  Abdomen: Protuberant, soft, non-tender. Bowel sounds normal. No masses,  no organomegaly  Extremities:  All extremities normal, atraumatic, no cyanosis, mild distal edema  Skin: Skin color, texture, turgor normal. No rashes or lesions  Neurologic: Grossly normal    Additional comments: Notes,orders, test results, vitals reviewed    Data Review  Recent Results (from the past 24 hour(s))   CBC WITH AUTOMATED DIFF    Collection Time: 01/10/20  3:22 PM   Result Value Ref Range    WBC 6.4 4.3 - 11.1 K/uL    RBC 5.04 4.23 - 5.6 M/uL    HGB 12.0 (L) 13.6 - 17.2 g/dL    HCT 40.3 (L) 41.1 - 50.3 %    MCV 80.0 79.6 - 97.8 FL    MCH 23.8 (L) 26.1 - 32.9 PG    MCHC 29.8 (L) 31.4 - 35.0 g/dL    RDW 17.9 (H) 11.9 - 14.6 %    PLATELET 869 725 - 791 K/uL    MPV 9.9 9.4 - 12.3 FL    ABSOLUTE NRBC 0.00 0.0 - 0.2 K/uL    DF AUTOMATED      NEUTROPHILS 67 43 - 78 %    LYMPHOCYTES 22 13 - 44 %    MONOCYTES 9 4.0 - 12.0 %    EOSINOPHILS 1 0.5 - 7.8 %    BASOPHILS 1 0.0 - 2.0 %    IMMATURE GRANULOCYTES 0 0.0 - 5.0 %    ABS. NEUTROPHILS 4.3 1.7 - 8.2 K/UL    ABS. LYMPHOCYTES 1.4 0.5 - 4.6 K/UL    ABS. MONOCYTES 0.6 0.1 - 1.3 K/UL    ABS. EOSINOPHILS 0.1 0.0 - 0.8 K/UL    ABS. BASOPHILS 0.0 0.0 - 0.2 K/UL    ABS. IMM. GRANS. 0.0 0.0 - 0.5 K/UL   METABOLIC PANEL, COMPREHENSIVE    Collection Time: 01/10/20  3:22 PM   Result Value Ref Range    Sodium 138 136 - 145 mmol/L    Potassium 3.7 3.5 - 5.1 mmol/L    Chloride 106 98 - 107 mmol/L    CO2 23 21 - 32 mmol/L    Anion gap 9 7 - 16 mmol/L    Glucose 96 65 - 100 mg/dL    BUN 15 8 - 23 MG/DL    Creatinine 1.67 (H) 0.8 - 1.5 MG/DL    GFR est AA 51 (L) >60 ml/min/1.73m2    GFR est non-AA 42 (L) >60 ml/min/1.73m2    Calcium 9.4 8.3 - 10.4 MG/DL    Bilirubin, total 0.9 0.2 - 1.1 MG/DL    ALT (SGPT) 26 12 - 65 U/L    AST (SGOT) 26 15 - 37 U/L    Alk. phosphatase 95 50 - 136 U/L    Protein, total 6.9 6.3 - 8.2 g/dL    Albumin 3.4 3.2 - 4.6 g/dL    Globulin 3.5 2.3 - 3.5 g/dL    A-G Ratio 1.0 (L) 1.2 - 3.5     MAGNESIUM    Collection Time: 01/10/20  3:22 PM   Result Value Ref Range    Magnesium 2.1 1.8 - 2.4 mg/dL   INFLUENZA A & B AG (RAPID TEST)    Collection Time: 01/10/20  3:23 PM   Result Value Ref Range    Influenza A Ag NEGATIVE  NEG      Influenza B Ag NEGATIVE  NEG      Source NASOPHARYNGEAL     POC LACTIC ACID    Collection Time: 01/10/20  3:24 PM   Result Value Ref Range    Lactic Acid (POC) 2.29 (H) 0.5 - 1.9 mmol/L   POC LACTIC ACID    Collection Time: 01/10/20  7:53 PM   Result Value Ref Range    Lactic Acid (POC) 1.55 0.5 - 1.9 mmol/L      1/11:  CAROTID DOPPLERS:    NOLAN:  No significant stenosis. LICA:  Slight elevation in velocities suggest a 50-69% stenosis. INFLUENZA:   Negative  C DIF:  Not sent as diarrhea stopped. Assessment/Plan:   Chronic Atrial fib with RVR:  Most likely related to dehydration from diarrhea. Continue home eliquis dosing   Continue home rate control med   Metoprolol 5 IV prn     Hypertension:  Currently hypotensive    Gentle IVF  Gastroenteritis:  Most likely viral with diarrhea    Entire family is ill also   Resolved on discharged     Syncope:  Most likely related to dehydration and atrial fib. Gentle fluids   Telemetry   Carotid dopplers above    CAD with history of 3 vessel CABG    Continue ASA, BB    Plans for pacemaker up grade with ICD and ablation for 1/15:  Per Dr Ralph Sousa will postpone   [facility] office will call patient with appointment    Chronic use of systemic steroids for pituitary tumor surgery in 2001 and in 2013   Taking Prednisone 5 mg daily since 2013.        History of CVA:  Home eliquis, ASA, statin      Chronic Systolic HF:  EF: 13-27% with moderate to severe MR   Lasix every other day, replace K+ and check mag   Continue toprol   No ACE or ARB due to hypotension     Care Plan discussed with: Patient, wife and Nurse    Signed By: Madisyn Fitch NP     January 11, 2020

## 2020-01-11 NOTE — PROGRESS NOTES
Laura Nascimento .. Laura Nascimento .TRANSFER - IN REPORT:    Verbal report received from [employer], RN  on [facility].  being received from ED     Report consisted of patients Situation, Background, Assessment and   Recommendations     Information from the following report was reviewed with the receiving nurse. Opportunity for questions and clarification was provided. Assessment completed upon patients arrival to unit and care assumed.

## 2020-01-11 NOTE — PROGRESS NOTES
Pt states that he is feeling better and is ready to go home. No nausea or vomiting notice. Pt has not had any BM's. Kasi Owens, NP made aware via perfectserve.

## 2020-01-11 NOTE — ED NOTES
Pt received scheduled synthroid within 20 mins Pt vomited. Pt stated he was not nauseous. Earl Prieto NP was notified and ordered to hold all morning medications and ordered Pepcid 20mg IV.

## 2020-01-11 NOTE — PROGRESS NOTES
Took pt to the bathroom. Pt to weak to get up off the toliet, took a two person assist, wife stated he cant go home like this. Sugar Davis NP made aware via perfect serve.

## 2020-01-12 ENCOUNTER — HOME HEALTH ADMISSION (OUTPATIENT)
Dept: HOME HEALTH SERVICES | Facility: HOME HEALTH | Age: 85
End: 2020-01-12
Payer: MEDICARE

## 2020-01-12 VITALS
DIASTOLIC BLOOD PRESSURE: 81 MMHG | TEMPERATURE: 98 F | HEIGHT: 71 IN | BODY MASS INDEX: 27.07 KG/M2 | SYSTOLIC BLOOD PRESSURE: 121 MMHG | HEART RATE: 94 BPM | RESPIRATION RATE: 17 BRPM | WEIGHT: 193.4 LBS | OXYGEN SATURATION: 94 %

## 2020-01-12 PROCEDURE — 90471 IMMUNIZATION ADMIN: CPT

## 2020-01-12 PROCEDURE — 97161 PT EVAL LOW COMPLEX 20 MIN: CPT

## 2020-01-12 PROCEDURE — 77030020263 HC SOL INJ SOD CL0.9% LFCR 1000ML

## 2020-01-12 PROCEDURE — 99218 HC RM OBSERVATION: CPT

## 2020-01-12 PROCEDURE — 74011250637 HC RX REV CODE- 250/637: Performed by: INTERNAL MEDICINE

## 2020-01-12 PROCEDURE — 74011636637 HC RX REV CODE- 636/637: Performed by: INTERNAL MEDICINE

## 2020-01-12 PROCEDURE — 74011250636 HC RX REV CODE- 250/636: Performed by: NURSE PRACTITIONER

## 2020-01-12 PROCEDURE — 90686 IIV4 VACC NO PRSV 0.5 ML IM: CPT | Performed by: NURSE PRACTITIONER

## 2020-01-12 PROCEDURE — 97116 GAIT TRAINING THERAPY: CPT

## 2020-01-12 PROCEDURE — A9270 NON-COVERED ITEM OR SERVICE: HCPCS | Performed by: INTERNAL MEDICINE

## 2020-01-12 RX ADMIN — SODIUM CHLORIDE 100 ML/HR: 900 INJECTION, SOLUTION INTRAVENOUS at 08:56

## 2020-01-12 RX ADMIN — PREDNISONE 5 MG: 10 TABLET ORAL at 08:50

## 2020-01-12 RX ADMIN — INFLUENZA VIRUS VACCINE 0.5 ML: 15; 15; 15; 15 SUSPENSION INTRAMUSCULAR at 14:01

## 2020-01-12 RX ADMIN — Medication 10 ML: at 05:33

## 2020-01-12 RX ADMIN — ASPIRIN 81 MG: 81 TABLET ORAL at 08:50

## 2020-01-12 RX ADMIN — LEVOTHYROXINE SODIUM 50 MCG: 50 TABLET ORAL at 08:50

## 2020-01-12 RX ADMIN — APIXABAN 2.5 MG: 2.5 TABLET, FILM COATED ORAL at 08:49

## 2020-01-12 RX ADMIN — METOPROLOL SUCCINATE 12.5 MG: 25 TABLET, EXTENDED RELEASE ORAL at 08:50

## 2020-01-12 NOTE — PROGRESS NOTES
Transfer of care. Report received from off going RN. Patient with eyes opened lying in bed and watching TV. Oriented to staff and plan of care. Patient is alert and oriented x 4, S/P Syncope, AFIB with RVR with even and unlabored respirations noted on RA. No needs voiced at this moment. Assessment to follow, see for details.

## 2020-01-12 NOTE — PROGRESS NOTES
Visit with patient to build rapport with .   Calm  In great spirits  [place] family present  Encouraged with presence and words of hope      Nilson Doyle,  Staff   C: [phone]  /  [email or web address]

## 2020-01-12 NOTE — PROGRESS NOTES
[facility] CARDIOLOGY PROGRESS NOTE           1/12/2020 11:17 AM    Admit Date: 1/10/2020      Subjective:   Patient feels better. GI issues resolved. Wants to go home. Awaiting PT evaluation per wife. ROS:  Cardiovascular:  As noted above    Objective:      Vitals:    01/12/20 0014 01/12/20 0356 01/12/20 0452 01/12/20 0748   BP: 114/61 137/75  (!) 151/97   Pulse: 90 65  (!) 111   Resp: 18 18  18   Temp: 98.4 °F (36.9 °C) 98.4 °F (36.9 °C)  98.3 °F (36.8 °C)   SpO2: 94% 98%  98%   Weight:   87.7 kg (193 lb 6.4 oz)    Height:           Physical Exam:  General-No Acute Distress  Neck- supple, no JVD  CV- IRIR  Lung- clear bilaterally  Abd- soft, nontender, nondistended  Ext- no edema bilaterally. Skin- warm and dry      Data Review:   Recent Labs     01/10/20  1522      K 3.7   MG 2.1   BUN 15   CREA 1.67*   GLU 96   WBC 6.4   HGB 12.0*   HCT 40.3*         No results found for: NHUNG Rich    Assessment/Plan:     Principal Problem:    Atrial fibrillation with RVR ([address] (1/10/2020)    OK for discharge. Planning Bi-V ICD and AV node ablation due to poor afib rate control. Will have office contact next week to reschedule. On Toprol and Eliquis. Active Problems:    Essential hypertension (5/16/2018)    Stable. S/P CABG (coronary artery bypass graft) (5/18/2018)    No angina. OK for discharge. Will call with follow up.                Theresa Samuel MD  1/12/2020 11:17 AM

## 2020-01-12 NOTE — PROGRESS NOTES
Shift Summary  Hourly rounds performed on pt, pt resting in bed quietly with head the bed elevated. Patient remained on RA. Did not C/O of anything this shift. Patient remained alert and oriented x 4. Left FA IV site remained patent. No concern voiced this shift, afebrile, VSS, great output and 0 BM noted. No acute distress noted, care continue till transfer of care is done to up coming RN.

## 2020-01-12 NOTE — DISCHARGE SUMMARY
Hospitalist Discharge Summary     Admit Date:  1/10/2020  4:32 PM   Name:  Arvin Blankenship. Age:  82 y.o.  :  1935   MRN:  [id number]   PCP:  Daniel Lacy MD  Treatment Team: Attending Provider: Luis E Mireles MD; Hospitalist: Claudio Lanza NP; Utilization Review: [email or web address], Maddie QUINTERO; Consulting Provider: Herbie Cedeno MD; Physician: Herbie Cedeno MD; Charge Nurse: Jeanine Álvarez; Physical Therapist: Julian Kellogg DPT    PROBLEMS:    Acute on Chronic Atrial fib with RVR:  Most likely related to dehydration from diarrhea:  Resolved     Hypertension  Hypotension on admission   Gastroenteritis:  Most likely viral with diarrhea:  Improved   Syncope:  Most likely related to dehydration and atrial fib. CAD with history of 3 vessel CABG   Plans for pacemaker up grade with ICD and ablation for 1/15:  Per Dr Phipps Half will postpone  Chronic use of systemic steroids for pituitary tumor             History of CVA with mild left LE extremity weakness   Chronic Systolic HF:  EF: 43-38% with moderate to severe MR    Hospital Course:  Patient presents to ER 1/10 afternoon with complaints of weakness and diarrhea x 2 days with wife being seen here  for same symptoms. No pain. Intermittent  nausea, no vomiting. Had brief episode of syncope just PTA while sitting on toilet without fall or head injury. Chills without fever. Hypotensive to 96/58 on arrival.  Dehydrated. Administered IV, levsin, zofran. Feeling better, then HR spikes to 150's, in atrial fib with RVR, administered cardizem. Known PAF, SSS recent PPM placement, chronic atrial fib on eliquis. Scheduled for AV node ablation and upgrade of pacemaker to biventricular ICD 1/15. Mild creatinine bump on arrival.     :  Cardiology in for consult, need to reschedule procedure and wait apprx 1-2 weeks until he recovers from current acute illness. Office to call patient. Now wants to go home.   Encouraged to stay by myself and Cardiology for electrolye replacement. Patient denies knowing when he is in AF with RVR. Continue gentle fluids, diarrhea ceased. 1/12:  Stable, wants to go home. Cleared by Cards, their office will call patient for reschedule of procedure. Patient and wife in agreement. Both verbalized understanding. Disposition: Home Health Care Svc  Activity: Activity as tolerated  Diet: DIET CARDIAC Regular  Code Status: Full Code    Follow up instructions, discharge meds at bottom of this note. Plan was discussed with patient and wife. All questions answered. Patient was stable at time of discharge. Patient will call a physician or return if any concerns. Diagnostic Imaging/Tests:   1/11:  CAROTID DOPPLERS:    NOLAN:  No significant stenosis.   LICA:  Slight elevation in velocities suggest a 50-69% stenosis.     INFLUENZA:   Negative  C DIF:  Not sent as diarrhea stopped    Labs: Results:       BMP, Mg, Phos Recent Labs     01/10/20  1522      K 3.7      CO2 23   AGAP 9   BUN 15   CREA 1.67*   CA 9.4   GLU 96   MG 2.1      CBC Recent Labs     01/10/20  1522   WBC 6.4   RBC 5.04   HGB 12.0*   HCT 40.3*      GRANS 67   LYMPH 22   EOS 1   MONOS 9   BASOS 1   IG 0   ANEU 4.3   ABL 1.4   KARELY 0.1   ABM 0.6   ABB 0.0   AIG 0.0      LFT Recent Labs     01/10/20  1522   SGOT 26   ALT 26   AP 95   TP 6.9   ALB 3.4   GLOB 3.5   AGRAT 1.0*      Cardiac Testing Lab Results   Component Value Date/Time     (H) 10/09/2019 01:30 PM     (H) 09/23/2019 09:59 AM     (H) 07/29/2019 11:13 AM    Troponin-I, Qt. 13.30 (HH) 05/17/2018 04:21 AM    Troponin-I, Qt. 2.66 (HH) 05/16/2018 08:52 PM    Troponin-I, Qt. 0.04 05/16/2018 06:45 PM      Coagulation Tests Lab Results   Component Value Date/Time    Prothrombin time 14.8 (H) 10/22/2019 09:54 AM    Prothrombin time 18.9 (H) 06/20/2018 07:31 PM    Prothrombin time 18.8 (H) 05/18/2018 06:09 PM    INR 1.1 10/22/2019 09:54 AM    INR 1.6 06/20/2018 07:31 PM    INR 1.6 05/18/2018 06:09 PM    aPTT 31.9 05/18/2018 06:09 PM    aPTT 24.8 05/18/2018 03:51 AM    aPTT 95.1 (HH) 05/17/2018 04:21 AM      A1c Lab Results   Component Value Date/Time    Hemoglobin A1c 6.0 05/18/2018 03:51 AM      Lipid Panel Lab Results   Component Value Date/Time    Cholesterol, total 119 09/23/2019 09:59 AM    HDL Cholesterol 49 09/23/2019 09:59 AM    LDL, calculated 51.6 09/23/2019 09:59 AM    VLDL, calculated 18.4 09/23/2019 09:59 AM    Triglyceride 92 09/23/2019 09:59 AM    CHOL/HDL Ratio 2.4 09/23/2019 09:59 AM      Thyroid Panel Lab Results   Component Value Date/Time    TSH 0.713 09/23/2019 09:59 AM    TSH 0.217 (L) 03/22/2019 10:07 AM    T4, Free 1.81 (H) 07/05/2018 11:56 AM        Most Recent UA Lab Results   Component Value Date/Time    Color PINK 05/17/2018 04:18 PM    Appearance CLOUDY 05/17/2018 04:18 PM    Specific gravity 1.031 (H) 05/17/2018 04:18 PM    pH (UA) 8.0 05/17/2018 04:18 PM    Protein TRACE (A) 05/17/2018 04:18 PM    Glucose NEGATIVE  05/17/2018 04:18 PM    Ketone NEGATIVE  05/17/2018 04:18 PM    Bilirubin NEGATIVE  05/17/2018 04:18 PM    Blood LARGE (A) 05/17/2018 04:18 PM    Urobilinogen 1.0 05/17/2018 04:18 PM    Nitrites NEGATIVE  05/17/2018 04:18 PM    Leukocyte Esterase SMALL (A) 05/17/2018 04:18 PM    WBC 0-3 01/11/2020 07:42 AM    RBC 0-3 01/11/2020 07:42 AM    Epithelial cells 0-3 01/11/2020 07:42 AM    Bacteria TRACE 01/11/2020 07:42 AM    Casts 0 01/11/2020 07:42 AM    Crystals, urine 0 01/11/2020 07:42 AM    Mucus 0 01/11/2020 07:42 AM    Other observations RESULTS VERIFIED MANUALLY 07/18/2018 09:45 PM        Allergies   Allergen Reactions    Ativan [Lorazepam] Rash    Pcn [Penicillins] Swelling     Immunization History   Administered Date(s) Administered    Influenza High Dose Vaccine PF 09/17/2015, 11/30/2017, 09/25/2018    Pneumococcal Polysaccharide (PPSV-23) 01/27/2012    TB Skin Test (PPD) Intradermal 05/18/2018, 07/20/2018 Current Med List in Hospital:   Current Facility-Administered Medications   Medication Dose Route Frequency    apixaban (ELIQUIS) tablet 2.5 mg  2.5 mg Oral BID    aspirin delayed-release tablet 81 mg  81 mg Oral DAILY    atorvastatin (LIPITOR) tablet 80 mg  80 mg Oral QHS    levothyroxine (SYNTHROID) tablet 50 mcg  50 mcg Oral ACB    metoprolol succinate (TOPROL-XL) XL tablet 12.5 mg  12.5 mg Oral DAILY    predniSONE (DELTASONE) tablet 5 mg  5 mg Oral DAILY    sodium chloride (NS) flush 5-40 mL  5-40 mL IntraVENous Q8H    sodium chloride (NS) flush 5-40 mL  5-40 mL IntraVENous PRN    0.9% sodium chloride infusion  75 mL/hr IntraVENous CONTINUOUS    ondansetron (ZOFRAN) injection 4 mg  4 mg IntraVENous Q4H PRN    metoprolol (LOPRESSOR) injection 5 mg  5 mg IntraVENous Q6H PRN       Discharge Exam:  Patient Vitals for the past 24 hrs:   Temp Pulse Resp BP SpO2   01/12/20 1125 98 °F (36.7 °C) 94 17 121/81 94 %   01/12/20 0748 98.3 °F (36.8 °C) (!) 111 18 (!) 151/97 98 %   01/12/20 0356 98.4 °F (36.9 °C) 65 18 137/75 98 %   01/12/20 0014 98.4 °F (36.9 °C) 90 18 114/61 94 %   01/11/20 2036 98.5 °F (36.9 °C) 76 18 106/66 99 %   01/11/20 1642 98.5 °F (36.9 °C) 86 18 107/74 99 %   01/11/20 1352 98.1 °F (36.7 °C) 98 19 137/67 98 %     Oxygen Therapy  O2 Sat (%): 94 % (01/12/20 1125)  Pulse via Oximetry: 116 beats per minute (01/11/20 0919)  O2 Device: Room air (01/12/20 1130)    Estimated body mass index is 26.97 kg/m² as calculated from the following:    Height as of this encounter: 5' 11\" (1.803 m). Weight as of this encounter: 87.7 kg (193 lb 6.4 oz). Intake/Output Summary (Last 24 hours) at 1/12/2020 1309  Last data filed at 1/12/2020 0857  Gross per 24 hour   Intake 2965.33 ml   Output 225 ml   Net 2740.33 ml       General appearance: Oriented and alert, cooperative, no pain at present. Morbidly obese.   Wife at bedside    Head: Normocephalic, without obvious abnormality, atraumatic  Eyes: conjunctivae/corneas clear. PERRL  Neck: supple, symmetrical, trachea midline, no carotid bruit and no JVD  Lungs: Mildly diminished in bases, otherwise clear to auscultation bilaterally  Heart: Irregular rate and rhythm, Pacemaker, + murmur  Abdomen: Protuberant, soft, non-tender. Bowel sounds normal. No masses,  no organomegaly  Extremities: All extremities normal, atraumatic, no cyanosis, mild distal edema  Skin: Skin color, texture, turgor normal. No rashes or lesions  Neurologic: Grossly normal     Additional comments: Notes,orders, test results, vitals reviewed    Discharge Info:   Current Discharge Medication List      CONTINUE these medications which have NOT CHANGED    Details   aspirin delayed-release 81 mg tablet Take 1 Tab by mouth daily. Qty: 30 Tab, Refills: 10      metoprolol succinate (TOPROL-XL) 25 mg XL tablet Take 1 Tab by mouth daily. Qty: 90 Tab, Refills: 3      levothyroxine (SYNTHROID) 50 mcg tablet One pill per day  Qty: 30 Tab, Refills: 11    Associated Diagnoses: Acquired hypothyroidism      atorvastatin (LIPITOR) 80 mg tablet Take 1 Tab by mouth nightly. Qty: 90 Tab, Refills: 3      predniSONE (DELTASONE) 5 mg tablet Take 1 Tab by mouth daily. Qty: 90 Tab, Refills: 4    Associated Diagnoses: Current chronic use of systemic steroids      furosemide (LASIX) 20 mg tablet Take 1 Tab by mouth daily. Qty: 30 Tab, Refills: 3      apixaban (ELIQUIS) 5 mg tablet Take 1 Tab by mouth two (2) times a day. Qty: 180 Tab, Refills: 3      bisacodyl (DULCOLAX) 5 mg EC tablet Take 1 Tab by mouth daily. Qty: 1 Tab, Refills: 0     Follow Up Orders: Follow up with Cardiology as instructed by them for rescheduling of your pacemaker placement   Follow up with [facility] as usual, sooner if problems     Time spent in patient discharge planning and coordination 45 minutes.     Signed:  Matt Pelaez NP

## 2020-01-12 NOTE — PROGRESS NOTES
Problem: Falls - Risk of  Goal: *Absence of Falls  Description  Document Tia Manus Fall Risk and appropriate interventions in the flowsheet. Outcome: Progressing Towards Goal  Note: Fall Risk Interventions:  Mobility Interventions: Patient to call before getting OOB              Elimination Interventions: Call light in reach              Problem: Patient Education: Go to Patient Education Activity  Goal: Patient/Family Education  Outcome: Progressing Towards Goal     Problem: Pressure Injury - Risk of  Goal: *Prevention of pressure injury  Description  Document Mekhi Scale and appropriate interventions in the flowsheet.   Outcome: Progressing Towards Goal  Note: Pressure Injury Interventions:  Sensory Interventions: Assess changes in LOC         Activity Interventions: PT/OT evaluation    Mobility Interventions: HOB 30 degrees or less    Nutrition Interventions: Document food/fluid/supplement intake                     Problem: Patient Education: Go to Patient Education Activity  Goal: Patient/Family Education  Outcome: Progressing Towards Goal

## 2020-01-12 NOTE — PROGRESS NOTES
Problem: Mobility Impaired (Adult and Pediatric)  Goal: *Acute Goals and Plan of Care (Insert Text)  Description  LTG:  (1.)Mr. Lanie Zamudio will move from supine to sit and sit to supine, scoot up and down and roll side to side INDEPENDENTLY with bed flat within 7 treatment day(s). (2.)Mr. Lanie Zamudio will transfer from bed to chair and chair to bed INDEPENDENTLY within 7 treatment day(s). (3.)Mr. Lanie Zamudio will ambulate with MODIFIED INDEPENDENCE for 350 feet with the least restrictive device within 7 treatment day(s). (4.)Mr. Jovel will perform exercises per HEP for 10+ minutes to improve strength and mobility within 7 days. ________________________________________________________________________________________________   Outcome: Progressing Towards Goal     PHYSICAL THERAPY: Initial Assessment and AM 1/12/2020  OBSERVATION: PT Visit Days : 1  [place] / [place] / Product Type: Medicare /       NAME/AGE/GENDER: Bc Black is a 82 y.o. male   PRIMARY DIAGNOSIS: Atrial fibrillation with RVR ([facility] [address] [I48.91]  Syncope [R55] Atrial fibrillation with RVR (HCC) Atrial fibrillation with RVR ([facility] [address]        ICD-10: Treatment Diagnosis:    Generalized Muscle Weakness (M62.81)  Difficulty in walking, Not elsewhere classified (R26.2)  Other abnormalities of gait and mobility (R26.89)   Precaution/Allergies:  Ativan [lorazepam] and Pcn [penicillins]      ASSESSMENT:     Mr. Lanie Zamudio is an 82year old male admitted from home for syncope due to GI virus and a fib. At baseline he lives with wife and is independent - mod I with cane at times. Wife reports balance deficits following CVA in 2018 however denies falls aside from a trip. Pt presents in supine without complaints, verbalizes desire to go home. Transfers to sitting with SBA. Good to fair+ seated balance; he has difficulty with socks due to impulsivity and fair unsupported balance and after struggling he eventually asks therapist to don socks. CGA for sit-stand transfer. Worked on pre-gait activities, weight shift, and dynamic balance. Ambulates for 100 ft in hallway with min-briefly mod assist and several losses of balance needing support from PT or wall. Demonstrates accelerated pace with poor safety awareness. Heavy verbal, visual and tactile cues for gait safety, technique, sequencing, etc. Returned to room for short seated rest break, then stood again and ambulates 250 ft WITH walker (which he does have at home). Demonstrates much improved balance however continues to be impulsive and unsafe at times. Returned to room and up to chair after activity with wife present, needs in reach. [place]. Is functioning slightly below baseline with mobility, strength, balance, and gait safety and will benefit from continued therapy during hospital stay to maximize safety/independence with mobility. Currently feel he is safe for dc home with wife assistance and reminded to use walker at all times. [facility]. Of note, NP reports HR jumped to 150's during activity. This section established at most recent assessment   PROBLEM LIST (Impairments causing functional limitations):  Decreased Strength  Decreased ADL/Functional Activities  Decreased Transfer Abilities  Decreased Ambulation Ability/Technique  Decreased Balance  Decreased Activity Tolerance  Decreased Cognition   INTERVENTIONS PLANNED: (Benefits and precautions of physical therapy have been discussed with the patient.)  Balance Exercise  Bed Mobility  Gait Training  Home Exercise Program (HEP)  Therapeutic Activites  Therapeutic Exercise/Strengthening  Transfer Training     TREATMENT PLAN: Frequency/Duration: 3 times a week for duration of hospital stay  Rehabilitation Potential For Stated Goals: Good     REHAB RECOMMENDATIONS (at time of discharge pending progress):    Placement:   It is my opinion, based on this patient's performance to date, that Mr. Isaiah Quinones may benefit from [address] THERAPY after discharge due to the functional deficits listed above that are likely to improve with skilled rehabilitation because he/she has multiple medical issues that affect his/her functional mobility in the community. Equipment:   None at this time              HISTORY:   History of Present Injury/Illness (Reason for Referral):  Per H&P, \"The patient is an 63-year-old male with a past medical history of HTN, CAD (status post CABG), hypothyroidism, CVA, atrial fibrillation, and CHF who presents with to the emergency room with chief complaint of diarrhea. Patient reports he has had diarrhea for the past 2 days. He reports at least 5 liquid bowel movements today. He reports his wife had similar episode and presented to the emergency department yesterday was sent home. He reports he had episode of syncope earlier today while sitting on his toilet. Patient reports he had loss of consciousness for about 5 minutes. He reports he did not fall from toilet or hit his head. He denies any other complaints at this time. He denies any fever, chills, chest pain, palpitations, difficulty breathing, abdominal pain, urinary symptoms, arthralgias or myalgias\"    Past Medical History/Comorbidities:   Mr. Sanket Vang  has a past medical history of CAD, multiple vessel (5/22/2018), Calculus of kidney, Hypertension, Neurological disorder, and Stroke ([facility] [address] (2018). Mr. Sanket Vang  has a past surgical history that includes hx hemorrhoidectomy; hx other surgical (2001 and 2013); and hx pacemaker (2018). Social History/Living Environment:   Home Environment: Private residence  # Steps to Enter: 0  One/Two Story Residence: One story  Living Alone: No  Support Systems: Spouse/Significant Other/Partner  Patient Expects to be Discharged to[de-identified] Private residence  Current DME Used/Available at Home: Eveleen Frames, straight, Walker, rolling  Prior Level of Function/Work/Activity:  Lives with wife. Independent to mod I with cane at times. Does have walker. Prior CVA w/balance deficits      Number of Personal Factors/Comorbidities that affect the Plan of Care: 1-2: MODERATE COMPLEXITY   EXAMINATION:   Most Recent Physical Functioning:   Gross Assessment:  AROM: Within functional limits  Strength: Generally decreased, functional  Coordination: Generally decreased, functional               Posture:  Posture (WDL): Exceptions to WDL  Posture Assessment: Forward head, Rounded shoulders, Trunk flexion  Balance:  Sitting: Impaired  Sitting - Static: Good (unsupported)  Sitting - Dynamic: Fair (occasional)  Standing: Impaired  Standing - Static: Fair  Standing - Dynamic : Fair Bed Mobility:  Rolling: Stand-by assistance  Supine to Sit: Stand-by assistance  Scooting: Stand-by assistance  Wheelchair Mobility:     Transfers:  Sit to Stand: Contact guard assistance;Stand-by assistance  Stand to Sit: Contact guard assistance;Stand-by assistance  Bed to Chair: Contact guard assistance  Gait:     Base of Support: Center of gravity altered  Speed/Ann: Accelerated; Fluctuations  Step Length: Left shortened;Right shortened  Gait Abnormalities: Trunk sway increased;Decreased step clearance; Path deviations  Distance (ft): 250 Feet (ft)  Assistive Device: Walker, rolling;Gait belt  Ambulation - Level of Assistance: Contact guard assistance;Minimal assistance  Interventions: Verbal cues; Safety awareness training; Tactile cues  Duration: 9 Minutes      Body Structures Involved:  Muscles Body Functions Affected: Movement Related Activities and Participation Affected: [facility], Social and [place]   Number of elements that affect the Plan of Care: 4+: HIGH COMPLEXITY   CLINICAL PRESENTATION:   Presentation: Stable and uncomplicated: LOW COMPLEXITY   CLINICAL DECISION MAKIN [facility] Mobility Inpatient Short Form  How much difficulty does the patient currently have. .. Unable A Lot A Little None   1.   Turning over in bed (including adjusting bedclothes, sheets and blankets)? [] 1   [] 2   [] 3   [x] 4   2. Sitting down on and standing up from a chair with arms ( e.g., wheelchair, bedside commode, etc.)   [] 1   [] 2   [x] 3   [] 4   3. Moving from lying on back to sitting on the side of the bed? [] 1   [] 2   [] 3   [x] 4   How much help from another person does the patient currently need. .. Total A Lot A Little None   4. Moving to and from a bed to a chair (including a wheelchair)? [] 1   [] 2   [x] 3   [] 4   5. Need to walk in hospital room? [] 1   [] 2   [x] 3   [] 4   6. Climbing 3-5 steps with a railing? [] 1   [x] 2   [] 3   [] 4   © 2007, Trustees of [address] [place] [address], under license to [employer]. All rights reserved      Score:  Initial: 19 Most Recent: X (Date: -- )    Interpretation of Tool:  Represents activities that are increasingly more difficult (i.e. Bed mobility, Transfers, Gait). Medical Necessity:     Patient demonstrates   good   rehab potential due to higher previous functional level. Reason for Services/Other Comments:  Patient   continues to demonstrate capacity to improve strength, mobility, balance, transfers, and activity tolerance which will   increase independence, decrease amount of assistance required from caregiver, and increase safety  . Use of outcome tool(s) and clinical judgement create a POC that gives a: Clear prediction of patient's progress: LOW COMPLEXITY            TREATMENT:   (In addition to Assessment/Re-Assessment sessions the following treatments were rendered)   Pre-treatment Symptoms/Complaints:  \"I'm ready to go home\"  Pain: Initial:   Pain Intensity 1: 0  Post Session:  0/10     Gait Training (9 Minutes):  Gait training to improve and/or restore physical functioning as related to mobility, strength, balance, and coordination.   Ambulated 250 Feet (ft) with Contact guard assistance;Minimal assistance using a Walker, rolling;Gait belt and moderate Verbal cues;Safety awareness training; Tactile cues related to their stance phase, pelvis position and motion, and assistive device use  to promote proper body alignment, promote proper body posture, and promote proper body mechanics. Instruction in performance of gait safety, posture, body mechanics, walker management, gait speed, foot placement to correct deficits and improve safety/independence. Braces/Orthotics/Lines/Etc:   IV  O2 Device: Room air  Treatment/Session Assessment:    Response to Treatment:  pt performs mobility with CGA-min A, better with walker  Interdisciplinary Collaboration:   Physical Therapist  Registered Nurse  After treatment position/precautions:   Up in chair  Bed/Chair-wheels locked  Call light within reach  RN notified  Family at bedside   Compliance with Program/Exercises: Will assess as treatment progresses  Recommendations/Intent for next treatment session: \"Next visit will focus on advancements to more challenging activities and reduction in assistance provided\".   Total Treatment Duration:  PT Patient Time In/Time Out  Time In: 1110  Time Out: [address] DPT

## 2020-01-12 NOTE — DISCHARGE INSTRUCTIONS
Follow up with Cardiology as instructed by them for rescheduling of your pacemaker placement   Follow up with [facility] as usual, sooner if problems.

## 2020-01-12 NOTE — PROGRESS NOTES
[facility]  Face to Face Encounter    Patients Name: Sanjuana Mendez. YOB: 1935    Ordering Physician: Meli Basilio    Primary Diagnosis: Atrial fibrillation with RVR ([address] [I48.91]  Syncope [R55]    Date of Face to Face:   1/12/2020                                  Face to Face Encounter findings are related to primary reason for home care:   yes. 1. I certify that the patient needs intermittent care as follows: physical therapy: strengthening    2. I certify that this patient is homebound, that is: 1) patient requires the use of a cane device, special transportation, or assistance of another to leave the home; or 2) patient's condition makes leaving the home medically contraindicated; and 3) patient has a normal inability to leave the home and leaving the home requires considerable and taxing effort. Patient may leave the home for infrequent and short duration for medical reasons, and occasional absences for non-medical reasons. Homebound status is due to the following functional limitations: Patient with strength deficits limiting the performance of all ADL's without caregiver assistance or the use of an assistive device. 3. I certify that this patient is under my care and that I, or a nurse practitioner or  [id number], or clinical nurse specialist, or certified nurse midwife, working with me, had a Face-to-Face Encounter that meets the physician Face-to-Face Encounter requirements. The following are the clinical findings from the [address] [place] [address] encounter that support the need for skilled services and is a summary of the encounter:     See discharge summary      Cali Herron  1/12/2020      THE FOLLOWING TO BE COMPLETED BY THE COMMUNITY PHYSICIAN:    I concur with the findings described above from the [facility] encounter that this patient is homebound and in need of a skilled service.     Certifying Physician: _____________________________________      Printed Certifying Physician Name: _____________________________________    Date: _________________

## 2020-01-12 NOTE — PROGRESS NOTES
Care Management Interventions  PCP Verified by CM: Yes  Transition of Care Consult (CM Consult): [address]: Yes  Physical Therapy Consult: Yes  Occupational Therapy Consult: No  Current Support Network: Lives with Spouse  Confirm Follow Up Transport: Family  The Plan for Transition of Care is Related to the Following Treatment Goals : HH PT-strengthening  The Patient and/or Patient Representative was Provided with a Choice of Provider and Agrees with the Discharge Plan?: Yes  Name of the Patient Representative Who was Provided with a Choice of Provider and Agrees with the Discharge Plan: brittany jc  Freedom of Choice List was Provided with Basic Dialogue that Supports the Patient's Individualized Plan of Care/Goals, Treatment Preferences and Shares the Quality Data Associated with the Providers?: Yes  [place] Resource Information Provided?: No  Discharge Location  Discharge Placement: Home with infusion therapy  Patient is discharging with JOSE ANTONIO Munson RN.

## 2020-01-14 ENCOUNTER — HOME CARE VISIT (OUTPATIENT)
Dept: SCHEDULING | Facility: HOME HEALTH | Age: 85
End: 2020-01-14
Payer: MEDICARE

## 2020-01-14 VITALS
HEART RATE: 92 BPM | SYSTOLIC BLOOD PRESSURE: 108 MMHG | TEMPERATURE: 98.1 F | RESPIRATION RATE: 18 BRPM | DIASTOLIC BLOOD PRESSURE: 70 MMHG

## 2020-01-14 PROCEDURE — 3331090002 HH PPS REVENUE DEBIT

## 2020-01-14 PROCEDURE — G0151 HHCP-SERV OF PT,EA 15 MIN: HCPCS

## 2020-01-14 PROCEDURE — 400013 HH SOC

## 2020-01-14 PROCEDURE — 3331090001 HH PPS REVENUE CREDIT

## 2020-01-15 PROCEDURE — 3331090002 HH PPS REVENUE DEBIT

## 2020-01-15 PROCEDURE — 3331090001 HH PPS REVENUE CREDIT

## 2020-01-15 NOTE — PROGRESS NOTES
[facility]  Face to Face Encounter    Patients Name: Curtis Lockwood YOB: 1935    Ordering Physician: Dr. Jonathan Dennis  Primary Diagnosis: Atrial fibrillation with RVR ([facility] [address] [I48.91]  Syncope [R55]    Date of Face to Face:   1/2/2020                                 Face to Face Encounter findings are related to primary reason for home care:   yes. 1. I certify that the patient needs intermittent care as follows: physical therapy: strengthening    2. I certify that this patient is homebound, that is: 1) patient requires the use of a cane device, special transportation, or assistance of another to leave the home; or 2) patient's condition makes leaving the home medically contraindicated; and 3) patient has a normal inability to leave the home and leaving the home requires considerable and taxing effort. Patient may leave the home for infrequent and short duration for medical reasons, and occasional absences for non-medical reasons. Homebound status is due to the following functional limitations: Patient with strength deficits limiting the performance of all ADL's without caregiver assistance or the use of an assistive device. 3. I certify that this patient is under my care and that I, or a nurse practitioner or  [id number], or clinical nurse specialist, or certified nurse midwife, working with me, had a Face-to-Face Encounter that meets the physician Face-to-Face Encounter requirements. The following are the clinical findings from the [address] [place] [address] encounter that support the need for skilled services and is a summary of the encounter:     See discharge summary      Cali [facility]  1/15/2020      THE FOLLOWING TO BE COMPLETED BY THE COMMUNITY PHYSICIAN:    I concur with the findings described above from the [facility] encounter that this patient is homebound and in need of a skilled service.     Certifying Physician: _____________________________________      Printed Certifying Physician Name: _____________________________________    Date: _________________

## 2020-01-16 PROCEDURE — 3331090001 HH PPS REVENUE CREDIT

## 2020-01-16 PROCEDURE — 3331090002 HH PPS REVENUE DEBIT

## 2020-01-17 ENCOUNTER — HOME CARE VISIT (OUTPATIENT)
Dept: SCHEDULING | Facility: HOME HEALTH | Age: 85
End: 2020-01-17
Payer: MEDICARE

## 2020-01-17 VITALS
RESPIRATION RATE: 16 BRPM | SYSTOLIC BLOOD PRESSURE: 128 MMHG | DIASTOLIC BLOOD PRESSURE: 80 MMHG | TEMPERATURE: 98 F | HEART RATE: 81 BPM

## 2020-01-17 PROCEDURE — G0157 HHC PT ASSISTANT EA 15: HCPCS

## 2020-01-17 PROCEDURE — 3331090001 HH PPS REVENUE CREDIT

## 2020-01-17 PROCEDURE — 3331090002 HH PPS REVENUE DEBIT

## 2020-01-18 PROCEDURE — 3331090002 HH PPS REVENUE DEBIT

## 2020-01-18 PROCEDURE — 3331090001 HH PPS REVENUE CREDIT

## 2020-01-19 PROCEDURE — 3331090002 HH PPS REVENUE DEBIT

## 2020-01-19 PROCEDURE — 3331090001 HH PPS REVENUE CREDIT

## 2020-01-20 PROCEDURE — 3331090002 HH PPS REVENUE DEBIT

## 2020-01-20 PROCEDURE — 3331090001 HH PPS REVENUE CREDIT

## 2020-01-21 PROCEDURE — 3331090002 HH PPS REVENUE DEBIT

## 2020-01-21 PROCEDURE — 3331090001 HH PPS REVENUE CREDIT

## 2020-01-22 PROCEDURE — 3331090002 HH PPS REVENUE DEBIT

## 2020-01-22 PROCEDURE — 3331090001 HH PPS REVENUE CREDIT

## 2020-01-23 ENCOUNTER — HOME CARE VISIT (OUTPATIENT)
Dept: SCHEDULING | Facility: HOME HEALTH | Age: 85
End: 2020-01-23
Payer: MEDICARE

## 2020-01-23 VITALS
TEMPERATURE: 97.5 F | SYSTOLIC BLOOD PRESSURE: 112 MMHG | RESPIRATION RATE: 16 BRPM | HEART RATE: 68 BPM | DIASTOLIC BLOOD PRESSURE: 70 MMHG

## 2020-01-23 PROCEDURE — 3331090002 HH PPS REVENUE DEBIT

## 2020-01-23 PROCEDURE — 3331090003 HH PPS REVENUE ADJ

## 2020-01-23 PROCEDURE — 3331090001 HH PPS REVENUE CREDIT

## 2020-01-23 PROCEDURE — G0151 HHCP-SERV OF PT,EA 15 MIN: HCPCS

## 2020-01-28 NOTE — PROGRESS NOTES
[facility] - INPATIENT  Face to Face Encounter    Patients Name: Reagan Bella. YOB: 1935    Ordering Physician: Dr. Hui Gill  Primary Diagnosis: Atrial fibrillation with RVR ([facility] [address] [I48.91]  Syncope [R55]    Date of Face to Face:  1/12/2020                           Face to Face Encounter findings are related to primary reason for home care:   yes. 1. I certify that the patient needs intermittent care as follows: physical therapy: strengthening    2. I certify that this patient is homebound, that is: 1) patient requires the use of a cane device, special transportation, or assistance of another to leave the home; or 2) patient's condition makes leaving the home medically contraindicated; and 3) patient has a normal inability to leave the home and leaving the home requires considerable and taxing effort. Patient may leave the home for infrequent and short duration for medical reasons, and occasional absences for non-medical reasons. Homebound status is due to the following functional limitations: Patient with strength deficits limiting the performance of all ADL's without caregiver assistance or the use of an assistive device. 3. I certify that this patient is under my care and that I, or a nurse practitioner or  [id number], or clinical nurse specialist, or certified nurse midwife, working with me, had a Face-to-Face Encounter that meets the physician Face-to-Face Encounter requirements. The following are the clinical findings from the [address] [place] [address] encounter that support the need for skilled services and is a summary of the encounter:     See discharge summary      Cali Machado  1/28/2020      THE FOLLOWING TO BE COMPLETED BY THE COMMUNITY PHYSICIAN:    I concur with the findings described above from the [facility] encounter that this patient is homebound and in need of a skilled service.     Certifying Physician: _____________________________________      Printed Certifying Physician Name: _____________________________________    Date: _________________

## 2020-02-10 ENCOUNTER — HOSPITAL ENCOUNTER (OUTPATIENT)
Dept: LAB | Age: 85
Discharge: HOME | End: 2020-02-10
Payer: MEDICARE

## 2020-02-10 DIAGNOSIS — I48.19 PERSISTENT ATRIAL FIBRILLATION (HCC): ICD-10-CM

## 2020-02-10 DIAGNOSIS — I42.8 NICM (NONISCHEMIC CARDIOMYOPATHY) (HCC): ICD-10-CM

## 2020-02-10 LAB
ANION GAP SERPL CALC-SCNC: 7 MMOL/L (ref 7–16)
BASOPHILS # BLD: 0 K/UL (ref 0–0.2)
BASOPHILS NFR BLD: 0 % (ref 0–2)
BUN SERPL-MCNC: 21 MG/DL (ref 8–23)
CALCIUM SERPL-MCNC: 8.6 MG/DL (ref 8.3–10.4)
CHLORIDE SERPL-SCNC: 108 MMOL/L (ref 98–107)
CO2 SERPL-SCNC: 28 MMOL/L (ref 21–32)
CREAT SERPL-MCNC: 1.2 MG/DL (ref 0.8–1.5)
DIFFERENTIAL METHOD BLD: ABNORMAL
EOSINOPHIL # BLD: 0.2 K/UL (ref 0–0.8)
EOSINOPHIL NFR BLD: 3 % (ref 0.5–7.8)
ERYTHROCYTE [DISTWIDTH] IN BLOOD BY AUTOMATED COUNT: 18.2 % (ref 11.9–14.6)
GLUCOSE SERPL-MCNC: 79 MG/DL (ref 65–100)
HCT VFR BLD AUTO: 33.7 % (ref 41.1–50.3)
HGB BLD-MCNC: 10 G/DL (ref 13.6–17.2)
IMM GRANULOCYTES # BLD AUTO: 0 K/UL (ref 0–0.5)
IMM GRANULOCYTES NFR BLD AUTO: 0 % (ref 0–5)
LYMPHOCYTES # BLD: 1.8 K/UL (ref 0.5–4.6)
LYMPHOCYTES NFR BLD: 27 % (ref 13–44)
MAGNESIUM SERPL-MCNC: 2.1 MG/DL (ref 1.8–2.4)
MCH RBC QN AUTO: 24.2 PG (ref 26.1–32.9)
MCHC RBC AUTO-ENTMCNC: 29.7 G/DL (ref 31.4–35)
MCV RBC AUTO: 81.4 FL (ref 79.6–97.8)
MONOCYTES # BLD: 0.7 K/UL (ref 0.1–1.3)
MONOCYTES NFR BLD: 10 % (ref 4–12)
NEUTS SEG # BLD: 4 K/UL (ref 1.7–8.2)
NEUTS SEG NFR BLD: 60 % (ref 43–78)
NRBC # BLD: 0 K/UL (ref 0–0.2)
PLATELET # BLD AUTO: 184 K/UL (ref 150–450)
PMV BLD AUTO: 9.1 FL (ref 9.4–12.3)
POTASSIUM SERPL-SCNC: 3.8 MMOL/L (ref 3.5–5.1)
RBC # BLD AUTO: 4.14 M/UL (ref 4.23–5.6)
SODIUM SERPL-SCNC: 143 MMOL/L (ref 136–145)
WBC # BLD AUTO: 6.7 K/UL (ref 4.3–11.1)

## 2020-02-10 PROCEDURE — 83735 ASSAY OF MAGNESIUM: CPT

## 2020-02-10 PROCEDURE — 85025 COMPLETE CBC W/AUTO DIFF WBC: CPT

## 2020-02-10 PROCEDURE — 80048 BASIC METABOLIC PNL TOTAL CA: CPT

## 2020-02-10 PROCEDURE — 36415 COLL VENOUS BLD VENIPUNCTURE: CPT

## 2020-02-13 NOTE — PROGRESS NOTES
Patient pre-assessment complete for BIV ICD & AV Node ablation with Dr Elva Uriarte scheduled for 20 at 1:30pm, arrival time 11:30am. Patient verified using . Patient instructed to bring all home medications in labeled bottles on the day of procedure. NPO status reinforced. Patient instructed to HOLD eliquis (last dose 20) & HOLD lasix in am . Instructed they can take all other medications excluding vitamins & supplements. Patient verbalizes understanding of all instructions & denies any questions at this time.

## 2020-02-14 ENCOUNTER — ANESTHESIA EVENT (OUTPATIENT)
Dept: SURGERY | Age: 85
End: 2020-02-14
Payer: MEDICARE

## 2020-02-14 ENCOUNTER — HOSPITAL ENCOUNTER (OUTPATIENT)
Age: 85
Discharge: HOME | End: 2020-02-15
Attending: INTERNAL MEDICINE | Admitting: INTERNAL MEDICINE
Payer: MEDICARE

## 2020-02-14 ENCOUNTER — ANESTHESIA (OUTPATIENT)
Dept: SURGERY | Age: 85
End: 2020-02-14
Payer: MEDICARE

## 2020-02-14 ENCOUNTER — APPOINTMENT (OUTPATIENT)
Dept: CARDIAC CATH/INVASIVE PROCEDURES | Age: 85
End: 2020-02-14
Payer: MEDICARE

## 2020-02-14 ENCOUNTER — APPOINTMENT (OUTPATIENT)
Dept: GENERAL RADIOLOGY | Age: 85
End: 2020-02-14
Attending: INTERNAL MEDICINE
Payer: MEDICARE

## 2020-02-14 DIAGNOSIS — Z95.0 PACEMAKER: Primary | ICD-10-CM

## 2020-02-14 PROBLEM — I42.8 NICM (NONISCHEMIC CARDIOMYOPATHY) (HCC): Status: ACTIVE | Noted: 2020-02-14

## 2020-02-14 LAB
ALBUMIN SERPL-MCNC: 3.5 G/DL (ref 3.2–4.6)
ALBUMIN/GLOB SERPL: 1.1 {RATIO} (ref 1.2–3.5)
ALP SERPL-CCNC: 83 U/L (ref 50–136)
ALT SERPL-CCNC: 43 U/L (ref 12–65)
ANION GAP SERPL CALC-SCNC: 8 MMOL/L (ref 7–16)
AST SERPL-CCNC: 34 U/L (ref 15–37)
ATRIAL RATE: 120 BPM
ATRIAL RATE: 64 BPM
BILIRUB SERPL-MCNC: 0.5 MG/DL (ref 0.2–1.1)
BUN SERPL-MCNC: 18 MG/DL (ref 8–23)
CALCIUM SERPL-MCNC: 8.9 MG/DL (ref 8.3–10.4)
CALCULATED R AXIS, ECG10: -70 DEGREES
CALCULATED R AXIS, ECG10: 12 DEGREES
CALCULATED T AXIS, ECG11: -167 DEGREES
CALCULATED T AXIS, ECG11: 78 DEGREES
CHLORIDE SERPL-SCNC: 109 MMOL/L (ref 98–107)
CO2 SERPL-SCNC: 25 MMOL/L (ref 21–32)
CREAT SERPL-MCNC: 1.28 MG/DL (ref 0.8–1.5)
DIAGNOSIS, 93000: NORMAL
DIAGNOSIS, 93000: NORMAL
ERYTHROCYTE [DISTWIDTH] IN BLOOD BY AUTOMATED COUNT: 18.3 % (ref 11.9–14.6)
GLOBULIN SER CALC-MCNC: 3.1 G/DL (ref 2.3–3.5)
GLUCOSE SERPL-MCNC: 83 MG/DL (ref 65–100)
HCT VFR BLD AUTO: 35.8 % (ref 41.1–50.3)
HGB BLD-MCNC: 10.6 G/DL (ref 13.6–17.2)
INR PPP: 1
MCH RBC QN AUTO: 24.4 PG (ref 26.1–32.9)
MCHC RBC AUTO-ENTMCNC: 29.6 G/DL (ref 31.4–35)
MCV RBC AUTO: 82.5 FL (ref 79.6–97.8)
NRBC # BLD: 0 K/UL (ref 0–0.2)
PLATELET # BLD AUTO: 199 K/UL (ref 150–450)
PMV BLD AUTO: 9.7 FL (ref 9.4–12.3)
POTASSIUM SERPL-SCNC: 3.7 MMOL/L (ref 3.5–5.1)
PROT SERPL-MCNC: 6.6 G/DL (ref 6.3–8.2)
PROTHROMBIN TIME: 13 SEC (ref 12–14.7)
Q-T INTERVAL, ECG07: 370 MS
Q-T INTERVAL, ECG07: 478 MS
QRS DURATION, ECG06: 102 MS
QRS DURATION, ECG06: 138 MS
QTC CALCULATION (BEZET), ECG08: 457 MS
QTC CALCULATION (BEZET), ECG08: 551 MS
RBC # BLD AUTO: 4.34 M/UL (ref 4.23–5.6)
SODIUM SERPL-SCNC: 142 MMOL/L (ref 136–145)
VENTRICULAR RATE, ECG03: 80 BPM
VENTRICULAR RATE, ECG03: 92 BPM
WBC # BLD AUTO: 8.2 K/UL (ref 4.3–11.1)

## 2020-02-14 PROCEDURE — C1769 GUIDE WIRE: HCPCS

## 2020-02-14 PROCEDURE — 77030035291 HC TBNG PMP SMARTABLATE J&J -B

## 2020-02-14 PROCEDURE — 74011636320 HC RX REV CODE- 636/320: Performed by: INTERNAL MEDICINE

## 2020-02-14 PROCEDURE — C1893 INTRO/SHEATH, FIXED,NON-PEEL: HCPCS

## 2020-02-14 PROCEDURE — 77030031139 HC SUT VCRL2 J&J -A

## 2020-02-14 PROCEDURE — 77030022704 HC SUT VLOC COVD -B

## 2020-02-14 PROCEDURE — C1882 AICD, OTHER THAN SING/DUAL: HCPCS

## 2020-02-14 PROCEDURE — C1894 INTRO/SHEATH, NON-LASER: HCPCS

## 2020-02-14 PROCEDURE — 74011250636 HC RX REV CODE- 250/636: Performed by: INTERNAL MEDICINE

## 2020-02-14 PROCEDURE — 74011000250 HC RX REV CODE- 250: Performed by: NURSE ANESTHETIST, CERTIFIED REGISTERED

## 2020-02-14 PROCEDURE — 33235 REMOVAL PACEMAKER ELECTRODE: CPT

## 2020-02-14 PROCEDURE — 33241 REMOVE PULSE GENERATOR: CPT

## 2020-02-14 PROCEDURE — 93650 ICAR CATH ABLTJ AV NODE FUNC: CPT

## 2020-02-14 PROCEDURE — C1887 CATHETER, GUIDING: HCPCS

## 2020-02-14 PROCEDURE — 74011250636 HC RX REV CODE- 250/636: Performed by: NURSE ANESTHETIST, CERTIFIED REGISTERED

## 2020-02-14 PROCEDURE — 80053 COMPREHEN METABOLIC PANEL: CPT

## 2020-02-14 PROCEDURE — C1892 INTRO/SHEATH,FIXED,PEEL-AWAY: HCPCS

## 2020-02-14 PROCEDURE — C1732 CATH, EP, DIAG/ABL, 3D/VECT: HCPCS

## 2020-02-14 PROCEDURE — 93005 ELECTROCARDIOGRAM TRACING: CPT | Performed by: INTERNAL MEDICINE

## 2020-02-14 PROCEDURE — 85610 PROTHROMBIN TIME: CPT

## 2020-02-14 PROCEDURE — 77030002912 HC SUT ETHBND J&J -A

## 2020-02-14 PROCEDURE — 77030041279 HC DRSG PRMSL AG MDII -B

## 2020-02-14 PROCEDURE — 77030008462 HC STPLR SKN PROX J&J -A

## 2020-02-14 PROCEDURE — 74011250637 HC RX REV CODE- 250/637: Performed by: INTERNAL MEDICINE

## 2020-02-14 PROCEDURE — 74011250636 HC RX REV CODE- 250/636: Performed by: ANESTHESIOLOGY

## 2020-02-14 PROCEDURE — 33233 REMOVAL OF PM GENERATOR: CPT

## 2020-02-14 PROCEDURE — 71045 X-RAY EXAM CHEST 1 VIEW: CPT

## 2020-02-14 PROCEDURE — 74011000272 HC RX REV CODE- 272: Performed by: INTERNAL MEDICINE

## 2020-02-14 PROCEDURE — C1900 LEAD, CORONARY VENOUS: HCPCS

## 2020-02-14 PROCEDURE — 33249 INSJ/RPLCMT DEFIB W/LEAD(S): CPT

## 2020-02-14 PROCEDURE — C1777 LEAD, AICD, ENDO SINGLE COIL: HCPCS

## 2020-02-14 PROCEDURE — 77030028698 HC BLD TISS PLSM MEDT -D

## 2020-02-14 PROCEDURE — A4565 SLINGS: HCPCS

## 2020-02-14 PROCEDURE — 77030013687 HC GD NDL BARD -B

## 2020-02-14 PROCEDURE — 33225 L VENTRIC PACING LEAD ADD-ON: CPT

## 2020-02-14 PROCEDURE — 76060000035 HC ANESTHESIA 2 TO 2.5 HR: Performed by: INTERNAL MEDICINE

## 2020-02-14 PROCEDURE — 74011000250 HC RX REV CODE- 250: Performed by: INTERNAL MEDICINE

## 2020-02-14 PROCEDURE — 85027 COMPLETE CBC AUTOMATED: CPT

## 2020-02-14 PROCEDURE — 99218 HC RM OBSERVATION: CPT

## 2020-02-14 RX ORDER — FENTANYL CITRATE 50 UG/ML
INJECTION, SOLUTION INTRAMUSCULAR; INTRAVENOUS AS NEEDED
Status: DISCONTINUED | OUTPATIENT
Start: 2020-02-14 | End: 2020-02-14 | Stop reason: HOSPADM

## 2020-02-14 RX ORDER — HYDROMORPHONE HYDROCHLORIDE 2 MG/ML
0.5 INJECTION, SOLUTION INTRAMUSCULAR; INTRAVENOUS; SUBCUTANEOUS
Status: DISCONTINUED | OUTPATIENT
Start: 2020-02-14 | End: 2020-02-14 | Stop reason: ALTCHOICE

## 2020-02-14 RX ORDER — ACETAMINOPHEN 500 MG
1000 TABLET ORAL
Status: DISCONTINUED | OUTPATIENT
Start: 2020-02-14 | End: 2020-02-14 | Stop reason: SDUPTHER

## 2020-02-14 RX ORDER — LIDOCAINE HYDROCHLORIDE 10 MG/ML
0.1 INJECTION INFILTRATION; PERINEURAL AS NEEDED
Status: DISCONTINUED | OUTPATIENT
Start: 2020-02-14 | End: 2020-02-14

## 2020-02-14 RX ORDER — HEPARIN SODIUM 200 [USP'U]/100ML
1000 INJECTION, SOLUTION INTRAVENOUS AS NEEDED
Status: DISCONTINUED | OUTPATIENT
Start: 2020-02-14 | End: 2020-02-14

## 2020-02-14 RX ORDER — LIDOCAINE HYDROCHLORIDE 20 MG/ML
INJECTION, SOLUTION EPIDURAL; INFILTRATION; INTRACAUDAL; PERINEURAL AS NEEDED
Status: DISCONTINUED | OUTPATIENT
Start: 2020-02-14 | End: 2020-02-14 | Stop reason: HOSPADM

## 2020-02-14 RX ORDER — CLINDAMYCIN PHOSPHATE 900 MG/50ML
900 INJECTION INTRAVENOUS ONCE
Status: COMPLETED | OUTPATIENT
Start: 2020-02-14 | End: 2020-02-14

## 2020-02-14 RX ORDER — ACETAMINOPHEN 325 MG/1
650 TABLET ORAL
Status: DISCONTINUED | OUTPATIENT
Start: 2020-02-14 | End: 2020-02-15 | Stop reason: HOSPADM

## 2020-02-14 RX ORDER — LIDOCAINE HYDROCHLORIDE AND EPINEPHRINE 10; 10 MG/ML; UG/ML
1.5 INJECTION, SOLUTION INFILTRATION; PERINEURAL
Status: DISCONTINUED | OUTPATIENT
Start: 2020-02-14 | End: 2020-02-14

## 2020-02-14 RX ORDER — FENTANYL CITRATE 50 UG/ML
100 INJECTION, SOLUTION INTRAMUSCULAR; INTRAVENOUS ONCE
Status: DISCONTINUED | OUTPATIENT
Start: 2020-02-14 | End: 2020-02-14

## 2020-02-14 RX ORDER — SODIUM CHLORIDE, SODIUM LACTATE, POTASSIUM CHLORIDE, CALCIUM CHLORIDE 600; 310; 30; 20 MG/100ML; MG/100ML; MG/100ML; MG/100ML
INJECTION, SOLUTION INTRAVENOUS
Status: DISCONTINUED | OUTPATIENT
Start: 2020-02-14 | End: 2020-02-14 | Stop reason: HOSPADM

## 2020-02-14 RX ORDER — HYDROCODONE BITARTRATE AND ACETAMINOPHEN 5; 325 MG/1; MG/1
1 TABLET ORAL
Status: DISCONTINUED | OUTPATIENT
Start: 2020-02-14 | End: 2020-02-15 | Stop reason: HOSPADM

## 2020-02-14 RX ORDER — METOPROLOL SUCCINATE 25 MG/1
12.5 TABLET, EXTENDED RELEASE ORAL DAILY
Status: DISCONTINUED | OUTPATIENT
Start: 2020-02-15 | End: 2020-02-15 | Stop reason: HOSPADM

## 2020-02-14 RX ORDER — PROPOFOL 10 MG/ML
INJECTION, EMULSION INTRAVENOUS AS NEEDED
Status: DISCONTINUED | OUTPATIENT
Start: 2020-02-14 | End: 2020-02-14 | Stop reason: HOSPADM

## 2020-02-14 RX ORDER — ATORVASTATIN CALCIUM 40 MG/1
80 TABLET, FILM COATED ORAL
Status: DISCONTINUED | OUTPATIENT
Start: 2020-02-14 | End: 2020-02-15 | Stop reason: HOSPADM

## 2020-02-14 RX ORDER — FAMOTIDINE 20 MG/1
20 TABLET, FILM COATED ORAL ONCE
Status: DISCONTINUED | OUTPATIENT
Start: 2020-02-14 | End: 2020-02-14

## 2020-02-14 RX ORDER — SODIUM CHLORIDE 0.9 % (FLUSH) 0.9 %
5-40 SYRINGE (ML) INJECTION EVERY 8 HOURS
Status: DISCONTINUED | OUTPATIENT
Start: 2020-02-14 | End: 2020-02-15 | Stop reason: HOSPADM

## 2020-02-14 RX ORDER — LEVOTHYROXINE SODIUM 50 UG/1
50 TABLET ORAL DAILY
Status: DISCONTINUED | OUTPATIENT
Start: 2020-02-15 | End: 2020-02-15 | Stop reason: HOSPADM

## 2020-02-14 RX ORDER — PROPOFOL 10 MG/ML
INJECTION, EMULSION INTRAVENOUS
Status: DISCONTINUED | OUTPATIENT
Start: 2020-02-14 | End: 2020-02-14 | Stop reason: HOSPADM

## 2020-02-14 RX ORDER — CLINDAMYCIN PHOSPHATE 900 MG/50ML
900 INJECTION INTRAVENOUS EVERY 8 HOURS
Status: COMPLETED | OUTPATIENT
Start: 2020-02-14 | End: 2020-02-15

## 2020-02-14 RX ORDER — FUROSEMIDE 40 MG/1
20 TABLET ORAL EVERY OTHER DAY
Status: DISCONTINUED | OUTPATIENT
Start: 2020-02-14 | End: 2020-02-15 | Stop reason: HOSPADM

## 2020-02-14 RX ORDER — HYDROCODONE BITARTRATE AND ACETAMINOPHEN 5; 325 MG/1; MG/1
1 TABLET ORAL AS NEEDED
Status: DISCONTINUED | OUTPATIENT
Start: 2020-02-14 | End: 2020-02-14 | Stop reason: SDUPTHER

## 2020-02-14 RX ORDER — PREDNISONE 5 MG/1
5 TABLET ORAL DAILY
Status: DISCONTINUED | OUTPATIENT
Start: 2020-02-15 | End: 2020-02-15 | Stop reason: HOSPADM

## 2020-02-14 RX ORDER — SODIUM CHLORIDE 0.9 % (FLUSH) 0.9 %
5-40 SYRINGE (ML) INJECTION AS NEEDED
Status: DISCONTINUED | OUTPATIENT
Start: 2020-02-14 | End: 2020-02-15 | Stop reason: HOSPADM

## 2020-02-14 RX ORDER — MIDAZOLAM HYDROCHLORIDE 1 MG/ML
2 INJECTION, SOLUTION INTRAMUSCULAR; INTRAVENOUS
Status: DISCONTINUED | OUTPATIENT
Start: 2020-02-14 | End: 2020-02-14

## 2020-02-14 RX ORDER — MORPHINE SULFATE 10 MG/ML
2 INJECTION, SOLUTION INTRAMUSCULAR; INTRAVENOUS
Status: DISCONTINUED | OUTPATIENT
Start: 2020-02-14 | End: 2020-02-15 | Stop reason: HOSPADM

## 2020-02-14 RX ORDER — SODIUM CHLORIDE, SODIUM LACTATE, POTASSIUM CHLORIDE, CALCIUM CHLORIDE 600; 310; 30; 20 MG/100ML; MG/100ML; MG/100ML; MG/100ML
150 INJECTION, SOLUTION INTRAVENOUS CONTINUOUS
Status: DISCONTINUED | OUTPATIENT
Start: 2020-02-14 | End: 2020-02-15 | Stop reason: HOSPADM

## 2020-02-14 RX ORDER — SODIUM CHLORIDE, SODIUM LACTATE, POTASSIUM CHLORIDE, CALCIUM CHLORIDE 600; 310; 30; 20 MG/100ML; MG/100ML; MG/100ML; MG/100ML
150 INJECTION, SOLUTION INTRAVENOUS CONTINUOUS
Status: DISCONTINUED | OUTPATIENT
Start: 2020-02-14 | End: 2020-02-14

## 2020-02-14 RX ORDER — SODIUM CHLORIDE 9 MG/ML
75 INJECTION, SOLUTION INTRAVENOUS CONTINUOUS
Status: DISCONTINUED | OUTPATIENT
Start: 2020-02-14 | End: 2020-02-14

## 2020-02-14 RX ORDER — SODIUM CHLORIDE 9 MG/ML
50 INJECTION, SOLUTION INTRAVENOUS CONTINUOUS
Status: DISCONTINUED | OUTPATIENT
Start: 2020-02-14 | End: 2020-02-15 | Stop reason: HOSPADM

## 2020-02-14 RX ADMIN — SODIUM CHLORIDE 50 ML/HR: 900 INJECTION, SOLUTION INTRAVENOUS at 18:54

## 2020-02-14 RX ADMIN — PROPOFOL 10 MG: 10 INJECTION, EMULSION INTRAVENOUS at 15:18

## 2020-02-14 RX ADMIN — FENTANYL CITRATE 12.5 MCG: 50 INJECTION INTRAMUSCULAR; INTRAVENOUS at 15:26

## 2020-02-14 RX ADMIN — PHENYLEPHRINE HYDROCHLORIDE 60 MCG: 10 INJECTION INTRAVENOUS at 14:52

## 2020-02-14 RX ADMIN — CLINDAMYCIN PHOSPHATE 900 MG: 900 INJECTION, SOLUTION INTRAVENOUS at 14:25

## 2020-02-14 RX ADMIN — PHENYLEPHRINE HYDROCHLORIDE 60 MCG: 10 INJECTION INTRAVENOUS at 16:12

## 2020-02-14 RX ADMIN — PHENYLEPHRINE HYDROCHLORIDE 60 MCG: 10 INJECTION INTRAVENOUS at 15:43

## 2020-02-14 RX ADMIN — PROPOFOL 10 MG: 10 INJECTION, EMULSION INTRAVENOUS at 14:59

## 2020-02-14 RX ADMIN — PROPOFOL 120 MCG/KG/MIN: 10 INJECTION, EMULSION INTRAVENOUS at 14:35

## 2020-02-14 RX ADMIN — PHENYLEPHRINE HYDROCHLORIDE 60 MCG: 10 INJECTION INTRAVENOUS at 15:31

## 2020-02-14 RX ADMIN — PHENYLEPHRINE HYDROCHLORIDE 60 MCG: 10 INJECTION INTRAVENOUS at 14:54

## 2020-02-14 RX ADMIN — SODIUM CHLORIDE, SODIUM LACTATE, POTASSIUM CHLORIDE, AND CALCIUM CHLORIDE: 600; 310; 30; 20 INJECTION, SOLUTION INTRAVENOUS at 13:05

## 2020-02-14 RX ADMIN — PHENYLEPHRINE HYDROCHLORIDE 60 MCG: 10 INJECTION INTRAVENOUS at 15:45

## 2020-02-14 RX ADMIN — PROPOFOL 10 MG: 10 INJECTION, EMULSION INTRAVENOUS at 15:20

## 2020-02-14 RX ADMIN — PROPOFOL 10 MG: 10 INJECTION, EMULSION INTRAVENOUS at 14:34

## 2020-02-14 RX ADMIN — LIDOCAINE HYDROCHLORIDE 40 MG: 20 INJECTION, SOLUTION EPIDURAL; INFILTRATION; INTRACAUDAL; PERINEURAL at 14:34

## 2020-02-14 RX ADMIN — PROPOFOL 10 MG: 10 INJECTION, EMULSION INTRAVENOUS at 16:07

## 2020-02-14 RX ADMIN — PHENYLEPHRINE HYDROCHLORIDE 60 MCG: 10 INJECTION INTRAVENOUS at 15:25

## 2020-02-14 RX ADMIN — FENTANYL CITRATE 12.5 MCG: 50 INJECTION INTRAMUSCULAR; INTRAVENOUS at 15:45

## 2020-02-14 RX ADMIN — LIDOCAINE HYDROCHLORIDE,EPINEPHRINE BITARTRATE 15 MG: 10; .01 INJECTION, SOLUTION INFILTRATION; PERINEURAL at 15:37

## 2020-02-14 RX ADMIN — PHENYLEPHRINE HYDROCHLORIDE 60 MCG: 10 INJECTION INTRAVENOUS at 16:10

## 2020-02-14 RX ADMIN — FENTANYL CITRATE 12.5 MCG: 50 INJECTION INTRAMUSCULAR; INTRAVENOUS at 15:14

## 2020-02-14 RX ADMIN — PHENYLEPHRINE HYDROCHLORIDE 60 MCG: 10 INJECTION INTRAVENOUS at 15:14

## 2020-02-14 RX ADMIN — IOPAMIDOL 43 ML: 755 INJECTION, SOLUTION INTRAVENOUS at 15:37

## 2020-02-14 RX ADMIN — PHENYLEPHRINE HYDROCHLORIDE 60 MCG: 10 INJECTION INTRAVENOUS at 15:13

## 2020-02-14 RX ADMIN — PHENYLEPHRINE HYDROCHLORIDE 60 MCG: 10 INJECTION INTRAVENOUS at 15:02

## 2020-02-14 RX ADMIN — PHENYLEPHRINE HYDROCHLORIDE 60 MCG: 10 INJECTION INTRAVENOUS at 15:05

## 2020-02-14 RX ADMIN — PHENYLEPHRINE HYDROCHLORIDE 60 MCG: 10 INJECTION INTRAVENOUS at 15:01

## 2020-02-14 RX ADMIN — NEOMYCIN AND POLYMYXIN B SULFATES: 40; 200000 SOLUTION IRRIGATION at 15:38

## 2020-02-14 RX ADMIN — FENTANYL CITRATE 12.5 MCG: 50 INJECTION INTRAMUSCULAR; INTRAVENOUS at 15:08

## 2020-02-14 RX ADMIN — PHENYLEPHRINE HYDROCHLORIDE 60 MCG: 10 INJECTION INTRAVENOUS at 15:32

## 2020-02-14 RX ADMIN — PHENYLEPHRINE HYDROCHLORIDE 120 MCG: 10 INJECTION INTRAVENOUS at 14:46

## 2020-02-14 RX ADMIN — PHENYLEPHRINE HYDROCHLORIDE 60 MCG: 10 INJECTION INTRAVENOUS at 15:39

## 2020-02-14 RX ADMIN — FUROSEMIDE 20 MG: 40 TABLET ORAL at 18:54

## 2020-02-14 RX ADMIN — PHENYLEPHRINE HYDROCHLORIDE 60 MCG: 10 INJECTION INTRAVENOUS at 16:14

## 2020-02-14 RX ADMIN — FENTANYL CITRATE 25 MCG: 50 INJECTION INTRAMUSCULAR; INTRAVENOUS at 14:31

## 2020-02-14 RX ADMIN — Medication 10 ML: at 21:42

## 2020-02-14 RX ADMIN — PHENYLEPHRINE HYDROCHLORIDE 60 MCG: 10 INJECTION INTRAVENOUS at 15:20

## 2020-02-14 RX ADMIN — FENTANYL CITRATE 12.5 MCG: 50 INJECTION INTRAMUSCULAR; INTRAVENOUS at 15:35

## 2020-02-14 RX ADMIN — PHENYLEPHRINE HYDROCHLORIDE 60 MCG: 10 INJECTION INTRAVENOUS at 15:19

## 2020-02-14 RX ADMIN — PHENYLEPHRINE HYDROCHLORIDE 120 MCG: 10 INJECTION INTRAVENOUS at 14:50

## 2020-02-14 RX ADMIN — PROPOFOL 10 MG: 10 INJECTION, EMULSION INTRAVENOUS at 14:40

## 2020-02-14 RX ADMIN — PHENYLEPHRINE HYDROCHLORIDE 60 MCG: 10 INJECTION INTRAVENOUS at 15:04

## 2020-02-14 RX ADMIN — PHENYLEPHRINE HYDROCHLORIDE 60 MCG: 10 INJECTION INTRAVENOUS at 15:27

## 2020-02-14 RX ADMIN — FENTANYL CITRATE 12.5 MCG: 50 INJECTION INTRAMUSCULAR; INTRAVENOUS at 15:20

## 2020-02-14 RX ADMIN — HEPARIN SODIUM 2000 UNITS: 200 INJECTION, SOLUTION INTRAVENOUS at 16:00

## 2020-02-14 RX ADMIN — CLINDAMYCIN PHOSPHATE 900 MG: 900 INJECTION, SOLUTION INTRAVENOUS at 21:38

## 2020-02-14 RX ADMIN — PROPOFOL 10 MG: 10 INJECTION, EMULSION INTRAVENOUS at 15:56

## 2020-02-14 RX ADMIN — ATORVASTATIN CALCIUM 80 MG: 40 TABLET, FILM COATED ORAL at 21:41

## 2020-02-14 RX ADMIN — PHENYLEPHRINE HYDROCHLORIDE 60 MCG: 10 INJECTION INTRAVENOUS at 14:56

## 2020-02-14 NOTE — H&P
[facility] Cardiology/Electrophyiology Consult                Date of  Admission: 2/14/2020 11:14 AM     CC/Reason for admission: ICD implantation    Saranya Moore is a 82 y.o. male admitted for Congestive heart failure, unspecified HF chronicity, unspecified heart failure type ([facility] [address] [I50.9]. 82 y. o. male with past medical history of multivessel CAD with recent CABG, recent CVA, recent PPM implantation due to PAF/SSS, HTN and hypothyroidism and now worsening EF on OMT and presents for consideration for BiV ICD upgrade and AV node ablation. Pt is doing well, but is having some fatigue and EMERSON, denies chest pain, palpitations, presyncope or syncope. Cardiac PMH: (Old records have been reviewed and summarized below)  CAD, prior CVA, PPM.    CAD:  NSTEMI, followed by 3v CABG on 5/18/18. Wilner Scarce after CABG. Echo 5/2018: EF 50-55%. Carotid US 5/2018: mild dz  PPM 6/2018 for Afib and SSS  Echo 10/2019: EF 30-35%, mod-severe MR  [facility] 10/2019: stable 3/3 patent grafts    Patient Active Problem List   Diagnosis Code    Essential hypertension I10    S/P CABG (coronary artery bypass graft) Z95.1    Hypothyroidism E03.9    Current chronic use of systemic steroids Z79.52    CAD, multiple vessel I25.10    History of CVA (cerebrovascular accident) Z80.78    UTI (urinary tract infection) N39.0    Normocytic anemia D64.9    Persistent atrial fibrillation I48.19    Abdominal pain R10.9    Sepsis secondary to UTI ([facility]) A41.9, N39.0    Gram-negative bacteremia B63.11    Systolic CHF, chronic ([facility]) I50.22    Dilated cardiomyopathy (HCC) I42.0    Syncope R55    Atrial fibrillation with RVR ([facility]) I48.91       Past Medical History:   Diagnosis Date    Arrhythmia     CAD, multiple vessel 5/22/2018 5/18/18 (Dr Attila Hampton) Coronary artery bypass grafting x3 with grafts consisting of: 1. Left internal mammary artery to left anterior descending artery.   2.  Reverse saphenous vein to diagonal.  3.  Reversed saphenous vein graft to obtuse marginal.    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     HX PACEMAKER  2018    VASCULAR SURGERY PROCEDURE UNLIST       Allergies   Allergen Reactions    Ativan [Lorazepam] Rash    Pcn [Penicillins] Swelling      Family History   Problem Relation Age of Onset    No Known Problems Mother     No Known Problems Father         Current Facility-Administered Medications   Medication Dose Route Frequency    lidocaine (XYLOCAINE) 10 mg/mL (1 %) injection 0.1 mL  0.1 mL SubCUTAneous PRN    lactated Ringers infusion  150 mL/hr IntraVENous CONTINUOUS    famotidine (PEPCID) tablet 20 mg  20 mg Oral ONCE    fentaNYL citrate (PF) injection 100 mcg  100 mcg IntraVENous ONCE    midazolam (VERSED) injection 2 mg  2 mg IntraVENous ONCE PRN    0.9% sodium chloride infusion  75 mL/hr IntraVENous CONTINUOUS    clindamycin (CLEOCIN) 900mg D5W 50mL IVPB (premix)  900 mg IntraVENous ONCE    bacitracin 50,000 Units, neomycin-polymyxin B  2 mL in sterile water irrigation 1,000 mL irrigation   Irrigation ONCE    iopamidoL (ISOVUE-370) 76 % injection 125 mL  125 mL IntraVENous RAD ONCE    lidocaine-EPINEPHrine (XYLOCAINE) 1 %-1:100,000 injection 15 mg  1.5 mL IntraDERMal Multiple     Facility-Administered Medications Ordered in Other Encounters   Medication Dose Route Frequency    lactated Ringers infusion    CONTINUOUS       Review of Symptoms:  A comprehensive ROS was performed with the pertinent positives and negatives mentioned in the HPI, all other systems reviewed and are negative       Physical Exam  Vitals:    02/14/20 1221   BP: (!) 138/92   Pulse: 84   Temp: 98.1 °F (36.7 °C)   SpO2: 100%   Weight: 89.4 kg (197 lb)   Height: 5' 11\" (1.803 m) Physical Exam:  General appearance: Alert, well appearing, and in no distress   Eyes: Sclerae anicteric, Pupils equal, EOMI  ENT: Hearing grossly normal bilaterally, normal dentition  CV: irreg irreg, soft SM, no JVD, normal distal pulses, no lower extremity edema  Pulm: Clear to auscultation, no wheezes, no crackles, no accessory muscle use  GI: Soft, nontender, nondistended      Cardiographics    Telemetry:   ECG (Indpendently visualized and interpreted):  Echocardiogram:     Labs:   Recent Labs     02/14/20  1219      K 3.7   BUN 18   CREA 1.28   GLU 83   WBC 8.2   HGB 10.6*   HCT 35.8*      INR 1.0        Assessment:      Active Problems:    Persistent atrial fibrillation (7/18/2018)      Dilated cardiomyopathy ([address] (10/17/2019)           Plan:   1. NICM, EF 30%, NYHA class II-III, uncontrolled: admission for planned BiV ICD upgrade, answered all questions and concerns and patient wishes to proceed. --BiV ICD upgrade, AV node ablation       --hold eliquis day prior       Bennett Turner MD, MS  Cardiology/Electrophysiology

## 2020-02-14 NOTE — PROCEDURES
Pre-Procedure Diagnosis  1. Chronic systolic heart failure, ejection fraction of 30%. 2. Non ischemic dilated cardiomyopathy. 3. Class II-III heart failure symptoms. 4. Chronic systolic heart failure for a minimum of 3 months duration on optimal medical therapy. 5. Primary prevention indications for defibrillator  6. Life expectancy greater than 1 year. 7. Permanent atrial fibrillation  8. Complete heart block    Procedure Performed  1. Removal of dual chamber PPM generator  2. Removal of RV pacemaker lead  3. Removal of RA pacemaker lead  4. Insertion of Biotronik biventricular implantable cardioverter defibrillator. 5. Insertion of left ventricular lead at time of new system Implantation. Anesthesia: MAC     Estimated Blood Loss: Less than 10 mL     Specimens: * No specimens in log *     The procedure, indications, risks, benefits, and alternatives were discussed with the patient and family members, who desired to proceed after questions were answered and informed consent was documented. Procedure: After informed consent was obtained, the patient was brought to the Electrophysiology Laboratory in a fasting state and was prepped and draped in sterile fashion. Prophylactic antibiotic was administered 10 minutes prior to skin incision (refer to anesthesia procedural documentation for details). MAC was administered by the anesthesia team with continuous oxygen saturation measurement and blood pressure measurement. Local anesthetic (sensorcaine) was delivered to the left pectoral region and an incision was made parallel to the deltopectoral groove. The subcutaneous pocket was opened using blunt dissection and electrocautery, and adequate hemostasis was established. Access x 2 was obtained under ultrasound guidance using a modified Seldinger technique with placement of 2 wires down into the RA and advanced below the diaphragm.   The existing PPM generator and leads were freed from the pocket, the leads removed from the pacemaker and the pacemaker generator was removed. The RA and RV suture sleeves were removed, a stylet was placed down each lead and the screws were retracted. Both leads were removed with gentle traction. The existing pocket was enlarged to accommodate the BiV ICD. Next, placement of a peel-away sheath over the first guidewire was performed. A permanent RV single coil ICD lead was then advanced under fluoroscopic guidance into the RV apex in a stable position with satisfactory sensing and pacing characteristics. The peel away sheath was removed. The lead was sutured to the underlying pectoralis fascia using 2 non-absorbable sutures around the lead's collar. The lead slack and position was assessed under fluoroscopy while securing the lead collars to ensure proper length. After positioning the RV lead, a standard Merit [place] LV delivery sheath was advanced over the long access wire and dilator and wire were removed. The braided inner sheath was then advanced into the [place] and used to cannulate the coronary sinus using contrast when necessary. A Glide wire and was used to allow a smooth transition into the CS body. A coronary sinus venogram was then performed using a balloon occluding catheter. A Merit renal inner sheath was used with an Acuity Straight trak to cannulate a posterolateral branch. The renal was advanced into the posterolateral and a subselected venogram was performed. The left ventricular lead was then advanced into a posterolateral brach over an angioplasty wire. Adequate thresholds were obtained with an adequate margin between phrenic stim and loss of capture. The delivery sheaths were then slit with fluoroscopy demonstrating stable position. The lead was then sutured to the underlying pectoralis fascia using 2 non-absorbable sutures around the lead collar.   Final lead testing via the pacing system analyzer (PSA) demonstrated stable sensing, impedance and pacing thresholds. The lead pins were then cleaned with antibiotic soaked gauze, dried gently, and attached to a new defibrillator generator. Pins were directly observed to pass the tip electrode, and the ring hex wrench screws were secured, and leads tug tested. The pocket was irrigated copiously with a saline antimicrobial solution. The device and leads were gently positioned within the pocket. The wound was closed with multiple layers of absorbable suture followed by skin closure with staples. Fluoroscopic images were interpreted by me, in multiple projections. Final device position was confirmed by stored fluoroscopic image from device pocket to lead tips in AP projection.      Lead Data:    Pulse Generator Model #  Serial # Location Implant/Explant   [id number] Biotronik [id number] Left Pectoral Implant   [id number] Biotronik [id number] Left Pectoral Explant  DOI: 06/21/18     Lead Model Number  Serial Number Lead position Implant/Explant     RV   [id number] Biotronik [id number] RV [place] Implant   LV [id number] Biotronik [id number] LV Lateral Implant   RA [id number] Biotronik [id number] RA [place]end[place] Explant  DOI: 06/21/18   RV [id number] Biotronik [id number] RV [place] Explant  DOI: 06/21/18     Lead Sensitivity and Threshold  Lead R or P sensitivity (mv) Threshold (V) Threshold PW (msec) Impedance (ohms) Final output Voltage (V) Final PW (msec)   RA 1.0 -- -- -- -- --   RV 21.4 0.4 0.4 638 3.5 0.4   LV 20.4 1.8 0.4 580 3.5 0.4     Bradycardia Settings  Byron Mode LRL URL Pace AVD (ms) Sense AVD (ms) Rate Response Mode Switching Mode SW Rate   VVIR 80 -- -- -- ON OFF --     Tachycardia Settings  Zone Type VT2 VF   ON/OFF/  MONITOR ON ON   Zone Rate 188 222   1st Therapy Type ATP-burst x2 ATP-burst x1   Energy (J) 80% 85%   2nd Therapy Type ATP-ramp x2 Shock    Energy (J) 80% 40   3rd Therapy Type Shock Shock   Energy (J) 40 40   4th Therapy Type Shock Shock   Energy (J) 40 40   5th Therapy Type Shock Shock   Energy (J) 40 40   6-8th Therapy Type Shock Shock   Energy (J) 40 40     Defibrillation Threshold Not Tested    Shock Impedance: 67 ohms    Contrast: 43 ml    Fluoro Time: 10.9 minutes    AV Node Ablation   Access x 1 was obtained under ultrasound guidance using a modified Seldinger technique with placement of a short sheath into the right femoral vein. A Biosense [place] 3.5 mm irrigated ablation catheter was then advanced to the region of the AV junction and with delivery of RF there was complete heart block. This remained after a 15 minute waiting period. Complications: None    IMPRESSION: Successful removal of dual chamber PPM and leads and implantation of Biotronik biventricular implantable cardioverter defibrillator for primary prevention of sudden death followed by successful AV node ablation. Jerry Turner MD, MS  Clinical Cardiac Electrophysiology  2/14/2020  4:17 PM

## 2020-02-14 NOTE — PROGRESS NOTES
TRANSFER - OUT REPORT:    Verbal report given to Giana Carlson on Derrel Bunting.  being transferred to CVICU for routine progression of care       Report consisted of patients Situation, Background, Assessment and Recommendations(SBAR). Information from the following report(s) SBAR, Kardex and Procedure Summary was reviewed with the receiving nurse. Opportunity for questions and clarification was provided. Left subclavian site incision C/D/I without bleeding or hematoma. Right groin site C/D/I without bleeding or hematoma. Family updated on POC.

## 2020-02-14 NOTE — PROGRESS NOTES
Pt arrived, ambulated to room with no visible problems, planned ICD upgrade/AV Node Ablation for Dr Reena Nguyen. Consent signed, Procedure discussed with pt all questions answered voiced understanding. Medications and history discussed with pt. Pt prepped per ordersThe patient has a fraility score of 5-MILDLY FRAIL, based on age.

## 2020-02-14 NOTE — ANESTHESIA POSTPROCEDURE EVALUATION
Procedure(s):  BIV ICD. total IV anesthesia    Anesthesia Post Evaluation      Multimodal analgesia: multimodal analgesia used between 6 hours prior to anesthesia start to PACU discharge  Patient location during evaluation: PACU  Patient participation: complete - patient participated  Level of consciousness: awake and alert  Pain management: adequate  Airway patency: patent  Anesthetic complications: no  Cardiovascular status: acceptable  Respiratory status: acceptable  Hydration status: acceptable  Post anesthesia nausea and vomiting:  none      Vitals Value Taken Time   /91 2/14/2020  5:00 PM   Temp     Pulse 81 2/14/2020  5:03 PM   Resp     SpO2 100 % 2/14/2020  5:03 PM   Vitals shown include unvalidated device data.

## 2020-02-14 NOTE — PROGRESS NOTES
Pt is being referred to for an Implantable Cardioverter- Defibrillator (ICD). The NP has discussed and allowed the pt to ask questions regarding ICD. Pt was provided with a Shared Decision ICD booklet by NP.              Delmy Monteiro NP

## 2020-02-14 NOTE — PROGRESS NOTES
TRANSFER - IN REPORT:    Verbal report received from Yanci ZARAGOZA(name) on [facility].  being received from CPRU(unit) for routine post - op      Report consisted of patients Situation, Background, Assessment and   Recommendations(SBAR). Information from the following report(s) SBAR, Kardex, Procedure Summary, Intake/Output, MAR, Recent Results, Med Rec Status and Cardiac Rhythm paced was reviewed with the receiving nurse. Opportunity for questions and clarification was provided. Assessment completed upon patients arrival to unit and care assumed.

## 2020-02-14 NOTE — ANESTHESIA PREPROCEDURE EVALUATION
Anesthetic History   No history of anesthetic complications            Review of Systems / Medical History  Patient summary reviewed and pertinent labs reviewed    Pulmonary  Within defined limits                 Neuro/Psych   Within defined limits           Cardiovascular    Hypertension: well controlled      CHF: NYHA Classification II, dyspnea on exertion    Past MI (5/16), CAD and CABG (3 vessels, 2 years ago, no angina)    Exercise tolerance: <4 METS     GI/Hepatic/Renal  Within defined limits              Endo/Other      Hypothyroidism: well controlled  Obesity     Other Findings              Physical Exam    Airway  Mallampati: II  TM Distance: < 4 cm  Neck ROM: normal range of motion, short neck   Mouth opening: Diminished (comment)     Cardiovascular    Rhythm: irregular  Rate: abnormal         Dental    Dentition: Caps/crowns, Full upper dentures and Lower partial plate     Pulmonary  Breath sounds clear to auscultation               Abdominal         Other Findings            Anesthetic Plan    ASA: 4  Anesthesia type: total IV anesthesia          Induction: Intravenous  Anesthetic plan and risks discussed with: Patient, Spouse and Family

## 2020-02-14 NOTE — PROGRESS NOTES
TRANSFER - IN REPORT:    Verbal report received from [facility] on [facility]. being received from EP LAB for routine progression of care      Report consisted of patients Situation, Background, Assessment and Recommendations(SBAR). Information from the following report(s) Procedure Summary was reviewed with the receiving nurse. Opportunity for questions and clarification was provided. Assessment completed upon patients arrival to unit and care assumed.

## 2020-02-15 VITALS
RESPIRATION RATE: 17 BRPM | DIASTOLIC BLOOD PRESSURE: 71 MMHG | OXYGEN SATURATION: 98 % | WEIGHT: 185.85 LBS | HEIGHT: 71 IN | HEART RATE: 80 BPM | BODY MASS INDEX: 26.02 KG/M2 | SYSTOLIC BLOOD PRESSURE: 115 MMHG | TEMPERATURE: 97.8 F

## 2020-02-15 PROCEDURE — A9270 NON-COVERED ITEM OR SERVICE: HCPCS | Performed by: INTERNAL MEDICINE

## 2020-02-15 PROCEDURE — 74011636637 HC RX REV CODE- 636/637: Performed by: INTERNAL MEDICINE

## 2020-02-15 PROCEDURE — 99218 HC RM OBSERVATION: CPT

## 2020-02-15 PROCEDURE — 74011250637 HC RX REV CODE- 250/637: Performed by: INTERNAL MEDICINE

## 2020-02-15 PROCEDURE — 74011250636 HC RX REV CODE- 250/636: Performed by: INTERNAL MEDICINE

## 2020-02-15 RX ORDER — HYDROCODONE BITARTRATE AND ACETAMINOPHEN 5; 325 MG/1; MG/1
1 TABLET ORAL
Qty: 10 TAB | Refills: 0 | Status: SHIPPED | OUTPATIENT
Start: 2020-02-15 | End: 2020-02-18

## 2020-02-15 RX ADMIN — CLINDAMYCIN PHOSPHATE 900 MG: 900 INJECTION, SOLUTION INTRAVENOUS at 05:52

## 2020-02-15 RX ADMIN — Medication 10 ML: at 05:54

## 2020-02-15 RX ADMIN — PREDNISONE 5 MG: 5 TABLET ORAL at 10:10

## 2020-02-15 RX ADMIN — ACETAMINOPHEN 650 MG: 325 TABLET, FILM COATED ORAL at 03:41

## 2020-02-15 RX ADMIN — LEVOTHYROXINE SODIUM 50 MCG: 0.05 TABLET ORAL at 10:11

## 2020-02-15 NOTE — PROGRESS NOTES
Bedside shift change report given to Lalita Estrada RN (oncoming nurse) by Monica Armendariz RN (offgoing nurse). Report included the following information SBAR, Kardex, Procedure Summary, Intake/Output, MAR, Recent Results, Med Rec Status and Cardiac Rhythm of Ventricular Pacing.

## 2020-02-15 NOTE — PROGRESS NOTES
Bedside shift change report given to Jordy Torres RN (oncoming nurse) by Kyung Alarcon RN (offgoing nurse). Report included the following information SBAR, Kardex, Procedure Summary, Intake/Output, MAR, Recent Results, Med Rec Status and Cardiac Rhythm of Ventricular Pacing.

## 2020-02-15 NOTE — PROGRESS NOTES
Discharge instructions given to patient and wife with stated understanding. IV X 2 and condom catheter removed without difficulty. Prescription X 1 given to patient with drug information sheet. Patient then dressed, and discharged via W/C to POV and to home with wife.

## 2020-02-15 NOTE — PROGRESS NOTES
Ambulated to restroom without difficulty for need to have a BM (didn't happen-false call), assisted back to recliner and patient was slightly unsteady walking. Checked VS and found SBP-74 (checked X 2), only when sitting did patient admit to being a little dizzy. After 2 minutes of sitting SBP-84 and patient denied dizziness. Breakfast tray provided and patient enthusiastically attacked it. Liseth Rashid NP on unit and updated, new orders received.

## 2020-02-15 NOTE — DISCHARGE INSTRUCTIONS
Patient Education     Please resume eliquis tonight. Implantable Cardioverter-Defibrillator Placement: What to Expect at [address] [facility] cardioverter-defibrillator (ICD) placement is surgery to put an ICD in your chest. An ICD is a small, battery-powered device that fixes life-threatening changes in your heartbeat. If the ICD detects a life-threatening rapid heart rhythm, it tries to slow the rhythm back to normal using electrical pulses. If the dangerous rhythm does not stop, the ICD sends an electric shock to the heart to restore a normal rhythm. The device then goes back to its watchful mode. Your chest may be sore where the doctor made the cut (incision) and put in the ICD. You also may have a bruise and mild swelling. These symptoms usually get better in 1 to 2 weeks. You may feel a hard ridge along the incision. This usually gets softer in the months after surgery. You probably will be able to see and feel the outline of the ICD under your skin. You will probably be able to go back to work or your usual routine 1 to 2 weeks after surgery. Your doctor will talk to you about how often you will need to have the ICD checked. You'll need to take steps to safely use electric devices. Some of these devices can stop your ICD from working right for a short time. Check with your doctor about what to avoid and what to keep a short distance away from your ICD. For example, you will need to stay away from things with strong magnetic and electrical fields. An example is an MRI machine (unless your ICD is safe for an MRI). You can use a cell phone and other wireless devices but keep them at least 6 inches away from your ICD. Many household and office electronics do not affect an ICD. These include kitchen appliances and computers. This care sheet gives you a general idea about how long it will take for you to recover. But each person recovers at a different pace.  Follow the steps below to get better as quickly as possible. How can you care for yourself at home? Activity    · Rest when you feel tired. Getting enough sleep will help you recover.     · Try to walk each day. Start by walking a little more than you did the day before. Bit by bit, increase the amount you walk. Walking boosts blood flow and helps prevent pneumonia and constipation.     · For 4 to 6 weeks:  ? Avoid activities that strain your chest or upper arm muscles. This includes pushing a  or vacuum, mopping floors, swimming, or swinging a golf club or tennis racquet. ? Do not raise your arm, on the side of your body where the ICD is located, above shoulder level. ? Avoid strenuous activities, such as bicycle riding, jogging, weight lifting, or heavy aerobic exercise. ? Avoid lifting anything that would make you strain. This may include heavy grocery bags and milk containers, a heavy briefcase or backpack, cat litter or dog food bags, or a child.     · Ask your doctor when you can drive again.     · You will probably need to take about 1 to 2 weeks off from work. It depends on the type of work you do and how you feel.     · Ask your doctor when it is okay for you to have sex. Diet    · You can eat your normal diet. If your stomach is upset, try bland, low-fat foods like plain rice, broiled chicken, toast, and yogurt.     · Drink plenty of fluids (unless your doctor tells you not to). Medicines    · Your doctor will tell you if and when you can restart your medicines. He or she will also give you instructions about taking any new medicines.     · If you take blood thinners, such as warfarin (Coumadin), clopidogrel (Plavix), or aspirin, be sure to talk to your doctor. He or she will tell you if and when to start taking those medicines again. Make sure that you understand exactly what your doctor wants you to do.     · Take pain medicines exactly as directed.   ? If the doctor gave you a prescription medicine for pain, take it as prescribed. ? If you are not taking a prescription pain medicine, ask your doctor if you can take an over-the-counter medicine. ? Do not take aspirin, ibuprofen (Advil, Motrin), naproxen (Aleve), or other nonsteroidal anti-inflammatory drugs (NSAIDs) unless your doctor says it is okay.     · If you think your pain medicine is making you sick to your stomach:  ? Take your medicine after meals (unless your doctor has told you not to). ? Ask your doctor for a different pain medicine.     · If your doctor prescribed antibiotics, take them as directed. Do not stop taking them just because you feel better. You need to take the full course of antibiotics. Incision care    · Keep the area clean and dry.     · Ask your doctor when you can shower or take a bath.     · If you have strips of tape on the incision, leave the tape on for a week or until it falls off.     · Wash the area daily with warm, soapy water, and pat it dry. Don't use hydrogen peroxide or alcohol, which can slow healing. You may cover the area with a gauze bandage if it weeps or rubs against clothing. Exercise    · Check with your doctor before you start an exercise program. Your doctor may recommend that you work with a physical therapist to learn how to exercise safely with an ICD. Other instructions    · Carry your ICD identification card with you at all times. The card should include the ICD  and model information.     · Wear medical alert jewelry that states you have an ICD. You can buy this at most drugstores.     · Have your ICD checked as often as your doctor recommends. In some cases, this may be done over the phone or the Internet. your doctor will give you instructions about how to do this.     · Talk with your doctor about the possibility of turning off the ICD at the end of life. You can put your wishes about the ICD in an advance directive. Follow-up care is a key part of your treatment and safety.  Be sure to make and go to all appointments, and call your doctor if you are having problems. It's also a good idea to know your test results and keep a list of the medicines you take. When should you call for help? Call 911 anytime you think you may need emergency care. For example, call if:    · You passed out (lost consciousness).     · You have trouble breathing.    Call your doctor now or seek immediate medical care if:    · You receive a shock from your ICD.     · You are dizzy or lightheaded, or you feel like you may faint.     · You have pain that does not get better after you take pain medicine.     · You hear an alarm or feel a vibration from your ICD.     · You have loose stitches, or your incision comes open.     · Bright red blood has soaked through the bandage over your incision.     · You have signs of infection, such as:  ? Increased pain, swelling, warmth, or redness. ? Red streaks leading from the incision. ? Pus draining from the incision. ? A fever.    Watch closely for changes in your health, and be sure to contact your doctor if you have any problems. Where can you learn more? Go to http://zoie-nusrat.info/. Enter K184 in the search box to learn more about \"Implantable Cardioverter-Defibrillator Placement: What to Expect at Home. \"  Current as of: April 9, 2019  Content Version: 12.2  © 1376-2118 [employer]. Care instructions adapted under license by [employer] (which disclaims liability or warranty for this information). If you have questions about a medical condition or this instruction, always ask your healthcare professional. [place] [address] any warranty or liability for your use of this information.       DISCHARGE SUMMARY from Nurse    PATIENT INSTRUCTIONS:    After general anesthesia or intravenous sedation, for 24 hours or while taking prescription Narcotics:  · Limit your activities  · Do not drive and operate hazardous machinery  · Do not make important personal or business decisions  · Do  not drink alcoholic beverages  · If you have not urinated within 8 hours after discharge, please contact your surgeon on call. Report the following to your surgeon:  · Excessive pain, swelling, redness or odor of or around the surgical area  · Temperature over 100.5  · Nausea and vomiting lasting longer than 4 hours or if unable to take medications  · Any signs of decreased circulation or nerve impairment to extremity: change in color, persistent  numbness, tingling, coldness or increase pain  · Any questions    What to do at Home:  Recommended activity: Activity as tolerated and no driving for today,     *  Please give a list of your current medications to your Primary Care Provider. *  Please update this list whenever your medications are discontinued, doses are      changed, or new medications (including over-the-counter products) are added. *  Please carry medication information at all times in case of emergency situations. These are general instructions for a healthy lifestyle:    No smoking/ No tobacco products/ Avoid exposure to second hand smoke  Surgeon General's Warning:  Quitting smoking now greatly reduces serious risk to your health. Obesity, smoking, and sedentary lifestyle greatly increases your risk for illness    A healthy diet, regular physical exercise & weight monitoring are important for maintaining a healthy lifestyle    You may be retaining fluid if you have a history of heart failure or if you experience any of the following symptoms:  Weight gain of 3 pounds or more overnight or 5 pounds in a week, increased swelling in our hands or feet or shortness of breath while lying flat in bed. Please call your doctor as soon as you notice any of these symptoms; do not wait until your next office visit. The discharge information has been reviewed with the patient and spouse.   The patient and spouse verbalized understanding. Discharge medications reviewed with the patient and spouse and appropriate educational materials and side effects teaching were provided. ___________________________________________________________________________________________________________________________________       Patient Education   Hydrocodone/Acetaminophen (By mouth)   Acetaminophen (p-lsqh-e-MIN-oh-fen), Hydrocodone Bitartrate (umo-uozj-ATM-done bye-TAR-trate)  Treats pain. This medicine contains a narcotic pain reliever. Brand Name(s): Hycet, Lorcet, Lorcet HD, Lorcet Plus, Lortab 10/325, Lortab 5/325, Lortab 7.5/325, Lortab Elixir, Norco, Verdrocet, Vicodin, Vicodin ES, Vicodin HP, Xodol, Xodol 5/300   There may be other brand names for this medicine. When This Medicine Should Not Be Used: This medicine is not right for everyone. Do not use it if you had an allergic reaction to acetaminophen, hydrocodone, or other narcotic medicines, or stomach or bowel blockage (including paralytic ileus). How to Use This Medicine:   Capsule, Liquid, Tablet  · Your doctor will tell you how much medicine to use. Do not use more than directed. · An overdose can be dangerous. Follow directions carefully so you do not get too much medicine at one time. · Oral liquid: Measure the oral liquid medicine with a marked measuring spoon, oral syringe, or medicine cup. · Drink plenty of liquids to help avoid constipation. · This medicine should come with a Medication Guide. Ask your pharmacist for a copy if you do not have one. · Missed dose: Take a dose as soon as you remember. If it is almost time for your next dose, wait until then and take a regular dose. Do not take extra medicine to make up for a missed dose. · Store the medicine in a closed container at room temperature, away from heat, moisture, and direct light. Flush any unused Norco® tablets down the toilet.   Drugs and Foods to Avoid:   Ask your doctor or pharmacist before using any other medicine, including over-the-counter medicines, vitamins, and herbal products. · Do not use this medicine if you are using or have used an MAO inhibitor within the past 14 days. · Some medicines can affect how hydrocodone/acetaminophen works. Tell your doctor if you are using any of the following:   ¨ Carbamazepine, erythromycin, ketoconazole, mirtazapine, phenytoin, rifampin, ritonavir, tramadol, trazodone  ¨ Diuretic (water pill)  ¨ Medicine to treat depression or mental health problems  ¨ Medicine to treat migraine headaches  ¨ Phenothiazine medicine  · Tell your doctor if you use anything else that makes you sleepy. Some examples are allergy medicine, narcotic pain medicine, and alcohol. Tell your doctor if you are using buprenorphine, butorphanol, nalbuphine, pentazocine, or a muscle relaxer. · Do not drink alcohol while you are using this medicine. Acetaminophen can damage your liver, and your risk is higher if you also drink alcohol. Warnings While Using This Medicine:   · Tell your doctor if you are pregnant or breastfeeding, or if you have kidney disease, liver disease, lung or breathing problems, gallbladder or pancreas problems, an underactive thyroid, [place] disease, prostate problems, trouble urinating, stomach problems, or a history of head injury or brain tumor, seizures, alcohol or drug addiction. · This medicine may cause the following problems:   ¨ High risk of overdose, which can lead to death  ¨ Respiratory depression (serious breathing problem that can be life-threatening)  ¨ Liver problems  ¨ Serious skin reactions  ¨ Serotonin syndrome (when used with certain medicines)  · This medicine can be habit-forming. Do not use more than your prescribed dose. Call your doctor if you think your medicine is not working. · This medicine may make you dizzy or drowsy. Do not drive or doing anything else that could be dangerous until you know how this medicine affects you.   · This medicine contains acetaminophen. Read the labels of all other medicines you are using to see if they also contain acetaminophen, or ask your doctor or pharmacist. Renetta Ellis not use more than 4 grams (4,000 milligrams) total of acetaminophen in one day. · Tell any doctor or dentist who treats you that you are using this medicine. This medicine may affect certain medical test results. · This medicine may cause constipation, especially with long-term use. Ask your doctor if you should use a laxative to prevent and treat constipation. · This medicine could cause infertility. Talk with your doctor before using this medicine if you plan to have children. · Keep all medicine out of the reach of children. Never share your medicine with anyone. Possible Side Effects While Using This Medicine:   Call your doctor right away if you notice any of these side effects:  · Allergic reaction: Itching or hives, swelling in your face or hands, swelling or tingling in your mouth or throat, chest tightness, trouble breathing  · Anxiety, restlessness, fast heartbeat, fever, sweating, muscle spasms, twitching, diarrhea, seeing or hearing things that are not there  · Blistering, peeling, red skin rash  · Blue lips, fingernails, or skin  · Dark urine or pale stools, loss of appetite, nausea or vomiting, stomach pain, yellow skin or eyes  · Extreme weakness, shallow breathing, slow heartbeat, sweating, seizures, cold or clammy skin  · Lightheadedness, dizziness, fainting  If you notice these less serious side effects, talk with your doctor:   · Constipation, nausea, vomiting  · Tiredness or sleepiness  If you notice other side effects that you think are caused by this medicine, tell your doctor. Call your doctor for medical advice about side effects. You may report side effects to FDA at 6-457-FDA-2156  © 2017 [facility] Information is for End User's use only and may not be sold, redistributed or otherwise used for commercial purposes.   The above information is an  only. It is not intended as medical advice for individual conditions or treatments. Talk to your doctor, nurse or pharmacist before following any medical regimen to see if it is safe and effective for you.

## 2020-02-15 NOTE — PROGRESS NOTES
Vitals:    02/15/20 1101 02/15/20 1111 02/15/20 1112 02/15/20 1114   BP: 110/65 112/71 129/72 121/65   BP 1 Location:  Left arm Left arm Left arm   BP Patient Position:  Supine Sitting Standing   Pulse: 80 80 80 80   Resp: 23 20 24 22   Temp: 97.8 °F (36.6 °C)      SpO2: 98%      Weight:       Height:         Orthostatic vital signs as above. There were no complaints of dizziness, lightheadedness, nor pre-syncopal symptoms.

## 2020-02-15 NOTE — DISCHARGE SUMMARY
[facility] Cardiology Discharge Summary     Patient ID:  Delle Lundborg  [id number]  82 y.o.  1935    Admit date: 2/14/2020    Discharge date and time:  02/15/20     Admitting Physician: Ana Hallman MD     Discharge Physician: Cristina Ibarra NP/Dr. Elena Saavedra    Admission Diagnoses: Congestive heart failure, unspecified HF chronicity, unspecified heart failure type ([address] [I50.9]  Pacemaker [Z95.0]  NICM (nonischemic cardiomyopathy) ([address] [I42.8]    Discharge Diagnoses:   Patient Active Problem List    Diagnosis Date Noted    Pacemaker 02/14/2020     Priority: 1 - One    NICM (nonischemic cardiomyopathy) ([address] 02/14/2020    Syncope 01/10/2020    Atrial fibrillation with RVR ([address] 60/42/6907    Systolic CHF, chronic ([address] 10/17/2019    Dilated cardiomyopathy ([address] 10/17/2019    Sepsis secondary to UTI ([address] 07/19/2018    Gram-negative bacteremia 07/19/2018    UTI (urinary tract infection) 07/18/2018    Normocytic anemia 07/18/2018    Persistent atrial fibrillation 07/18/2018    Abdominal pain 07/18/2018    History of CVA (cerebrovascular accident) 06/20/2018    CAD, multiple vessel 05/22/2018    S/P CABG (coronary artery bypass graft) 05/18/2018    Hypothyroidism 05/18/2018    Current chronic use of systemic steroids 05/18/2018    Essential hypertension 05/16/2018       Cardiology Procedures this admission:  Pacemaker Implantation, CXR and decive interrogation     Consults: none    Hospital Course: Patient was seen in office by Dr. Alejandra Berg for NICM needing upgrade BIV ICD. Pt was scheduled for an AM Admission. Patient was admitted on 2/14/2020 and underwent a removal of dual chamber PPM generator, removal of RV pacemaker lead, removal of RA pacemaker lead, insertion of Biotronik biventricular implantable cardioverter defibrillator, and insertion of left ventricular lead at time of new system Implantation without any complications. Follow up CXR showed no pneumothorax.  Pt tolerated the procedure well and was taken to the telemetry floor for recovery. The following morning Pt was up feeling well without any complaints of CP, SOB or palpitations. The patient's device was interrogated prior to discharge showing normal function. Pt's left subclavian PM site was clean, dry and intact without hematoma. Pt's labs were WNL. Pt was seen and examined by Dr. Wander Olmstead and determined stable and ready for discharge. Pt was instructed to follow PM discharge instructions given by nursing staff. The patient will need device clinic appt in 10-14 days (office will call patient Monday with appt). He will resume his eliquis tonight. DISPOSITION: The patient is being discharged home in stable condition on a low saturated fat, low cholesterol and low salt diet. Pt is instructed to advance activities as tolerated limited to fatigue or shortness of breath. Pt is instructed not to lift anything over 5-10 lbs with affected arm. No pushing or pulling or lifting arm above shoulder. See complete discharge instructions. Pt is instructed to watch PM site for bleeding/oozing, if seen Pt is instructed to apply firm pressure with clean cloth and call office at [phone]. Pt is instructed to watch for signs of infection which include increasing area of redness around site, fever/hot to touch or purulent drainage. Pt is instructed to call office or return to ER for immediate evaluation of any shortness of breath, chest pain or palpitations. Discharge Exam:   Visit Vitals  /81   Pulse 80   Temp 98.3 °F (36.8 °C)   Resp 18   Ht 5' 11\" (1.803 m)   Wt 84.3 kg (185 lb 13.6 oz)   SpO2 99%   BMI 25.92 kg/m²    Pt has been seen by Dr. Kaylie Virgen : see his progress note for exam details.     Recent Results (from the past 24 hour(s))   CBC W/O DIFF    Collection Time: 02/14/20 12:19 PM   Result Value Ref Range    WBC 8.2 4.3 - 11.1 K/uL    RBC 4.34 4.23 - 5.6 M/uL    HGB 10.6 (L) 13.6 - 17.2 g/dL    HCT 35.8 (L) 41.1 - 50.3 % MCV 82.5 79.6 - 97.8 FL    MCH 24.4 (L) 26.1 - 32.9 PG    MCHC 29.6 (L) 31.4 - 35.0 g/dL    RDW 18.3 (H) 11.9 - 14.6 %    PLATELET 169 578 - 472 K/uL    MPV 9.7 9.4 - 12.3 FL    ABSOLUTE NRBC 0.00 0.0 - 0.2 K/uL   METABOLIC PANEL, COMPREHENSIVE    Collection Time: 02/14/20 12:19 PM   Result Value Ref Range    Sodium 142 136 - 145 mmol/L    Potassium 3.7 3.5 - 5.1 mmol/L    Chloride 109 (H) 98 - 107 mmol/L    CO2 25 21 - 32 mmol/L    Anion gap 8 7 - 16 mmol/L    Glucose 83 65 - 100 mg/dL    BUN 18 8 - 23 MG/DL    Creatinine 1.28 0.8 - 1.5 MG/DL    GFR est AA >60 >60 ml/min/1.73m2    GFR est non-AA 57 (L) >60 ml/min/1.73m2    Calcium 8.9 8.3 - 10.4 MG/DL    Bilirubin, total 0.5 0.2 - 1.1 MG/DL    ALT (SGPT) 43 12 - 65 U/L    AST (SGOT) 34 15 - 37 U/L    Alk.  phosphatase 83 50 - 136 U/L    Protein, total 6.6 6.3 - 8.2 g/dL    Albumin 3.5 3.2 - 4.6 g/dL    Globulin 3.1 2.3 - 3.5 g/dL    A-G Ratio 1.1 (L) 1.2 - 3.5     PROTHROMBIN TIME + INR    Collection Time: 02/14/20 12:19 PM   Result Value Ref Range    Prothrombin time 13.0 12.0 - 14.7 sec    INR 1.0     EKG, 12 LEAD, INITIAL    Collection Time: 02/14/20  1:06 PM   Result Value Ref Range    Ventricular Rate 92 BPM    Atrial Rate 120 BPM    QRS Duration 102 ms    Q-T Interval 370 ms    QTC Calculation (Bezet) 457 ms    Calculated R Axis 12 degrees    Calculated T Axis -167 degrees    Diagnosis       Atrial fibrillation  ST & T wave abnormality, consider inferolateral ischemia  Abnormal ECG  When compared with ECG of 22-OCT-2019 09:48,  No significant change was found  Confirmed by LANEY PETERSON (), Bhavani Conroy ([id number]) on 2/14/2020 2:29:48 PM     EKG, 12 LEAD, INITIAL    Collection Time: 02/14/20  6:22 PM   Result Value Ref Range    Ventricular Rate 80 BPM    Atrial Rate 64 BPM    QRS Duration 138 ms    Q-T Interval 478 ms    QTC Calculation (Bezet) 551 ms    Calculated R Axis -70 degrees    Calculated T Axis 78 degrees    Diagnosis       Ventricular-paced rhythm  Abnormal ECG  When compared with ECG of 14-FEB-2020 13:06,  Electronic ventricular pacemaker has replaced Atrial fibrillation  Confirmed by ST KARLA MARTINS MD (), TRISH KEARNEY ([id number]) on 2/14/2020 9:20:38 PM           Patient Instructions:   Current Discharge Medication List      START taking these medications    Details   HYDROcodone-acetaminophen (NORCO) 5-325 mg per tablet Take 1 Tab by mouth every four (4) hours as needed for Pain for up to 3 days. Max Daily Amount: 6 Tabs. Qty: 10 Tab, Refills: 0    Associated Diagnoses: Pacemaker         CONTINUE these medications which have NOT CHANGED    Details   apixaban (ELIQUIS) 5 mg tablet Take 1 Tab by mouth two (2) times a day. Qty: 180 Tab, Refills: 3      metoprolol succinate (TOPROL-XL) 25 mg XL tablet Take 1 Tab by mouth daily. Qty: 90 Tab, Refills: 3      levothyroxine (SYNTHROID) 50 mcg tablet One pill per day  Qty: 30 Tab, Refills: 11    Associated Diagnoses: Acquired hypothyroidism      atorvastatin (LIPITOR) 80 mg tablet Take 1 Tab by mouth nightly. Qty: 90 Tab, Refills: 3      predniSONE (DELTASONE) 5 mg tablet Take 1 Tab by mouth daily. Qty: 90 Tab, Refills: 4    Associated Diagnoses: Current chronic use of systemic steroids      furosemide (LASIX) 20 mg tablet Take 1 Tab by mouth daily. Qty: 30 Tab, Refills: 3      aspirin delayed-release 81 mg tablet Take 1 Tab by mouth daily. Qty: 30 Tab, Refills: 10      bisacodyl (DULCOLAX) 5 mg EC tablet Take 1 Tab by mouth daily.   Qty: 1 Tab, Refills: 0               Signed:  Melani Ch NP  2/15/2020  8:07 AM

## 2020-02-15 NOTE — PROGRESS NOTES
[facility] CARDIOLOGY PROGRESS NOTE           2/15/2020 7:16 AM    Admit Date: 2/14/2020      Subjective:   No complaints. Post op Xray is ok with no pneumothorax. ROS:  GEN:  No fever or chills  Cardiovascular:  As noted above:no CP or palpitations. Pulmonary:  As noted above:no SOB  Neuro:  No new focal motor or sensory loss    Objective:      Vitals:    02/15/20 0331 02/15/20 0401 02/15/20 0501 02/15/20 0601   BP: 129/83 129/81 107/71 130/81   Pulse: 80 80 80 80   Resp: 22 14 18    Temp: 98.3 °F (36.8 °C)      SpO2: 94% (!) 87% 94% 99%   Weight:    84.3 kg (185 lb 13.6 oz)   Height:    5' 11\" (1.803 m)       Physical Exam:  General-sitting in chair in no distress. Neck- supple, no JVD  CV- regular rate and rhythm no MRG  Lung- clear bilaterally  Abd- soft, nontender, nondistended  Ext- no edema bilaterally. Skin- warm and dry. Dry intact surgical bandage over left subclavian pacer site. Psychiatric:  Normal mood and affect. Neurologic:  Alert and oriented X 3      Data Review:   Recent Labs     02/14/20  1219      K 3.7   BUN 18   CREA 1.28   GLU 83   WBC 8.2   HGB 10.6*   HCT 35.8*      INR 1.0       TELEMETRY:  V-paced    Assessment/Plan:     Active Problems:    Pacemaker (2/14/2020):s/p ICD upgrade. Discharge to home with wound precautions. I discussed wound precautions with patient. Avoid excessive left arm motion. Device clinic in 10-14 days for wound check. Persistent atrial fibrillation (7/18/2018): S/P AV dre ablation. Restart Eliquis 5 mg bid this pm.      NICM (nonischemic cardiomyopathy) ([facility] (2/14/2020):Continue Lasix and Toprol.                 Jessenia Castañeda MD  2/15/2020 7:16 AM

## 2020-02-15 NOTE — PROGRESS NOTES
Dhara Glynn Pacer rep at bedside and ICD interrogation in progress and ventricular rate increased to 80.

## 2020-04-21 ENCOUNTER — HOSPITAL ENCOUNTER (OUTPATIENT)
Dept: LAB | Age: 85
Discharge: HOME | End: 2020-04-21
Payer: MEDICARE

## 2020-04-21 DIAGNOSIS — D64.9 NORMOCYTIC ANEMIA: ICD-10-CM

## 2020-04-21 DIAGNOSIS — E03.9 HYPOTHYROIDISM, UNSPECIFIED TYPE: Chronic | ICD-10-CM

## 2020-04-21 DIAGNOSIS — I42.0 DILATED CARDIOMYOPATHY (HCC): ICD-10-CM

## 2020-04-21 DIAGNOSIS — I50.22 SYSTOLIC CHF, CHRONIC (HCC): ICD-10-CM

## 2020-04-21 DIAGNOSIS — I10 ESSENTIAL HYPERTENSION: Chronic | ICD-10-CM

## 2020-04-21 LAB
ALBUMIN SERPL-MCNC: 3.2 G/DL (ref 3.2–4.6)
ALBUMIN/GLOB SERPL: 1 {RATIO} (ref 1.2–3.5)
ALP SERPL-CCNC: 105 U/L (ref 50–136)
ALT SERPL-CCNC: 29 U/L (ref 12–65)
ANION GAP SERPL CALC-SCNC: 5 MMOL/L (ref 7–16)
AST SERPL-CCNC: 20 U/L (ref 15–37)
BASOPHILS # BLD: 0.1 K/UL (ref 0–0.2)
BASOPHILS NFR BLD: 1 % (ref 0–2)
BILIRUB SERPL-MCNC: 0.6 MG/DL (ref 0.2–1.1)
BNP SERPL-MCNC: 8914 PG/ML
BUN SERPL-MCNC: 12 MG/DL (ref 8–23)
CALCIUM SERPL-MCNC: 8.7 MG/DL (ref 8.3–10.4)
CHLORIDE SERPL-SCNC: 110 MMOL/L (ref 98–107)
CO2 SERPL-SCNC: 25 MMOL/L (ref 21–32)
CREAT SERPL-MCNC: 1.32 MG/DL (ref 0.8–1.5)
DIFFERENTIAL METHOD BLD: ABNORMAL
EOSINOPHIL # BLD: 0.2 K/UL (ref 0–0.8)
EOSINOPHIL NFR BLD: 3 % (ref 0.5–7.8)
ERYTHROCYTE [DISTWIDTH] IN BLOOD BY AUTOMATED COUNT: 16.6 % (ref 11.9–14.6)
GLOBULIN SER CALC-MCNC: 3.3 G/DL (ref 2.3–3.5)
GLUCOSE SERPL-MCNC: 86 MG/DL (ref 65–100)
HCT VFR BLD AUTO: 34.4 % (ref 41.1–50.3)
HGB BLD-MCNC: 10 G/DL (ref 13.6–17.2)
IMM GRANULOCYTES # BLD AUTO: 0 K/UL (ref 0–0.5)
IMM GRANULOCYTES NFR BLD AUTO: 0 % (ref 0–5)
LYMPHOCYTES # BLD: 1.7 K/UL (ref 0.5–4.6)
LYMPHOCYTES NFR BLD: 30 % (ref 13–44)
MAGNESIUM SERPL-MCNC: 2.3 MG/DL (ref 1.8–2.4)
MCH RBC QN AUTO: 23.5 PG (ref 26.1–32.9)
MCHC RBC AUTO-ENTMCNC: 29.1 G/DL (ref 31.4–35)
MCV RBC AUTO: 80.9 FL (ref 79.6–97.8)
MONOCYTES # BLD: 0.6 K/UL (ref 0.1–1.3)
MONOCYTES NFR BLD: 11 % (ref 4–12)
NEUTS SEG # BLD: 3 K/UL (ref 1.7–8.2)
NEUTS SEG NFR BLD: 55 % (ref 43–78)
NRBC # BLD: 0 K/UL (ref 0–0.2)
PLATELET # BLD AUTO: 197 K/UL (ref 150–450)
PMV BLD AUTO: 10.1 FL (ref 9.4–12.3)
POTASSIUM SERPL-SCNC: 3.6 MMOL/L (ref 3.5–5.1)
PROT SERPL-MCNC: 6.5 G/DL (ref 6.3–8.2)
RBC # BLD AUTO: 4.25 M/UL (ref 4.23–5.67)
SODIUM SERPL-SCNC: 140 MMOL/L (ref 136–145)
TSH SERPL DL<=0.005 MIU/L-ACNC: 2.26 UIU/ML (ref 0.36–3.74)
WBC # BLD AUTO: 5.5 K/UL (ref 4.3–11.1)

## 2020-04-21 PROCEDURE — 80053 COMPREHEN METABOLIC PANEL: CPT

## 2020-04-21 PROCEDURE — 85025 COMPLETE CBC W/AUTO DIFF WBC: CPT

## 2020-04-21 PROCEDURE — 36415 COLL VENOUS BLD VENIPUNCTURE: CPT

## 2020-04-21 PROCEDURE — 83735 ASSAY OF MAGNESIUM: CPT

## 2020-04-21 PROCEDURE — 84443 ASSAY THYROID STIM HORMONE: CPT

## 2020-04-21 PROCEDURE — 83880 ASSAY OF NATRIURETIC PEPTIDE: CPT

## 2020-04-23 PROBLEM — I25.10 CAD, MULTIPLE VESSEL: Chronic | Status: RESOLVED | Noted: 2018-05-22 | Resolved: 2020-04-23

## 2020-04-23 PROBLEM — I25.119 CORONARY ARTERY DISEASE INVOLVING NATIVE CORONARY ARTERY OF NATIVE HEART WITH ANGINA PECTORIS (HCC): Status: ACTIVE | Noted: 2020-04-23

## 2020-07-08 ENCOUNTER — HOSPITAL ENCOUNTER (OUTPATIENT)
Dept: LAB | Age: 85
Discharge: HOME | End: 2020-07-08
Attending: INTERNAL MEDICINE
Payer: MEDICARE

## 2020-07-08 DIAGNOSIS — I50.22 SYSTOLIC CHF, CHRONIC (HCC): ICD-10-CM

## 2020-07-08 LAB
ALBUMIN SERPL-MCNC: 3.6 G/DL (ref 3.2–4.6)
ALBUMIN/GLOB SERPL: 1.1 {RATIO} (ref 1.2–3.5)
ALP SERPL-CCNC: 92 U/L (ref 50–136)
ALT SERPL-CCNC: 29 U/L (ref 12–65)
ANION GAP SERPL CALC-SCNC: 4 MMOL/L (ref 7–16)
AST SERPL-CCNC: 29 U/L (ref 15–37)
BASOPHILS # BLD: 0 K/UL (ref 0–0.2)
BASOPHILS NFR BLD: 1 % (ref 0–2)
BILIRUB SERPL-MCNC: 0.3 MG/DL (ref 0.2–1.1)
BNP SERPL-MCNC: 6207 PG/ML
BUN SERPL-MCNC: 29 MG/DL (ref 8–23)
CALCIUM SERPL-MCNC: 8.6 MG/DL (ref 8.3–10.4)
CHLORIDE SERPL-SCNC: 108 MMOL/L (ref 98–107)
CO2 SERPL-SCNC: 26 MMOL/L (ref 21–32)
CREAT SERPL-MCNC: 1.38 MG/DL (ref 0.8–1.5)
DIFFERENTIAL METHOD BLD: ABNORMAL
EOSINOPHIL # BLD: 0 K/UL (ref 0–0.8)
EOSINOPHIL NFR BLD: 0 % (ref 0.5–7.8)
ERYTHROCYTE [DISTWIDTH] IN BLOOD BY AUTOMATED COUNT: 19 % (ref 11.9–14.6)
GLOBULIN SER CALC-MCNC: 3.3 G/DL (ref 2.3–3.5)
GLUCOSE SERPL-MCNC: 97 MG/DL (ref 65–100)
HCT VFR BLD AUTO: 44.4 % (ref 41.1–50.3)
HGB BLD-MCNC: 13.9 G/DL (ref 13.6–17.2)
IMM GRANULOCYTES # BLD AUTO: 0.1 K/UL (ref 0–0.5)
IMM GRANULOCYTES NFR BLD AUTO: 1 % (ref 0–5)
LYMPHOCYTES # BLD: 1.2 K/UL (ref 0.5–4.6)
LYMPHOCYTES NFR BLD: 14 % (ref 13–44)
MAGNESIUM SERPL-MCNC: 2.3 MG/DL (ref 1.8–2.4)
MCH RBC QN AUTO: 29.3 PG (ref 26.1–32.9)
MCHC RBC AUTO-ENTMCNC: 31.3 G/DL (ref 31.4–35)
MCV RBC AUTO: 93.5 FL (ref 79.6–97.8)
MONOCYTES # BLD: 0.4 K/UL (ref 0.1–1.3)
MONOCYTES NFR BLD: 5 % (ref 4–12)
NEUTS SEG # BLD: 6.8 K/UL (ref 1.7–8.2)
NEUTS SEG NFR BLD: 80 % (ref 43–78)
NRBC # BLD: 0 K/UL (ref 0–0.2)
PLATELET # BLD AUTO: 172 K/UL (ref 150–450)
PMV BLD AUTO: 10.7 FL (ref 9.4–12.3)
POTASSIUM SERPL-SCNC: 5.3 MMOL/L (ref 3.5–5.1)
PROT SERPL-MCNC: 6.9 G/DL (ref 6.3–8.2)
RBC # BLD AUTO: 4.75 M/UL (ref 4.23–5.6)
SODIUM SERPL-SCNC: 138 MMOL/L (ref 136–145)
WBC # BLD AUTO: 8.6 K/UL (ref 4.3–11.1)

## 2020-07-08 PROCEDURE — 80053 COMPREHEN METABOLIC PANEL: CPT

## 2020-07-08 PROCEDURE — 83880 ASSAY OF NATRIURETIC PEPTIDE: CPT

## 2020-07-08 PROCEDURE — 36415 COLL VENOUS BLD VENIPUNCTURE: CPT

## 2020-07-08 PROCEDURE — 83735 ASSAY OF MAGNESIUM: CPT

## 2020-07-08 PROCEDURE — 85025 COMPLETE CBC W/AUTO DIFF WBC: CPT

## 2020-07-09 NOTE — PROGRESS NOTES
Please call him, labs are ok. Avoid K rich foods, bananas etc.  Take lasix PRN as he has been doing. Can reduce iron to once daily. Hb is better. Overall, labs are good. Needs another BMP before seeing me in [place]. Call for issues. Good news.    Thanks

## 2021-01-11 ENCOUNTER — HOSPITAL ENCOUNTER (OUTPATIENT)
Dept: LAB | Age: 86
Discharge: HOME | End: 2021-01-11
Payer: MEDICARE

## 2021-01-11 DIAGNOSIS — I42.8 NICM (NONISCHEMIC CARDIOMYOPATHY) (HCC): ICD-10-CM

## 2021-01-11 DIAGNOSIS — Z79.52 CURRENT CHRONIC USE OF SYSTEMIC STEROIDS: Chronic | ICD-10-CM

## 2021-01-11 DIAGNOSIS — I50.22 SYSTOLIC CHF, CHRONIC (HCC): ICD-10-CM

## 2021-01-11 LAB
ALBUMIN SERPL-MCNC: 3.5 G/DL (ref 3.2–4.6)
ALBUMIN/GLOB SERPL: 1.3 {RATIO} (ref 1.2–3.5)
ALP SERPL-CCNC: 84 U/L (ref 50–136)
ALT SERPL-CCNC: 28 U/L (ref 12–65)
ANION GAP SERPL CALC-SCNC: 3 MMOL/L (ref 7–16)
AST SERPL-CCNC: 24 U/L (ref 15–37)
BASOPHILS # BLD: 0 K/UL (ref 0–0.2)
BASOPHILS NFR BLD: 1 % (ref 0–2)
BILIRUB SERPL-MCNC: 0.5 MG/DL (ref 0.2–1.1)
BUN SERPL-MCNC: 18 MG/DL (ref 8–23)
CALCIUM SERPL-MCNC: 8.8 MG/DL (ref 8.3–10.4)
CHLORIDE SERPL-SCNC: 107 MMOL/L (ref 98–107)
CHOLEST SERPL-MCNC: 141 MG/DL
CO2 SERPL-SCNC: 30 MMOL/L (ref 21–32)
CREAT SERPL-MCNC: 1.32 MG/DL (ref 0.8–1.5)
DIFFERENTIAL METHOD BLD: NORMAL
EOSINOPHIL # BLD: 0.2 K/UL (ref 0–0.8)
EOSINOPHIL NFR BLD: 2 % (ref 0.5–7.8)
ERYTHROCYTE [DISTWIDTH] IN BLOOD BY AUTOMATED COUNT: 13.7 % (ref 11.9–14.6)
GLOBULIN SER CALC-MCNC: 2.7 G/DL (ref 2.3–3.5)
GLUCOSE SERPL-MCNC: 90 MG/DL (ref 65–100)
HCT VFR BLD AUTO: 43.5 % (ref 41.1–50.3)
HDLC SERPL-MCNC: 34 MG/DL (ref 40–60)
HDLC SERPL: 4.1 {RATIO}
HGB BLD-MCNC: 14.1 G/DL (ref 13.6–17.2)
IMM GRANULOCYTES # BLD AUTO: 0 K/UL (ref 0–0.5)
IMM GRANULOCYTES NFR BLD AUTO: 0 % (ref 0–5)
LDLC SERPL CALC-MCNC: 76.4 MG/DL
LIPID PROFILE,FLP: ABNORMAL
LYMPHOCYTES # BLD: 1.6 K/UL (ref 0.5–4.6)
LYMPHOCYTES NFR BLD: 22 % (ref 13–44)
MAGNESIUM SERPL-MCNC: 2.3 MG/DL (ref 1.8–2.4)
MCH RBC QN AUTO: 31.2 PG (ref 26.1–32.9)
MCHC RBC AUTO-ENTMCNC: 32.4 G/DL (ref 31.4–35)
MCV RBC AUTO: 96.2 FL (ref 79.6–97.8)
MONOCYTES # BLD: 0.6 K/UL (ref 0.1–1.3)
MONOCYTES NFR BLD: 9 % (ref 4–12)
NEUTS SEG # BLD: 4.8 K/UL (ref 1.7–8.2)
NEUTS SEG NFR BLD: 67 % (ref 43–78)
NRBC # BLD: 0 K/UL (ref 0–0.2)
PLATELET # BLD AUTO: 167 K/UL (ref 150–450)
PMV BLD AUTO: 10 FL (ref 9.4–12.3)
POTASSIUM SERPL-SCNC: 4.2 MMOL/L (ref 3.5–5.1)
PROT SERPL-MCNC: 6.2 G/DL (ref 6.3–8.2)
RBC # BLD AUTO: 4.52 M/UL (ref 4.23–5.6)
SODIUM SERPL-SCNC: 140 MMOL/L (ref 138–145)
TRIGL SERPL-MCNC: 153 MG/DL (ref 35–150)
TSH SERPL DL<=0.005 MIU/L-ACNC: 0.8 UIU/ML (ref 0.36–3.74)
VLDLC SERPL CALC-MCNC: 30.6 MG/DL (ref 6–23)
WBC # BLD AUTO: 7.2 K/UL (ref 4.3–11.1)

## 2021-01-11 PROCEDURE — 83735 ASSAY OF MAGNESIUM: CPT

## 2021-01-11 PROCEDURE — 80061 LIPID PANEL: CPT

## 2021-01-11 PROCEDURE — 85025 COMPLETE CBC W/AUTO DIFF WBC: CPT

## 2021-01-11 PROCEDURE — 80053 COMPREHEN METABOLIC PANEL: CPT

## 2021-01-11 PROCEDURE — 84443 ASSAY THYROID STIM HORMONE: CPT

## 2021-01-11 PROCEDURE — 36415 COLL VENOUS BLD VENIPUNCTURE: CPT

## 2021-01-12 NOTE — PROGRESS NOTES
Please call him, labs are excellent, lipids are excellent. Cr is good. No med changes. Call as needed, see me as planned. Good news.    Thanks

## 2021-01-19 ENCOUNTER — HOSPITAL ENCOUNTER (EMERGENCY)
Age: 86
Discharge: HOME | End: 2021-01-19
Attending: EMERGENCY MEDICINE
Payer: MEDICARE

## 2021-01-19 ENCOUNTER — APPOINTMENT (OUTPATIENT)
Dept: GENERAL RADIOLOGY | Age: 86
End: 2021-01-19
Attending: EMERGENCY MEDICINE
Payer: MEDICARE

## 2021-01-19 VITALS
HEART RATE: 79 BPM | DIASTOLIC BLOOD PRESSURE: 80 MMHG | TEMPERATURE: 98.8 F | RESPIRATION RATE: 70 BRPM | OXYGEN SATURATION: 99 % | BODY MASS INDEX: 27.3 KG/M2 | WEIGHT: 195 LBS | SYSTOLIC BLOOD PRESSURE: 152 MMHG | HEIGHT: 71 IN

## 2021-01-19 DIAGNOSIS — R53.83 FATIGUE, UNSPECIFIED TYPE: Primary | ICD-10-CM

## 2021-01-19 DIAGNOSIS — I50.9 CHRONIC CONGESTIVE HEART FAILURE, UNSPECIFIED HEART FAILURE TYPE (HCC): ICD-10-CM

## 2021-01-19 DIAGNOSIS — N17.9 AKI (ACUTE KIDNEY INJURY) (HCC): ICD-10-CM

## 2021-01-19 DIAGNOSIS — I10 HYPERTENSION, UNSPECIFIED TYPE: ICD-10-CM

## 2021-01-19 LAB
ALBUMIN SERPL-MCNC: 3.3 G/DL (ref 3.2–4.6)
ALBUMIN/GLOB SERPL: 1 {RATIO} (ref 1.2–3.5)
ALP SERPL-CCNC: 95 U/L (ref 50–136)
ALT SERPL-CCNC: 23 U/L (ref 12–65)
ANION GAP SERPL CALC-SCNC: 9 MMOL/L (ref 7–16)
AST SERPL-CCNC: 28 U/L (ref 15–37)
BASOPHILS # BLD: 0.1 K/UL (ref 0–0.2)
BASOPHILS NFR BLD: 1 % (ref 0–2)
BILIRUB SERPL-MCNC: 0.8 MG/DL (ref 0.2–1.1)
BUN SERPL-MCNC: 20 MG/DL (ref 8–23)
CALCIUM SERPL-MCNC: 8.9 MG/DL (ref 8.3–10.4)
CHLORIDE SERPL-SCNC: 108 MMOL/L (ref 98–107)
CO2 SERPL-SCNC: 24 MMOL/L (ref 21–32)
CREAT SERPL-MCNC: 1.8 MG/DL (ref 0.8–1.5)
DIFFERENTIAL METHOD BLD: NORMAL
EOSINOPHIL # BLD: 0.2 K/UL (ref 0–0.8)
EOSINOPHIL NFR BLD: 3 % (ref 0.5–7.8)
ERYTHROCYTE [DISTWIDTH] IN BLOOD BY AUTOMATED COUNT: 13 % (ref 11.9–14.6)
GLOBULIN SER CALC-MCNC: 3.3 G/DL (ref 2.3–3.5)
GLUCOSE SERPL-MCNC: 92 MG/DL (ref 65–100)
HCT VFR BLD AUTO: 45.4 % (ref 41.1–50.3)
HGB BLD-MCNC: 14.5 G/DL (ref 13.6–17.2)
IMM GRANULOCYTES # BLD AUTO: 0 K/UL (ref 0–0.5)
IMM GRANULOCYTES NFR BLD AUTO: 0 % (ref 0–5)
LYMPHOCYTES # BLD: 1.6 K/UL (ref 0.5–4.6)
LYMPHOCYTES NFR BLD: 29 % (ref 13–44)
MCH RBC QN AUTO: 30.6 PG (ref 26.1–32.9)
MCHC RBC AUTO-ENTMCNC: 31.9 G/DL (ref 31.4–35)
MCV RBC AUTO: 95.8 FL (ref 79.6–97.8)
MONOCYTES # BLD: 0.7 K/UL (ref 0.1–1.3)
MONOCYTES NFR BLD: 12 % (ref 4–12)
NEUTS SEG # BLD: 3 K/UL (ref 1.7–8.2)
NEUTS SEG NFR BLD: 54 % (ref 43–78)
NRBC # BLD: 0 K/UL (ref 0–0.2)
PLATELET # BLD AUTO: 175 K/UL (ref 150–450)
PMV BLD AUTO: 9.6 FL (ref 9.4–12.3)
POTASSIUM SERPL-SCNC: 4.6 MMOL/L (ref 3.5–5.1)
PROT SERPL-MCNC: 6.6 G/DL (ref 6.3–8.2)
RBC # BLD AUTO: 4.74 M/UL (ref 4.23–5.6)
SODIUM SERPL-SCNC: 141 MMOL/L (ref 136–145)
WBC # BLD AUTO: 5.6 K/UL (ref 4.3–11.1)

## 2021-01-19 PROCEDURE — 71045 X-RAY EXAM CHEST 1 VIEW: CPT

## 2021-01-19 PROCEDURE — 99285 EMERGENCY DEPT VISIT HI MDM: CPT

## 2021-01-19 PROCEDURE — 93005 ELECTROCARDIOGRAM TRACING: CPT | Performed by: EMERGENCY MEDICINE

## 2021-01-19 PROCEDURE — 80053 COMPREHEN METABOLIC PANEL: CPT

## 2021-01-19 PROCEDURE — 85025 COMPLETE CBC W/AUTO DIFF WBC: CPT

## 2021-01-19 PROCEDURE — 74011250636 HC RX REV CODE- 250/636: Performed by: EMERGENCY MEDICINE

## 2021-01-19 PROCEDURE — 81003 URINALYSIS AUTO W/O SCOPE: CPT

## 2021-01-19 RX ADMIN — SODIUM CHLORIDE 500 ML: 900 INJECTION, SOLUTION INTRAVENOUS at 14:59

## 2021-01-19 NOTE — ED PROVIDER NOTES
Patient has history of coronary artery disease, congestive heart failure, hypertension, thyroid disease and prior stroke complaining of fatigue for the past couple days. He states he gets very weak and tired doing normal things. He denies any chest pain or shortness of breath, has had no cough or fever. He denies any vomiting or diarrhea. He states his breathing is fine when he sleeps at night, denies any lower extremity swelling. He has been taking his medications as prescribed. He denies any known sick contacts. Past Medical History:   Diagnosis Date    Arrhythmia     CAD, multiple vessel 5/22/2018 5/18/18 (Dr Jeffery Kerns) Coronary artery bypass grafting x3 with grafts consisting of: 1. Left internal mammary artery to left anterior descending artery.   2.  Reverse saphenous vein to diagonal.  3.  Reversed saphenous vein graft to obtuse marginal.    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     HX PACEMAKER  2018    VASCULAR SURGERY PROCEDURE UNLIST           Family History:   Problem Relation Age of Onset    No Known Problems Mother     No Known Problems Father        Social History     Socioeconomic History    Marital status:      Spouse name: Not on file    Number of children: Not on file    Years of education: Not on file    Highest education level: Not on file   Occupational History    Not on file   Social Needs    Financial resource strain: Not on file    Food insecurity     Worry: Not on file     Inability: Not on file    Transportation needs     Medical: Not on file     Non-medical: Not on file   Tobacco Use    Smoking status: Never Smoker    Smokeless tobacco: Never Used   Substance and Sexual Activity    Alcohol use: No    Drug use: No    Sexual activity: Not Currently   Lifestyle    Physical activity     Days per week: Not on file     Minutes per session: Not on file    Stress: Not on file   Relationships    Social connections     Talks on phone: Not on file     Gets together: Not on file     Attends [employer] service: Not on file     Active member of club or organization: Not on file     Attends meetings of clubs or organizations: Not on file     Relationship status: Not on file    Intimate partner violence     Fear of current or ex partner: Not on file     Emotionally abused: Not on file     Physically abused: Not on file     Forced sexual activity: Not on file   Other Topics Concern    Not on file   Social History Narrative    Not on file         ALLERGIES: Ativan [lorazepam] and Pcn [penicillins]    Review of Systems   Constitutional: Positive for fatigue. Negative for chills and fever. Gastrointestinal: Negative for nausea and vomiting. All other systems reviewed and are negative. Vitals:    01/19/21 1332   BP: 130/76   Pulse: 77   Resp: 18   Temp: 98.8 °F (37.1 °C)   SpO2: 99%   Weight: 88.5 kg (195 lb)   Height: 5' 11\" (1.803 m)            Physical Exam  Vitals signs and nursing note reviewed. Constitutional:       Appearance: Normal appearance. He is well-developed. HENT:      Head: Normocephalic and atraumatic. Eyes:      Conjunctiva/sclera: Conjunctivae normal.      Pupils: Pupils are equal, round, and reactive to light. Neck:      Musculoskeletal: Normal range of motion and neck supple. Cardiovascular:      Rate and Rhythm: Normal rate and regular rhythm. Heart sounds: Normal heart sounds. Pulmonary:      Effort: Pulmonary effort is normal. No respiratory distress. Breath sounds: Normal breath sounds. No wheezing. Abdominal:      General: Bowel sounds are normal.      Palpations: Abdomen is soft. Musculoskeletal: Normal range of motion. Right lower leg: No edema. Left lower leg: No edema. Skin:     General: Skin is warm and dry. Neurological:      Mental Status: He is alert and oriented to person, place, and time. MDM  Number of Diagnoses or Management Options  SALEEM (acute kidney injury) ([address]): new and does not require workup  Chronic congestive heart failure, unspecified heart failure type ([address]  Fatigue, unspecified type: new and does not require workup  Hypertension, unspecified type  Diagnosis management comments: 5/7/20 echo report:  EF 30-35%  LA severely dilated  Moderate aortic sclerosis  Moderate to severe mitral regurg    3:16 PM discussed results with patient. His creatinine is bumped above his baseline, is currently 1.8 today from a baseline of 1.3 per old records. He will be gently rehydrated and discharged home. Discussed this with patient, need for close outpatient follow-up with his primary doctor.        Amount and/or Complexity of Data Reviewed  Clinical lab tests: ordered and reviewed  Tests in the radiology section of CPT®: ordered and reviewed  Tests in the medicine section of CPT®: ordered and reviewed  Decide to obtain previous medical records or to obtain history from someone other than the patient: yes  Review and summarize past medical records: yes    Risk of Complications, Morbidity, and/or Mortality  Presenting problems: moderate  Diagnostic procedures: moderate  Management options: moderate    Patient Progress  Patient progress: improved         Procedures

## 2021-01-19 NOTE — ED TRIAGE NOTES
Pt arrived via [facility] from home c/o all over weakness X4 days. Reports shob with exertion.  Denies NVD, cp, fever, cough

## 2021-01-19 NOTE — ED NOTES
I have reviewed discharge instructions with the patient. The patient verbalized understanding. Patient left ED via Discharge Method: ambulatory to Home with his wife. Opportunity for questions and clarification provided. Patient given 0 scripts. To continue your aftercare when you leave the hospital, you may receive an automated call from our care team to check in on how you are doing.  This is a free service and part of our promise to provide the best care and service to meet your aftercare needs. \" If you have questions, or wish to unsubscribe from this service please call [phone].  Thank you for Choosing our [facility] Emergency Department.

## 2021-11-23 NOTE — PROGRESS NOTES
Pt not verbally responsive upon initial assessment. Impulsive, trying to get out of bed, assisted to sit on side of bed. Mumbling but unable to state his name or answer any other questions. Pt able to move all extremities, but some right sided weakness noted with . No noticeable facial droop. Code S called 0700. See orders, will continue to monitor closely. 23-Nov-2021 22:00

## 2022-01-01 NOTE — PROGRESS NOTES
Verbal bedside report given to PG[facility]E Admitly, oncoming RN. Patient's situation, background, assessment and recommendations provided. Opportunity for questions provided. Oncoming RN assumed care of patient. Cardizem IV drip verified at bedside with oncoming RN. 2022 10:14

## 2022-03-14 ENCOUNTER — HOSPITAL ENCOUNTER (OUTPATIENT)
Dept: LAB | Age: 87
Discharge: HOME | End: 2022-03-14
Payer: MEDICARE

## 2022-03-14 DIAGNOSIS — I50.22 SYSTOLIC CHF, CHRONIC (HCC): ICD-10-CM

## 2022-03-14 LAB
ALBUMIN SERPL-MCNC: 3.3 G/DL (ref 3.2–4.6)
ALBUMIN/GLOB SERPL: 1.1 {RATIO} (ref 1.2–3.5)
ALP SERPL-CCNC: 80 U/L (ref 50–136)
ALT SERPL-CCNC: 28 U/L (ref 12–65)
ANION GAP SERPL CALC-SCNC: 4 MMOL/L (ref 7–16)
AST SERPL-CCNC: 24 U/L (ref 15–37)
BASOPHILS # BLD: 0.1 K/UL (ref 0–0.2)
BASOPHILS NFR BLD: 1 % (ref 0–2)
BILIRUB SERPL-MCNC: 0.5 MG/DL (ref 0.2–1.1)
BUN SERPL-MCNC: 22 MG/DL (ref 8–23)
CALCIUM SERPL-MCNC: 9.2 MG/DL (ref 8.3–10.4)
CHLORIDE SERPL-SCNC: 109 MMOL/L (ref 98–107)
CHOLEST SERPL-MCNC: 119 MG/DL
CO2 SERPL-SCNC: 28 MMOL/L (ref 21–32)
CREAT SERPL-MCNC: 1.3 MG/DL (ref 0.8–1.5)
DIFFERENTIAL METHOD BLD: ABNORMAL
EOSINOPHIL # BLD: 0.1 K/UL (ref 0–0.8)
EOSINOPHIL NFR BLD: 1 % (ref 0.5–7.8)
ERYTHROCYTE [DISTWIDTH] IN BLOOD BY AUTOMATED COUNT: 15.2 % (ref 11.9–14.6)
GLOBULIN SER CALC-MCNC: 3 G/DL (ref 2.3–3.5)
GLUCOSE SERPL-MCNC: 132 MG/DL (ref 65–100)
HCT VFR BLD AUTO: 40.9 % (ref 41.1–50.3)
HDLC SERPL-MCNC: 42 MG/DL (ref 40–60)
HDLC SERPL: 2.8 {RATIO}
HGB BLD-MCNC: 12.5 G/DL (ref 13.6–17.2)
IMM GRANULOCYTES # BLD AUTO: 0 K/UL (ref 0–0.5)
IMM GRANULOCYTES NFR BLD AUTO: 0 % (ref 0–5)
LDLC SERPL CALC-MCNC: 50.4 MG/DL
LYMPHOCYTES # BLD: 1.5 K/UL (ref 0.5–4.6)
LYMPHOCYTES NFR BLD: 14 % (ref 13–44)
MAGNESIUM SERPL-MCNC: 2.1 MG/DL (ref 1.8–2.4)
MCH RBC QN AUTO: 27.4 PG (ref 26.1–32.9)
MCHC RBC AUTO-ENTMCNC: 30.6 G/DL (ref 31.4–35)
MCV RBC AUTO: 89.7 FL (ref 79.6–97.8)
MONOCYTES # BLD: 0.8 K/UL (ref 0.1–1.3)
MONOCYTES NFR BLD: 7 % (ref 4–12)
NEUTS SEG # BLD: 8 K/UL (ref 1.7–8.2)
NEUTS SEG NFR BLD: 77 % (ref 43–78)
NRBC # BLD: 0 K/UL (ref 0–0.2)
PLATELET # BLD AUTO: 231 K/UL (ref 150–450)
PMV BLD AUTO: 9.6 FL (ref 9.4–12.3)
POTASSIUM SERPL-SCNC: 4.4 MMOL/L (ref 3.5–5.1)
PROT SERPL-MCNC: 6.3 G/DL (ref 6.3–8.2)
RBC # BLD AUTO: 4.56 M/UL (ref 4.23–5.6)
SODIUM SERPL-SCNC: 141 MMOL/L (ref 138–145)
TRIGL SERPL-MCNC: 133 MG/DL (ref 35–150)
TSH SERPL DL<=0.005 MIU/L-ACNC: 0.74 UIU/ML (ref 0.36–3.74)
VLDLC SERPL CALC-MCNC: 26.6 MG/DL (ref 6–23)
WBC # BLD AUTO: 10.5 K/UL (ref 4.3–11.1)

## 2022-03-14 PROCEDURE — 85025 COMPLETE CBC W/AUTO DIFF WBC: CPT

## 2022-03-14 PROCEDURE — 36415 COLL VENOUS BLD VENIPUNCTURE: CPT

## 2022-03-14 PROCEDURE — 80061 LIPID PANEL: CPT

## 2022-03-14 PROCEDURE — 84443 ASSAY THYROID STIM HORMONE: CPT

## 2022-03-14 PROCEDURE — 80053 COMPREHEN METABOLIC PANEL: CPT

## 2022-03-14 PROCEDURE — 83735 ASSAY OF MAGNESIUM: CPT

## 2022-03-15 NOTE — PROGRESS NOTES
Please call him. Labs are good, normal K and Cr better now. Good news. Remain on aldactone, continue with lasix on a PRN basis based on weight and edema. Liver normal, lipids are great as well. TSH normal.    Continue with meds, call for issues. See me as planned.    Thanks

## 2022-03-18 PROBLEM — Z95.1 S/P CABG (CORONARY ARTERY BYPASS GRAFT): Status: ACTIVE | Noted: 2018-05-18

## 2022-03-18 PROBLEM — N39.0 UTI (URINARY TRACT INFECTION): Status: ACTIVE | Noted: 2018-07-18

## 2022-03-19 PROBLEM — E03.9 HYPOTHYROIDISM: Status: ACTIVE | Noted: 2018-05-18

## 2022-03-19 PROBLEM — Z95.0 PACEMAKER: Status: ACTIVE | Noted: 2020-02-14

## 2022-03-19 PROBLEM — I48.91 ATRIAL FIBRILLATION WITH RVR (HCC): Status: ACTIVE | Noted: 2020-01-10

## 2022-03-19 PROBLEM — A41.9 SEPSIS SECONDARY TO UTI (HCC): Status: ACTIVE | Noted: 2018-07-19

## 2022-03-19 PROBLEM — R10.9 ABDOMINAL PAIN: Status: ACTIVE | Noted: 2018-07-18

## 2022-03-19 PROBLEM — I50.22 SYSTOLIC CHF, CHRONIC (HCC): Status: ACTIVE | Noted: 2019-10-17

## 2022-03-19 PROBLEM — N39.0 SEPSIS SECONDARY TO UTI (HCC): Status: ACTIVE | Noted: 2018-07-19

## 2022-03-19 PROBLEM — R55 SYNCOPE: Status: ACTIVE | Noted: 2020-01-10

## 2022-03-19 PROBLEM — R78.81 GRAM-NEGATIVE BACTEREMIA: Status: ACTIVE | Noted: 2018-07-19

## 2022-03-19 PROBLEM — I25.119 CORONARY ARTERY DISEASE INVOLVING NATIVE CORONARY ARTERY OF NATIVE HEART WITH ANGINA PECTORIS (HCC): Status: ACTIVE | Noted: 2020-04-23

## 2022-03-19 PROBLEM — Z79.52 CURRENT CHRONIC USE OF SYSTEMIC STEROIDS: Status: ACTIVE | Noted: 2018-05-18

## 2022-03-19 PROBLEM — D64.9 NORMOCYTIC ANEMIA: Status: ACTIVE | Noted: 2018-07-18

## 2022-03-19 PROBLEM — I42.8 NICM (NONISCHEMIC CARDIOMYOPATHY) (HCC): Status: ACTIVE | Noted: 2020-02-14

## 2022-03-19 PROBLEM — I42.0 DILATED CARDIOMYOPATHY (HCC): Status: ACTIVE | Noted: 2019-10-17

## 2022-03-19 PROBLEM — Z86.73 HISTORY OF CVA (CEREBROVASCULAR ACCIDENT): Status: ACTIVE | Noted: 2018-06-20

## 2022-03-20 PROBLEM — I10 ESSENTIAL HYPERTENSION: Status: ACTIVE | Noted: 2018-05-16

## 2022-03-20 PROBLEM — I48.19 PERSISTENT ATRIAL FIBRILLATION (HCC): Status: ACTIVE | Noted: 2018-07-18

## 2022-07-19 RX ORDER — SPIRONOLACTONE 25 MG/1
25 TABLET ORAL DAILY
Qty: 90 TABLET | Refills: 3 | Status: SHIPPED | OUTPATIENT
Start: 2022-07-19

## 2022-09-07 ENCOUNTER — OFFICE VISIT (OUTPATIENT)
Dept: CARDIOLOGY CLINIC | Age: 87
End: 2022-09-07
Payer: MEDICARE

## 2022-09-07 VITALS
HEIGHT: 71 IN | WEIGHT: 187 LBS | SYSTOLIC BLOOD PRESSURE: 118 MMHG | HEART RATE: 74 BPM | DIASTOLIC BLOOD PRESSURE: 80 MMHG | BODY MASS INDEX: 26.18 KG/M2

## 2022-09-07 DIAGNOSIS — N18.31 STAGE 3A CHRONIC KIDNEY DISEASE (HCC): ICD-10-CM

## 2022-09-07 DIAGNOSIS — I42.8 NICM (NONISCHEMIC CARDIOMYOPATHY) (HCC): Primary | ICD-10-CM

## 2022-09-07 DIAGNOSIS — I10 ESSENTIAL HYPERTENSION: ICD-10-CM

## 2022-09-07 DIAGNOSIS — Z95.0 PACEMAKER: ICD-10-CM

## 2022-09-07 DIAGNOSIS — I48.19 PERSISTENT ATRIAL FIBRILLATION (HCC): ICD-10-CM

## 2022-09-07 DIAGNOSIS — I25.119 ATHEROSCLEROSIS OF NATIVE CORONARY ARTERY OF NATIVE HEART WITH ANGINA PECTORIS (HCC): ICD-10-CM

## 2022-09-07 DIAGNOSIS — I50.22 SYSTOLIC CHF, CHRONIC (HCC): ICD-10-CM

## 2022-09-07 PROBLEM — N18.30 CHRONIC RENAL DISEASE, STAGE III (HCC): Status: ACTIVE | Noted: 2022-09-07

## 2022-09-07 PROCEDURE — G8417 CALC BMI ABV UP PARAM F/U: HCPCS | Performed by: INTERNAL MEDICINE

## 2022-09-07 PROCEDURE — 1036F TOBACCO NON-USER: CPT | Performed by: INTERNAL MEDICINE

## 2022-09-07 PROCEDURE — 1123F ACP DISCUSS/DSCN MKR DOCD: CPT | Performed by: INTERNAL MEDICINE

## 2022-09-07 PROCEDURE — 99214 OFFICE O/P EST MOD 30 MIN: CPT | Performed by: INTERNAL MEDICINE

## 2022-09-07 PROCEDURE — G8427 DOCREV CUR MEDS BY ELIG CLIN: HCPCS | Performed by: INTERNAL MEDICINE

## 2022-09-07 NOTE — PROGRESS NOTES
[address], [address] [facility], [address] [place] [address]  PHONE: [phone]     22    NAME:  Rehan Flores. : 1935  MRN: [id number]       SUBJECTIVE:   Rehan Flores. is a 82 y.o. male seen for a follow up visit regarding the following:     Chief Complaint   Patient presents with    Coronary Artery Disease       HPI: Here for CAD, prior CVA, PPM.     CAD:  NSTEMI, followed by 3v CABG on 18. CVA after CABG. Echo 2018: EF 50-55%. Carotid US 2018: mild dz   PPM 2018 for Afib and SSS   Echo 10/2019 and 2020: EF 30-35%, mod-severe MR   LHC 10/2019: stable 3/3 patent grafts   Upgrade 2/15/2020: Biotronik biventricular implantable cardioverter defibrillator       No new angina, No new angina, CP.   WAITE the same, not worsening. No new edema, palp. Patient denies recent history of orthopnea, PND, excessive dizziness and/or syncope. Doing well on anticoagulation treatment as reviewed today, no bleeding issues or excessive bruising noted. Been on prednisone since Pit surgery       Past Medical History, Past Surgical History, Family history, Social History, and Medications were all reviewed with the patient today and updated as necessary. Current Outpatient Medications   Medication Sig Dispense Refill    spironolactone (ALDACTONE) 25 MG tablet Take 1 tablet by mouth in the morning. TAKE 1 TABLET BY MOUTH DAILY.  90 tablet 3    apixaban (ELIQUIS) 5 MG TABS tablet TAKE 1 TABLET BY MOUTH TWICE DAILY      aspirin 81 MG EC tablet Take 81 mg by mouth daily      atorvastatin (LIPITOR) 80 MG tablet Take 80 mg by mouth      bisacodyl (DULCOLAX) 5 MG EC tablet Take 5 mg by mouth daily      ferrous sulfate (FE TABS 325) 325 (65 Fe) MG EC tablet Take 325 mg by mouth 3 times daily (with meals)      furosemide (LASIX) 20 MG tablet Take 20 mg by mouth daily      levothyroxine (SYNTHROID) 75 MCG tablet Take 75 mcg by mouth daily      metoprolol succinate (TOPROL XL) 25 MG extended release tablet Take 25 mg by mouth daily      predniSONE (DELTASONE) 5 MG tablet Take 5 mg by mouth daily       No current facility-administered medications for this visit. Allergies   Allergen Reactions    Penicillins Swelling    Lorazepam Rash     Patient Active Problem List    Diagnosis Date Noted    Pacemaker 02/14/2020     Priority: High    Chronic renal disease, stage III [facility]) [[id number]] 09/07/2022     Priority: Medium    Coronary artery disease involving native coronary artery of native heart with angina pectoris ([facility] [address] 04/23/2020    NICM (nonischemic cardiomyopathy) ([facility] 02/14/2020    Atrial fibrillation with RVR ([facility] [address] 01/10/2020    Syncope 04/67/1322    Systolic CHF, chronic ([address] 10/17/2019    Dilated cardiomyopathy ([facility] [address] 10/17/2019    Gram-negative bacteremia 07/19/2018    Sepsis secondary to UTI ([address] 07/19/2018    UTI (urinary tract infection) 07/18/2018    Abdominal pain 07/18/2018    Normocytic anemia 07/18/2018    Persistent atrial fibrillation ([address] 07/18/2018    History of CVA (cerebrovascular accident) 06/20/2018    S/P CABG (coronary artery bypass graft) 05/18/2018    Hypothyroidism 05/18/2018    Current chronic use of systemic steroids 05/18/2018     Pituitary tumor surgery in 2001 and in 2013--has been on Prednisone 5 mg   daily since 2013. 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       PHYSICAL EXAM:     /80   Pulse 74   Ht 5' 11\" (1.803 m)   Wt 187 lb (84.8 kg)   BMI 26.08 kg/m²    General/Constitutional:   Alert and oriented x 3, no acute distress  HEENT:   normocephalic, atraumatic, moist mucous membranes  Neck:   No JVD or carotid bruits bilaterally  Cardiovascular:   regular rate and rhythm, no murmur/rub/gallop appreciated  Pulmonary:   clear to auscultation bilaterally, no respiratory distress  Abdomen:   soft, non-tender, non-distended  Ext:   No sig LE edema bilaterally  Skin:  warm and dry, no obvious rashes seen  Neuro:   no obvious sensory or motor deficits  Psychiatric:   normal mood and affect      Lab Results   Component Value Date/Time     03/14/2022 04:37 PM    K 4.4 03/14/2022 04:37 PM     03/14/2022 04:37 PM    CO2 28 03/14/2022 04:37 PM    BUN 22 03/14/2022 04:37 PM    CREATININE 1.30 03/14/2022 04:37 PM    GLUCOSE 132 03/14/2022 04:37 PM    CALCIUM 9.2 03/14/2022 04:37 PM        Lab Results   Component Value Date    WBC 10.5 03/14/2022    HGB 12.5 (L) 03/14/2022    HCT 40.9 (L) 03/14/2022    MCV 89.7 03/14/2022     03/14/2022       Lab Results   Component Value Date    TSH 0.738 03/14/2022       No results found for: LABA1C  No results found for: EAG    Lab Results   Component Value Date    CHOL 119 03/14/2022    CHOL 141 01/11/2021    CHOL 119 09/23/2019     Lab Results   Component Value Date    TRIG 133 03/14/2022    TRIG 153 (H) 01/11/2021    TRIG 92 09/23/2019     Lab Results   Component Value Date    HDL 42 03/14/2022    HDL 34 (L) 01/11/2021    HDL 49 09/23/2019     Lab Results   Component Value Date    LDLCALC 50.4 03/14/2022    LDLCALC 76.4 01/11/2021    LDLCALC 51.6 09/23/2019     Lab Results   Component Value Date    LABVLDL 26.6 (H) 03/14/2022    LABVLDL 30.6 (H) 01/11/2021    LABVLDL 18.4 09/23/2019     Lab Results   Component Value Date    CHOLHDLRATIO 2.8 03/14/2022    CHOLHDLRATIO 4.1 01/11/2021    CHOLHDLRATIO 2.4 09/23/2019           I have Independently reviewed prior care notes, any ER records available, cardiac testing, labs and results with the patient and before seeing the patient today. Also independently reviewed outside records when available. ASSESSMENT:    Feliz Scheuermann was seen today for coronary artery disease. Diagnoses and all orders for this visit:    NICM (nonischemic cardiomyopathy) ([facility] [facility]    Stage 3a chronic kidney disease ([facility]    Atherosclerosis of native coronary artery of native heart with angina pectoris (HCC)    Systolic CHF, chronic ([facility] [facility]    Persistent atrial fibrillation ([facility] [address]    Essential hypertension    Pacemaker        PLAN:         1. CAD/CM/SHF:  Feeling better on aldactone. Off lasix now. Remain on BB, no Ace or ARB with low BP. Lasix PRN. Rare now. Chronic AF on AC. Normal CRT-D function. 2. Afib/Flutter with RVR:  Remain on toprol. Remain on [address] [place] [address]. Follow device, check today. 3. Prior CVA:  Remain on NOAC now. I have reviewed their anticoagulation treatment, instructed patient to call with any bleeding issues such as melana or other. The patient will call with any issues related to their treatment. All questions were answered with the patient voicing understanding. 4. CKD: follow. 5. HPL:  Remain on atorvastatin. 6. MR:  Bridget, follow EF. Follow for more WAITE, edema. Patient has been instructed and agrees to call our office with any issues or other concerns related to their cardiac condition(s) and/or complaint(s). Return in about 6 months (around 3/7/2023).        KEN GORMAN,   9/7/2022

## 2022-10-23 DIAGNOSIS — Z95.0 PACEMAKER: ICD-10-CM

## 2022-10-23 DIAGNOSIS — I42.8 NICM (NONISCHEMIC CARDIOMYOPATHY) (HCC): Primary | ICD-10-CM

## 2022-11-18 DIAGNOSIS — I48.91 ATRIAL FIBRILLATION WITH RVR (HCC): ICD-10-CM

## 2022-11-18 DIAGNOSIS — Z95.810 S/P INTERNAL CARDIAC DEFIBRILLATOR PROCEDURE: ICD-10-CM

## 2022-11-18 DIAGNOSIS — I50.22 SYSTOLIC CHF, CHRONIC (HCC): Primary | ICD-10-CM

## 2022-12-06 NOTE — PROGRESS NOTES
5/20/2018 12:03 PM    Admit Date: 5/16/2018        Subjective:     Liv Canela reports no SOB He went into a fib last night and started on IV amio rate this AM <100. Objective:      Visit Vitals    /74 (BP 1 Location: Right arm, BP Patient Position: Head of bed elevated (Comment degrees))    Pulse (!) 107    Temp 99.5 °F (37.5 °C)    Resp 17    Ht 5' 11\" (1.803 m)    Wt 99 kg (218 lb 4.1 oz)    SpO2 92%    BMI 30.44 kg/m2       Physical Exam:  Neck: no JVD  Lungs: clear to auscultation bilaterally  Abdomen: soft, non-tender.  Bowel sounds normal. No masses,  no organomegaly  Extremities: edema mild  Cor irreg no rub  Data Review:   Labs:    Recent Results (from the past 24 hour(s))   GLUCOSE, POC    Collection Time: 05/19/18  1:01 PM   Result Value Ref Range    Glucose (POC) 101 (H) 65 - 100 mg/dL   GLUCOSE, POC    Collection Time: 05/19/18  8:58 PM   Result Value Ref Range    Glucose (POC) 184 (H) 65 - 100 mg/dL   PLEASE READ & DOCUMENT PPD TEST IN 24 HRS    Collection Time: 05/19/18  9:00 PM   Result Value Ref Range    PPD neg Negative    mm Induration 0 mm   METABOLIC PANEL, BASIC    Collection Time: 05/20/18  5:13 AM   Result Value Ref Range    Sodium 143 136 - 145 mmol/L    Potassium 3.7 3.5 - 5.1 mmol/L    Chloride 112 (H) 98 - 107 mmol/L    CO2 24 21 - 32 mmol/L    Anion gap 7 7 - 16 mmol/L    Glucose 92 65 - 100 mg/dL    BUN 18 8 - 23 MG/DL    Creatinine 1.24 0.8 - 1.5 MG/DL    GFR est AA >60 >60 ml/min/1.73m2    GFR est non-AA 59 (L) >60 ml/min/1.73m2    Calcium 7.8 (L) 8.3 - 10.4 MG/DL   MAGNESIUM    Collection Time: 05/20/18  5:13 AM   Result Value Ref Range    Magnesium 2.3 1.8 - 2.4 mg/dL   CBC W/O DIFF    Collection Time: 05/20/18  5:13 AM   Result Value Ref Range    WBC 5.5 4.3 - 11.1 K/uL    RBC 3.09 (L) 4.23 - 5.67 M/uL    HGB 9.0 (L) 13.6 - 17.2 g/dL    HCT 27.7 (L) 41.1 - 50.3 %    MCV 89.6 79.6 - 97.8 FL    MCH 29.1 26.1 - 32.9 PG    MCHC 32.5 31.4 - 35.0 g/dL    RDW 14.7 (H) 11.9 - 14.6 %    PLATELET 85 (L) 573 - 450 K/uL    MPV 9.5 (L) 10.8 - 14.1 FL   GLUCOSE, POC    Collection Time: 05/20/18  6:15 AM   Result Value Ref Range    Glucose (POC) 90 65 - 100 mg/dL   GLUCOSE, POC    Collection Time: 05/20/18 11:36 AM   Result Value Ref Range    Glucose (POC) 93 65 - 100 mg/dL       Telemetry: AFIB      Assessment:     Patient Active Problem List    Diagnosis Date Noted    PAF (paroxysmal atrial fibrillation) ([facility] [address] he went into a fib on IV amio continue for now 05/20/2018    Atelectasis 05/19/2018    S/P CABG (coronary artery bypass graft) stable 05/18/2018    Hypoxia 05/18/2018    Hypothyroidism 05/18/2018    Current chronic use of systemic steroids 05/18/2018    Bradycardia 05/16/2018    Essential hypertension 05/16/2018    NSTEMI (non-ST elevated myocardial infarction) ([facility] [address] 05/16/2018    Localized edema 09/21/2017    EKG, abnormal 08/23/2017       Plan:   Continue amio Return to [facility], OLMAN Lreba aware. If D/C home would require assistance with all functional mobility, home PT. DME: patient lift device, wheelchair./[facility]

## 2022-12-13 RX ORDER — METOPROLOL SUCCINATE 25 MG/1
25 TABLET, EXTENDED RELEASE ORAL DAILY
Qty: 90 TABLET | Refills: 3 | Status: SHIPPED | OUTPATIENT
Start: 2022-12-13

## 2023-02-17 ENCOUNTER — APPOINTMENT (OUTPATIENT)
Dept: GENERAL RADIOLOGY | Age: 88
End: 2023-02-17
Payer: MEDICARE

## 2023-02-17 ENCOUNTER — HOSPITAL ENCOUNTER (EMERGENCY)
Age: 88
Discharge: HOME | End: 2023-02-17
Attending: EMERGENCY MEDICINE
Payer: MEDICARE

## 2023-02-17 VITALS
OXYGEN SATURATION: 100 % | HEIGHT: 71 IN | BODY MASS INDEX: 24.78 KG/M2 | TEMPERATURE: 97.5 F | SYSTOLIC BLOOD PRESSURE: 114 MMHG | WEIGHT: 177 LBS | DIASTOLIC BLOOD PRESSURE: 71 MMHG | HEART RATE: 76 BPM | RESPIRATION RATE: 21 BRPM

## 2023-02-17 DIAGNOSIS — R11.2 NAUSEA AND VOMITING, UNSPECIFIED VOMITING TYPE: Primary | ICD-10-CM

## 2023-02-17 DIAGNOSIS — R63.0 ANOREXIA: ICD-10-CM

## 2023-02-17 LAB
ALBUMIN SERPL-MCNC: 3.2 G/DL (ref 3.2–4.6)
ALBUMIN/GLOB SERPL: 0.9 (ref 0.4–1.6)
ALP SERPL-CCNC: 101 U/L (ref 50–136)
ALT SERPL-CCNC: 39 U/L (ref 12–65)
ANION GAP SERPL CALC-SCNC: 7 MMOL/L (ref 2–11)
APPEARANCE UR: CLEAR
AST SERPL-CCNC: 30 U/L (ref 15–37)
BACTERIA URNS QL MICRO: NEGATIVE /HPF
BASOPHILS # BLD: 0.1 K/UL (ref 0–0.2)
BASOPHILS NFR BLD: 1 % (ref 0–2)
BILIRUB SERPL-MCNC: 0.8 MG/DL (ref 0.2–1.1)
BILIRUB UR QL: NEGATIVE
BUN SERPL-MCNC: 16 MG/DL (ref 8–23)
CALCIUM SERPL-MCNC: 9.1 MG/DL (ref 8.3–10.4)
CASTS URNS QL MICRO: ABNORMAL /LPF
CHLORIDE SERPL-SCNC: 107 MMOL/L (ref 101–110)
CO2 SERPL-SCNC: 23 MMOL/L (ref 21–32)
COLOR UR: ABNORMAL
CREAT SERPL-MCNC: 2 MG/DL (ref 0.8–1.5)
DIFFERENTIAL METHOD BLD: ABNORMAL
EKG ATRIAL RATE: 98 BPM
EKG DIAGNOSIS: NORMAL
EKG P AXIS: 0 DEGREES
EKG P-R INTERVAL: 172 MS
EKG Q-T INTERVAL: 479 MS
EKG QRS DURATION: 146 MS
EKG QTC CALCULATION (BAZETT): 536 MS
EKG R AXIS: 128 DEGREES
EKG T AXIS: -57 DEGREES
EKG VENTRICULAR RATE: 75 BPM
EOSINOPHIL # BLD: 0.2 K/UL (ref 0–0.8)
EOSINOPHIL NFR BLD: 4 % (ref 0.5–7.8)
EPI CELLS #/AREA URNS HPF: ABNORMAL /HPF
ERYTHROCYTE [DISTWIDTH] IN BLOOD BY AUTOMATED COUNT: 14.8 % (ref 11.9–14.6)
GLOBULIN SER CALC-MCNC: 3.6 G/DL (ref 2.8–4.5)
GLUCOSE SERPL-MCNC: 125 MG/DL (ref 65–100)
GLUCOSE UR STRIP.AUTO-MCNC: NEGATIVE MG/DL
HCT VFR BLD AUTO: 47.1 % (ref 41.1–50.3)
HGB BLD-MCNC: 15.3 G/DL (ref 13.6–17.2)
HGB UR QL STRIP: NEGATIVE
IMM GRANULOCYTES # BLD AUTO: 0 K/UL (ref 0–0.5)
IMM GRANULOCYTES NFR BLD AUTO: 0 % (ref 0–5)
KETONES UR QL STRIP.AUTO: ABNORMAL MG/DL
LEUKOCYTE ESTERASE UR QL STRIP.AUTO: ABNORMAL
LIPASE SERPL-CCNC: 266 U/L (ref 73–393)
LYMPHOCYTES # BLD: 1.6 K/UL (ref 0.5–4.6)
LYMPHOCYTES NFR BLD: 27 % (ref 13–44)
MCH RBC QN AUTO: 29.1 PG (ref 26.1–32.9)
MCHC RBC AUTO-ENTMCNC: 32.5 G/DL (ref 31.4–35)
MCV RBC AUTO: 89.5 FL (ref 82–102)
MONOCYTES # BLD: 0.5 K/UL (ref 0.1–1.3)
MONOCYTES NFR BLD: 9 % (ref 4–12)
NEUTS SEG # BLD: 3.5 K/UL (ref 1.7–8.2)
NEUTS SEG NFR BLD: 59 % (ref 43–78)
NITRITE UR QL STRIP.AUTO: NEGATIVE
NRBC # BLD: 0 K/UL (ref 0–0.2)
PH UR STRIP: 5 (ref 5–9)
PLATELET # BLD AUTO: 181 K/UL (ref 150–450)
PMV BLD AUTO: 9.8 FL (ref 9.4–12.3)
POTASSIUM SERPL-SCNC: 4.4 MMOL/L (ref 3.5–5.1)
PROT SERPL-MCNC: 6.8 G/DL (ref 6.3–8.2)
PROT UR STRIP-MCNC: NEGATIVE MG/DL
RBC # BLD AUTO: 5.26 M/UL (ref 4.23–5.6)
RBC #/AREA URNS HPF: ABNORMAL /HPF
SODIUM SERPL-SCNC: 137 MMOL/L (ref 133–143)
SP GR UR REFRACTOMETRY: 1.02 (ref 1–1.02)
UROBILINOGEN UR QL STRIP.AUTO: 0.2 EU/DL (ref 0.2–1)
WBC # BLD AUTO: 5.9 K/UL (ref 4.3–11.1)
WBC URNS QL MICRO: ABNORMAL /HPF

## 2023-02-17 PROCEDURE — 93005 ELECTROCARDIOGRAM TRACING: CPT | Performed by: EMERGENCY MEDICINE

## 2023-02-17 PROCEDURE — 96374 THER/PROPH/DIAG INJ IV PUSH: CPT

## 2023-02-17 PROCEDURE — 2580000003 HC RX 258: Performed by: EMERGENCY MEDICINE

## 2023-02-17 PROCEDURE — 85025 COMPLETE CBC W/AUTO DIFF WBC: CPT

## 2023-02-17 PROCEDURE — 80053 COMPREHEN METABOLIC PANEL: CPT

## 2023-02-17 PROCEDURE — 6360000002 HC RX W HCPCS: Performed by: EMERGENCY MEDICINE

## 2023-02-17 PROCEDURE — 99285 EMERGENCY DEPT VISIT HI MDM: CPT

## 2023-02-17 PROCEDURE — 71045 X-RAY EXAM CHEST 1 VIEW: CPT

## 2023-02-17 PROCEDURE — 81001 URINALYSIS AUTO W/SCOPE: CPT

## 2023-02-17 PROCEDURE — 83690 ASSAY OF LIPASE: CPT

## 2023-02-17 RX ORDER — FAMOTIDINE 20 MG/1
20 TABLET, FILM COATED ORAL 2 TIMES DAILY
Qty: 14 TABLET | Refills: 0 | Status: SHIPPED | OUTPATIENT
Start: 2023-02-17 | End: 2023-02-24

## 2023-02-17 RX ORDER — 0.9 % SODIUM CHLORIDE 0.9 %
500 INTRAVENOUS SOLUTION INTRAVENOUS ONCE
Status: COMPLETED | OUTPATIENT
Start: 2023-02-17 | End: 2023-02-17

## 2023-02-17 RX ORDER — ONDANSETRON 4 MG/1
4 TABLET, ORALLY DISINTEGRATING ORAL 3 TIMES DAILY PRN
Qty: 9 TABLET | Refills: 0 | Status: SHIPPED | OUTPATIENT
Start: 2023-02-17

## 2023-02-17 RX ORDER — ONDANSETRON 2 MG/ML
4 INJECTION INTRAMUSCULAR; INTRAVENOUS
Status: COMPLETED | OUTPATIENT
Start: 2023-02-17 | End: 2023-02-17

## 2023-02-17 RX ADMIN — SODIUM CHLORIDE 500 ML: 9 INJECTION, SOLUTION INTRAVENOUS at 18:31

## 2023-02-17 RX ADMIN — ONDANSETRON 4 MG: 2 INJECTION INTRAMUSCULAR; INTRAVENOUS at 18:31

## 2023-02-17 ASSESSMENT — ENCOUNTER SYMPTOMS
BACK PAIN: 0
COUGH: 0
BLOOD IN STOOL: 0
NAUSEA: 1
DIARRHEA: 0
SHORTNESS OF BREATH: 0
ABDOMINAL PAIN: 1
VOMITING: 1
COLOR CHANGE: 0

## 2023-02-17 ASSESSMENT — PAIN - FUNCTIONAL ASSESSMENT: PAIN_FUNCTIONAL_ASSESSMENT: NONE - DENIES PAIN

## 2023-02-17 NOTE — ED TRIAGE NOTES
Pt arrived to ed via ems from home. Wife called in Last oral intake 3 days. Sick for 3 days. N/V no diahrea. Emisis x1 1 hr. No cold or flu symptoms. No h/a. Hx of stroke, neg stroke screen. Bgl 151. Hx htn.  Vs wnl

## 2023-02-17 NOTE — ED PROVIDER NOTES
Emergency Department Provider Note                   PCP:                Rboinson Awan MD               Age: 82 y.o. Sex: male       ICD-10-CM    1. Nausea and vomiting, unspecified vomiting type  R11.2       2. Anorexia  R63.0           DISPOSITION Decision To Discharge 02/17/2023 08:08:22 PM        Medical Decision Making  Poor appetite with couple episodes of vomiting. Occasional viral syndrome. Check electrolytes. IV fluids. Nausea control. Check x-ray for any evidence of aspiration. Check pacemaker function with EKG. Screening lab work. Any imaging to check for obstruction depending upon recheck. Potential for dehydration and acute kidney injury. Check for UTI. Wife seems to be giving much more clear history 1 episode of vomiting today and once yesterday. Does get nauseated after meals. Whole family has had respiratory issues. Problems Addressed:  Nausea and vomiting, unspecified vomiting type: acute illness or injury    Amount and/or Complexity of Data Reviewed  Independent Historian: EMS     Details: EMS stated emesis x1 in front of them with clear emesis and no blood. Normal vitals. Called by wife with concerns for dehydration  External Data Reviewed: notes. 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 May be potentials for an addisonian crisis. Labs: ordered. Details: I reviewed labs. Essentially normal.  Radiology: ordered and independent interpretation performed. Details: I reviewed chest x-ray. No infiltrates  ECG/medicine tests: ordered and independent interpretation performed. Details: My interpretation EKG shows paced rhythm at 75. Intraventricular conduction delay. Secondary ST-T changes. No ventricular ectopy. Normal QT interval    Risk  Prescription drug management. Complexity of Problem: 1 undiagnosed new problem with uncertain prognosis.  (4)  The patients assessment required an independent historian: I spoke with the paramedic. I have conducted an independent ordering and review of Labs. I have conducted an independent ordering and review of EKG. I have conducted an independent ordering and review of X-rays. I have reviewed records from an external source: provider visit notes from outside specialist.                   Orders Placed This Encounter   Procedures    XR CHEST PORTABLE    Comprehensive Metabolic Panel    CBC with Auto Differential    Lipase    Urinalysis    EKG 12 Lead    Insert peripheral IV        Medications   0.9 % sodium chloride bolus (0 mLs IntraVENous Stopped 2/17/23 1926)   ondansetron (ZOFRAN) injection 4 mg (4 mg IntraVENous Given 2/17/23 1831)       Discharge Medication List as of 2/17/2023  8:24 PM        START taking these medications    Details   ondansetron (ZOFRAN-ODT) 4 MG disintegrating tablet Take 1 tablet by mouth 3 times daily as needed for Nausea or Vomiting, Disp-9 tablet, R-0Print      famotidine (PEPCID) 20 MG tablet Take 1 tablet by mouth 2 times daily for 7 days, Disp-14 tablet, R-0Print              Nicole Olivares is a 82 y.o. male who presents to the Emergency Department with chief complaint of    Chief Complaint   Patient presents with    Fatigue    Illness      79-year-old male was brought in by EMS. Lives at home. EMS called by his wife. Had an episode or 2 of vomiting 2 days ago and again today. Does not eat very well in the last 3 days. Complains of fatigue and lightheadedness. No particular chest pain shortness of breath. No abdominal pain or diarrhea. No bleeding or constipation. No syncope. Patient does have his coronary artery bypass grafting. Is on anticoagulant chronically. No particular abdominal surgery. The history is provided by the patient and the EMS personnel. Review of Systems   Constitutional:  Positive for fatigue. Negative for chills and fever.    Respiratory:  Negative for cough and shortness of breath. Cardiovascular:  Negative for chest pain. Gastrointestinal:  Positive for abdominal pain (None now), nausea and vomiting. Negative for blood in stool and diarrhea. Genitourinary:  Positive for decreased urine volume. Negative for difficulty urinating and dysuria. Musculoskeletal:  Negative for back pain and myalgias. Skin:  Negative for color change and rash. Past Medical History:   Diagnosis Date    Arrhythmia     CAD, multiple vessel 5/22/2018 5/18/18 (Dr Julia Delatorre) Coronary artery bypass grafting x3 with grafts consisting of: 1. Left internal mammary artery to left anterior descending artery. 2.  Reverse saphenous vein to diagonal.  3.  Reversed saphenous vein graft to obtuse marginal.     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     PACEMAKER  2018    VASCULAR SURGERY          Family History   Problem Relation Age of Onset    No Known Problems Mother     No Known Problems Father         Social History     Socioeconomic History    Marital status:    Tobacco Use    Smoking status: Never    Smokeless tobacco: Never   Substance and Sexual Activity    Alcohol use: No    Drug use: No         Penicillins and Lorazepam     Discharge Medication List as of 2/17/2023  8:24 PM        CONTINUE these medications which have NOT CHANGED    Details   metoprolol succinate (TOPROL XL) 25 MG extended release tablet Take 1 tablet by mouth daily, Disp-90 tablet, R-3Normal      spironolactone (ALDACTONE) 25 MG tablet Take 1 tablet by mouth in the morning. TAKE 1 TABLET BY MOUTH DAILY. , Disp-90 tablet, R-3Normal      apixaban (ELIQUIS) 5 MG TABS tablet TAKE 1 TABLET BY MOUTH TWICE DAILYHistorical Med      aspirin 81 MG EC tablet Take 81 mg by mouth dailyHistorical Med      atorvastatin (LIPITOR) 80 MG tablet Take 80 mg by mouthHistorical Med      bisacodyl (DULCOLAX) 5 MG EC tablet Take 5 mg by mouth dailyHistorical Med      ferrous sulfate (FE TABS 325) 325 (65 Fe) MG EC tablet Take 325 mg by mouth 3 times daily (with meals)Historical Med      furosemide (LASIX) 20 MG tablet Take 20 mg by mouth dailyHistorical Med      levothyroxine (SYNTHROID) 75 MCG tablet Take 75 mcg by mouth dailyHistorical Med      predniSONE (DELTASONE) 5 MG tablet Take 5 mg by mouth dailyHistorical Med              Vitals signs and nursing note reviewed. Patient Vitals for the past 4 hrs:   Pulse Resp BP SpO2   02/17/23 2028 76 21 -- 100 %   02/17/23 2024 76 18 -- 100 %   02/17/23 2016 76 21 114/71 100 %   02/17/23 1946 76 27 110/70 91 %          Physical Exam  Vitals and nursing note reviewed. Constitutional:       Appearance: He is not ill-appearing. HENT:      Head: Normocephalic and atraumatic. Right Ear: External ear normal.      Left Ear: External ear normal.      Mouth/Throat:      Mouth: Mucous membranes are moist.      Pharynx: Oropharynx is clear. Eyes:      General: No scleral icterus. Extraocular Movements: Extraocular movements intact. Pupils: Pupils are equal, round, and reactive to light. Cardiovascular:      Rate and Rhythm: Normal rate and regular rhythm. Pulmonary:      Effort: Pulmonary effort is normal.      Breath sounds: Normal breath sounds. Abdominal:      Palpations: Abdomen is soft. Tenderness: There is no abdominal tenderness. Musculoskeletal:         General: No swelling or tenderness. Right lower leg: No edema. Left lower leg: No edema. Skin:     General: Skin is warm and dry. Neurological:      General: No focal deficit present. Mental Status: He is alert.         Procedures    Results for orders placed or performed during the hospital encounter of 02/17/23   XR CHEST PORTABLE    Narrative    EXAMINATION: Single view chest    DATE: 2/17/2023 5:45 PM    COMPARISON: January 19, 2021    CLINICAL HISTORY: Chest pain    FINDINGS:    Costophrenic angles are clear. Heart size is normal. Left-sided cardiac device   with multiple leads overlying the heart. Pulmonary vasculature is not distended. There are no dense regions of   consolidation. Impression    No acute cardiopulmonary process. Garret Rubin M.D.   2/17/2023 6:24:00 PM   Comprehensive Metabolic Panel   Result Value Ref Range    Sodium 137 133 - 143 mmol/L    Potassium 4.4 3.5 - 5.1 mmol/L    Chloride 107 101 - 110 mmol/L    CO2 23 21 - 32 mmol/L    Anion Gap 7 2 - 11 mmol/L    Glucose 125 (H) 65 - 100 mg/dL    BUN 16 8 - 23 MG/DL    Creatinine 2.00 (H) 0.8 - 1.5 MG/DL    Est, Glom Filt Rate 32 (L) >60 ml/min/1.73m2    Calcium 9.1 8.3 - 10.4 MG/DL    Total Bilirubin 0.8 0.2 - 1.1 MG/DL    ALT 39 12 - 65 U/L    AST 30 15 - 37 U/L    Alk Phosphatase 101 50 - 136 U/L    Total Protein 6.8 6.3 - 8.2 g/dL    Albumin 3.2 3.2 - 4.6 g/dL    Globulin 3.6 2.8 - 4.5 g/dL    Albumin/Globulin Ratio 0.9 0.4 - 1.6     CBC with Auto Differential   Result Value Ref Range    WBC 5.9 4.3 - 11.1 K/uL    RBC 5.26 4.23 - 5.6 M/uL    Hemoglobin 15.3 13.6 - 17.2 g/dL    Hematocrit 47.1 41.1 - 50.3 %    MCV 89.5 82 - 102 FL    MCH 29.1 26.1 - 32.9 PG    MCHC 32.5 31.4 - 35.0 g/dL    RDW 14.8 (H) 11.9 - 14.6 %    Platelets 291 019 - 178 K/uL    MPV 9.8 9.4 - 12.3 FL    nRBC 0.00 0.0 - 0.2 K/uL    Differential Type AUTOMATED      Seg Neutrophils 59 43 - 78 %    Lymphocytes 27 13 - 44 %    Monocytes 9 4.0 - 12.0 %    Eosinophils % 4 0.5 - 7.8 %    Basophils 1 0.0 - 2.0 %    Immature Granulocytes 0 0.0 - 5.0 %    Segs Absolute 3.5 1.7 - 8.2 K/UL    Absolute Lymph # 1.6 0.5 - 4.6 K/UL    Absolute Mono # 0.5 0.1 - 1.3 K/UL    Absolute Eos # 0.2 0.0 - 0.8 K/UL    Basophils Absolute 0.1 0.0 - 0.2 K/UL    Absolute Immature Granulocyte 0.0 0.0 - 0.5 K/UL   Lipase   Result Value Ref Range    Lipase 266 73 - 393 U/L   Urinalysis   Result Value Ref Range    Color, UA YELLOW/STRAW      Appearance CLEAR      Specific Gravity, UA 1.021 1.001 - 1.023      pH, Urine 5.0 5.0 - 9.0      Protein, UA Negative NEG mg/dL    Glucose, UA Negative mg/dL    Ketones, Urine TRACE (A) NEG mg/dL    Bilirubin Urine Negative NEG      Blood, Urine Negative NEG      Urobilinogen, Urine 0.2 0.2 - 1.0 EU/dL    Nitrite, Urine Negative NEG      Leukocyte Esterase, Urine TRACE (A) NEG      WBC, UA 0-4 U4 /hpf    RBC, UA 0-5 U5 /hpf    Epithelial Cells UA 0-5 U5 /hpf    BACTERIA, URINE Negative NEG /hpf    Casts 2-5 (A) U2 /lpf   EKG 12 Lead   Result Value Ref Range    Ventricular Rate 75 BPM    Atrial Rate 98 BPM    P-R Interval 172 ms    QRS Duration 146 ms    Q-T Interval 479 ms    QTc Calculation (Bazett) 536 ms    P Axis 0 degrees    R Axis 128 degrees    T Axis -57 degrees    Diagnosis Ventricular-paced complexes         XR CHEST PORTABLE   Final Result      No acute cardiopulmonary process. Garret Haq M.D.    2/17/2023 6:24:00 PM         Results Include:    Recent Results (from the past 24 hour(s))   Comprehensive Metabolic Panel    Collection Time: 02/17/23  6:16 PM   Result Value Ref Range    Sodium 137 133 - 143 mmol/L    Potassium 4.4 3.5 - 5.1 mmol/L    Chloride 107 101 - 110 mmol/L    CO2 23 21 - 32 mmol/L    Anion Gap 7 2 - 11 mmol/L    Glucose 125 (H) 65 - 100 mg/dL    BUN 16 8 - 23 MG/DL    Creatinine 2.00 (H) 0.8 - 1.5 MG/DL    Est, Glom Filt Rate 32 (L) >60 ml/min/1.73m2    Calcium 9.1 8.3 - 10.4 MG/DL    Total Bilirubin 0.8 0.2 - 1.1 MG/DL    ALT 39 12 - 65 U/L    AST 30 15 - 37 U/L    Alk Phosphatase 101 50 - 136 U/L    Total Protein 6.8 6.3 - 8.2 g/dL    Albumin 3.2 3.2 - 4.6 g/dL    Globulin 3.6 2.8 - 4.5 g/dL    Albumin/Globulin Ratio 0.9 0.4 - 1.6     CBC with Auto Differential    Collection Time: 02/17/23  6:16 PM   Result Value Ref Range    WBC 5.9 4.3 - 11.1 K/uL    RBC 5.26 4.23 - 5.6 M/uL    Hemoglobin 15.3 13.6 - 17.2 g/dL    Hematocrit 47.1 41.1 - 50.3 %    MCV 89.5 82 - 102 FL    MCH 29.1 26.1 - 32.9 PG    MCHC 32.5 31.4 - 35.0 g/dL    RDW 14.8 (H) 11.9 - 14.6 %    Platelets 182 179 - 722 K/uL    MPV 9.8 9.4 - 12.3 FL    nRBC 0.00 0.0 - 0.2 K/uL    Differential Type AUTOMATED      Seg Neutrophils 59 43 - 78 %    Lymphocytes 27 13 - 44 %    Monocytes 9 4.0 - 12.0 %    Eosinophils % 4 0.5 - 7.8 %    Basophils 1 0.0 - 2.0 %    Immature Granulocytes 0 0.0 - 5.0 %    Segs Absolute 3.5 1.7 - 8.2 K/UL    Absolute Lymph # 1.6 0.5 - 4.6 K/UL    Absolute Mono # 0.5 0.1 - 1.3 K/UL    Absolute Eos # 0.2 0.0 - 0.8 K/UL    Basophils Absolute 0.1 0.0 - 0.2 K/UL    Absolute Immature Granulocyte 0.0 0.0 - 0.5 K/UL   Lipase    Collection Time: 02/17/23  6:16 PM   Result Value Ref Range    Lipase 266 73 - 393 U/L   Urinalysis    Collection Time: 02/17/23  6:17 PM   Result Value Ref Range    Color, UA YELLOW/STRAW      Appearance CLEAR      Specific Gravity, UA 1.021 1.001 - 1.023      pH, Urine 5.0 5.0 - 9.0      Protein, UA Negative NEG mg/dL    Glucose, UA Negative mg/dL    Ketones, Urine TRACE (A) NEG mg/dL    Bilirubin Urine Negative NEG      Blood, Urine Negative NEG      Urobilinogen, Urine 0.2 0.2 - 1.0 EU/dL    Nitrite, Urine Negative NEG      Leukocyte Esterase, Urine TRACE (A) NEG      WBC, UA 0-4 U4 /hpf    RBC, UA 0-5 U5 /hpf    Epithelial Cells UA 0-5 U5 /hpf    BACTERIA, URINE Negative NEG /hpf    Casts 2-5 (A) U2 /lpf   EKG 12 Lead    Collection Time: 02/17/23  6:35 PM   Result Value Ref Range    Ventricular Rate 75 BPM    Atrial Rate 98 BPM    P-R Interval 172 ms    QRS Duration 146 ms    Q-T Interval 479 ms    QTc Calculation (Bazett) 536 ms    P Axis 0 degrees    R Axis 128 degrees    T Axis -57 degrees    Diagnosis Ventricular-paced complexes      . risr  XR CHEST PORTABLE    Result Date: 2/17/2023  EXAMINATION: Single view chest DATE: 2/17/2023 5:45 PM COMPARISON: January 19, 2021 CLINICAL HISTORY: Chest pain FINDINGS: Costophrenic angles are clear. Heart size is normal. Left-sided cardiac device with multiple leads overlying the heart. Pulmonary vasculature is not distended. There are no dense regions of consolidation. No acute cardiopulmonary process. Garret Crowe M.D. 2/17/2023 6:24:00 PM       Patient without any vomiting or diarrhea in the department. Feels slightly cold but the room is cold and rechecked his temperature. No fever. Old treat for stomach acid and nausea. Do not believe he requires admission. Voice dictation software was used during the making of this note. This software is not perfect and grammatical and other typographical errors may be present. This note has not been completely proofread for errors.      Aileen Trujillo MD  02/17/23 [id number]

## 2023-02-18 NOTE — DISCHARGE INSTRUCTIONS
Called and left pt VM to schedule appointment. Please refill if appropriate. Request for Metformin. Next Visit Date:  No future appointments. Health Maintenance   Topic Date Due    Statin Therapy  1977    Hepatitis B vaccine (1 of 3 - Risk 3-dose series) 01/14/1996    Cervical cancer screen  01/14/1998    Annual Wellness Visit (AWV)  04/03/2019    Diabetic foot exam  01/04/2020    Diabetic microalbuminuria test  01/04/2020    Lipid screen  01/04/2020    Diabetic retinal exam  02/04/2020    Breast cancer screen  05/01/2020    Flu vaccine (1) 09/01/2020    A1C test (Diabetic or Prediabetic)  01/16/2021    Potassium monitoring  05/11/2021    Creatinine monitoring  05/11/2021    DTaP/Tdap/Td vaccine (3 - Td) 06/08/2028    Pneumococcal 0-64 years Vaccine  Completed    HIV screen  Completed    Hepatitis A vaccine  Aged Out    Hib vaccine  Aged Out    Meningococcal (ACWY) vaccine  Aged Out       Hemoglobin A1C (%)   Date Value   01/16/2020 7.6   10/17/2019 8.3   01/04/2019 6.7             ( goal A1C is < 7)   Microalb/Crt.  Ratio (mcg/mg creat)   Date Value   01/04/2019 CANNOT BE CALCULATED     LDL Cholesterol (mg/dL)   Date Value   01/04/2019 89       (goal LDL is <100)   AST (U/L)   Date Value   01/18/2020 27     ALT (U/L)   Date Value   01/18/2020 44 (H)     BUN (mg/dL)   Date Value   05/11/2020 7     BP Readings from Last 3 Encounters:   05/11/20 (!) 133/91   03/06/20 117/84   01/18/20 116/88          (goal 120/80)    All Future Testing planned in [facility]  Lab Frequency Next Occurrence   Lipid Panel Once 11/12/2020   Microalbumin, Ur Once 11/12/2020   ECHO Complete 2D W Doppler W Color Once 01/16/2020   Hemoglobin A1C Once 12/11/2020   Microalbumin / Creatinine Urine Ratio Once 12/11/2020   Lipid Panel Once 12/11/2020   XR SACRUM COCCYX (MIN 2 VIEWS) Once 09/17/2020         Patient Active Problem List:     Lumbar back pain     Depression     Fibromyalgia     Anxiety     Type 2 Sips of liquids if nauseated. Medications for nausea and stomach acid. Recheck with your doctor here in 72 hours if not improving. Recheck sooner for worse or worrisome symptoms. diabetes mellitus without complication (HCC)     Iron deficiency anemia     Mixed hyperlipidemia     Rib pain on right side     Facet degeneration of lumbar region     Lipoma of chest wall     Bipolar disorder ([address]     Depressed mood with feeling of loneliness     Essential hypertension     Apnea

## 2023-02-18 NOTE — ED NOTES
I have reviewed discharge instructions with the patient. The patient verbalized understanding. Patient left ED via Discharge Method: wheelchair to Home with spouse    Opportunity for questions and clarification provided. Patient given 2 scripts.           Herman Young RN  02/17/23 [id number]

## 2023-02-20 ENCOUNTER — APPOINTMENT (OUTPATIENT)
Dept: CT IMAGING | Age: 88
End: 2023-02-20
Payer: MEDICARE

## 2023-02-20 ENCOUNTER — HOSPITAL ENCOUNTER (EMERGENCY)
Age: 88
Discharge: HOME | End: 2023-02-20
Attending: EMERGENCY MEDICINE
Payer: MEDICARE

## 2023-02-20 VITALS
TEMPERATURE: 98.1 F | DIASTOLIC BLOOD PRESSURE: 74 MMHG | OXYGEN SATURATION: 98 % | BODY MASS INDEX: 24.78 KG/M2 | WEIGHT: 177 LBS | HEIGHT: 71 IN | SYSTOLIC BLOOD PRESSURE: 126 MMHG | RESPIRATION RATE: 16 BRPM | HEART RATE: 75 BPM

## 2023-02-20 DIAGNOSIS — S09.90XA INJURY OF HEAD, INITIAL ENCOUNTER: Primary | ICD-10-CM

## 2023-02-20 DIAGNOSIS — N18.9 CHRONIC KIDNEY DISEASE, UNSPECIFIED CKD STAGE: ICD-10-CM

## 2023-02-20 LAB
ALBUMIN SERPL-MCNC: 2.8 G/DL (ref 3.2–4.6)
ALBUMIN/GLOB SERPL: 0.8 (ref 0.4–1.6)
ALP SERPL-CCNC: 94 U/L (ref 50–136)
ALT SERPL-CCNC: 19 U/L (ref 12–65)
ANION GAP SERPL CALC-SCNC: 3 MMOL/L (ref 2–11)
ANION GAP SERPL CALC-SCNC: 4 MMOL/L (ref 2–11)
AST SERPL-CCNC: 31 U/L (ref 15–37)
BASOPHILS # BLD: 0 K/UL (ref 0–0.2)
BASOPHILS NFR BLD: 0 % (ref 0–2)
BILIRUB SERPL-MCNC: 0.5 MG/DL (ref 0.2–1.1)
BUN SERPL-MCNC: 21 MG/DL (ref 8–23)
BUN SERPL-MCNC: 21 MG/DL (ref 8–23)
CALCIUM SERPL-MCNC: 8.7 MG/DL (ref 8.3–10.4)
CALCIUM SERPL-MCNC: 8.7 MG/DL (ref 8.3–10.4)
CHLORIDE SERPL-SCNC: 110 MMOL/L (ref 101–110)
CHLORIDE SERPL-SCNC: 111 MMOL/L (ref 101–110)
CO2 SERPL-SCNC: 21 MMOL/L (ref 21–32)
CO2 SERPL-SCNC: 23 MMOL/L (ref 21–32)
CREAT SERPL-MCNC: 2.2 MG/DL (ref 0.8–1.5)
CREAT SERPL-MCNC: 2.4 MG/DL (ref 0.8–1.5)
DIFFERENTIAL METHOD BLD: ABNORMAL
EOSINOPHIL # BLD: 0 K/UL (ref 0–0.8)
EOSINOPHIL NFR BLD: 0 % (ref 0.5–7.8)
ERYTHROCYTE [DISTWIDTH] IN BLOOD BY AUTOMATED COUNT: 14.8 % (ref 11.9–14.6)
GLOBULIN SER CALC-MCNC: 3.6 G/DL (ref 2.8–4.5)
GLUCOSE SERPL-MCNC: 160 MG/DL (ref 65–100)
GLUCOSE SERPL-MCNC: 174 MG/DL (ref 65–100)
HCT VFR BLD AUTO: 41.6 % (ref 41.1–50.3)
HGB BLD-MCNC: 13.4 G/DL (ref 13.6–17.2)
IMM GRANULOCYTES # BLD AUTO: 0 K/UL (ref 0–0.5)
IMM GRANULOCYTES NFR BLD AUTO: 0 % (ref 0–5)
LYMPHOCYTES # BLD: 0.7 K/UL (ref 0.5–4.6)
LYMPHOCYTES NFR BLD: 11 % (ref 13–44)
MAGNESIUM SERPL-MCNC: 1.9 MG/DL (ref 1.8–2.4)
MCH RBC QN AUTO: 28.8 PG (ref 26.1–32.9)
MCHC RBC AUTO-ENTMCNC: 32.2 G/DL (ref 31.4–35)
MCV RBC AUTO: 89.3 FL (ref 82–102)
MONOCYTES # BLD: 0.4 K/UL (ref 0.1–1.3)
MONOCYTES NFR BLD: 6 % (ref 4–12)
NEUTS SEG # BLD: 5.5 K/UL (ref 1.7–8.2)
NEUTS SEG NFR BLD: 83 % (ref 43–78)
NRBC # BLD: 0 K/UL (ref 0–0.2)
PLATELET # BLD AUTO: 181 K/UL (ref 150–450)
PMV BLD AUTO: 9.9 FL (ref 9.4–12.3)
POTASSIUM SERPL-SCNC: 5.3 MMOL/L (ref 3.5–5.1)
POTASSIUM SERPL-SCNC: 5.6 MMOL/L (ref 3.5–5.1)
PROT SERPL-MCNC: 6.4 G/DL (ref 6.3–8.2)
RBC # BLD AUTO: 4.66 M/UL (ref 4.23–5.6)
SODIUM SERPL-SCNC: 135 MMOL/L (ref 133–143)
SODIUM SERPL-SCNC: 137 MMOL/L (ref 133–143)
WBC # BLD AUTO: 6.7 K/UL (ref 4.3–11.1)

## 2023-02-20 PROCEDURE — 93005 ELECTROCARDIOGRAM TRACING: CPT | Performed by: EMERGENCY MEDICINE

## 2023-02-20 PROCEDURE — 80053 COMPREHEN METABOLIC PANEL: CPT

## 2023-02-20 PROCEDURE — 85025 COMPLETE CBC W/AUTO DIFF WBC: CPT

## 2023-02-20 PROCEDURE — 83735 ASSAY OF MAGNESIUM: CPT

## 2023-02-20 PROCEDURE — 72125 CT NECK SPINE W/O DYE: CPT

## 2023-02-20 PROCEDURE — 70450 CT HEAD/BRAIN W/O DYE: CPT

## 2023-02-20 PROCEDURE — 2580000003 HC RX 258: Performed by: EMERGENCY MEDICINE

## 2023-02-20 PROCEDURE — 99284 EMERGENCY DEPT VISIT MOD MDM: CPT

## 2023-02-20 RX ORDER — SODIUM CHLORIDE, SODIUM LACTATE, POTASSIUM CHLORIDE, AND CALCIUM CHLORIDE .6; .31; .03; .02 G/100ML; G/100ML; G/100ML; G/100ML
1000 INJECTION, SOLUTION INTRAVENOUS ONCE
Status: COMPLETED | OUTPATIENT
Start: 2023-02-20 | End: 2023-02-20

## 2023-02-20 RX ADMIN — SODIUM CHLORIDE, POTASSIUM CHLORIDE, SODIUM LACTATE AND CALCIUM CHLORIDE 1000 ML: 600; 310; 30; 20 INJECTION, SOLUTION INTRAVENOUS at 21:06

## 2023-02-20 ASSESSMENT — LIFESTYLE VARIABLES
HOW OFTEN DO YOU HAVE A DRINK CONTAINING ALCOHOL: NEVER
HOW MANY STANDARD DRINKS CONTAINING ALCOHOL DO YOU HAVE ON A TYPICAL DAY: PATIENT DOES NOT DRINK

## 2023-02-20 ASSESSMENT — ENCOUNTER SYMPTOMS
VOMITING: 0
ABDOMINAL PAIN: 0
NAUSEA: 0
VISUAL CHANGE: 0

## 2023-02-20 ASSESSMENT — PAIN SCALES - GENERAL: PAINLEVEL_OUTOF10: 0

## 2023-02-20 ASSESSMENT — PAIN - FUNCTIONAL ASSESSMENT: PAIN_FUNCTIONAL_ASSESSMENT: 0-10

## 2023-02-20 NOTE — ED PROVIDER NOTES
Emergency Department Provider Note                   PCP:                Bree Gold MD               Age: 82 y.o. Sex: male       ICD-10-CM    1. Injury of head, initial encounter  S09.90XA       2. Chronic kidney disease, unspecified CKD stage  N18.9           DISPOSITION Decision To Discharge 02/20/2023 10:08:34 PM       Medical Decision Making  61-year-old male patient presents for evaluation after a fall  Patient states that he tripped on a bathroom carpet and fell and struck his head  I did not find any evidence of acute injury on my exam    Patient was just recently seen and was evaluated for nausea and decreased appetite  I did note on reviewing some old lab work that his creatinine was slightly elevated at that time    We will proceed with CT head and neck as the patient is maintained on Eliquis for chronic A-fib  We will check basic lab work to ensure that he has no electrolyte abnormalities or any signs of progressive renal insufficiency    10:09 PM  CT brain and cervical spine negative for an acute injury  Lab work today does reveal what appears to be some more chronic kidney disease    Patient will be discharged home with family  We discussed the patient's kidney function potassium and need for close follow-up  Reinforced head injury precautions particularly for patient who is on an anticoagulation regimen    Amount and/or Complexity of Data Reviewed  Labs: ordered. Decision-making details documented in ED Course. Radiology: ordered. ECG/medicine tests: ordered and independent interpretation performed. Risk  Risk Details: Elements of this note have been dictated via voice recognition software. Text and phrases may be limited by the accuracy of the software. The chart has been reviewed, but errors may still be present. Complexity of Problem: 1 acute, uncomplicated illness or injury. (3)  Complexity of Problem: 2 or more stable chronic illnesses.  (4)    I have conducted an independent ordering and review of Labs. I have conducted an independent ordering and review of EKG. I have conducted an independent ordering and review of X-rays. I have conducted an independent ordering and review of CT Scan. I have reviewed records from an external source: provider visit notes from outside specialist.  Considerations: Shared decision making was utilized in the care of this patient. Patient was discharged risks and benefits of hospitalization were discussed. ED Course as of 02/20/23 2211 Mon Feb 20, 2023 1937 Potassium(!): 5.6 [KH]      ED Course User Index  [KH] Valarie Barthel, MD        Orders Placed This Encounter   Procedures    CT CERVICAL SPINE WO CONTRAST    CT HEAD WO CONTRAST    CBC with Auto Differential    Comprehensive Metabolic Panel    Magnesium    Basic Metabolic Panel    EKG 12 Lead        Medications   lactated ringers bolus (1,000 mLs IntraVENous New Bag 2/20/23 2106)       New Prescriptions    No medications on file        Angelica Pacheco is a 82 y.o. male who presents to the Emergency Department with chief complaint of    Chief Complaint   Patient presents with    Fall      82year-old male patient presents via EMS after reported fall  EMS reports that the patient's spouse called 911 after her  fell  Patient states that he tripped over a mat in the bathroom  Thinks he may have struck his head but denies losing consciousness  Currently he denies any pain  He denies any nausea or vomiting  He denies being dizzy or lightheaded    The history is provided by the patient and the EMS personnel. Fall  The accident occurred Less than 1 hour ago. The fall occurred while walking. Distance fallen: Ground-level. He landed on A hard floor. There was no blood loss. The point of impact was the head. The pain is present in the head. The pain is mild. There was No entrapment after the fall. There was No drug use involved in the accident.  There was No alcohol use involved in the accident. Pertinent negatives include no visual change, no fever, no abdominal pain, no nausea, no vomiting, no headaches and no loss of consciousness. Exacerbated by: Patient denies exacerbating factors. He has tried nothing for the symptoms. Review of Systems   Unable to perform ROS: Dementia   Constitutional:  Negative for fever. Gastrointestinal:  Negative for abdominal pain, nausea and vomiting. Musculoskeletal:  Positive for arthralgias. Negative for neck pain and neck stiffness. Neurological:  Negative for loss of consciousness and headaches. Past Medical History:   Diagnosis Date    Arrhythmia     CAD, multiple vessel 5/22/2018 5/18/18 (Dr Africa Jaramillo) Coronary artery bypass grafting x3 with grafts consisting of: 1. Left internal mammary artery to left anterior descending artery.   2.  Reverse saphenous vein to diagonal.  3.  Reversed saphenous vein graft to obtuse marginal.     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     PACEMAKER  2018    VASCULAR SURGERY          Family History   Problem Relation Age of Onset    No Known Problems Mother     No Known Problems Father         Social History     Socioeconomic History    Marital status:    Tobacco Use    Smoking status: Never    Smokeless tobacco: Never   Substance and Sexual Activity    Alcohol use: No    Drug use: No        Allergies: Penicillins and Lorazepam    Previous Medications    APIXABAN (ELIQUIS) 5 MG TABS TABLET    TAKE 1 TABLET BY MOUTH TWICE DAILY    ASPIRIN 81 MG EC TABLET    Take 81 mg by mouth daily    ATORVASTATIN (LIPITOR) 80 MG TABLET    Take 80 mg by mouth    BISACODYL (DULCOLAX) 5 MG EC TABLET    Take 5 mg by mouth daily    FAMOTIDINE (PEPCID) 20 MG TABLET    Take 1 tablet by mouth 2 times daily for 7 days    FERROUS SULFATE (FE TABS 325) 325 (65 FE) MG EC TABLET    Take 325 mg by mouth 3 times daily (with meals)    FUROSEMIDE (LASIX) 20 MG TABLET    Take 20 mg by mouth daily    LEVOTHYROXINE (SYNTHROID) 75 MCG TABLET    Take 75 mcg by mouth daily    METOPROLOL SUCCINATE (TOPROL XL) 25 MG EXTENDED RELEASE TABLET    Take 1 tablet by mouth daily    ONDANSETRON (ZOFRAN-ODT) 4 MG DISINTEGRATING TABLET    Take 1 tablet by mouth 3 times daily as needed for Nausea or Vomiting    PREDNISONE (DELTASONE) 5 MG TABLET    Take 5 mg by mouth daily    SPIRONOLACTONE (ALDACTONE) 25 MG TABLET    Take 1 tablet by mouth in the morning. TAKE 1 TABLET BY MOUTH DAILY. Vitals signs and nursing note reviewed. No data found. Physical Exam  Vitals and nursing note reviewed. Constitutional:       General: He is in acute distress. Appearance: Normal appearance. He is normal weight. HENT:      Head: Normocephalic and atraumatic. Right Ear: External ear normal.      Left Ear: External ear normal.      Nose: Nose normal.      Mouth/Throat:      Mouth: Mucous membranes are moist.      Pharynx: Oropharynx is clear. Eyes:      General: No scleral icterus. Extraocular Movements: Extraocular movements intact. Conjunctiva/sclera: Conjunctivae normal.      Pupils: Pupils are equal, round, and reactive to light. Cardiovascular:      Rate and Rhythm: Normal rate and regular rhythm. Pulses: Normal pulses. Heart sounds: Normal heart sounds. Pulmonary:      Effort: Pulmonary effort is normal. No respiratory distress. Breath sounds: Normal breath sounds. Abdominal:      General: Abdomen is flat. Bowel sounds are normal. There is no distension. Palpations: Abdomen is soft. There is no mass. Tenderness: There is no abdominal tenderness.    Musculoskeletal:         General: No swelling, tenderness, deformity or signs of injury. Normal range of motion. Cervical back: Normal range of motion and neck supple. Skin:     General: Skin is warm and dry. Capillary Refill: Capillary refill takes less than 2 seconds. Neurological:      General: No focal deficit present. Mental Status: He is alert and oriented to person, place, and time. Mental status is at baseline. Psychiatric:         Mood and Affect: Mood normal.         Behavior: Behavior normal.         Thought Content: Thought content normal.         Judgment: Judgment normal.        EKG 12 Lead    Date/Time: 2/20/2023 5:36 PM  Performed by: Puneet oL MD  Authorized by: Puneet Lo MD     ECG reviewed by ED Physician in the absence of a cardiologist: yes    Previous ECG:     Previous ECG:  Compared to current    Similarity:  No change  Interpretation:     Interpretation: abnormal    Rate:     ECG rate:  75  Rhythm:     Rhythm: paced    Pacing:     Capture:  Complete    Type of pacing:  Ventricular  Ectopy:     Ectopy: none    Comments:      100% ventricular paced rhythm with underlying A-fib  No change from prior tracing    ED EKG Interpretation  EKG was interpreted in the absence of a cardiologist.      Is this patient to be included in the SEP-1 core measure due to severe sepsis or septic shock? No Exclusion criteria - the patient is NOT to be included for SEP-1 Core Measure due to: Infection is not suspected     Results for orders placed or performed during the hospital encounter of 02/20/23   CT CERVICAL SPINE WO CONTRAST    Narrative    EXAMINATION: CT HEAD WO CONTRAST, CT CERVICAL SPINE WO CONTRAST    DATE: 2/20/2023 6:22 PM     INDICATION: Ground-level fall. COMPARISON: None available. TECHNIQUE: Thin section noncontrast axial images were obtained through the head. Coronal reformatted images were created.  CT dose lowering techniques were used,   to include: automated exposure control, adjustment for patient size, and or use   of iterative reconstruction. FINDINGS:  Evaluation is slightly limited by patient motion. Bones and extracranial soft tissues:    Calvarium is intact. Paranasal sinuses and mastoid air cells are essentially   clear. Globes and orbits are unremarkable. Intracranial contents:    Gray-white differentiation is preserved. There is age-appropriate whole brain   atrophy. Patchy low attenuation in the hemispheric white matter likely reflects   the sequela of chronic microvascular ischemia. Small region left frontal   encephalomalacia. Old left frontal and right thalamic lacunar infarct. Basal   cisterns are patent. No hemorrhage, extra-axial collection, or hydrocephalus. No   CT evidence of acute ischemia. No mass or mass effect. Atherosclerosis. TECHNIQUE: Thin section axial noncontrast images were obtained through the   cervical spine. Sagittal and coronal reformatted images were created. Images   were reviewed in bone and soft tissue windows. CT dose lowering techniques were   used, to include: automated exposure control, adjustment for patient size, and   or use of iterative reconstruction. FINDINGS:    Vertebral column:    Spinal alignment is anatomic. Mild degenerative subluxations. . Alignment of the   craniocervical junction is preserved. No acute fracture. Vertebral body heights   are maintained. Bone mineral density is decreased. Multilevel cervical spondylosis with uncovertebral joint hypertrophy resulting   in osseous neural foraminal stenoses, most prominently at C5-6 where neural   foraminal stenosis is moderate to severe. Soft tissues:    Lung apices are clear. Cervical soft tissues are unremarkable. Impression    Head CT:     1. No acute intracranial abnormality. 2.  Generalized parenchymal volume loss and chronic microvascular ischemic   changes. Atherosclerosis. Old left frontal insult.  Old right thalamic and left   cerebellar lacunar infarcts. Cervical spine CT:    1. No acute fracture or traumatic malalignment in the cervical spine. 2.  Multilevel cervical spondylosis worse at C5-6 where there is moderate to   severe neural foraminal stenosis. David Hernandez M.D.   2/20/2023 7:05:00 PM   CT HEAD WO CONTRAST    Narrative    EXAMINATION: CT HEAD WO CONTRAST, CT CERVICAL SPINE WO CONTRAST    DATE: 2/20/2023 6:22 PM     INDICATION: Ground-level fall. COMPARISON: None available. TECHNIQUE: Thin section noncontrast axial images were obtained through the head. Coronal reformatted images were created. CT dose lowering techniques were used,   to include: automated exposure control, adjustment for patient size, and or use   of iterative reconstruction. FINDINGS:  Evaluation is slightly limited by patient motion. Bones and extracranial soft tissues:    Calvarium is intact. Paranasal sinuses and mastoid air cells are essentially   clear. Globes and orbits are unremarkable. Intracranial contents:    Gray-white differentiation is preserved. There is age-appropriate whole brain   atrophy. Patchy low attenuation in the hemispheric white matter likely reflects   the sequela of chronic microvascular ischemia. Small region left frontal   encephalomalacia. Old left frontal and right thalamic lacunar infarct. Basal   cisterns are patent. No hemorrhage, extra-axial collection, or hydrocephalus. No   CT evidence of acute ischemia. No mass or mass effect. Atherosclerosis. TECHNIQUE: Thin section axial noncontrast images were obtained through the   cervical spine. Sagittal and coronal reformatted images were created. Images   were reviewed in bone and soft tissue windows. CT dose lowering techniques were   used, to include: automated exposure control, adjustment for patient size, and   or use of iterative reconstruction. FINDINGS:    Vertebral column:    Spinal alignment is anatomic. Mild degenerative subluxations. Gerardo Castclarice Alignment of the   craniocervical junction is preserved. No acute fracture. Vertebral body heights   are maintained. Bone mineral density is decreased. Multilevel cervical spondylosis with uncovertebral joint hypertrophy resulting   in osseous neural foraminal stenoses, most prominently at C5-6 where neural   foraminal stenosis is moderate to severe. Soft tissues:    Lung apices are clear. Cervical soft tissues are unremarkable. Impression    Head CT:     1. No acute intracranial abnormality. 2.  Generalized parenchymal volume loss and chronic microvascular ischemic   changes. Atherosclerosis. Old left frontal insult. Old right thalamic and left   cerebellar lacunar infarcts. Cervical spine CT:    1. No acute fracture or traumatic malalignment in the cervical spine. 2.  Multilevel cervical spondylosis worse at C5-6 where there is moderate to   severe neural foraminal stenosis.        Silverio Hansen M.D.   2/20/2023 7:05:00 PM   CBC with Auto Differential   Result Value Ref Range    WBC 6.7 4.3 - 11.1 K/uL    RBC 4.66 4.23 - 5.6 M/uL    Hemoglobin 13.4 (L) 13.6 - 17.2 g/dL    Hematocrit 41.6 41.1 - 50.3 %    MCV 89.3 82 - 102 FL    MCH 28.8 26.1 - 32.9 PG    MCHC 32.2 31.4 - 35.0 g/dL    RDW 14.8 (H) 11.9 - 14.6 %    Platelets 151 289 - 876 K/uL    MPV 9.9 9.4 - 12.3 FL    nRBC 0.00 0.0 - 0.2 K/uL    Differential Type AUTOMATED      Seg Neutrophils 83 (H) 43 - 78 %    Lymphocytes 11 (L) 13 - 44 %    Monocytes 6 4.0 - 12.0 %    Eosinophils % 0 (L) 0.5 - 7.8 %    Basophils 0 0.0 - 2.0 %    Immature Granulocytes 0 0.0 - 5.0 %    Segs Absolute 5.5 1.7 - 8.2 K/UL    Absolute Lymph # 0.7 0.5 - 4.6 K/UL    Absolute Mono # 0.4 0.1 - 1.3 K/UL    Absolute Eos # 0.0 0.0 - 0.8 K/UL    Basophils Absolute 0.0 0.0 - 0.2 K/UL    Absolute Immature Granulocyte 0.0 0.0 - 0.5 K/UL   Comprehensive Metabolic Panel   Result Value Ref Range    Sodium 135 133 - 143 mmol/L    Potassium 5.6 (H) 3.5 - 5.1 mmol/L Chloride 110 101 - 110 mmol/L    CO2 21 21 - 32 mmol/L    Anion Gap 4 2 - 11 mmol/L    Glucose 174 (H) 65 - 100 mg/dL    BUN 21 8 - 23 MG/DL    Creatinine 2.40 (H) 0.8 - 1.5 MG/DL    Est, Glom Filt Rate 26 (L) >60 ml/min/1.73m2    Calcium 8.7 8.3 - 10.4 MG/DL    Total Bilirubin 0.5 0.2 - 1.1 MG/DL    ALT 19 12 - 65 U/L    AST 31 15 - 37 U/L    Alk Phosphatase 94 50 - 136 U/L    Total Protein 6.4 6.3 - 8.2 g/dL    Albumin 2.8 (L) 3.2 - 4.6 g/dL    Globulin 3.6 2.8 - 4.5 g/dL    Albumin/Globulin Ratio 0.8 0.4 - 1.6     Magnesium   Result Value Ref Range    Magnesium 1.9 1.8 - 2.4 mg/dL   Basic Metabolic Panel   Result Value Ref Range    Sodium 137 133 - 143 mmol/L    Potassium 5.3 (H) 3.5 - 5.1 mmol/L    Chloride 111 (H) 101 - 110 mmol/L    CO2 23 21 - 32 mmol/L    Anion Gap 3 2 - 11 mmol/L    Glucose 160 (H) 65 - 100 mg/dL    BUN 21 8 - 23 MG/DL    Creatinine 2.20 (H) 0.8 - 1.5 MG/DL    Est, Glom Filt Rate 29 (L) >60 ml/min/1.73m2    Calcium 8.7 8.3 - 10.4 MG/DL   EKG 12 Lead   Result Value Ref Range    Ventricular Rate 75 BPM    Atrial Rate 0 BPM    QRS Duration 155 ms    Q-T Interval 464 ms    QTc Calculation (Bazett) 519 ms    R Axis -70 degrees    T Axis 118 degrees    Diagnosis Afib/flutter and ventricular-paced rhythm         CT CERVICAL SPINE WO CONTRAST   Final Result      Head CT:       1. No acute intracranial abnormality. 2.  Generalized parenchymal volume loss and chronic microvascular ischemic    changes. Atherosclerosis. Old left frontal insult. Old right thalamic and left    cerebellar lacunar infarcts. Cervical spine CT:      1. No acute fracture or traumatic malalignment in the cervical spine. 2.  Multilevel cervical spondylosis worse at C5-6 where there is moderate to    severe neural foraminal stenosis. Iban Gray M.D.    2/20/2023 7:05:00 PM      CT HEAD WO CONTRAST   Final Result      Head CT:       1. No acute intracranial abnormality.       2.  Generalized parenchymal volume loss and chronic microvascular ischemic    changes. Atherosclerosis. Old left frontal insult. Old right thalamic and left    cerebellar lacunar infarcts. Cervical spine CT:      1. No acute fracture or traumatic malalignment in the cervical spine. 2.  Multilevel cervical spondylosis worse at C5-6 where there is moderate to    severe neural foraminal stenosis. Kev Peter M.D.    2/20/2023 7:05:00 PM                            Voice dictation software was used during the making of this note. This software is not perfect and grammatical and other typographical errors may be present. This note has not been completely proofread for errors.      Corin Golden MD  02/20/23 0708

## 2023-02-20 NOTE — ED TRIAGE NOTES
Pt arrives to ED via EMS c/o GLF that occurred today approx 30 min PTA. Pt reports \"trip and fall\", reports hitting back of head denies LOC reports that he does take blood thinners GCS 15 at this time, per EMS pt has history of dementia.  Pt denies pain and states that he is here because his wife called EMS

## 2023-02-21 LAB
EKG ATRIAL RATE: 0 BPM
EKG DIAGNOSIS: NORMAL
EKG Q-T INTERVAL: 464 MS
EKG QRS DURATION: 155 MS
EKG QTC CALCULATION (BAZETT): 519 MS
EKG R AXIS: -70 DEGREES
EKG T AXIS: 118 DEGREES
EKG VENTRICULAR RATE: 75 BPM

## 2023-02-21 NOTE — ED NOTES
I have reviewed discharge instructions with the patient and spouse. The patient and spouse verbalized understanding. Patient left ED via Discharge Method: wheelchair to Home with spouse and daughter. Opportunity for questions and clarification provided. Patient given 0 scripts. To continue your aftercare when you leave the hospital, you may receive an automated call from our care team to check in on how you are doing. This is a free service and part of our promise to provide the best care and service to meet your aftercare needs.  If you have questions, or wish to unsubscribe from this service please call [phone]. Thank you for Choosing our [facility] Emergency Department.         Dionna Muñiz RN  02/20/23 3827

## 2023-02-21 NOTE — DISCHARGE INSTRUCTIONS
Continue all current medications  Be sure to drink plenty of fluids    Call your primary care doctor in the morning to set up a close follow-up visit and to discuss your kidney function    Return to ER for any worsening symptoms or new problems which may arise

## 2023-03-07 DIAGNOSIS — Z95.810 S/P INTERNAL CARDIAC DEFIBRILLATOR PROCEDURE: ICD-10-CM

## 2023-03-07 DIAGNOSIS — I48.91 ATRIAL FIBRILLATION WITH RVR (HCC): ICD-10-CM

## 2023-03-07 DIAGNOSIS — I50.22 SYSTOLIC CHF, CHRONIC (HCC): ICD-10-CM

## 2023-04-25 RX ORDER — APIXABAN 5 MG/1
TABLET, FILM COATED ORAL
Qty: 180 TABLET | Refills: 3 | Status: SHIPPED | OUTPATIENT
Start: 2023-04-25

## 2023-05-03 ENCOUNTER — OFFICE VISIT (OUTPATIENT)
Dept: CARDIOLOGY CLINIC | Age: 88
End: 2023-05-03
Payer: MEDICARE

## 2023-05-03 VITALS
WEIGHT: 184 LBS | DIASTOLIC BLOOD PRESSURE: 80 MMHG | HEART RATE: 84 BPM | BODY MASS INDEX: 25.76 KG/M2 | SYSTOLIC BLOOD PRESSURE: 124 MMHG | HEIGHT: 71 IN

## 2023-05-03 DIAGNOSIS — I42.0 DILATED CARDIOMYOPATHY (HCC): Primary | ICD-10-CM

## 2023-05-03 DIAGNOSIS — I42.8 NICM (NONISCHEMIC CARDIOMYOPATHY) (HCC): ICD-10-CM

## 2023-05-03 DIAGNOSIS — I48.19 PERSISTENT ATRIAL FIBRILLATION (HCC): ICD-10-CM

## 2023-05-03 DIAGNOSIS — I50.22 SYSTOLIC CHF, CHRONIC (HCC): ICD-10-CM

## 2023-05-03 PROCEDURE — G8417 CALC BMI ABV UP PARAM F/U: HCPCS | Performed by: INTERNAL MEDICINE

## 2023-05-03 PROCEDURE — 1123F ACP DISCUSS/DSCN MKR DOCD: CPT | Performed by: INTERNAL MEDICINE

## 2023-05-03 PROCEDURE — 99214 OFFICE O/P EST MOD 30 MIN: CPT | Performed by: INTERNAL MEDICINE

## 2023-05-03 PROCEDURE — G8428 CUR MEDS NOT DOCUMENT: HCPCS | Performed by: INTERNAL MEDICINE

## 2023-05-03 PROCEDURE — 1036F TOBACCO NON-USER: CPT | Performed by: INTERNAL MEDICINE

## 2023-05-03 NOTE — PROGRESS NOTES
[address], [address]  PHONE: [phone]     23    NAME:  Delphine Grossman. : 1935  MRN: [id number]       SUBJECTIVE:   Delphine Grossman. is a 82 y.o. male seen for a follow up visit regarding the following:     Chief Complaint   Patient presents with    Congestive Heart Failure        HPI: Here for CAD, prior CVA, PPM.     CAD:  NSTEMI, followed by 3v CABG on 18. CVA after CABG. Echo 2018: EF 50-55%. Carotid US 2018: mild dz   PPM 2018 for Afib and SSS   Echo 10/2019 and 2020: EF 30-35%, mod-severe MR   LHC 10/2019: stable 3/3 patent grafts   Upgrade 2/15/2020: Bi[employer] biventricular implantable cardioverter defibrillator       No new angina, No new angina, CP.   WAITE the same, not worsening. Some lack of energy. No new edema, palp. Patient denies recent history of orthopnea, PND, excessive dizziness and/or syncope. Doing well on anticoagulation treatment as reviewed today, no bleeding issues or excessive bruising noted. Been on prednisone since Pit surgery          Past Medical History, Past Surgical History, Family history, Social History, and Medications were all reviewed with the patient today and updated as necessary. Current Outpatient Medications   Medication Sig Dispense Refill    ELIQUIS 5 MG TABS tablet TAKE ONE TABLET BY MOUTH TWICE A  tablet 3    ondansetron (ZOFRAN-ODT) 4 MG disintegrating tablet Take 1 tablet by mouth 3 times daily as needed for Nausea or Vomiting 9 tablet 0    metoprolol succinate (TOPROL XL) 25 MG extended release tablet Take 1 tablet by mouth daily 90 tablet 3    spironolactone (ALDACTONE) 25 MG tablet Take 1 tablet by mouth in the morning. TAKE 1 TABLET BY MOUTH DAILY.  90 tablet 3    aspirin 81 MG EC tablet Take 1 tablet by mouth daily      atorvastatin (LIPITOR) 80 MG tablet Take 1 tablet by mouth      bisacodyl (DULCOLAX) 5 MG EC tablet Take 1 tablet by mouth daily

## 2023-05-10 ENCOUNTER — HOSPITAL ENCOUNTER (INPATIENT)
Age: 88
LOS: 1 days | Discharge: HOME | DRG: 177 | End: 2023-05-12
Attending: EMERGENCY MEDICINE | Admitting: HOSPITALIST
Payer: MEDICARE

## 2023-05-10 DIAGNOSIS — U07.1 COVID-19: ICD-10-CM

## 2023-05-10 DIAGNOSIS — R41.82 ALTERED MENTAL STATUS, UNSPECIFIED ALTERED MENTAL STATUS TYPE: Primary | ICD-10-CM

## 2023-05-10 PROCEDURE — 99285 EMERGENCY DEPT VISIT HI MDM: CPT

## 2023-05-10 PROCEDURE — 93005 ELECTROCARDIOGRAM TRACING: CPT | Performed by: EMERGENCY MEDICINE

## 2023-05-10 PROCEDURE — 36415 COLL VENOUS BLD VENIPUNCTURE: CPT

## 2023-05-10 RX ORDER — 0.9 % SODIUM CHLORIDE 0.9 %
1000 INTRAVENOUS SOLUTION INTRAVENOUS
Status: COMPLETED | OUTPATIENT
Start: 2023-05-11 | End: 2023-05-11

## 2023-05-10 ASSESSMENT — PAIN - FUNCTIONAL ASSESSMENT: PAIN_FUNCTIONAL_ASSESSMENT: NONE - DENIES PAIN

## 2023-05-11 ENCOUNTER — APPOINTMENT (OUTPATIENT)
Dept: GENERAL RADIOLOGY | Age: 88
DRG: 177 | End: 2023-05-11
Payer: MEDICARE

## 2023-05-11 ENCOUNTER — APPOINTMENT (OUTPATIENT)
Dept: CT IMAGING | Age: 88
DRG: 177 | End: 2023-05-11
Payer: MEDICARE

## 2023-05-11 PROBLEM — I42.0 DILATED CARDIOMYOPATHY (HCC): Status: ACTIVE | Noted: 2019-10-17

## 2023-05-11 PROBLEM — G93.41 METABOLIC ENCEPHALOPATHY: Status: ACTIVE | Noted: 2023-05-11

## 2023-05-11 PROBLEM — F03.90 DEMENTIA (HCC): Status: ACTIVE | Noted: 2023-05-11

## 2023-05-11 PROBLEM — I10 ESSENTIAL HYPERTENSION: Status: ACTIVE | Noted: 2018-05-16

## 2023-05-11 LAB
ALBUMIN SERPL-MCNC: 3.1 G/DL (ref 3.2–4.6)
ALBUMIN/GLOB SERPL: 1 (ref 0.4–1.6)
ALP SERPL-CCNC: 90 U/L (ref 50–136)
ALT SERPL-CCNC: 22 U/L (ref 12–65)
ANION GAP SERPL CALC-SCNC: 7 MMOL/L (ref 2–11)
APPEARANCE UR: CLEAR
AST SERPL-CCNC: 23 U/L (ref 15–37)
BACTERIA URNS QL MICRO: NEGATIVE /HPF
BASOPHILS # BLD: 0 K/UL (ref 0–0.2)
BASOPHILS NFR BLD: 1 % (ref 0–2)
BILIRUB SERPL-MCNC: 0.8 MG/DL (ref 0.2–1.1)
BILIRUB UR QL: NEGATIVE
BUN SERPL-MCNC: 16 MG/DL (ref 8–23)
CALCIUM SERPL-MCNC: 8.9 MG/DL (ref 8.3–10.4)
CASTS URNS QL MICRO: NORMAL /LPF
CHLORIDE SERPL-SCNC: 107 MMOL/L (ref 101–110)
CO2 SERPL-SCNC: 20 MMOL/L (ref 21–32)
COLOR UR: NORMAL
CREAT SERPL-MCNC: 1.5 MG/DL (ref 0.8–1.5)
DIFFERENTIAL METHOD BLD: NORMAL
EKG ATRIAL RATE: 95 BPM
EKG DIAGNOSIS: NORMAL
EKG P AXIS: 78 DEGREES
EKG P-R INTERVAL: 123 MS
EKG Q-T INTERVAL: 443 MS
EKG QRS DURATION: 170 MS
EKG QTC CALCULATION (BAZETT): 546 MS
EKG R AXIS: -88 DEGREES
EKG T AXIS: 92 DEGREES
EKG VENTRICULAR RATE: 91 BPM
EOSINOPHIL # BLD: 0.1 K/UL (ref 0–0.8)
EOSINOPHIL NFR BLD: 2 % (ref 0.5–7.8)
EPI CELLS #/AREA URNS HPF: NORMAL /HPF
ERYTHROCYTE [DISTWIDTH] IN BLOOD BY AUTOMATED COUNT: 14.5 % (ref 11.9–14.6)
GLOBULIN SER CALC-MCNC: 3.2 G/DL (ref 2.8–4.5)
GLUCOSE SERPL-MCNC: 104 MG/DL (ref 65–100)
GLUCOSE UR STRIP.AUTO-MCNC: NEGATIVE MG/DL
HCT VFR BLD AUTO: 42.3 % (ref 41.1–50.3)
HGB BLD-MCNC: 13.8 G/DL (ref 13.6–17.2)
HGB UR QL STRIP: NEGATIVE
IMM GRANULOCYTES # BLD AUTO: 0 K/UL (ref 0–0.5)
IMM GRANULOCYTES NFR BLD AUTO: 0 % (ref 0–5)
KETONES UR QL STRIP.AUTO: NEGATIVE MG/DL
LACTATE SERPL-SCNC: 1.9 MMOL/L (ref 0.4–2)
LACTATE SERPL-SCNC: 2.8 MMOL/L (ref 0.4–2)
LEUKOCYTE ESTERASE UR QL STRIP.AUTO: NEGATIVE
LIPASE SERPL-CCNC: 209 U/L (ref 73–393)
LYMPHOCYTES # BLD: 1.4 K/UL (ref 0.5–4.6)
LYMPHOCYTES NFR BLD: 19 % (ref 13–44)
MCH RBC QN AUTO: 29.9 PG (ref 26.1–32.9)
MCHC RBC AUTO-ENTMCNC: 32.6 G/DL (ref 31.4–35)
MCV RBC AUTO: 91.8 FL (ref 82–102)
MONOCYTES # BLD: 0.6 K/UL (ref 0.1–1.3)
MONOCYTES NFR BLD: 8 % (ref 4–12)
MUCOUS THREADS URNS QL MICRO: 0 /LPF
NEUTS SEG # BLD: 5.3 K/UL (ref 1.7–8.2)
NEUTS SEG NFR BLD: 70 % (ref 43–78)
NITRITE UR QL STRIP.AUTO: NEGATIVE
NRBC # BLD: 0 K/UL (ref 0–0.2)
PH UR STRIP: 8.5 (ref 5–9)
PLATELET # BLD AUTO: 177 K/UL (ref 150–450)
PMV BLD AUTO: 9.7 FL (ref 9.4–12.3)
POTASSIUM SERPL-SCNC: 4.3 MMOL/L (ref 3.5–5.1)
PROCALCITONIN SERPL-MCNC: 0.09 NG/ML (ref 0–0.49)
PROT SERPL-MCNC: 6.3 G/DL (ref 6.3–8.2)
PROT UR STRIP-MCNC: NEGATIVE MG/DL
RBC # BLD AUTO: 4.61 M/UL (ref 4.23–5.6)
RBC #/AREA URNS HPF: NORMAL /HPF
SARS-COV-2 RDRP RESP QL NAA+PROBE: DETECTED
SODIUM SERPL-SCNC: 134 MMOL/L (ref 133–143)
SOURCE: ABNORMAL
SP GR UR REFRACTOMETRY: 1.01 (ref 1–1.02)
TROPONIN I SERPL HS-MCNC: 22.1 PG/ML (ref 0–57)
TROPONIN I SERPL HS-MCNC: 23.8 PG/ML (ref 0–57)
URINE CULTURE IF INDICATED: NORMAL
UROBILINOGEN UR QL STRIP.AUTO: 1 EU/DL (ref 0.2–1)
WBC # BLD AUTO: 7.4 K/UL (ref 4.3–11.1)
WBC URNS QL MICRO: NORMAL /HPF

## 2023-05-11 PROCEDURE — 87040 BLOOD CULTURE FOR BACTERIA: CPT

## 2023-05-11 PROCEDURE — 81001 URINALYSIS AUTO W/SCOPE: CPT

## 2023-05-11 PROCEDURE — 70450 CT HEAD/BRAIN W/O DYE: CPT

## 2023-05-11 PROCEDURE — 84145 PROCALCITONIN (PCT): CPT

## 2023-05-11 PROCEDURE — 96367 TX/PROPH/DG ADDL SEQ IV INF: CPT

## 2023-05-11 PROCEDURE — 6370000000 HC RX 637 (ALT 250 FOR IP): Performed by: HOSPITALIST

## 2023-05-11 PROCEDURE — 2580000003 HC RX 258: Performed by: EMERGENCY MEDICINE

## 2023-05-11 PROCEDURE — 85025 COMPLETE CBC W/AUTO DIFF WBC: CPT

## 2023-05-11 PROCEDURE — 87635 SARS-COV-2 COVID-19 AMP PRB: CPT

## 2023-05-11 PROCEDURE — 83605 ASSAY OF LACTIC ACID: CPT

## 2023-05-11 PROCEDURE — 96365 THER/PROPH/DIAG IV INF INIT: CPT

## 2023-05-11 PROCEDURE — 71045 X-RAY EXAM CHEST 1 VIEW: CPT

## 2023-05-11 PROCEDURE — 2580000003 HC RX 258: Performed by: HOSPITALIST

## 2023-05-11 PROCEDURE — 83690 ASSAY OF LIPASE: CPT

## 2023-05-11 PROCEDURE — 96375 TX/PRO/DX INJ NEW DRUG ADDON: CPT

## 2023-05-11 PROCEDURE — 84484 ASSAY OF TROPONIN QUANT: CPT

## 2023-05-11 PROCEDURE — 1100000000 HC RM PRIVATE

## 2023-05-11 PROCEDURE — 6360000002 HC RX W HCPCS: Performed by: EMERGENCY MEDICINE

## 2023-05-11 PROCEDURE — 80053 COMPREHEN METABOLIC PANEL: CPT

## 2023-05-11 RX ORDER — SODIUM CHLORIDE 0.9 % (FLUSH) 0.9 %
5-40 SYRINGE (ML) INJECTION PRN
Status: DISCONTINUED | OUTPATIENT
Start: 2023-05-11 | End: 2023-05-12 | Stop reason: HOSPADM

## 2023-05-11 RX ORDER — ACETAMINOPHEN 650 MG/1
650 SUPPOSITORY RECTAL EVERY 6 HOURS PRN
Status: DISCONTINUED | OUTPATIENT
Start: 2023-05-11 | End: 2023-05-12 | Stop reason: HOSPADM

## 2023-05-11 RX ORDER — LEVOTHYROXINE SODIUM 0.07 MG/1
75 TABLET ORAL DAILY
Status: DISCONTINUED | OUTPATIENT
Start: 2023-05-11 | End: 2023-05-12 | Stop reason: HOSPADM

## 2023-05-11 RX ORDER — ASPIRIN 81 MG/1
81 TABLET ORAL DAILY
Status: DISCONTINUED | OUTPATIENT
Start: 2023-05-11 | End: 2023-05-12 | Stop reason: HOSPADM

## 2023-05-11 RX ORDER — BISACODYL 5 MG/1
5 TABLET, DELAYED RELEASE ORAL DAILY
Status: DISCONTINUED | OUTPATIENT
Start: 2023-05-11 | End: 2023-05-12 | Stop reason: HOSPADM

## 2023-05-11 RX ORDER — ONDANSETRON 4 MG/1
4 TABLET, ORALLY DISINTEGRATING ORAL EVERY 8 HOURS PRN
Status: DISCONTINUED | OUTPATIENT
Start: 2023-05-11 | End: 2023-05-12 | Stop reason: HOSPADM

## 2023-05-11 RX ORDER — ATORVASTATIN CALCIUM 40 MG/1
80 TABLET, FILM COATED ORAL NIGHTLY
Status: DISCONTINUED | OUTPATIENT
Start: 2023-05-11 | End: 2023-05-12 | Stop reason: HOSPADM

## 2023-05-11 RX ORDER — SODIUM CHLORIDE 9 MG/ML
INJECTION, SOLUTION INTRAVENOUS PRN
Status: DISCONTINUED | OUTPATIENT
Start: 2023-05-11 | End: 2023-05-12 | Stop reason: HOSPADM

## 2023-05-11 RX ORDER — FUROSEMIDE 20 MG/1
20 TABLET ORAL DAILY
Status: DISCONTINUED | OUTPATIENT
Start: 2023-05-11 | End: 2023-05-12 | Stop reason: HOSPADM

## 2023-05-11 RX ORDER — METOPROLOL SUCCINATE 25 MG/1
25 TABLET, EXTENDED RELEASE ORAL DAILY
Status: DISCONTINUED | OUTPATIENT
Start: 2023-05-11 | End: 2023-05-12 | Stop reason: HOSPADM

## 2023-05-11 RX ORDER — DEXAMETHASONE SODIUM PHOSPHATE 10 MG/ML
6 INJECTION INTRAMUSCULAR; INTRAVENOUS ONCE
Status: COMPLETED | OUTPATIENT
Start: 2023-05-11 | End: 2023-05-11

## 2023-05-11 RX ORDER — PREDNISONE 10 MG/1
5 TABLET ORAL DAILY
Status: DISCONTINUED | OUTPATIENT
Start: 2023-05-11 | End: 2023-05-12 | Stop reason: HOSPADM

## 2023-05-11 RX ORDER — METOPROLOL SUCCINATE 25 MG/1
25 TABLET, EXTENDED RELEASE ORAL DAILY
Status: DISCONTINUED | OUTPATIENT
Start: 2023-05-11 | End: 2023-05-11

## 2023-05-11 RX ORDER — ENOXAPARIN SODIUM 100 MG/ML
40 INJECTION SUBCUTANEOUS DAILY
Status: DISCONTINUED | OUTPATIENT
Start: 2023-05-11 | End: 2023-05-11

## 2023-05-11 RX ORDER — ONDANSETRON 2 MG/ML
4 INJECTION INTRAMUSCULAR; INTRAVENOUS EVERY 6 HOURS PRN
Status: DISCONTINUED | OUTPATIENT
Start: 2023-05-11 | End: 2023-05-12 | Stop reason: HOSPADM

## 2023-05-11 RX ORDER — SODIUM CHLORIDE 0.9 % (FLUSH) 0.9 %
5-40 SYRINGE (ML) INJECTION EVERY 12 HOURS SCHEDULED
Status: DISCONTINUED | OUTPATIENT
Start: 2023-05-11 | End: 2023-05-12 | Stop reason: HOSPADM

## 2023-05-11 RX ORDER — POLYETHYLENE GLYCOL 3350 17 G/17G
17 POWDER, FOR SOLUTION ORAL DAILY PRN
Status: DISCONTINUED | OUTPATIENT
Start: 2023-05-11 | End: 2023-05-12 | Stop reason: HOSPADM

## 2023-05-11 RX ORDER — ACETAMINOPHEN 325 MG/1
650 TABLET ORAL EVERY 6 HOURS PRN
Status: DISCONTINUED | OUTPATIENT
Start: 2023-05-11 | End: 2023-05-12 | Stop reason: HOSPADM

## 2023-05-11 RX ADMIN — APIXABAN 2.5 MG: 5 TABLET, FILM COATED ORAL at 10:29

## 2023-05-11 RX ADMIN — ASPIRIN 81 MG: 81 TABLET ORAL at 10:29

## 2023-05-11 RX ADMIN — SODIUM CHLORIDE, PRESERVATIVE FREE 10 ML: 5 INJECTION INTRAVENOUS at 21:32

## 2023-05-11 RX ADMIN — BISACODYL 5 MG: 5 TABLET, COATED ORAL at 10:30

## 2023-05-11 RX ADMIN — DEXAMETHASONE SODIUM PHOSPHATE 6 MG: 10 INJECTION INTRAMUSCULAR; INTRAVENOUS at 01:59

## 2023-05-11 RX ADMIN — LEVOTHYROXINE SODIUM 75 MCG: 75 TABLET ORAL at 10:29

## 2023-05-11 RX ADMIN — SODIUM CHLORIDE 1000 ML: 9 INJECTION, SOLUTION INTRAVENOUS at 00:16

## 2023-05-11 RX ADMIN — SODIUM CHLORIDE, PRESERVATIVE FREE 10 ML: 5 INJECTION INTRAVENOUS at 10:32

## 2023-05-11 RX ADMIN — APIXABAN 2.5 MG: 5 TABLET, FILM COATED ORAL at 21:32

## 2023-05-11 RX ADMIN — ATORVASTATIN CALCIUM 80 MG: 40 TABLET, FILM COATED ORAL at 21:32

## 2023-05-11 RX ADMIN — AZITHROMYCIN MONOHYDRATE 500 MG: 500 INJECTION, POWDER, LYOPHILIZED, FOR SOLUTION INTRAVENOUS at 01:02

## 2023-05-11 RX ADMIN — CEFTRIAXONE 1000 MG: 1 INJECTION, POWDER, FOR SOLUTION INTRAMUSCULAR; INTRAVENOUS at 00:16

## 2023-05-11 RX ADMIN — PREDNISONE 5 MG: 10 TABLET ORAL at 10:32

## 2023-05-11 ASSESSMENT — ENCOUNTER SYMPTOMS: COUGH: 1

## 2023-05-11 NOTE — ED PROVIDER NOTES
Triple lead defibrillator/pacemaker. Normal heart size. CABG. No acute cardiopulmonary process. Naty Bernstein M.D.    5/11/2023 1:10:00 AM                        Voice dictation software was used during the making of this note. This software is not perfect and grammatical and other typographical errors may be present. This note has not been completely proofread for errors.      Napoleon Hagen III, MD  05/11/23 2413

## 2023-05-11 NOTE — H&P
Albumin/Globulin Ratio 1.0 0.4 - 1.6     Urinalysis with Reflex to Culture    Collection Time: 05/11/23 12:08 AM    Specimen: Urine   Result Value Ref Range    Color, UA YELLOW/STRAW      Appearance CLEAR      Specific Gravity, UA 1.015 1.001 - 1.023      pH, Urine 8.5 5.0 - 9.0      Protein, UA Negative NEG mg/dL    Glucose, UA Negative mg/dL    Ketones, Urine Negative NEG mg/dL    Bilirubin Urine Negative NEG      Blood, Urine Negative NEG      Urobilinogen, Urine 1.0 0.2 - 1.0 EU/dL    Nitrite, Urine Negative NEG      Leukocyte Esterase, Urine Negative NEG      Urine Culture if Indicated CULTURE NOT INDICATED BY UA RESULT      WBC, UA 0-4 U4 /hpf    RBC, UA 0-5 U5 /hpf    BACTERIA, URINE Negative NEG /hpf    Epithelial Cells UA 0-5 U5 /hpf    Casts 0-2 U2 /lpf    Mucus, UA 0 0 /lpf   Lipase    Collection Time: 05/11/23 12:08 AM   Result Value Ref Range    Lipase 209 73 - 393 U/L   Lactic Acid    Collection Time: 05/11/23 12:08 AM   Result Value Ref Range    Lactic Acid, Plasma 2.8 (H) 0.4 - 2.0 MMOL/L   Procalcitonin    Collection Time: 05/11/23 12:08 AM   Result Value Ref Range    Procalcitonin 0.09 0.00 - 0.49 ng/mL   COVID-19, Rapid    Collection Time: 05/11/23 12:08 AM    Specimen: Nasopharyngeal   Result Value Ref Range    Source NASAL      SARS-CoV-2, Rapid Detected (AA) NOTD     Culture, Blood 1    Collection Time: 05/11/23 12:08 AM    Specimen: Blood   Result Value Ref Range    Special Requests RIGHT  Antecubital        Culture PENDING    Troponin    Collection Time: 05/11/23  2:09 AM   Result Value Ref Range    Troponin, High Sensitivity 22.1 0 - 57 pg/mL   Lactic Acid    Collection Time: 05/11/23  2:09 AM   Result Value Ref Range    Lactic Acid, Plasma 1.9 0.4 - 2.0 MMOL/L       I have personally reviewed imaging studies:  XR CHEST PORTABLE    Result Date: 5/11/2023  EXAMINATION: XR CHEST PORTABLE  DATE: 5/11/2023 12:00 AM INDICATIONS: Shortness of breath.  COMPARISON: February 17, 2023 at 5:38 PM

## 2023-05-11 NOTE — ED NOTES
Please call wife, Mina Wang, when bed assigned.  [address] [facility] Mya London RN  05/11/23 1823

## 2023-05-11 NOTE — ED NOTES
TRANSFER - OUT REPORT:    Verbal report given to ROXANA Hernandez on [facility].  being transferred to 23-14-20-09 for routine progression of patient care       Report consisted of patient's Situation, Background, Assessment and   Recommendations(SBAR). Information from the following report(s) ED SBAR was reviewed with the receiving nurse. Loraine Assessment: No data recorded  Lines:   Peripheral IV 05/11/23 Right Antecubital (Active)        Opportunity for questions and clarification was provided.       Patient transported with:  N95          Marcella Cunningham RN  05/11/23 3516

## 2023-05-11 NOTE — CARE COORDINATION
05/11/23 1511   Service Assessment   Patient Orientation Unable to Assess   Cognition Other (see comment)   History Provided By Significant Other   Primary Caregiver Self   Support Systems Spouse/Significant Other   PCP Verified by CM Yes   Prior Functional Level Independent in ADLs/IADLs   Current Functional Level Other (see comment)   Can patient return to prior living arrangement Unknown at present   Ability to make needs known: Good   Family able to assist with home care needs: Yes   Would you like for me to discuss the discharge plan with any other family members/significant others, and if so, who? Yes   Financial Resources Medicare   Social/Functional History   Lives With Spouse   Type of [address]   [address] Responsibilities No   Ambulation Assistance Needs assistance  (occassionally uses a cane)   Transfer Assistance Independent   Active  No   Patient's  Info patient can drive but wife feels better when she drives     CM called patient's spouse to discuss discharge plans. Patient is uses a cane occasionally to ambulate. Confirmed patient has a pcp ([facility]). No history of HH but history of rehab 4 years ago after a stroke. Patient has a supportive family with many relatives close by. CM following for discharge needs.

## 2023-05-11 NOTE — ED TRIAGE NOTES
Patient arrives via EMS for altered mental status, fever, productive cough. States has had a cough and fever for the past 3 days, became altered today. EMS initated sepsis protocols and gave ~500ml LR in route, yvan 1st set of blood cultures, but no antibiotics were available. EMS reports tylenol was given about 4 hours ago.

## 2023-05-12 ENCOUNTER — TELEPHONE (OUTPATIENT)
Dept: FAMILY MEDICINE CLINIC | Facility: CLINIC | Age: 88
End: 2023-05-12

## 2023-05-12 VITALS
BODY MASS INDEX: 26.32 KG/M2 | WEIGHT: 188 LBS | SYSTOLIC BLOOD PRESSURE: 125 MMHG | TEMPERATURE: 97.3 F | HEART RATE: 76 BPM | DIASTOLIC BLOOD PRESSURE: 84 MMHG | RESPIRATION RATE: 19 BRPM | HEIGHT: 71 IN | OXYGEN SATURATION: 99 %

## 2023-05-12 LAB
ALBUMIN SERPL-MCNC: 2.9 G/DL (ref 3.2–4.6)
ALBUMIN/GLOB SERPL: 1 (ref 0.4–1.6)
ALP SERPL-CCNC: 63 U/L (ref 50–136)
ALT SERPL-CCNC: 20 U/L (ref 12–65)
ANION GAP SERPL CALC-SCNC: 2 MMOL/L (ref 2–11)
AST SERPL-CCNC: 27 U/L (ref 15–37)
BASOPHILS # BLD: 0 K/UL (ref 0–0.2)
BASOPHILS NFR BLD: 0 % (ref 0–2)
BILIRUB SERPL-MCNC: 0.6 MG/DL (ref 0.2–1.1)
BUN SERPL-MCNC: 21 MG/DL (ref 8–23)
CALCIUM SERPL-MCNC: 8.5 MG/DL (ref 8.3–10.4)
CHLORIDE SERPL-SCNC: 111 MMOL/L (ref 101–110)
CO2 SERPL-SCNC: 23 MMOL/L (ref 21–32)
CREAT SERPL-MCNC: 1.2 MG/DL (ref 0.8–1.5)
DIFFERENTIAL METHOD BLD: ABNORMAL
EOSINOPHIL # BLD: 0 K/UL (ref 0–0.8)
EOSINOPHIL NFR BLD: 0 % (ref 0.5–7.8)
ERYTHROCYTE [DISTWIDTH] IN BLOOD BY AUTOMATED COUNT: 14.3 % (ref 11.9–14.6)
GLOBULIN SER CALC-MCNC: 2.9 G/DL (ref 2.8–4.5)
GLUCOSE SERPL-MCNC: 127 MG/DL (ref 65–100)
HCT VFR BLD AUTO: 38.3 % (ref 41.1–50.3)
HGB BLD-MCNC: 12.7 G/DL (ref 13.6–17.2)
IMM GRANULOCYTES # BLD AUTO: 0 K/UL (ref 0–0.5)
IMM GRANULOCYTES NFR BLD AUTO: 0 % (ref 0–5)
LYMPHOCYTES # BLD: 0.6 K/UL (ref 0.5–4.6)
LYMPHOCYTES NFR BLD: 6 % (ref 13–44)
MCH RBC QN AUTO: 30.2 PG (ref 26.1–32.9)
MCHC RBC AUTO-ENTMCNC: 33.2 G/DL (ref 31.4–35)
MCV RBC AUTO: 91 FL (ref 82–102)
MONOCYTES # BLD: 0.6 K/UL (ref 0.1–1.3)
MONOCYTES NFR BLD: 6 % (ref 4–12)
NEUTS SEG # BLD: 9.1 K/UL (ref 1.7–8.2)
NEUTS SEG NFR BLD: 88 % (ref 43–78)
NRBC # BLD: 0 K/UL (ref 0–0.2)
PLATELET # BLD AUTO: 188 K/UL (ref 150–450)
PMV BLD AUTO: 10.4 FL (ref 9.4–12.3)
POTASSIUM SERPL-SCNC: 4.6 MMOL/L (ref 3.5–5.1)
PROT SERPL-MCNC: 5.8 G/DL (ref 6.3–8.2)
RBC # BLD AUTO: 4.21 M/UL (ref 4.23–5.6)
SODIUM SERPL-SCNC: 136 MMOL/L (ref 133–143)
WBC # BLD AUTO: 10.3 K/UL (ref 4.3–11.1)

## 2023-05-12 PROCEDURE — 80053 COMPREHEN METABOLIC PANEL: CPT

## 2023-05-12 PROCEDURE — 85025 COMPLETE CBC W/AUTO DIFF WBC: CPT

## 2023-05-12 PROCEDURE — 6370000000 HC RX 637 (ALT 250 FOR IP): Performed by: HOSPITALIST

## 2023-05-12 PROCEDURE — 36415 COLL VENOUS BLD VENIPUNCTURE: CPT

## 2023-05-12 PROCEDURE — 2580000003 HC RX 258: Performed by: HOSPITALIST

## 2023-05-12 RX ADMIN — PREDNISONE 5 MG: 10 TABLET ORAL at 08:35

## 2023-05-12 RX ADMIN — METOPROLOL SUCCINATE 25 MG: 25 TABLET, EXTENDED RELEASE ORAL at 08:34

## 2023-05-12 RX ADMIN — LEVOTHYROXINE SODIUM 75 MCG: 75 TABLET ORAL at 08:32

## 2023-05-12 RX ADMIN — ASPIRIN 81 MG: 81 TABLET ORAL at 08:32

## 2023-05-12 RX ADMIN — SODIUM CHLORIDE, PRESERVATIVE FREE 10 ML: 5 INJECTION INTRAVENOUS at 08:32

## 2023-05-12 RX ADMIN — BISACODYL 5 MG: 5 TABLET, COATED ORAL at 08:31

## 2023-05-12 RX ADMIN — APIXABAN 2.5 MG: 5 TABLET, FILM COATED ORAL at 08:29

## 2023-05-12 NOTE — DISCHARGE SUMMARY
U/L    AST 27 15 - 37 U/L    Alk Phosphatase 63 50 - 136 U/L    Total Protein 5.8 (L) 6.3 - 8.2 g/dL    Albumin 2.9 (L) 3.2 - 4.6 g/dL    Globulin 2.9 2.8 - 4.5 g/dL    Albumin/Globulin Ratio 1.0 0.4 - 1.6     CBC with Auto Differential    Collection Time: 05/12/23  4:28 AM   Result Value Ref Range    WBC 10.3 4.3 - 11.1 K/uL    RBC 4.21 (L) 4.23 - 5.6 M/uL    Hemoglobin 12.7 (L) 13.6 - 17.2 g/dL    Hematocrit 38.3 (L) 41.1 - 50.3 %    MCV 91.0 82 - 102 FL    MCH 30.2 26.1 - 32.9 PG    MCHC 33.2 31.4 - 35.0 g/dL    RDW 14.3 11.9 - 14.6 %    Platelets 497 076 - 595 K/uL    MPV 10.4 9.4 - 12.3 FL    nRBC 0.00 0.0 - 0.2 K/uL    Differential Type AUTOMATED      Seg Neutrophils 88 (H) 43 - 78 %    Lymphocytes 6 (L) 13 - 44 %    Monocytes 6 4.0 - 12.0 %    Eosinophils % 0 (L) 0.5 - 7.8 %    Basophils 0 0.0 - 2.0 %    Immature Granulocytes 0 0.0 - 5.0 %    Segs Absolute 9.1 (H) 1.7 - 8.2 K/UL    Absolute Lymph # 0.6 0.5 - 4.6 K/UL    Absolute Mono # 0.6 0.1 - 1.3 K/UL    Absolute Eos # 0.0 0.0 - 0.8 K/UL    Basophils Absolute 0.0 0.0 - 0.2 K/UL    Absolute Immature Granulocyte 0.0 0.0 - 0.5 K/UL       Allergies   Allergen Reactions    Penicillins Swelling    Lorazepam Rash     Immunization History   Administered Date(s) Administered    Influenza, FLUARIX, FLULAVAL, FLUZONE (age 10 mo+) AND AFLURIA, (age 1 y+), PF, 0.5mL 01/12/2020    Influenza, High Dose (Fluzone 65 yrs and older) 09/17/2015, 11/30/2017, 09/25/2018    PPD Test 05/18/2018, 07/20/2018    Pneumococcal, PPSV23, PNEUMOVAX 23, (age 2y+), SC/IM, 0.5mL 01/27/2012       Recent Vital Data:  Patient Vitals for the past 24 hrs:   Temp Pulse Resp BP SpO2   05/12/23 0834 -- -- -- 125/84 --   05/12/23 0728 97.3 °F (36.3 °C) 76 19 125/84 99 %   05/12/23 0308 98.4 °F (36.9 °C) 77 17 122/84 96 %   05/11/23 2315 98.4 °F (36.9 °C) 74 18 118/78 98 %   05/11/23 2027 98.2 °F (36.8 °C) 76 17 (!) 153/92 100 %   05/11/23 1550 98.2 °F (36.8 °C) 76 18 113/81 99 %   05/11/23 0949 97.3

## 2023-05-12 NOTE — CARE COORDINATION
05/12/23 [address] Discharge None   [employer] Information Provided? No   Mode of Transport at Discharge Other (see comment)   Confirm Follow Up Transport Family   Condition of Participation: Discharge Planning   The Patient and/or Patient Representative was provided with a Choice of Provider? Patient   The Patient and/Or Patient Representative agree with the Discharge Plan? Yes   Freedom of Choice list was provided with basic dialogue that supports the patient's individualized plan of care/goals, treatment preferences, and shares the quality data associated with the providers? Yes     Patient is discharging home. Spouse requested CM order a rollator.  Rollator has been ordered from [facility]

## 2023-05-12 NOTE — PROGRESS NOTES
Patient resting in bed on room air. A&Ox3, with periods of orientation/disorientation. Respirations even and unlabored. Patient denies pain and is in no acute distress at this time. No acute events overnight. Call bell within reach, patient instructed to call if assistance is needed. Preparing to give report to oncoming RN.
Patient resting in bed on room air. Respirations present and unlabored. A&Ox4. No needs at this time. Call bell within reach and encouraged to call with needs.
Pt arrived to room 824 via stretcher from ED. Wife at bedside. Pt alert and oriented times 3 at this time. On RA. No s/sx of distress noted. Dual skin assessment done. Blanchable redness noted to sacrum. No pressure injuries noted. Denies any pain. Pt oriented to room and surroundings. Encouraged to call for assistance as needed. Call light within reach. Will continue to monitor.
Pt resting in bed with eyes closed. Awakens when spoken to. On RA. No s/sx of distress noted. Denies any pain. Call light within reach. Will continue to monitor.
TRANSFER - IN REPORT:    Verbal report received from [place], RN(name) on [facility].  being received from ER(unit) for routine progression of patient care      Report consisted of patients Situation, Background, Assessment and   Recommendations(SBAR). Information from the following report(s) Nurse Handoff Report, MAR, Recent Results, and Event Log was reviewed with the receiving nurse. Opportunity for questions and clarification was provided. Report given to Rene Drake RN, who will assume primary care of patient on arrival to floor.
IntraVENous 2 times per day    sodium chloride flush 0.9 % injection 5-40 mL  5-40 mL IntraVENous PRN    0.9 % sodium chloride infusion   IntraVENous PRN    ondansetron (ZOFRAN-ODT) disintegrating tablet 4 mg  4 mg Oral Q8H PRN    Or    ondansetron (ZOFRAN) injection 4 mg  4 mg IntraVENous Q6H PRN    polyethylene glycol (GLYCOLAX) packet 17 g  17 g Oral Daily PRN    acetaminophen (TYLENOL) tablet 650 mg  650 mg Oral Q6H PRN    Or    acetaminophen (TYLENOL) suppository 650 mg  650 mg Rectal Q6H PRN    aspirin EC tablet 81 mg  81 mg Oral Daily    atorvastatin (LIPITOR) tablet 80 mg  80 mg Oral Nightly    bisacodyl (DULCOLAX) EC tablet 5 mg  5 mg Oral Daily    apixaban (ELIQUIS) tablet 2.5 mg  2.5 mg Oral BID    [Held by provider] furosemide (LASIX) tablet 20 mg  20 mg Oral Daily    levothyroxine (SYNTHROID) tablet 75 mcg  75 mcg Oral Daily    predniSONE (DELTASONE) tablet 5 mg  5 mg Oral Daily    [Held by provider] metoprolol succinate (TOPROL XL) extended release tablet 25 mg  25 mg Oral Daily       Signed:  Brooke Nash MD    Part of this note may have been written by using a voice dictation software. The note has been proof read but may still contain some grammatical/other typographical errors.

## 2023-05-12 NOTE — FLOWSHEET NOTE
05/12/23 50[address] [facility] Received From Brittney Meneses RN   Handoff Communication Face to Face   Time Handoff Given [id number]     Resting soundly with no distress on RA. Covid isolation in place. Bed alarm on. SR up x2, bed low locked with call light within reach.

## 2023-05-15 ENCOUNTER — TELEPHONE (OUTPATIENT)
Dept: FAMILY MEDICINE CLINIC | Facility: CLINIC | Age: 88
End: 2023-05-15

## 2023-05-15 NOTE — TELEPHONE ENCOUNTER
Care Transitions Initial Follow Up Call    Call within 2 business days of discharge: Yes     Patient: Jaiden Hamilton.  Patient : 1935 MRN: [id number]    [unfilled]    RARS: Readmission Risk Score: 17.6       Spoke with: left voice mail    Discharge department/facility: [facility]    Non-face-to-face services provided:  Scheduled appointment with PCP-     Follow Up  Future Appointments   Date Time Provider Steve Yancey   2023  8:00 AM YARI Khalil GVPATTI AMB   11/3/2023  1:00 PM DO JAVIER Duong GVL AMB       Manuel Domingo LPN

## 2023-06-20 DIAGNOSIS — I48.91 ATRIAL FIBRILLATION WITH RVR (HCC): ICD-10-CM

## 2023-06-20 DIAGNOSIS — I50.22 SYSTOLIC CHF, CHRONIC (HCC): ICD-10-CM

## 2023-06-20 DIAGNOSIS — Z95.810 S/P INTERNAL CARDIAC DEFIBRILLATOR PROCEDURE: ICD-10-CM

## 2023-06-30 RX ORDER — PREDNISONE 5 MG/1
5 TABLET ORAL DAILY
Qty: 30 TABLET | Refills: 0 | Status: SHIPPED | OUTPATIENT
Start: 2023-06-30

## 2023-06-30 RX ORDER — APIXABAN 5 MG/1
TABLET, FILM COATED ORAL
Qty: 180 TABLET | Refills: 3 | OUTPATIENT
Start: 2023-06-30

## 2023-06-30 RX ORDER — LEVOTHYROXINE SODIUM 0.07 MG/1
75 TABLET ORAL DAILY
Qty: 90 TABLET | Refills: 3 | Status: SHIPPED | OUTPATIENT
Start: 2023-06-30

## 2023-06-30 RX ORDER — ATORVASTATIN CALCIUM 80 MG/1
80 TABLET, FILM COATED ORAL DAILY
Qty: 90 TABLET | Refills: 3 | Status: SHIPPED | OUTPATIENT
Start: 2023-06-30

## 2023-06-30 RX ORDER — METOPROLOL SUCCINATE 25 MG/1
25 TABLET, EXTENDED RELEASE ORAL DAILY
Qty: 90 TABLET | Refills: 3 | Status: SHIPPED | OUTPATIENT
Start: 2023-06-30

## 2023-07-25 PROCEDURE — 93296 REM INTERROG EVL PM/IDS: CPT | Performed by: INTERNAL MEDICINE

## 2023-07-25 PROCEDURE — 93295 DEV INTERROG REMOTE 1/2/MLT: CPT | Performed by: INTERNAL MEDICINE

## 2023-08-28 ENCOUNTER — HOSPITAL ENCOUNTER (INPATIENT)
Age: 88
LOS: 1 days | Discharge: HOME | DRG: 392 | End: 2023-08-30
Attending: EMERGENCY MEDICINE | Admitting: INTERNAL MEDICINE
Payer: MEDICARE

## 2023-08-28 ENCOUNTER — APPOINTMENT (OUTPATIENT)
Dept: GENERAL RADIOLOGY | Age: 88
DRG: 392 | End: 2023-08-28
Payer: MEDICARE

## 2023-08-28 DIAGNOSIS — E86.0 DEHYDRATION: Primary | ICD-10-CM

## 2023-08-28 DIAGNOSIS — R11.2 NAUSEA VOMITING AND DIARRHEA: ICD-10-CM

## 2023-08-28 DIAGNOSIS — R19.7 NAUSEA VOMITING AND DIARRHEA: ICD-10-CM

## 2023-08-28 LAB
ALBUMIN SERPL-MCNC: 3.3 G/DL (ref 3.2–4.6)
ALBUMIN/GLOB SERPL: 1 (ref 0.4–1.6)
ALP SERPL-CCNC: 113 U/L (ref 50–136)
ALT SERPL-CCNC: 28 U/L (ref 12–65)
ANION GAP SERPL CALC-SCNC: 6 MMOL/L (ref 2–11)
APPEARANCE UR: CLEAR
AST SERPL-CCNC: 47 U/L (ref 15–37)
BACTERIA URNS QL MICRO: NEGATIVE /HPF
BASOPHILS # BLD: 0 K/UL (ref 0–0.2)
BASOPHILS NFR BLD: 1 % (ref 0–2)
BILIRUB SERPL-MCNC: 0.6 MG/DL (ref 0.2–1.1)
BILIRUB UR QL: NEGATIVE
BUN SERPL-MCNC: 12 MG/DL (ref 8–23)
CALCIUM SERPL-MCNC: 9.2 MG/DL (ref 8.3–10.4)
CASTS URNS QL MICRO: ABNORMAL /LPF
CHLORIDE SERPL-SCNC: 112 MMOL/L (ref 101–110)
CO2 SERPL-SCNC: 25 MMOL/L (ref 21–32)
COLOR UR: ABNORMAL
CREAT SERPL-MCNC: 1.4 MG/DL (ref 0.8–1.5)
DIFFERENTIAL METHOD BLD: NORMAL
EKG ATRIAL RATE: 142 BPM
EKG DIAGNOSIS: NORMAL
EKG Q-T INTERVAL: 437 MS
EKG QRS DURATION: 133 MS
EKG QTC CALCULATION (BAZETT): 501 MS
EKG R AXIS: -83 DEGREES
EKG T AXIS: 86 DEGREES
EKG VENTRICULAR RATE: 79 BPM
EOSINOPHIL # BLD: 0.3 K/UL (ref 0–0.8)
EOSINOPHIL NFR BLD: 6 % (ref 0.5–7.8)
EPI CELLS #/AREA URNS HPF: ABNORMAL /HPF
ERYTHROCYTE [DISTWIDTH] IN BLOOD BY AUTOMATED COUNT: 14 % (ref 11.9–14.6)
GLOBULIN SER CALC-MCNC: 3.4 G/DL (ref 2.8–4.5)
GLUCOSE SERPL-MCNC: 121 MG/DL (ref 65–100)
GLUCOSE UR STRIP.AUTO-MCNC: NEGATIVE MG/DL
HCT VFR BLD AUTO: 43.4 % (ref 41.1–50.3)
HGB BLD-MCNC: 13.8 G/DL (ref 13.6–17.2)
HGB UR QL STRIP: NEGATIVE
IMM GRANULOCYTES # BLD AUTO: 0 K/UL (ref 0–0.5)
IMM GRANULOCYTES NFR BLD AUTO: 0 % (ref 0–5)
KETONES UR QL STRIP.AUTO: ABNORMAL MG/DL
LACTATE SERPL-SCNC: 1.8 MMOL/L (ref 0.4–2)
LACTATE SERPL-SCNC: 2 MMOL/L (ref 0.4–2)
LEUKOCYTE ESTERASE UR QL STRIP.AUTO: ABNORMAL
LIPASE SERPL-CCNC: 222 U/L (ref 73–393)
LYMPHOCYTES # BLD: 1.5 K/UL (ref 0.5–4.6)
LYMPHOCYTES NFR BLD: 27 % (ref 13–44)
MCH RBC QN AUTO: 28.8 PG (ref 26.1–32.9)
MCHC RBC AUTO-ENTMCNC: 31.8 G/DL (ref 31.4–35)
MCV RBC AUTO: 90.4 FL (ref 82–102)
MONOCYTES # BLD: 0.5 K/UL (ref 0.1–1.3)
MONOCYTES NFR BLD: 9 % (ref 4–12)
MUCOUS THREADS URNS QL MICRO: 0 /LPF
NEUTS SEG # BLD: 3 K/UL (ref 1.7–8.2)
NEUTS SEG NFR BLD: 57 % (ref 43–78)
NITRITE UR QL STRIP.AUTO: NEGATIVE
NRBC # BLD: 0 K/UL (ref 0–0.2)
PH UR STRIP: 5 (ref 5–9)
PLATELET # BLD AUTO: 161 K/UL (ref 150–450)
PMV BLD AUTO: 10.6 FL (ref 9.4–12.3)
POTASSIUM SERPL-SCNC: 3.8 MMOL/L (ref 3.5–5.1)
PROT SERPL-MCNC: 6.7 G/DL (ref 6.3–8.2)
PROT UR STRIP-MCNC: NEGATIVE MG/DL
RBC # BLD AUTO: 4.8 M/UL (ref 4.23–5.6)
RBC #/AREA URNS HPF: ABNORMAL /HPF
SODIUM SERPL-SCNC: 143 MMOL/L (ref 133–143)
SP GR UR REFRACTOMETRY: 1.02 (ref 1–1.02)
URINE CULTURE IF INDICATED: ABNORMAL
UROBILINOGEN UR QL STRIP.AUTO: 0.2 EU/DL (ref 0.2–1)
WBC # BLD AUTO: 5.4 K/UL (ref 4.3–11.1)
WBC URNS QL MICRO: ABNORMAL /HPF

## 2023-08-28 PROCEDURE — 83605 ASSAY OF LACTIC ACID: CPT

## 2023-08-28 PROCEDURE — 96361 HYDRATE IV INFUSION ADD-ON: CPT

## 2023-08-28 PROCEDURE — 74022 RADEX COMPL AQT ABD SERIES: CPT

## 2023-08-28 PROCEDURE — 2580000003 HC RX 258: Performed by: EMERGENCY MEDICINE

## 2023-08-28 PROCEDURE — 99285 EMERGENCY DEPT VISIT HI MDM: CPT

## 2023-08-28 PROCEDURE — 96374 THER/PROPH/DIAG INJ IV PUSH: CPT

## 2023-08-28 PROCEDURE — 83690 ASSAY OF LIPASE: CPT

## 2023-08-28 PROCEDURE — 93005 ELECTROCARDIOGRAM TRACING: CPT | Performed by: EMERGENCY MEDICINE

## 2023-08-28 PROCEDURE — 85025 COMPLETE CBC W/AUTO DIFF WBC: CPT

## 2023-08-28 PROCEDURE — 93010 ELECTROCARDIOGRAM REPORT: CPT | Performed by: INTERNAL MEDICINE

## 2023-08-28 PROCEDURE — 6360000002 HC RX W HCPCS: Performed by: EMERGENCY MEDICINE

## 2023-08-28 PROCEDURE — 81001 URINALYSIS AUTO W/SCOPE: CPT

## 2023-08-28 PROCEDURE — 80053 COMPREHEN METABOLIC PANEL: CPT

## 2023-08-28 PROCEDURE — 94761 N-INVAS EAR/PLS OXIMETRY MLT: CPT

## 2023-08-28 RX ORDER — SODIUM CHLORIDE 9 MG/ML
INJECTION, SOLUTION INTRAVENOUS CONTINUOUS
Status: DISCONTINUED | OUTPATIENT
Start: 2023-08-28 | End: 2023-08-30 | Stop reason: HOSPADM

## 2023-08-28 RX ORDER — ONDANSETRON 2 MG/ML
4 INJECTION INTRAMUSCULAR; INTRAVENOUS ONCE
Status: COMPLETED | OUTPATIENT
Start: 2023-08-28 | End: 2023-08-28

## 2023-08-28 RX ORDER — 0.9 % SODIUM CHLORIDE 0.9 %
1000 INTRAVENOUS SOLUTION INTRAVENOUS ONCE
Status: COMPLETED | OUTPATIENT
Start: 2023-08-28 | End: 2023-08-28

## 2023-08-28 RX ORDER — 0.9 % SODIUM CHLORIDE 0.9 %
500 INTRAVENOUS SOLUTION INTRAVENOUS ONCE
Status: COMPLETED | OUTPATIENT
Start: 2023-08-28 | End: 2023-08-28

## 2023-08-28 RX ADMIN — SODIUM CHLORIDE 1000 ML: 9 INJECTION, SOLUTION INTRAVENOUS at 17:58

## 2023-08-28 RX ADMIN — ONDANSETRON 4 MG: 2 INJECTION INTRAMUSCULAR; INTRAVENOUS at 17:58

## 2023-08-28 RX ADMIN — SODIUM CHLORIDE 500 ML: 9 INJECTION, SOLUTION INTRAVENOUS at 22:03

## 2023-08-28 ASSESSMENT — ENCOUNTER SYMPTOMS
BLOOD IN STOOL: 0
COUGH: 0
DIARRHEA: 1
CONSTIPATION: 0
SHORTNESS OF BREATH: 0
ABDOMINAL PAIN: 0
NAUSEA: 1
BACK PAIN: 0
VOMITING: 1
SORE THROAT: 0

## 2023-08-28 ASSESSMENT — LIFESTYLE VARIABLES
HOW MANY STANDARD DRINKS CONTAINING ALCOHOL DO YOU HAVE ON A TYPICAL DAY: PATIENT DOES NOT DRINK
HOW OFTEN DO YOU HAVE A DRINK CONTAINING ALCOHOL: NEVER

## 2023-08-28 ASSESSMENT — PAIN - FUNCTIONAL ASSESSMENT: PAIN_FUNCTIONAL_ASSESSMENT: 0-10

## 2023-08-28 ASSESSMENT — PAIN SCALES - GENERAL: PAINLEVEL_OUTOF10: 0

## 2023-08-28 NOTE — ED TRIAGE NOTES
Pt arriving from home via EMS. Pt states he has had n/v/d and weakness for the past few days. Pt states he is hot and then cold. EMS found pt lying on the bathroom floor when they arrived on scene bc he stated he was too weak to get up. VSS en route according to EMS. . Pt has pacemaker and hx bypass.

## 2023-08-28 NOTE — ED PROVIDER NOTES
[employer] Emergency Department Provider Note                   PCP:                Ariel Bone PA-C               Age: 82 y.o. Sex: male     MEDICAL DECISION MAKING  Complexity of Problems Addressed:   1 or more acute illness/injury that poses a threat to life or bodily function    Data Reviewed and Analyzed:  Category 1:    I have reviewed outside records from an external source for any pertinent PMH, ED visits, primary care visits, specialist visits, labs, EKG, and/or radiologic studies. Category 2:     ED EKG Interpretation  EKG was interpreted in the absence of a cardiologist.    Rate: Rate: Normal  EKG Interpretation: EKG Interpretation: paced rhythm  ST Segments: Nonspecific ST segments - NO STEMI      I independently ordered and reviewed the labs. I have reviewed the Radiologist interpretation of the radiologic studies. The patients assessment required an independent historian: Patient's wife. The reason they were needed is dementia. The patient was admitted and I have discussed patient management with the admitting provider. Category 3:     Discussion of management or test interpretation:    MDM  Number of Diagnoses or Management Options  Dehydration  Nausea vomiting and diarrhea  Diagnosis management comments: Patient presented for nausea, vomiting, and diarrhea and weakness which appears to be from dehydration. Patient's CBC, CMP, and lactate showed no significant abnormalities. Patient's abdominal series was negative for obstruction or free air. His urinalysis was negative for infection. Patient was given IV fluid. His repeat orthostatics were still positive and he was still feeling weak. I ordered more IV fluid but with his advanced age and medical problems, I feel he needs admission so hospitalist was consulted.           Allen Kendall is a 82 y.o. male who presents to the Emergency Department with chief complaint of    Chief Complaint   Patient presents with    Emesis Neutrophils % 57 43 - 78 %    Lymphocytes % 27 13 - 44 %    Monocytes % 9 4.0 - 12.0 %    Eosinophils % 6 0.5 - 7.8 %    Basophils % 1 0.0 - 2.0 %    Immature Granulocytes 0 0.0 - 5.0 %    Neutrophils Absolute 3.0 1.7 - 8.2 K/UL    Lymphocytes Absolute 1.5 0.5 - 4.6 K/UL    Monocytes Absolute 0.5 0.1 - 1.3 K/UL    Eosinophils Absolute 0.3 0.0 - 0.8 K/UL    Basophils Absolute 0.0 0.0 - 0.2 K/UL    Absolute Immature Granulocyte 0.0 0.0 - 0.5 K/UL   Comprehensive Metabolic Panel    Collection Time: 08/28/23  5:47 PM   Result Value Ref Range    Sodium 143 133 - 143 mmol/L    Potassium 3.8 3.5 - 5.1 mmol/L    Chloride 112 (H) 101 - 110 mmol/L    CO2 25 21 - 32 mmol/L    Anion Gap 6 2 - 11 mmol/L    Glucose 121 (H) 65 - 100 mg/dL    BUN 12 8 - 23 MG/DL    Creatinine 1.40 0.8 - 1.5 MG/DL    Est, Glom Filt Rate 49 (L) >60 ml/min/1.73m2    Calcium 9.2 8.3 - 10.4 MG/DL    Total Bilirubin 0.6 0.2 - 1.1 MG/DL    ALT 28 12 - 65 U/L    AST 47 (H) 15 - 37 U/L    Alk Phosphatase 113 50 - 136 U/L    Total Protein 6.7 6.3 - 8.2 g/dL    Albumin 3.3 3.2 - 4.6 g/dL    Globulin 3.4 2.8 - 4.5 g/dL    Albumin/Globulin Ratio 1.0 0.4 - 1.6     Lipase    Collection Time: 08/28/23  5:47 PM   Result Value Ref Range    Lipase 222 73 - 393 U/L   Lactate, Sepsis    Collection Time: 08/28/23  5:47 PM   Result Value Ref Range    Lactic Acid, Sepsis 2.0 0.4 - 2.0 MMOL/L   Urinalysis with Reflex to Culture    Collection Time: 08/28/23  6:39 PM    Specimen: Urine   Result Value Ref Range    Color, UA YELLOW/STRAW      Appearance CLEAR      Specific Gravity, UA 1.022 1.001 - 1.023      pH, Urine 5.0 5.0 - 9.0      Protein, UA Negative NEG mg/dL    Glucose, UA Negative mg/dL    Ketones, Urine TRACE (A) NEG mg/dL    Bilirubin Urine Negative NEG      Blood, Urine Negative NEG      Urobilinogen, Urine 0.2 0.2 - 1.0 EU/dL    Nitrite, Urine Negative NEG      Leukocyte Esterase, Urine TRACE (A) NEG      Urine Culture if Indicated PENDING     WBC, UA 0-4 U4 /hpf    RBC, UA 0-5 U5 /hpf    BACTERIA, URINE Negative NEG /hpf    Epithelial Cells UA 0-5 U5 /hpf    Casts 0-2 U2 /lpf          XR ACUTE ABD SERIES CHEST 1 VW   Final Result      No acute cardiopulmonary process. Nonobstructive bowel gas pattern          Сергей Vigil M.D.    8/28/2023 8:07:00 PM                       ED Course as of 08/28/23 2159   Mon Aug 28, 2023   2059 Patient was given IV fluid. His repeat orthostatics are still positive. [CW]   2153 Patient and wife were updated on the results and plan. [CW]      ED Course User Index  [CW] Tito Keenan MD       The diagnosis and plan as well as the results of any testing and treatments today in the department were communicated to the patient and/or their family/caregiver (if present). The patient/caregiver verbalized understanding and realize that they are being admitted to the hospital for further evaluation and treatment. All questions were answered. ICD-10-CM    1. Dehydration  E86.0       2. Nausea vomiting and diarrhea  R11.2     R19.7           DISPOSITION Decision To Admit 08/28/2023 09:54:44 PM       Voice dictation software was used during the making of this note. This software is not perfect and grammatical and other typographical errors may be present. This note has not been completely proofread for errors.      Tito Keenan MD  08/28/23 5518

## 2023-08-29 ENCOUNTER — CLINICAL DOCUMENTATION (OUTPATIENT)
Dept: CASE MANAGEMENT | Age: 88
End: 2023-08-29

## 2023-08-29 PROBLEM — E86.0 DEHYDRATION: Status: ACTIVE | Noted: 2023-08-29

## 2023-08-29 LAB
CORTIS BS SERPL-MCNC: 1.3 UG/DL
EKG ATRIAL RATE: 250 BPM
EKG DIAGNOSIS: NORMAL
EKG P AXIS: 0 DEGREES
EKG P-R INTERVAL: 26 MS
EKG Q-T INTERVAL: 462 MS
EKG QRS DURATION: 154 MS
EKG QTC CALCULATION (BAZETT): 517 MS
EKG R AXIS: -87 DEGREES
EKG T AXIS: 89 DEGREES
EKG VENTRICULAR RATE: 75 BPM
TSH W FREE THYROID IF ABNORMAL: 0.49 UIU/ML (ref 0.36–3.74)

## 2023-08-29 PROCEDURE — 6370000000 HC RX 637 (ALT 250 FOR IP): Performed by: INTERNAL MEDICINE

## 2023-08-29 PROCEDURE — 93010 ELECTROCARDIOGRAM REPORT: CPT | Performed by: INTERNAL MEDICINE

## 2023-08-29 PROCEDURE — G0378 HOSPITAL OBSERVATION PER HR: HCPCS

## 2023-08-29 PROCEDURE — 97530 THERAPEUTIC ACTIVITIES: CPT

## 2023-08-29 PROCEDURE — 97161 PT EVAL LOW COMPLEX 20 MIN: CPT

## 2023-08-29 PROCEDURE — 96361 HYDRATE IV INFUSION ADD-ON: CPT

## 2023-08-29 PROCEDURE — 82533 TOTAL CORTISOL: CPT

## 2023-08-29 PROCEDURE — 2500000003 HC RX 250 WO HCPCS: Performed by: INTERNAL MEDICINE

## 2023-08-29 PROCEDURE — 97165 OT EVAL LOW COMPLEX 30 MIN: CPT

## 2023-08-29 PROCEDURE — 97535 SELF CARE MNGMENT TRAINING: CPT

## 2023-08-29 PROCEDURE — 2580000003 HC RX 258: Performed by: INTERNAL MEDICINE

## 2023-08-29 PROCEDURE — 36415 COLL VENOUS BLD VENIPUNCTURE: CPT

## 2023-08-29 PROCEDURE — 84443 ASSAY THYROID STIM HORMONE: CPT

## 2023-08-29 RX ORDER — PREDNISONE 5 MG/1
5 TABLET ORAL DAILY
Status: DISCONTINUED | OUTPATIENT
Start: 2023-08-29 | End: 2023-08-30 | Stop reason: HOSPADM

## 2023-08-29 RX ORDER — SODIUM CHLORIDE 0.9 % (FLUSH) 0.9 %
5-40 SYRINGE (ML) INJECTION PRN
Status: DISCONTINUED | OUTPATIENT
Start: 2023-08-29 | End: 2023-08-30 | Stop reason: HOSPADM

## 2023-08-29 RX ORDER — ACETAMINOPHEN 650 MG/1
650 SUPPOSITORY RECTAL EVERY 6 HOURS PRN
Status: DISCONTINUED | OUTPATIENT
Start: 2023-08-29 | End: 2023-08-30 | Stop reason: HOSPADM

## 2023-08-29 RX ORDER — ATORVASTATIN CALCIUM 80 MG/1
80 TABLET, FILM COATED ORAL DAILY
Status: DISCONTINUED | OUTPATIENT
Start: 2023-08-29 | End: 2023-08-30 | Stop reason: HOSPADM

## 2023-08-29 RX ORDER — ACETAMINOPHEN 325 MG/1
650 TABLET ORAL EVERY 6 HOURS PRN
Status: DISCONTINUED | OUTPATIENT
Start: 2023-08-29 | End: 2023-08-30 | Stop reason: HOSPADM

## 2023-08-29 RX ORDER — ONDANSETRON 4 MG/1
4 TABLET, ORALLY DISINTEGRATING ORAL EVERY 8 HOURS PRN
Status: DISCONTINUED | OUTPATIENT
Start: 2023-08-29 | End: 2023-08-30 | Stop reason: HOSPADM

## 2023-08-29 RX ORDER — METOPROLOL SUCCINATE 25 MG/1
25 TABLET, EXTENDED RELEASE ORAL DAILY
Status: DISCONTINUED | OUTPATIENT
Start: 2023-08-29 | End: 2023-08-30 | Stop reason: HOSPADM

## 2023-08-29 RX ORDER — LOPERAMIDE HYDROCHLORIDE 2 MG/1
2 CAPSULE ORAL 4 TIMES DAILY PRN
Status: DISCONTINUED | OUTPATIENT
Start: 2023-08-29 | End: 2023-08-30 | Stop reason: HOSPADM

## 2023-08-29 RX ORDER — SODIUM CHLORIDE 9 MG/ML
INJECTION, SOLUTION INTRAVENOUS PRN
Status: DISCONTINUED | OUTPATIENT
Start: 2023-08-29 | End: 2023-08-30 | Stop reason: HOSPADM

## 2023-08-29 RX ORDER — ONDANSETRON 2 MG/ML
4 INJECTION INTRAMUSCULAR; INTRAVENOUS EVERY 6 HOURS PRN
Status: DISCONTINUED | OUTPATIENT
Start: 2023-08-29 | End: 2023-08-30 | Stop reason: HOSPADM

## 2023-08-29 RX ORDER — SODIUM CHLORIDE 0.9 % (FLUSH) 0.9 %
5-40 SYRINGE (ML) INJECTION EVERY 12 HOURS SCHEDULED
Status: DISCONTINUED | OUTPATIENT
Start: 2023-08-29 | End: 2023-08-30 | Stop reason: HOSPADM

## 2023-08-29 RX ORDER — LEVOTHYROXINE SODIUM 0.07 MG/1
75 TABLET ORAL DAILY
Status: DISCONTINUED | OUTPATIENT
Start: 2023-08-29 | End: 2023-08-30 | Stop reason: HOSPADM

## 2023-08-29 RX ORDER — HALOPERIDOL 5 MG/ML
5 INJECTION INTRAMUSCULAR ONCE
Status: DISCONTINUED | OUTPATIENT
Start: 2023-08-29 | End: 2023-08-30 | Stop reason: HOSPADM

## 2023-08-29 RX ORDER — POLYETHYLENE GLYCOL 3350 17 G/17G
17 POWDER, FOR SOLUTION ORAL DAILY PRN
Status: DISCONTINUED | OUTPATIENT
Start: 2023-08-29 | End: 2023-08-30 | Stop reason: HOSPADM

## 2023-08-29 RX ORDER — ONDANSETRON 4 MG/1
4 TABLET, ORALLY DISINTEGRATING ORAL 3 TIMES DAILY PRN
Status: DISCONTINUED | OUTPATIENT
Start: 2023-08-29 | End: 2023-08-29 | Stop reason: SDUPTHER

## 2023-08-29 RX ORDER — ASPIRIN 81 MG/1
81 TABLET ORAL DAILY
Status: DISCONTINUED | OUTPATIENT
Start: 2023-08-29 | End: 2023-08-30 | Stop reason: HOSPADM

## 2023-08-29 RX ADMIN — SODIUM CHLORIDE 1000 ML: 9 INJECTION, SOLUTION INTRAVENOUS at 01:23

## 2023-08-29 RX ADMIN — ASPIRIN 81 MG: 81 TABLET ORAL at 09:36

## 2023-08-29 RX ADMIN — LEVOTHYROXINE SODIUM 75 MCG: 0.07 TABLET ORAL at 09:36

## 2023-08-29 RX ADMIN — APIXABAN 5 MG: 5 TABLET, FILM COATED ORAL at 21:01

## 2023-08-29 RX ADMIN — APIXABAN 5 MG: 5 TABLET, FILM COATED ORAL at 09:36

## 2023-08-29 RX ADMIN — SODIUM CHLORIDE, PRESERVATIVE FREE 10 ML: 5 INJECTION INTRAVENOUS at 21:04

## 2023-08-29 RX ADMIN — TUBERCULIN PURIFIED PROTEIN DERIVATIVE 5 UNITS: 5 INJECTION, SOLUTION INTRADERMAL at 04:49

## 2023-08-29 RX ADMIN — PREDNISONE 5 MG: 5 TABLET ORAL at 09:36

## 2023-08-29 RX ADMIN — ATORVASTATIN CALCIUM 80 MG: 80 TABLET, FILM COATED ORAL at 09:36

## 2023-08-29 RX ADMIN — METOPROLOL SUCCINATE 25 MG: 25 TABLET, EXTENDED RELEASE ORAL at 09:36

## 2023-08-29 RX ADMIN — LOPERAMIDE HYDROCHLORIDE 2 MG: 2 CAPSULE ORAL at 02:00

## 2023-08-29 ASSESSMENT — PAIN SCALES - GENERAL: PAINLEVEL_OUTOF10: 0

## 2023-08-29 NOTE — PROGRESS NOTES
ACUTE PHYSICAL THERAPY GOALS:   (Developed with and agreed upon by patient and/or caregiver.)    (1.) Jennifer Botello.  will move from supine to sit and sit to supine , scoot up and down, and roll side to side with INDEPENDENT within 7 treatment day(s). (2.) Jennifer Garcia will transfer from bed to chair and chair to bed with INDEPENDENT using the least restrictive device within 7 treatment day(s). (3.) Jennifer Botello. will ambulate with INDEPENDENT for 250 feet with the least restrictive device within 7 treatment day(s). (4.) Jennifer Botello. will perform standing static and dynamic balance activities x 20 minutes with INDEPENDENT to improve safety within 7 treatment day(s). (5.) Jennifer Botello. will perform therapeutic exercises x 15 min for HEP with SUPERVISION to improve strength, endurance, and functional mobility within 7 treatment day(s). PHYSICAL THERAPY Initial Assessment, Daily Note, and PM  (Link to Caseload Tracking: PT Visit Days : 1  Acknowledge Orders  Time In/Out  PT Charge Capture  Rehab Caseload Tracker    Jennifer Garcia is a 82 y.o. male   PRIMARY DIAGNOSIS: Dehydration  Dehydration [E86.0]  Nausea vomiting and diarrhea [R11.2, R19.7]       Reason for Referral: Generalized Muscle Weakness (M62.81)  Observation: Payor: MEDICARE / Plan: MEDICARE PART A AND B / Product Type: *No Product type* /     ASSESSMENT:     REHAB RECOMMENDATIONS:   Recommendation to date pending progress:  Setting:  Inpatient Rehab Facility    Equipment:    To Be Determined     ASSESSMENT:   Mr. Martina Alves is a 82year old male who presents to hospital 2/2 to dehydration. Pt presents confused this afternoon, wife present in room, reports that they live in single story home with level entry, pt did not use any AD PTA, and that pt was independent PTA. This date pt performs mobility including sup to sit with Mod A secondary to decreased strength.  Pt able to maintain static sitting balance with SBA for safety,

## 2023-08-29 NOTE — CARE COORDINATION
Pt chart reviewed for discharge planning. CM met with pt at bedside, verified demographic information/ health insurance. Pt lives with spouse in one level home, is independent with ADLs, ambulates with a walker, and drives. PCP was confirmed, last seen in office six months ago. Pt reports no outside services in the home at this time. CM will follow pt plan of care and assist with supportive care referrals pending pt clinical progress. Please consult case management if specific needs arise. 08/29/23 0920   Service Assessment   Patient Orientation Alert and Oriented   Cognition Alert   History Provided By Patient   Primary Caregiver Self   Support Systems Spouse/Significant Other   Patient's Healthcare Decision Maker is: Legal Next of Kin   PCP Verified by CM Yes  Janie Ibanez)   Last Visit to PCP Within last 6 months   Prior Functional Level Independent in ADLs/IADLs   Current Functional Level Independent in ADLs/IADLs   Can patient return to prior living arrangement Yes   Ability to make needs known: Good   Family able to assist with home care needs: Yes   Would you like for me to discuss the discharge plan with any other family members/significant others, and if so, who? Yes  Liam Mendezow, Spouse)   Financial Resources Medicare; Other (Comment)  (BCBS)   Community Resources None   Social/Functional History   Lives With Spouse   Type of [address] [place] [address] Dr One level   Home Equipment [place], [address] Help From Family   ADL Assistance Independent   Active  Yes   Occupation Retired   Discharge Planning   Type of [address] [place] [address] Prior To Admission None   Potential Assistance Needed N/A   DME Ordered?  No   Potential Assistance Purchasing Medications No   Type of Home Care Services None   Patient expects to be discharged to: House   One/Two Story Residence One story   Condition of Participation: Discharge Planning   The

## 2023-08-29 NOTE — PLAN OF CARE
Problem: Discharge Planning  Goal: Discharge to home or other facility with appropriate resources  Outcome: Progressing  Flowsheets  Taken 8/29/2023 0730 by Fariba Estrada RN  Discharge to home or other facility with appropriate resources:   Identify barriers to discharge with patient and caregiver   Arrange for needed discharge resources and transportation as appropriate   Identify discharge learning needs (meds, wound care, etc)   Refer to discharge planning if patient needs post-hospital services based on physician order or complex needs related to functional status, cognitive ability or social support system  Taken 8/29/2023 0346 by Fariba Estrada RN  Discharge to home or other facility with appropriate resources:   Identify barriers to discharge with patient and caregiver   Arrange for needed discharge resources and transportation as appropriate   Identify discharge learning needs (meds, wound care, etc)   Refer to discharge planning if patient needs post-hospital services based on physician order or complex needs related to functional status, cognitive ability or social support system     Problem: Pain  Goal: Verbalizes/displays adequate comfort level or baseline comfort level  Outcome: Progressing     Problem: Skin/Tissue Integrity  Goal: Absence of new skin breakdown  Description: 1. Monitor for areas of redness and/or skin breakdown  2. Assess vascular access sites hourly  3. Every 4-6 hours minimum:  Change oxygen saturation probe site  4. Every 4-6 hours:  If on nasal continuous positive airway pressure, respiratory therapy assess nares and determine need for appliance change or resting period.   Outcome: Progressing     Problem: Safety - Adult  Goal: Free from fall injury  Outcome: Progressing     Problem: ABCDS Injury Assessment  Goal: Absence of physical injury  Outcome: Progressing

## 2023-08-29 NOTE — PROGRESS NOTES
Occupational Therapy Note:    Attempted to see patient this AM for occupational therapy evaluation session. Patient is currently lethargic and not appropriate for participation at this time. Will follow and re-attempt as schedule permits/patient available.  Thank you,    Cristino Timmons, OT    Rehab Caseload Tracker

## 2023-08-29 NOTE — ED NOTES
Report given to William Leo RN. Transfer of care at this time.       Raulito Hernández RN  08/28/23 0412

## 2023-08-29 NOTE — PROGRESS NOTES
Physical Therapy Note:    Attempted to see patient this AM for physical therapy evaluation session. Patient lethargic also unwilling to participate in therapy at this time. Will follow and re-attempt as schedule permits/patient available.  Thank you,    Renea Sandoval, PT     Rehab Caseload Tracker

## 2023-08-29 NOTE — H&P
Hospitalist History and Physical   Admit Date:  2023  4:46 PM   Name:  Terell Lyles. Age:  82 y.o. Sex:  male  :  1935   MRN:  [id number]   Room:  ER35/    Presenting/Chief Complaint: Emesis, Nausea, and Diarrhea     Reason(s) for Admission: No admission diagnoses are documented for this encounter. History of Present Illness:   Terell Arnold is a 82 y.o. male with medical history of CAD s/p CABG, chronic systolic CHF (last known EF 35% in 2019), mild dementia, afib on eliquis, CKD stage 3, HTN, CVA, hypothyroidism, pituitary tumor resection who presented with 3 days of nausea, vomiting, diarrhea, dehydration and associated weakness. Patient is poor historian, wife at bedside to assist.   Pt denies blurry or double vision. No focal weakness or numbness. Wife reports he \"ran out\" of prednisone 2 weeks ago and has been feeling weak since then. ER work-up notable for  BMP-benign  CBC-benign  UA-noninfectious appearing  KUB-nonacute    Assessment & Plan:     # nausea/vomiting/diarrhea  - no abd pain  - may be AGE vs adrenal insufficiency  - check stool cultures/cdiff  - GI bland diet  - symptomatic management  - check cortisol  - slow IVF    # dehydration  - slow IVF  - monitor volume status carefully    # symptomatic orthostatic hypotension  - check cortisol  - slow ivf  - repeat orthostatics in AM    # Dementia  - w/o behavioral disturbance. Lives with wife. # Chronic systolic CHF  - hypovolemic by exam, hold aldactone and lasix  - slow IVF, monitor volume status clinically    # Afib   - rate controlled on Toprol Xl 25mg daily, cont eliquis    # CAD s/p CABG  - stable, cont asa/statin    # Hypothyroidism  - resume synthroid, check TSHt/4    # hx of pituitary surgery  - on chronic prednisone 5mg daily but missed doses x 2 weeks  - check baseline cortisol levels in presence of nausea/vomiting and orthostasis  - resume prednisone 5mg dose for now, no signs of adrenal crisis. 8/28/2023 7:17:18 PM     CBC with Auto Differential    Collection Time: 08/28/23  5:47 PM   Result Value Ref Range    WBC 5.4 4.3 - 11.1 K/uL    RBC 4.80 4.23 - 5.6 M/uL    Hemoglobin 13.8 13.6 - 17.2 g/dL    Hematocrit 43.4 41.1 - 50.3 %    MCV 90.4 82 - 102 FL    MCH 28.8 26.1 - 32.9 PG    MCHC 31.8 31.4 - 35.0 g/dL    RDW 14.0 11.9 - 14.6 %    Platelets 850 073 - 326 K/uL    MPV 10.6 9.4 - 12.3 FL    nRBC 0.00 0.0 - 0.2 K/uL    Differential Type AUTOMATED      Neutrophils % 57 43 - 78 %    Lymphocytes % 27 13 - 44 %    Monocytes % 9 4.0 - 12.0 %    Eosinophils % 6 0.5 - 7.8 %    Basophils % 1 0.0 - 2.0 %    Immature Granulocytes 0 0.0 - 5.0 %    Neutrophils Absolute 3.0 1.7 - 8.2 K/UL    Lymphocytes Absolute 1.5 0.5 - 4.6 K/UL    Monocytes Absolute 0.5 0.1 - 1.3 K/UL    Eosinophils Absolute 0.3 0.0 - 0.8 K/UL    Basophils Absolute 0.0 0.0 - 0.2 K/UL    Absolute Immature Granulocyte 0.0 0.0 - 0.5 K/UL   Comprehensive Metabolic Panel    Collection Time: 08/28/23  5:47 PM   Result Value Ref Range    Sodium 143 133 - 143 mmol/L    Potassium 3.8 3.5 - 5.1 mmol/L    Chloride 112 (H) 101 - 110 mmol/L    CO2 25 21 - 32 mmol/L    Anion Gap 6 2 - 11 mmol/L    Glucose 121 (H) 65 - 100 mg/dL    BUN 12 8 - 23 MG/DL    Creatinine 1.40 0.8 - 1.5 MG/DL    Est, Glom Filt Rate 49 (L) >60 ml/min/1.73m2    Calcium 9.2 8.3 - 10.4 MG/DL    Total Bilirubin 0.6 0.2 - 1.1 MG/DL    ALT 28 12 - 65 U/L    AST 47 (H) 15 - 37 U/L    Alk Phosphatase 113 50 - 136 U/L    Total Protein 6.7 6.3 - 8.2 g/dL    Albumin 3.3 3.2 - 4.6 g/dL    Globulin 3.4 2.8 - 4.5 g/dL    Albumin/Globulin Ratio 1.0 0.4 - 1.6     Lipase    Collection Time: 08/28/23  5:47 PM   Result Value Ref Range    Lipase 222 73 - 393 U/L   Lactate, Sepsis    Collection Time: 08/28/23  5:47 PM   Result Value Ref Range    Lactic Acid, Sepsis 2.0 0.4 - 2.0 MMOL/L   Urinalysis with Reflex to Culture    Collection Time: 08/28/23  6:39 PM    Specimen: Urine   Result Value Ref Range Color, UA YELLOW/STRAW      Appearance CLEAR      Specific Gravity, UA 1.022 1.001 - 1.023      pH, Urine 5.0 5.0 - 9.0      Protein, UA Negative NEG mg/dL    Glucose, UA Negative mg/dL    Ketones, Urine TRACE (A) NEG mg/dL    Bilirubin Urine Negative NEG      Blood, Urine Negative NEG      Urobilinogen, Urine 0.2 0.2 - 1.0 EU/dL    Nitrite, Urine Negative NEG      Leukocyte Esterase, Urine TRACE (A) NEG      Urine Culture if Indicated CULTURE NOT INDICATED BY UA RESULT      WBC, UA 0-4 U4 /hpf    RBC, UA 0-5 U5 /hpf    BACTERIA, URINE Negative NEG /hpf    Epithelial Cells UA 0-5 U5 /hpf    Casts 0-2 U2 /lpf    Mucus, UA 0 0 /lpf   EKG 12 Lead    Collection Time: 08/28/23 10:04 PM   Result Value Ref Range    Ventricular Rate 75 BPM    Atrial Rate 250 BPM    P-R Interval 26 ms    QRS Duration 154 ms    Q-T Interval 462 ms    QTc Calculation (Bazett) 517 ms    P Axis 0 degrees    R Axis -87 degrees    T Axis 89 degrees    Diagnosis       Ventricular-paced rhythm  No further analysis attempted due to paced rhythm     Lactate, Sepsis    Collection Time: 08/28/23 10:08 PM   Result Value Ref Range    Lactic Acid, Sepsis 1.8 0.4 - 2.0 MMOL/L       I have personally reviewed imaging studies:  XR ACUTE ABD SERIES CHEST 1 VW    Result Date: 8/28/2023  EXAMINATION: Single view chest and 4 view abdomen DATE: 8/28/2023 5:45 PM COMPARISON: May 11, 2023 CLINICAL HISTORY: Vomiting FINDINGS: Costophrenic angles are clear. Heart size is normal. Pulmonary vasculature is not distended. There are no dense regions of consolidation. Left-sided cardiac device with multiple leads. No dilation of the large or small bowel. No free identified. No abnormal air-fluid levels. No acute cardiopulmonary process. Nonobstructive bowel gas pattern  Jasmyne Gautam M.D. 8/28/2023 8:07:00 PM        Signed:  Trudy Soria DO    Part of this note may have been written by using a voice dictation software.   The note has been proof read but may still

## 2023-08-29 NOTE — PROGRESS NOTES
ACUTE OCCUPATIONAL THERAPY GOALS:   (Developed with and agreed upon by patient and/or caregiver.)  1. Patient will complete lower body bathing and dressing with SBA and adaptive equipment as needed. 2. Patient will complete toileting with SBA. 3. Patient will tolerate 30 minutes of OT treatment with 1-2 rest breaks to increase activity tolerance for ADLs. 4. Patient will complete functional transfers with SBA and adaptive equipment as needed. 5. Patient will complete functional mobility with SBA and appropriate safety awareness. 6. Patient will complete ADL standing sink side with SBA to improve standing tolerance for self-care. Timeframe: 7 visits       OCCUPATIONAL THERAPY Initial Assessment, Daily Note, and PM       OT Visit Days: 1  Acknowledge Orders  Time  OT Charge Capture  Rehab Caseload Tracker      Brittany Chavez is a 82 y.o. male   PRIMARY DIAGNOSIS: Dehydration  Dehydration [E86.0]  Nausea vomiting and diarrhea [R11.2, R19.7]       Reason for Referral: Generalized Muscle Weakness (M62.81)  Other lack of cordination (R27.8)  Observation: Payor: MEDICARE / Plan: MEDICARE PART A AND B / Product Type: *No Product type* /     ASSESSMENT:     REHAB RECOMMENDATIONS:   Recommendation to date pending progress:  Setting:  Inpatient Rehab Facility     Equipment:    To Be Determined     ASSESSMENT:  Mr. Mariano Sanchez presents to the hospital with dehydration and n/v/d. Upon initiation of assessment this am, pt was very drowsy/lethargic and not able to participate but wife is now at bedside and pt is more alert and cooperative. Pt has a hx of dementia but is currently more confused than his baseline. Pt's wife at bedside reports that at baseline he is independent with no need for adaptive devices. Pt required min-mod A out of bed this afternoon. Pt's brief was dirty and falling off him and pt only wearing a t-shirt.  Pt worked on donning a clean gown and working on standing for hygiene with additional time Outcome: Verbalized understanding;Continued education needed    TOTAL TREATMENT DURATION AND TIME:  Time In: 8955 (1045)  Time Out: 2328 (1053)  Minutes: [address], OT

## 2023-08-30 ENCOUNTER — TELEPHONE (OUTPATIENT)
Dept: FAMILY MEDICINE CLINIC | Facility: CLINIC | Age: 88
End: 2023-08-30

## 2023-08-30 VITALS
TEMPERATURE: 97.7 F | BODY MASS INDEX: 26.18 KG/M2 | WEIGHT: 187 LBS | HEIGHT: 71 IN | DIASTOLIC BLOOD PRESSURE: 67 MMHG | RESPIRATION RATE: 16 BRPM | HEART RATE: 74 BPM | SYSTOLIC BLOOD PRESSURE: 100 MMHG | OXYGEN SATURATION: 97 %

## 2023-08-30 PROBLEM — R19.7 NAUSEA VOMITING AND DIARRHEA: Status: ACTIVE | Noted: 2023-08-30

## 2023-08-30 PROBLEM — R11.2 NAUSEA VOMITING AND DIARRHEA: Status: ACTIVE | Noted: 2023-08-30

## 2023-08-30 LAB
ALBUMIN SERPL-MCNC: 2.7 G/DL (ref 3.2–4.6)
ALBUMIN/GLOB SERPL: 1 (ref 0.4–1.6)
ALP SERPL-CCNC: 86 U/L (ref 50–136)
ALT SERPL-CCNC: 25 U/L (ref 12–65)
ANION GAP SERPL CALC-SCNC: 4 MMOL/L (ref 2–11)
AST SERPL-CCNC: 43 U/L (ref 15–37)
BASOPHILS # BLD: 0 K/UL (ref 0–0.2)
BASOPHILS NFR BLD: 1 % (ref 0–2)
BILIRUB SERPL-MCNC: 0.5 MG/DL (ref 0.2–1.1)
BUN SERPL-MCNC: 9 MG/DL (ref 8–23)
CALCIUM SERPL-MCNC: 8.1 MG/DL (ref 8.3–10.4)
CHLORIDE SERPL-SCNC: 114 MMOL/L (ref 101–110)
CO2 SERPL-SCNC: 25 MMOL/L (ref 21–32)
CREAT SERPL-MCNC: 1.2 MG/DL (ref 0.8–1.5)
DIFFERENTIAL METHOD BLD: ABNORMAL
EOSINOPHIL # BLD: 0.3 K/UL (ref 0–0.8)
EOSINOPHIL NFR BLD: 7 % (ref 0.5–7.8)
ERYTHROCYTE [DISTWIDTH] IN BLOOD BY AUTOMATED COUNT: 13.7 % (ref 11.9–14.6)
GLOBULIN SER CALC-MCNC: 2.7 G/DL (ref 2.8–4.5)
GLUCOSE SERPL-MCNC: 97 MG/DL (ref 65–100)
HCT VFR BLD AUTO: 36 % (ref 41.1–50.3)
HGB BLD-MCNC: 11.5 G/DL (ref 13.6–17.2)
IMM GRANULOCYTES # BLD AUTO: 0 K/UL (ref 0–0.5)
IMM GRANULOCYTES NFR BLD AUTO: 0 % (ref 0–5)
LYMPHOCYTES # BLD: 1 K/UL (ref 0.5–4.6)
LYMPHOCYTES NFR BLD: 23 % (ref 13–44)
MCH RBC QN AUTO: 28.9 PG (ref 26.1–32.9)
MCHC RBC AUTO-ENTMCNC: 31.9 G/DL (ref 31.4–35)
MCV RBC AUTO: 90.5 FL (ref 82–102)
MM INDURATION, POC: 0 MM (ref 0–5)
MONOCYTES # BLD: 0.5 K/UL (ref 0.1–1.3)
MONOCYTES NFR BLD: 11 % (ref 4–12)
NEUTS SEG # BLD: 2.5 K/UL (ref 1.7–8.2)
NEUTS SEG NFR BLD: 59 % (ref 43–78)
NRBC # BLD: 0 K/UL (ref 0–0.2)
PLATELET # BLD AUTO: 172 K/UL (ref 150–450)
PMV BLD AUTO: 10.1 FL (ref 9.4–12.3)
POTASSIUM SERPL-SCNC: 4.1 MMOL/L (ref 3.5–5.1)
PPD, POC: NEGATIVE
PROT SERPL-MCNC: 5.4 G/DL (ref 6.3–8.2)
RBC # BLD AUTO: 3.98 M/UL (ref 4.23–5.6)
SODIUM SERPL-SCNC: 143 MMOL/L (ref 133–143)
WBC # BLD AUTO: 4.3 K/UL (ref 4.3–11.1)

## 2023-08-30 PROCEDURE — G0378 HOSPITAL OBSERVATION PER HR: HCPCS

## 2023-08-30 PROCEDURE — 36415 COLL VENOUS BLD VENIPUNCTURE: CPT

## 2023-08-30 PROCEDURE — 85025 COMPLETE CBC W/AUTO DIFF WBC: CPT

## 2023-08-30 PROCEDURE — 80053 COMPREHEN METABOLIC PANEL: CPT

## 2023-08-30 PROCEDURE — 1100000000 HC RM PRIVATE

## 2023-08-30 PROCEDURE — 6370000000 HC RX 637 (ALT 250 FOR IP): Performed by: INTERNAL MEDICINE

## 2023-08-30 PROCEDURE — 2580000003 HC RX 258: Performed by: INTERNAL MEDICINE

## 2023-08-30 PROCEDURE — 96361 HYDRATE IV INFUSION ADD-ON: CPT

## 2023-08-30 RX ORDER — POLYETHYLENE GLYCOL 3350 17 G/17G
17 POWDER, FOR SOLUTION ORAL DAILY PRN
OUTPATIENT
Start: 2023-08-30

## 2023-08-30 RX ORDER — ACETAMINOPHEN 650 MG/1
650 SUPPOSITORY RECTAL EVERY 6 HOURS PRN
OUTPATIENT
Start: 2023-08-30

## 2023-08-30 RX ORDER — LEVOTHYROXINE SODIUM 0.07 MG/1
75 TABLET ORAL DAILY
OUTPATIENT
Start: 2023-08-31

## 2023-08-30 RX ORDER — PREDNISONE 5 MG/1
5 TABLET ORAL DAILY
OUTPATIENT
Start: 2023-08-31

## 2023-08-30 RX ORDER — SODIUM CHLORIDE 0.9 % (FLUSH) 0.9 %
5-40 SYRINGE (ML) INJECTION PRN
OUTPATIENT
Start: 2023-08-30

## 2023-08-30 RX ORDER — LOPERAMIDE HYDROCHLORIDE 2 MG/1
2 CAPSULE ORAL 4 TIMES DAILY PRN
OUTPATIENT
Start: 2023-08-30

## 2023-08-30 RX ORDER — ASPIRIN 81 MG/1
81 TABLET ORAL DAILY
OUTPATIENT
Start: 2023-08-31

## 2023-08-30 RX ORDER — ONDANSETRON 4 MG/1
4 TABLET, ORALLY DISINTEGRATING ORAL EVERY 8 HOURS PRN
OUTPATIENT
Start: 2023-08-30

## 2023-08-30 RX ORDER — ACETAMINOPHEN 325 MG/1
650 TABLET ORAL EVERY 6 HOURS PRN
OUTPATIENT
Start: 2023-08-30

## 2023-08-30 RX ORDER — METOPROLOL SUCCINATE 25 MG/1
25 TABLET, EXTENDED RELEASE ORAL DAILY
OUTPATIENT
Start: 2023-08-31

## 2023-08-30 RX ORDER — ATORVASTATIN CALCIUM 80 MG/1
80 TABLET, FILM COATED ORAL DAILY
OUTPATIENT
Start: 2023-08-31

## 2023-08-30 RX ORDER — SODIUM CHLORIDE 9 MG/ML
INJECTION, SOLUTION INTRAVENOUS CONTINUOUS
OUTPATIENT
Start: 2023-08-30

## 2023-08-30 RX ORDER — ONDANSETRON 2 MG/ML
4 INJECTION INTRAMUSCULAR; INTRAVENOUS EVERY 6 HOURS PRN
OUTPATIENT
Start: 2023-08-30

## 2023-08-30 RX ADMIN — LEVOTHYROXINE SODIUM 75 MCG: 0.07 TABLET ORAL at 05:32

## 2023-08-30 RX ADMIN — ASPIRIN 81 MG: 81 TABLET ORAL at 10:10

## 2023-08-30 RX ADMIN — PREDNISONE 5 MG: 5 TABLET ORAL at 10:10

## 2023-08-30 RX ADMIN — ATORVASTATIN CALCIUM 80 MG: 80 TABLET, FILM COATED ORAL at 10:10

## 2023-08-30 RX ADMIN — SODIUM CHLORIDE, PRESERVATIVE FREE 10 ML: 5 INJECTION INTRAVENOUS at 10:11

## 2023-08-30 RX ADMIN — APIXABAN 5 MG: 5 TABLET, FILM COATED ORAL at 10:10

## 2023-08-30 RX ADMIN — METOPROLOL SUCCINATE 25 MG: 25 TABLET, EXTENDED RELEASE ORAL at 10:10

## 2023-08-30 NOTE — DISCHARGE INSTRUCTIONS
Discharge instructions given. Education provided. Medication changes discussed with patient/family and follow up appointments discussed with patient/family. Prescriptions provided. AVS reviewed, signed, and placed in chart. Copy provided to patient. PIV removed with catheter intact, no signs or symptoms of infection noted to include no redness, no warmth, and no edema. All questions answered and patient/family have verbally voiced understanding.

## 2023-08-30 NOTE — CARE COORDINATION
Discharge note:  Mr. Thelma Hamilton and his wife do not want to go to 9th floor [facility]; however, they agree to home health. From choices, they agree to [facility] home health. No other discharge needs voiced or identified.

## 2023-08-30 NOTE — CARE COORDINATION
Dispo update:  CM spoke to Mr. Ja Omer in room 607 [facility]) about OT and PT recommendations for inpatient rehab (e.g., 9th floor [facility]). He said to call his wife. CM called Ms. Melinda Rush at [phone]. She said that she would rather take him home, and see how he does. If he does not do well, then she would agree to inpatient rehab at that point. RICHA said that it may be more difficult to get him into inpatient rehab if that were the case. She then agreed to referral to 9th floor [facility], and wants to discuss again later. Thus, will be either home with home health, or possibly 9th floor IRC. Await 9th floor IRC evaluation.

## 2023-08-31 ENCOUNTER — TELEPHONE (OUTPATIENT)
Dept: FAMILY MEDICINE CLINIC | Facility: CLINIC | Age: 88
End: 2023-08-31

## 2023-08-31 NOTE — TELEPHONE ENCOUNTER
Care Transitions Initial Follow Up Call    Outreach made within 2 business days of discharge: Yes    Patient: Jennifer Botello. Patient : 1935   MRN: [id number]  Reason for Admission: There are no discharge diagnoses documented for the most recent discharge. Discharge Date: 23       Spoke with: richywife    Discharge department/facility: [facility] Interactive Patient Contact:  Was patient able to fill all prescriptions: No:   Was patient instructed to bring all medications to the follow-up visit: Yes  Is patient taking all medications as directed in the discharge summary?  Yes  Does patient understand their discharge instructions: Yes  Does patient have questions or concerns that need addressed prior to 7-14 day follow up office visit: no    Scheduled appointment with PCP within 7-14 days    Follow Up  Future Appointments   Date Time Provider [address]   2023  1:40 PM Ronel Ly PA-C [facility]   11/3/2023  1:00 PM Rito Mccann DO [facility]   11/3/2023  1:30 PM [facility] DEVICE [address]

## 2023-09-12 ENCOUNTER — OFFICE VISIT (OUTPATIENT)
Dept: FAMILY MEDICINE CLINIC | Facility: CLINIC | Age: 88
End: 2023-09-12

## 2023-09-12 VITALS
OXYGEN SATURATION: 98 % | BODY MASS INDEX: 26.06 KG/M2 | HEART RATE: 100 BPM | DIASTOLIC BLOOD PRESSURE: 72 MMHG | SYSTOLIC BLOOD PRESSURE: 110 MMHG | TEMPERATURE: 97.2 F | WEIGHT: 186.13 LBS | HEIGHT: 71 IN

## 2023-09-12 DIAGNOSIS — Z09 HOSPITAL DISCHARGE FOLLOW-UP: Primary | ICD-10-CM

## 2023-09-12 DIAGNOSIS — E86.0 DEHYDRATION: ICD-10-CM

## 2023-09-12 LAB
ANION GAP SERPL CALC-SCNC: 6 MMOL/L (ref 2–11)
BUN SERPL-MCNC: 21 MG/DL (ref 8–23)
CALCIUM SERPL-MCNC: 8.7 MG/DL (ref 8.3–10.4)
CHLORIDE SERPL-SCNC: 113 MMOL/L (ref 101–110)
CO2 SERPL-SCNC: 24 MMOL/L (ref 21–32)
CREAT SERPL-MCNC: 1.3 MG/DL (ref 0.8–1.5)
GLUCOSE SERPL-MCNC: 118 MG/DL (ref 65–100)
POTASSIUM SERPL-SCNC: 4 MMOL/L (ref 3.5–5.1)
SODIUM SERPL-SCNC: 143 MMOL/L (ref 133–143)

## 2023-09-12 SDOH — ECONOMIC STABILITY: FOOD INSECURITY: WITHIN THE PAST 12 MONTHS, THE FOOD YOU BOUGHT JUST DIDN'T LAST AND YOU DIDN'T HAVE MONEY TO GET MORE.: NEVER TRUE

## 2023-09-12 SDOH — ECONOMIC STABILITY: FOOD INSECURITY: WITHIN THE PAST 12 MONTHS, YOU WORRIED THAT YOUR FOOD WOULD RUN OUT BEFORE YOU GOT MONEY TO BUY MORE.: NEVER TRUE

## 2023-09-12 SDOH — ECONOMIC STABILITY: HOUSING INSECURITY
IN THE LAST 12 MONTHS, WAS THERE A TIME WHEN YOU DID NOT HAVE A STEADY PLACE TO SLEEP OR SLEPT IN A SHELTER (INCLUDING NOW)?: NO

## 2023-09-12 SDOH — ECONOMIC STABILITY: INCOME INSECURITY: HOW HARD IS IT FOR YOU TO PAY FOR THE VERY BASICS LIKE FOOD, HOUSING, MEDICAL CARE, AND HEATING?: NOT HARD AT ALL

## 2023-09-12 ASSESSMENT — PATIENT HEALTH QUESTIONNAIRE - PHQ9
SUM OF ALL RESPONSES TO PHQ QUESTIONS 1-9: 0
SUM OF ALL RESPONSES TO PHQ QUESTIONS 1-9: 0
SUM OF ALL RESPONSES TO PHQ9 QUESTIONS 1 & 2: 0
SUM OF ALL RESPONSES TO PHQ QUESTIONS 1-9: 0
2. FEELING DOWN, DEPRESSED OR HOPELESS: 0
SUM OF ALL RESPONSES TO PHQ QUESTIONS 1-9: 0
1. LITTLE INTEREST OR PLEASURE IN DOING THINGS: 0

## 2023-09-23 NOTE — PROGRESS NOTES
Physician Progress Note      PATIENT:               Kim Momin  CSN #:                  [id number]  :                       1935  ADMIT DATE:       2023 4:46 PM  [facility] DATE:        2023 3:54 PM  RESPONDING  PROVIDER #:        Yadiel Pineda DO          QUERY TEXT:    Pt presented with c/o 3 days of n/v/d. Patient's wife reported \"he ran out of   prednisone 2 weeks ago and has been feeling weak since then\". Documented that   pt takes \"laxatives at home which may have accounted for the diarrhea\". If   possible, please document in progress notes the etiology of the nausea and   vomiting if known: The medical record reflects the following:  Risk Factors: not taking prednisone  Clinical Indicators: Cortisol level 1.3 Documented on p/n dated , \"no   signs of adrenal crisis\". Treatment: Restarted prednisone. IVF bolus with continuous IVF. Options provided:  -- Nausea and vomiting due to, please specify.   -- Other - I will add my own diagnosis  -- Disagree - Not applicable / Not valid  -- Disagree - Clinically unable to determine / Unknown  -- Refer to Clinical Documentation Reviewer    PROVIDER RESPONSE TEXT:    This patient has nausea and vomiting due to cortisol deficiency    Query created by: Devante Núñez on 2023 1:13 PM      Electronically signed by:  Yadiel Pineda DO 2023 7:27 AM

## 2023-09-28 PROBLEM — E86.0 DEHYDRATION: Status: RESOLVED | Noted: 2023-08-29 | Resolved: 2023-09-28

## 2023-12-01 ENCOUNTER — NURSE ONLY (OUTPATIENT)
Age: 88
End: 2023-12-01

## 2023-12-01 DIAGNOSIS — I42.8 NICM (NONISCHEMIC CARDIOMYOPATHY) (HCC): Primary | ICD-10-CM

## 2024-01-18 ENCOUNTER — OFFICE VISIT (OUTPATIENT)
Age: 89
End: 2024-01-18
Payer: MEDICARE

## 2024-01-18 VITALS
DIASTOLIC BLOOD PRESSURE: 82 MMHG | BODY MASS INDEX: 26.32 KG/M2 | WEIGHT: 188 LBS | HEART RATE: 82 BPM | SYSTOLIC BLOOD PRESSURE: 116 MMHG | HEIGHT: 71 IN

## 2024-01-18 DIAGNOSIS — I42.8 NICM (NONISCHEMIC CARDIOMYOPATHY) (HCC): ICD-10-CM

## 2024-01-18 DIAGNOSIS — I48.19 PERSISTENT ATRIAL FIBRILLATION (HCC): ICD-10-CM

## 2024-01-18 DIAGNOSIS — I50.22 SYSTOLIC CHF, CHRONIC (HCC): ICD-10-CM

## 2024-01-18 DIAGNOSIS — I42.0 DILATED CARDIOMYOPATHY (HCC): Primary | ICD-10-CM

## 2024-01-18 DIAGNOSIS — Z95.0 PACEMAKER: ICD-10-CM

## 2024-01-18 PROCEDURE — 99214 OFFICE O/P EST MOD 30 MIN: CPT | Performed by: INTERNAL MEDICINE

## 2024-01-18 PROCEDURE — G8428 CUR MEDS NOT DOCUMENT: HCPCS | Performed by: INTERNAL MEDICINE

## 2024-01-18 PROCEDURE — 1036F TOBACCO NON-USER: CPT | Performed by: INTERNAL MEDICINE

## 2024-01-18 PROCEDURE — G8484 FLU IMMUNIZE NO ADMIN: HCPCS | Performed by: INTERNAL MEDICINE

## 2024-01-18 PROCEDURE — G8417 CALC BMI ABV UP PARAM F/U: HCPCS | Performed by: INTERNAL MEDICINE

## 2024-01-18 PROCEDURE — 1123F ACP DISCUSS/DSCN MKR DOCD: CPT | Performed by: INTERNAL MEDICINE

## 2024-01-18 NOTE — PROGRESS NOTES
Reactions    Penicillins Swelling    Lorazepam Rash     Patient Active Problem List    Diagnosis Date Noted    Pacemaker 02/14/2020     Priority: High    Chronic renal disease, stage III ([facility]) [[id number]] 09/07/2022     Priority: Medium    Nausea vomiting and diarrhea 08/30/2023    Metabolic encephalopathy 05/11/2023    Dementia ([facility]) 05/11/2023    Coronary artery disease involving native coronary artery of native heart with angina pectoris ([facility]) 04/23/2020    NICM (nonischemic cardiomyopathy) ([facility]) 02/14/2020    Atrial fibrillation with RVR ([facility]) 01/10/2020    Syncope 01/10/2020    Systolic CHF, chronic ([facility]) 10/17/2019    Dilated cardiomyopathy ([facility]) 10/17/2019    Gram-negative bacteremia 07/19/2018    Sepsis secondary to UTI ([facility]) 07/19/2018    UTI (urinary tract infection) 07/18/2018    Abdominal pain 07/18/2018    Normocytic anemia 07/18/2018    Persistent atrial fibrillation ([facility]) 07/18/2018    History of CVA (cerebrovascular accident) 06/20/2018    S/P CABG (coronary artery bypass graft) 05/18/2018    Hypothyroidism 05/18/2018    Current chronic use of systemic steroids 05/18/2018     Pituitary tumor surgery in 2001 and in 2013--has been on Prednisone 5 mg   daily since 2013.        Essential hypertension 05/16/2018      Past Surgical History:   Procedure Laterality Date    HEMORRHOID SURGERY      OTHER SURGICAL HISTORY  2001 and 2013    Pituitary tumor x2--on chronic Prednisone     PACEMAKER  2018    VASCULAR SURGERY       Family History   Problem Relation Age of Onset    No Known Problems Mother     No Known Problems Father      Social History     Tobacco Use    Smoking status: Never     Passive exposure: Never    Smokeless tobacco: Never   Substance Use Topics    Alcohol use: No         ROS:    No obvious pertinent positives on review of systems except for what was outlined in the HPI today.      PHYSICAL EXAM:     /82   Pulse 82   Ht 1.803 m (5' 11\")   Wt 85.3 kg (188 lb)   BMI 26.22 kg/m²

## 2024-02-05 RX ORDER — METOPROLOL SUCCINATE 25 MG/1
25 TABLET, EXTENDED RELEASE ORAL DAILY
Qty: 90 TABLET | Refills: 3 | Status: SHIPPED | OUTPATIENT
Start: 2024-02-05

## 2024-02-16 NOTE — PROGRESS NOTES
Please call him, labs reviewed. I would add lasix 20 per day, can stop and take PRN if edema gets better in a few days. Focus on low salt diet. Focus on raising legs. Will need BMP, BNP in 4 weeks. I will see as planned in 6 weeks.   Thanks 4 cc/1% Lidocaine/With EPI 3 cc/1% Lidocaine

## 2024-04-15 ENCOUNTER — APPOINTMENT (OUTPATIENT)
Dept: GENERAL RADIOLOGY | Age: 89
DRG: 683 | End: 2024-04-15
Payer: MEDICARE

## 2024-04-15 ENCOUNTER — HOSPITAL ENCOUNTER (INPATIENT)
Age: 89
LOS: 2 days | Discharge: NURSING, REHAB OR LONG-TERM CARE | DRG: 683 | End: 2024-04-17
Attending: EMERGENCY MEDICINE | Admitting: FAMILY MEDICINE
Payer: MEDICARE

## 2024-04-15 DIAGNOSIS — R79.89 ELEVATED LACTIC ACID LEVEL: ICD-10-CM

## 2024-04-15 DIAGNOSIS — I95.1 ORTHOSTATIC HYPOTENSION: ICD-10-CM

## 2024-04-15 DIAGNOSIS — I95.1 ORTHOSTATIC SYNCOPE: ICD-10-CM

## 2024-04-15 DIAGNOSIS — E86.0 DEHYDRATION: ICD-10-CM

## 2024-04-15 DIAGNOSIS — R19.7 DIARRHEA, UNSPECIFIED TYPE: Primary | ICD-10-CM

## 2024-04-15 PROBLEM — N17.9 ACUTE KIDNEY INJURY SUPERIMPOSED ON CKD (HCC): Status: ACTIVE | Noted: 2024-04-15

## 2024-04-15 PROBLEM — R53.1 GENERALIZED WEAKNESS: Status: ACTIVE | Noted: 2024-04-15

## 2024-04-15 PROBLEM — N18.9 ACUTE KIDNEY INJURY SUPERIMPOSED ON CKD (HCC): Status: ACTIVE | Noted: 2024-04-15

## 2024-04-15 PROBLEM — I95.9 HYPOTENSION: Status: ACTIVE | Noted: 2024-04-15

## 2024-04-15 LAB
ALBUMIN SERPL-MCNC: 3.2 G/DL (ref 3.2–4.6)
ALBUMIN/GLOB SERPL: 1 (ref 0.4–1.6)
ALP SERPL-CCNC: 109 U/L (ref 50–136)
ALT SERPL-CCNC: 18 U/L (ref 12–65)
ANION GAP SERPL CALC-SCNC: 6 MMOL/L (ref 2–11)
APPEARANCE UR: CLEAR
AST SERPL-CCNC: 28 U/L (ref 15–37)
BILIRUB SERPL-MCNC: 0.9 MG/DL (ref 0.2–1.1)
BILIRUB UR QL: NEGATIVE
BUN SERPL-MCNC: 13 MG/DL (ref 8–23)
CALCIUM SERPL-MCNC: 9.1 MG/DL (ref 8.3–10.4)
CHLORIDE SERPL-SCNC: 107 MMOL/L (ref 103–113)
CK SERPL-CCNC: 96 U/L (ref 21–215)
CO2 SERPL-SCNC: 24 MMOL/L (ref 21–32)
COLOR UR: NORMAL
CREAT SERPL-MCNC: 1.5 MG/DL (ref 0.8–1.5)
ERYTHROCYTE [DISTWIDTH] IN BLOOD BY AUTOMATED COUNT: 14.9 % (ref 11.9–14.6)
GLOBULIN SER CALC-MCNC: 3.1 G/DL (ref 2.8–4.5)
GLUCOSE SERPL-MCNC: 109 MG/DL (ref 65–100)
GLUCOSE UR STRIP.AUTO-MCNC: NEGATIVE MG/DL
HCT VFR BLD AUTO: 38.8 % (ref 41.1–50.3)
HGB BLD-MCNC: 11.7 G/DL (ref 13.6–17.2)
HGB UR QL STRIP: NEGATIVE
KETONES UR QL STRIP.AUTO: NEGATIVE MG/DL
LACTATE SERPL-SCNC: 1.7 MMOL/L (ref 0.4–2)
LACTATE SERPL-SCNC: 2.3 MMOL/L (ref 0.4–2)
LACTATE SERPL-SCNC: 2.4 MMOL/L (ref 0.4–2)
LEUKOCYTE ESTERASE UR QL STRIP.AUTO: NEGATIVE
MCH RBC QN AUTO: 25.6 PG (ref 26.1–32.9)
MCHC RBC AUTO-ENTMCNC: 30.2 G/DL (ref 31.4–35)
MCV RBC AUTO: 84.9 FL (ref 82–102)
NITRITE UR QL STRIP.AUTO: NEGATIVE
NRBC # BLD: 0 K/UL (ref 0–0.2)
PH UR STRIP: 5.5 (ref 5–9)
PLATELET # BLD AUTO: 198 K/UL (ref 150–450)
PMV BLD AUTO: 9.9 FL (ref 9.4–12.3)
POTASSIUM SERPL-SCNC: 3.7 MMOL/L (ref 3.5–5.1)
PROCALCITONIN SERPL-MCNC: 0.06 NG/ML (ref 0–0.49)
PROT SERPL-MCNC: 6.3 G/DL (ref 6.3–8.2)
PROT UR STRIP-MCNC: NEGATIVE MG/DL
RBC # BLD AUTO: 4.57 M/UL (ref 4.23–5.6)
SODIUM SERPL-SCNC: 137 MMOL/L (ref 136–146)
SP GR UR REFRACTOMETRY: 1.02 (ref 1–1.02)
TROPONIN I SERPL HS-MCNC: 20.4 PG/ML (ref 0–14)
TROPONIN I SERPL HS-MCNC: 21.1 PG/ML (ref 0–14)
UROBILINOGEN UR QL STRIP.AUTO: 0.2 EU/DL (ref 0.2–1)
WBC # BLD AUTO: 7.9 K/UL (ref 4.3–11.1)

## 2024-04-15 PROCEDURE — 81003 URINALYSIS AUTO W/O SCOPE: CPT

## 2024-04-15 PROCEDURE — 6370000000 HC RX 637 (ALT 250 FOR IP): Performed by: FAMILY MEDICINE

## 2024-04-15 PROCEDURE — 99285 EMERGENCY DEPT VISIT HI MDM: CPT

## 2024-04-15 PROCEDURE — 96361 HYDRATE IV INFUSION ADD-ON: CPT

## 2024-04-15 PROCEDURE — 80053 COMPREHEN METABOLIC PANEL: CPT

## 2024-04-15 PROCEDURE — 2580000003 HC RX 258: Performed by: EMERGENCY MEDICINE

## 2024-04-15 PROCEDURE — 84484 ASSAY OF TROPONIN QUANT: CPT

## 2024-04-15 PROCEDURE — 1100000003 HC PRIVATE W/ TELEMETRY

## 2024-04-15 PROCEDURE — 87086 URINE CULTURE/COLONY COUNT: CPT

## 2024-04-15 PROCEDURE — 96360 HYDRATION IV INFUSION INIT: CPT

## 2024-04-15 PROCEDURE — 2580000003 HC RX 258: Performed by: FAMILY MEDICINE

## 2024-04-15 PROCEDURE — 71045 X-RAY EXAM CHEST 1 VIEW: CPT

## 2024-04-15 PROCEDURE — 87040 BLOOD CULTURE FOR BACTERIA: CPT

## 2024-04-15 PROCEDURE — 93005 ELECTROCARDIOGRAM TRACING: CPT | Performed by: EMERGENCY MEDICINE

## 2024-04-15 PROCEDURE — 85027 COMPLETE CBC AUTOMATED: CPT

## 2024-04-15 PROCEDURE — 82550 ASSAY OF CK (CPK): CPT

## 2024-04-15 PROCEDURE — 84145 PROCALCITONIN (PCT): CPT

## 2024-04-15 PROCEDURE — 83605 ASSAY OF LACTIC ACID: CPT

## 2024-04-15 RX ORDER — ONDANSETRON 4 MG/1
4 TABLET, ORALLY DISINTEGRATING ORAL EVERY 8 HOURS PRN
Status: DISCONTINUED | OUTPATIENT
Start: 2024-04-15 | End: 2024-04-17 | Stop reason: HOSPADM

## 2024-04-15 RX ORDER — ASPIRIN 81 MG/1
81 TABLET ORAL DAILY
Status: DISCONTINUED | OUTPATIENT
Start: 2024-04-16 | End: 2024-04-17 | Stop reason: HOSPADM

## 2024-04-15 RX ORDER — METOPROLOL SUCCINATE 25 MG/1
25 TABLET, EXTENDED RELEASE ORAL DAILY
Status: DISCONTINUED | OUTPATIENT
Start: 2024-04-16 | End: 2024-04-17 | Stop reason: HOSPADM

## 2024-04-15 RX ORDER — SODIUM CHLORIDE, SODIUM LACTATE, POTASSIUM CHLORIDE, AND CALCIUM CHLORIDE .6; .31; .03; .02 G/100ML; G/100ML; G/100ML; G/100ML
500 INJECTION, SOLUTION INTRAVENOUS
Status: COMPLETED | OUTPATIENT
Start: 2024-04-15 | End: 2024-04-15

## 2024-04-15 RX ORDER — SODIUM CHLORIDE 9 MG/ML
INJECTION, SOLUTION INTRAVENOUS PRN
Status: DISCONTINUED | OUTPATIENT
Start: 2024-04-15 | End: 2024-04-17 | Stop reason: HOSPADM

## 2024-04-15 RX ORDER — ACETAMINOPHEN 650 MG/1
650 SUPPOSITORY RECTAL EVERY 6 HOURS PRN
Status: DISCONTINUED | OUTPATIENT
Start: 2024-04-15 | End: 2024-04-17 | Stop reason: HOSPADM

## 2024-04-15 RX ORDER — SODIUM CHLORIDE 0.9 % (FLUSH) 0.9 %
5-40 SYRINGE (ML) INJECTION PRN
Status: DISCONTINUED | OUTPATIENT
Start: 2024-04-15 | End: 2024-04-17 | Stop reason: HOSPADM

## 2024-04-15 RX ORDER — PREDNISONE 5 MG/1
5 TABLET ORAL
Status: DISCONTINUED | OUTPATIENT
Start: 2024-04-16 | End: 2024-04-17 | Stop reason: HOSPADM

## 2024-04-15 RX ORDER — SODIUM CHLORIDE 0.9 % (FLUSH) 0.9 %
5-40 SYRINGE (ML) INJECTION EVERY 12 HOURS SCHEDULED
Status: DISCONTINUED | OUTPATIENT
Start: 2024-04-15 | End: 2024-04-17 | Stop reason: HOSPADM

## 2024-04-15 RX ORDER — ACETAMINOPHEN 325 MG/1
650 TABLET ORAL EVERY 6 HOURS PRN
Status: DISCONTINUED | OUTPATIENT
Start: 2024-04-15 | End: 2024-04-17 | Stop reason: HOSPADM

## 2024-04-15 RX ORDER — POLYETHYLENE GLYCOL 3350 17 G/17G
17 POWDER, FOR SOLUTION ORAL DAILY PRN
Status: DISCONTINUED | OUTPATIENT
Start: 2024-04-15 | End: 2024-04-17 | Stop reason: HOSPADM

## 2024-04-15 RX ORDER — ATORVASTATIN CALCIUM 80 MG/1
80 TABLET, FILM COATED ORAL
Status: DISCONTINUED | OUTPATIENT
Start: 2024-04-15 | End: 2024-04-17 | Stop reason: HOSPADM

## 2024-04-15 RX ORDER — ONDANSETRON 2 MG/ML
4 INJECTION INTRAMUSCULAR; INTRAVENOUS EVERY 6 HOURS PRN
Status: DISCONTINUED | OUTPATIENT
Start: 2024-04-15 | End: 2024-04-17 | Stop reason: HOSPADM

## 2024-04-15 RX ORDER — SODIUM CHLORIDE 9 MG/ML
INJECTION, SOLUTION INTRAVENOUS CONTINUOUS
Status: DISCONTINUED | OUTPATIENT
Start: 2024-04-15 | End: 2024-04-17

## 2024-04-15 RX ORDER — LEVOTHYROXINE SODIUM 0.07 MG/1
75 TABLET ORAL DAILY
Status: DISCONTINUED | OUTPATIENT
Start: 2024-04-16 | End: 2024-04-17 | Stop reason: HOSPADM

## 2024-04-15 RX ADMIN — SODIUM CHLORIDE, POTASSIUM CHLORIDE, SODIUM LACTATE AND CALCIUM CHLORIDE 500 ML: 600; 310; 30; 20 INJECTION, SOLUTION INTRAVENOUS at 16:43

## 2024-04-15 RX ADMIN — SODIUM CHLORIDE, POTASSIUM CHLORIDE, SODIUM LACTATE AND CALCIUM CHLORIDE 500 ML: 600; 310; 30; 20 INJECTION, SOLUTION INTRAVENOUS at 18:24

## 2024-04-15 RX ADMIN — SODIUM CHLORIDE: 9 INJECTION, SOLUTION INTRAVENOUS at 22:22

## 2024-04-15 RX ADMIN — ATORVASTATIN CALCIUM 80 MG: 80 TABLET, FILM COATED ORAL at 22:18

## 2024-04-15 RX ADMIN — SODIUM CHLORIDE, PRESERVATIVE FREE 10 ML: 5 INJECTION INTRAVENOUS at 22:16

## 2024-04-15 RX ADMIN — APIXABAN 2.5 MG: 5 TABLET, FILM COATED ORAL at 22:16

## 2024-04-15 ASSESSMENT — PAIN - FUNCTIONAL ASSESSMENT: PAIN_FUNCTIONAL_ASSESSMENT: NONE - DENIES PAIN

## 2024-04-15 NOTE — H&P
Hospitalist History and Physical   Admit Date:  4/15/2024  3:10 PM   Name:  Neto Edouard Jr.   Age:  89 y.o.  Sex:  male  :  1935   MRN:  [id number]   Room:  ER04/    Presenting/Chief Complaint: Fall and Diarrhea     Reason(s) for Admission: Hypotension [I95.9]     History of Present Illness:   Neto Edouard Jr. is a 89 y.o. male with medical history of AICD congestive heart failure, A-fib, coronary disease with history of coronary bypass surgery, dementia, CKD stage III, history of CVA history of previous surgery on chronic prednisone.    .    As per triage-  Pt arrives to ER via EMS from home, c/o fall this morning - wife unable to get back into bed. Patient also c/o diarrhea and poor appetite, has not tolerated PO for the past few days. With EMS, patient had what appeared to be a near syncopal episode.    Initial BP 89/40 --> 121/71  HR up into the 150s, then came down to the 80s  Has pacemaker/defibrillator  02 sats stable       History from patient's spouse, spoke to on the phone, patient was not given much information  .  Spouse said the patient from time to time gets generalized weakness.  Today he gradually slumped down to the floor, spouse could not get up the patient, also had 3-4 episodes of diarrhea.  EMS came by and found to have a heart rate of 150s blood pressure of 89/42 to 121/71     Patient awake alert , does not complain of shortness of breath chest pain nausea vomiting or abdominal pain.  Does complain of generalized weakness.  Again as per spouse 3-4 episodes of diarrhea today.    Creatinine 1.5, previous creatinine 1.3, initial lactic acid of 2.3, initial troponin of 20.4, repeat troponin of 21.1  Normal UA and chest x-ray  Stable blood pressures    Patient will be admitted for acute on chronic kidney disease generalized weakness , dehydration leading to hypotension  Assessment & Plan:     -Hypotension probably secondary dehydration secondary to acute on chronic kidney  Medications:  Current Outpatient Medications   Medication Instructions    aspirin 81 mg, Oral, DAILY    atorvastatin (LIPITOR) 80 mg, Oral, DAILY    ELIQUIS 5 MG TABS tablet TAKE ONE TABLET BY MOUTH TWICE A DAY    levothyroxine (SYNTHROID) 75 mcg, Oral, DAILY    metoprolol succinate (TOPROL XL) 25 mg, Oral, DAILY    predniSONE (DELTASONE) 5 mg, Oral, DAILY       I have personally reviewed labs and tests:  Recent Results (from the past 24 hour(s))   CBC    Collection Time: 04/15/24  4:28 PM   Result Value Ref Range    WBC 7.9 4.3 - 11.1 K/uL    RBC 4.57 4.23 - 5.6 M/uL    Hemoglobin 11.7 (L) 13.6 - 17.2 g/dL    Hematocrit 38.8 (L) 41.1 - 50.3 %    MCV 84.9 82 - 102 FL    MCH 25.6 (L) 26.1 - 32.9 PG    MCHC 30.2 (L) 31.4 - 35.0 g/dL    RDW 14.9 (H) 11.9 - 14.6 %    Platelets 198 150 - 450 K/uL    MPV 9.9 9.4 - 12.3 FL    nRBC 0.00 0.0 - 0.2 K/uL   Comprehensive Metabolic Panel    Collection Time: 04/15/24  4:28 PM   Result Value Ref Range    Sodium 137 136 - 146 mmol/L    Potassium 3.7 3.5 - 5.1 mmol/L    Chloride 107 103 - 113 mmol/L    CO2 24 21 - 32 mmol/L    Anion Gap 6 2 - 11 mmol/L    Glucose 109 (H) 65 - 100 mg/dL    BUN 13 8 - 23 MG/DL    Creatinine 1.50 0.8 - 1.5 MG/DL    Est, Glom Filt Rate 45 (L) >60 ml/min/1.73m2    Calcium 9.1 8.3 - 10.4 MG/DL    Total Bilirubin 0.9 0.2 - 1.1 MG/DL    ALT 18 12 - 65 U/L    AST 28 15 - 37 U/L    Alk Phosphatase 109 50 - 136 U/L    Total Protein 6.3 6.3 - 8.2 g/dL    Albumin 3.2 3.2 - 4.6 g/dL    Globulin 3.1 2.8 - 4.5 g/dL    Albumin/Globulin Ratio 1.0 0.4 - 1.6     Troponin    Collection Time: 04/15/24  4:28 PM   Result Value Ref Range    Troponin, High Sensitivity 20.4 (H) 0 - 14 pg/mL   CK    Collection Time: 04/15/24  4:28 PM   Result Value Ref Range    Total CK 96 21 - 215 U/L   Procalcitonin    Collection Time: 04/15/24  4:28 PM   Result Value Ref Range    Procalcitonin 0.06 0.00 - 0.49 ng/mL   Lactic Acid    Collection Time: 04/15/24  4:29 PM   Result Value Ref  Range    Lactic Acid, Plasma 2.3 (H) 0.4 - 2.0 MMOL/L   Urinalysis    Collection Time: 04/15/24  4:36 PM   Result Value Ref Range    Color, UA YELLOW/STRAW      Appearance CLEAR      Specific Gravity, UA 1.020 1.001 - 1.023      pH, Urine 5.5 5.0 - 9.0      Protein, UA Negative NEG mg/dL    Glucose, UA Negative mg/dL    Ketones, Urine Negative NEG mg/dL    Bilirubin Urine Negative NEG      Blood, Urine Negative NEG      Urobilinogen, Urine 0.2 0.2 - 1.0 EU/dL    Nitrite, Urine Negative NEG      Leukocyte Esterase, Urine Negative NEG     Lactic Acid    Collection Time: 04/15/24  6:12 PM   Result Value Ref Range    Lactic Acid, Plasma 2.4 (H) 0.4 - 2.0 MMOL/L   Troponin    Collection Time: 04/15/24  6:12 PM   Result Value Ref Range    Troponin, High Sensitivity 21.1 (H) 0 - 14 pg/mL   Lactic Acid    Collection Time: 04/15/24  8:29 PM   Result Value Ref Range    Lactic Acid, Plasma 1.7 0.4 - 2.0 MMOL/L       No results for input(s): \"COVID19\" in the last 72 hours.    XR CHEST PORTABLE    Result Date: 4/15/2024  Chest X-ray INDICATION: Generalized weakness, hypotension. COMPARISON: May 11, 2023. TECHNIQUE: AP/PA view of the chest was obtained. FINDINGS: Stable a left permanent pacemaker with no evidence of broken leads. Stable sternotomy sutures are intact. The lungs are unchanged compared to prior study. There are no infiltrates or effusions.  The heart size is stable.  The bony thorax is intact.      No significant changes compared to prior study with no acute findings in the chest         Signed:  CHINTAN ELLIS MD

## 2024-04-15 NOTE — ED NOTES
RN had patient stand to complete orthos, patient became extremely symptommatic. Dizzy, pale, and started throwing up. Patient appeared to have a syncopal episode at that time. HR up to 110s, BP 90s/60s,    MD made aware. Will not ambulate patient at this time. Assisted back into bed.      Tatianna Cevallos, ROXANA  04/15/24 [id number]

## 2024-04-15 NOTE — ED PROVIDER NOTES
Emergency Department Provider Note       PCP: Guillermina Cerda PA-C   Age: 89 y.o.   Sex: male     DISPOSITION Decision To Admit 04/15/2024 07:19:03 PM       ICD-10-CM    1. Diarrhea, unspecified type  R19.7       2. Orthostatic syncope  I95.1       3. Orthostatic hypotension  I95.1       4. Dehydration  E86.0       5. Elevated lactic acid level  R79.89           Medical Decision Making     Patient fell this morning likely from orthostatic hypotension which we have discovered here in the emergency department and prior to arrival.  No improvement with initial hydration.  Mild elevation of lactic acid although 2+ SIRS criteria not met and there is no source of infection other than diarrhea.  We gave the patient fluids and then attempted orthostatics but he had a syncopal event from orthostatic hypotension.  Additional fluids ordered.  Abdomen remains soft nontender and benign.  No infection on chest x-ray or urinalysis.  Hospitalist consulted for admission given continued elevation of lactic acid and continued orthostatic hypotension despite hydration.  Further gentle hydration needed in the hospital     1 or more acute illnesses that pose a threat to life or bodily function.   Chronic medical problems impacting care include dementia.    I independently ordered and reviewed each unique test.  I reviewed external records: provider visit note from outside specialist.   The patients assessment required an independent historian: EMS.  The reason they were needed is important historical information not provided by the patient.  I interpreted the X-rays chest x-ray shows no acute pathology infiltrate and a normal cardiomediastinal silhouette..  My Independent EKG Interpretation: paced rhythm      ST Segments:Nonspecific ST segments - NO STEMI   Rate: 79  The patient was admitted and I have discussed patient management with the admitting provider.    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     PACEMAKER  2018    VASCULAR SURGERY          Social History     Socioeconomic History    Marital status:    Tobacco Use    Smoking status: Never     Passive exposure: Never    Smokeless tobacco: Never   Substance and Sexual Activity    Alcohol use: No    Drug use: No     Social Determinants of Health     Financial Resource Strain: Low Risk  (9/12/2023)    Overall Financial Resource Strain (CARDIA)     Difficulty of Paying Living Expenses: Not hard at all   Transportation Needs: Unknown (9/12/2023)    PRAPARE - Transportation     Lack of Transportation (Non-Medical): No   Housing Stability: Unknown (9/12/2023)    Housing Stability Vital Sign     Unstable Housing in the Last Year: No        Previous Medications    ASPIRIN 81 MG EC TABLET    Take 1 tablet by mouth daily    ATORVASTATIN (LIPITOR) 80 MG TABLET    Take 1 tablet by mouth daily    ELIQUIS 5 MG TABS TABLET    TAKE ONE TABLET BY MOUTH TWICE A DAY    LEVOTHYROXINE (SYNTHROID) 75 MCG TABLET    Take 1 tablet by mouth daily    METOPROLOL SUCCINATE (TOPROL XL) 25 MG EXTENDED RELEASE TABLET    TAKE ONE TABLET BY MOUTH EVERY DAY    PREDNISONE (DELTASONE) 5 MG TABLET    Take 1 tablet by mouth daily        Results for orders placed or performed during the hospital encounter of 04/15/24   XR CHEST PORTABLE    Narrative    Chest X-ray    INDICATION: Generalized weakness, hypotension.    COMPARISON: May 11, 2023.    TECHNIQUE: AP/PA view of the chest was obtained.    FINDINGS: Stable a left permanent pacemaker with no evidence of broken leads.  Stable sternotomy sutures are intact. The lungs are unchanged compared to prior  study. There are no infiltrates or effusions.  The heart size is stable.  The  bony thorax is intact.        Impression    No  significant changes compared to prior study with no acute  findings in the chest     CBC   Result Value Ref Range    WBC 7.9 4.3 - 11.1 K/uL    RBC 4.57 4.23 - 5.6 M/uL    Hemoglobin 11.7 (L) 13.6 - 17.2 g/dL    Hematocrit 38.8 (L) 41.1 - 50.3 %    MCV 84.9 82 - 102 FL    MCH 25.6 (L) 26.1 - 32.9 PG    MCHC 30.2 (L) 31.4 - 35.0 g/dL    RDW 14.9 (H) 11.9 - 14.6 %    Platelets 198 150 - 450 K/uL    MPV 9.9 9.4 - 12.3 FL    nRBC 0.00 0.0 - 0.2 K/uL   Comprehensive Metabolic Panel   Result Value Ref Range    Sodium 137 136 - 146 mmol/L    Potassium 3.7 3.5 - 5.1 mmol/L    Chloride 107 103 - 113 mmol/L    CO2 24 21 - 32 mmol/L    Anion Gap 6 2 - 11 mmol/L    Glucose 109 (H) 65 - 100 mg/dL    BUN 13 8 - 23 MG/DL    Creatinine 1.50 0.8 - 1.5 MG/DL    Est, Glom Filt Rate 45 (L) >60 ml/min/1.73m2    Calcium 9.1 8.3 - 10.4 MG/DL    Total Bilirubin 0.9 0.2 - 1.1 MG/DL    ALT 18 12 - 65 U/L    AST 28 15 - 37 U/L    Alk Phosphatase 109 50 - 136 U/L    Total Protein 6.3 6.3 - 8.2 g/dL    Albumin 3.2 3.2 - 4.6 g/dL    Globulin 3.1 2.8 - 4.5 g/dL    Albumin/Globulin Ratio 1.0 0.4 - 1.6     Lactic Acid   Result Value Ref Range    Lactic Acid, Plasma 2.3 (H) 0.4 - 2.0 MMOL/L   Lactic Acid   Result Value Ref Range    Lactic Acid, Plasma 2.4 (H) 0.4 - 2.0 MMOL/L   Troponin   Result Value Ref Range    Troponin, High Sensitivity 20.4 (H) 0 - 14 pg/mL   Troponin   Result Value Ref Range    Troponin, High Sensitivity 21.1 (H) 0 - 14 pg/mL   Urinalysis   Result Value Ref Range    Color, UA YELLOW/STRAW      Appearance CLEAR      Specific Gravity, UA 1.020 1.001 - 1.023      pH, Urine 5.5 5.0 - 9.0      Protein, UA Negative NEG mg/dL    Glucose, UA Negative mg/dL    Ketones, Urine Negative NEG mg/dL    Bilirubin Urine Negative NEG      Blood, Urine Negative NEG      Urobilinogen, Urine 0.2 0.2 - 1.0 EU/dL    Nitrite, Urine Negative NEG      Leukocyte Esterase, Urine Negative NEG     CK   Result Value Ref Range    Total CK 96 21 - 215 U/L

## 2024-04-16 LAB
ANION GAP SERPL CALC-SCNC: 5 MMOL/L (ref 2–11)
BASOPHILS # BLD: 0 K/UL (ref 0–0.2)
BASOPHILS NFR BLD: 1 % (ref 0–2)
BUN SERPL-MCNC: 11 MG/DL (ref 8–23)
CALCIUM SERPL-MCNC: 8.6 MG/DL (ref 8.3–10.4)
CHLORIDE SERPL-SCNC: 111 MMOL/L (ref 103–113)
CO2 SERPL-SCNC: 21 MMOL/L (ref 21–32)
CREAT SERPL-MCNC: 1.3 MG/DL (ref 0.8–1.5)
DIFFERENTIAL METHOD BLD: ABNORMAL
EKG ATRIAL RATE: 76 BPM
EKG DIAGNOSIS: NORMAL
EKG Q-T INTERVAL: 456 MS
EKG QRS DURATION: 149 MS
EKG QTC CALCULATION (BAZETT): 523 MS
EKG R AXIS: -88 DEGREES
EKG T AXIS: 90 DEGREES
EKG VENTRICULAR RATE: 79 BPM
EOSINOPHIL # BLD: 0.3 K/UL (ref 0–0.8)
EOSINOPHIL NFR BLD: 5 % (ref 0.5–7.8)
ERYTHROCYTE [DISTWIDTH] IN BLOOD BY AUTOMATED COUNT: 15.1 % (ref 11.9–14.6)
GLUCOSE SERPL-MCNC: 93 MG/DL (ref 65–100)
HCT VFR BLD AUTO: 35.8 % (ref 41.1–50.3)
HGB BLD-MCNC: 11 G/DL (ref 13.6–17.2)
IMM GRANULOCYTES # BLD AUTO: 0 K/UL (ref 0–0.5)
IMM GRANULOCYTES NFR BLD AUTO: 0 % (ref 0–5)
LYMPHOCYTES # BLD: 1.2 K/UL (ref 0.5–4.6)
LYMPHOCYTES NFR BLD: 21 % (ref 13–44)
MCH RBC QN AUTO: 25.6 PG (ref 26.1–32.9)
MCHC RBC AUTO-ENTMCNC: 30.7 G/DL (ref 31.4–35)
MCV RBC AUTO: 83.3 FL (ref 82–102)
MONOCYTES # BLD: 0.7 K/UL (ref 0.1–1.3)
MONOCYTES NFR BLD: 13 % (ref 4–12)
NEUTS SEG # BLD: 3.3 K/UL (ref 1.7–8.2)
NEUTS SEG NFR BLD: 60 % (ref 43–78)
NRBC # BLD: 0 K/UL (ref 0–0.2)
PLATELET # BLD AUTO: 174 K/UL (ref 150–450)
PMV BLD AUTO: 9.7 FL (ref 9.4–12.3)
POTASSIUM SERPL-SCNC: 4.1 MMOL/L (ref 3.5–5.1)
RBC # BLD AUTO: 4.3 M/UL (ref 4.23–5.6)
SODIUM SERPL-SCNC: 137 MMOL/L (ref 136–146)
TSH W FREE THYROID IF ABNORMAL: 0.82 UIU/ML (ref 0.36–3.74)
WBC # BLD AUTO: 5.6 K/UL (ref 4.3–11.1)

## 2024-04-16 PROCEDURE — 6370000000 HC RX 637 (ALT 250 FOR IP): Performed by: FAMILY MEDICINE

## 2024-04-16 PROCEDURE — 97161 PT EVAL LOW COMPLEX 20 MIN: CPT

## 2024-04-16 PROCEDURE — 80048 BASIC METABOLIC PNL TOTAL CA: CPT

## 2024-04-16 PROCEDURE — 97166 OT EVAL MOD COMPLEX 45 MIN: CPT

## 2024-04-16 PROCEDURE — 85025 COMPLETE CBC W/AUTO DIFF WBC: CPT

## 2024-04-16 PROCEDURE — 2580000003 HC RX 258: Performed by: FAMILY MEDICINE

## 2024-04-16 PROCEDURE — 97530 THERAPEUTIC ACTIVITIES: CPT

## 2024-04-16 PROCEDURE — 6370000000 HC RX 637 (ALT 250 FOR IP): Performed by: INTERNAL MEDICINE

## 2024-04-16 PROCEDURE — 84443 ASSAY THYROID STIM HORMONE: CPT

## 2024-04-16 PROCEDURE — 1100000003 HC PRIVATE W/ TELEMETRY

## 2024-04-16 PROCEDURE — 36415 COLL VENOUS BLD VENIPUNCTURE: CPT

## 2024-04-16 PROCEDURE — 97535 SELF CARE MNGMENT TRAINING: CPT

## 2024-04-16 PROCEDURE — 2500000003 HC RX 250 WO HCPCS: Performed by: FAMILY MEDICINE

## 2024-04-16 PROCEDURE — 93010 ELECTROCARDIOGRAM REPORT: CPT | Performed by: INTERNAL MEDICINE

## 2024-04-16 RX ADMIN — ATORVASTATIN CALCIUM 80 MG: 80 TABLET, FILM COATED ORAL at 20:22

## 2024-04-16 RX ADMIN — ASPIRIN 81 MG: 81 TABLET, COATED ORAL at 08:35

## 2024-04-16 RX ADMIN — APIXABAN 5 MG: 5 TABLET, FILM COATED ORAL at 08:35

## 2024-04-16 RX ADMIN — TUBERCULIN PURIFIED PROTEIN DERIVATIVE 5 UNITS: 5 INJECTION, SOLUTION INTRADERMAL at 08:36

## 2024-04-16 RX ADMIN — SODIUM CHLORIDE: 9 INJECTION, SOLUTION INTRAVENOUS at 12:28

## 2024-04-16 RX ADMIN — SODIUM CHLORIDE, PRESERVATIVE FREE 10 ML: 5 INJECTION INTRAVENOUS at 20:23

## 2024-04-16 RX ADMIN — PREDNISONE 5 MG: 5 TABLET ORAL at 08:35

## 2024-04-16 RX ADMIN — METOPROLOL SUCCINATE 25 MG: 25 TABLET, EXTENDED RELEASE ORAL at 08:36

## 2024-04-16 RX ADMIN — LEVOTHYROXINE SODIUM 75 MCG: 0.07 TABLET ORAL at 05:14

## 2024-04-16 RX ADMIN — APIXABAN 5 MG: 5 TABLET, FILM COATED ORAL at 20:22

## 2024-04-16 NOTE — PROGRESS NOTES
Hospitalist Progress Note   Admit Date:  4/15/2024  3:10 PM   Name:  Neto Edouard Jr.   Age:  89 y.o.  Sex:  male  :  1935   MRN:  [id number]   Room:  218/    Presenting/Chief Complaint: Fall and Diarrhea     Reason(s) for Admission: Orthostatic hypotension [I95.1]  Dehydration [E86.0]  Hypotension [I95.9]  Orthostatic syncope [I95.1]  Elevated lactic acid level [R79.89]  Diarrhea, unspecified type [R19.7]     Hospital Course:       Copied from prior provider HPI/summary:  Neto Edouard Jr. is a 89 y.o. male with medical history of AICD congestive heart failure, A-fib, coronary disease with history of coronary bypass surgery, dementia, CKD stage III, history of CVA history of previous surgery on chronic prednisone.     .     As per triage-  Pt arrives to ER via EMS from home, c/o fall this morning - wife unable to get back into bed. Patient also c/o diarrhea and poor appetite, has not tolerated PO for the past few days. With EMS, patient had what appeared to be a near syncopal episode.    Initial BP 89/40 --> 121/71  HR up into the 150s, then came down to the 80s  Has pacemaker/defibrillator  02 sats stable        History from patient's spouse, spoke to on the phone, patient was not given much information  .  Spouse said the patient from time to time gets generalized weakness.  Today he gradually slumped down to the floor, spouse could not get up the patient, also had 3-4 episodes of diarrhea.  EMS came by and found to have a heart rate of 150s blood pressure of 89/42 to 121/71      Patient awake alert , does not complain of shortness of breath chest pain nausea vomiting or abdominal pain.  Does complain of generalized weakness.  Again as per spouse 3-4 episodes of diarrhea today.     Creatinine 1.5, previous creatinine 1.3, initial lactic acid of 2.3, initial troponin of 20.4, repeat troponin of 21.1  Normal UA and chest x-ray  Stable blood pressures     Patient will be admitted for acute on  2.0 MMOL/L   Basic Metabolic Panel w/ Reflex to MG    Collection Time: 04/16/24  4:00 AM   Result Value Ref Range    Sodium 137 136 - 146 mmol/L    Potassium 4.1 3.5 - 5.1 mmol/L    Chloride 111 103 - 113 mmol/L    CO2 21 21 - 32 mmol/L    Anion Gap 5 2 - 11 mmol/L    Glucose 93 65 - 100 mg/dL    BUN 11 8 - 23 MG/DL    Creatinine 1.30 0.8 - 1.5 MG/DL    Est, Glom Filt Rate 53 (L) >60 ml/min/1.73m2    Calcium 8.6 8.3 - 10.4 MG/DL   CBC with Auto Differential    Collection Time: 04/16/24  4:00 AM   Result Value Ref Range    WBC 5.6 4.3 - 11.1 K/uL    RBC 4.30 4.23 - 5.6 M/uL    Hemoglobin 11.0 (L) 13.6 - 17.2 g/dL    Hematocrit 35.8 (L) 41.1 - 50.3 %    MCV 83.3 82 - 102 FL    MCH 25.6 (L) 26.1 - 32.9 PG    MCHC 30.7 (L) 31.4 - 35.0 g/dL    RDW 15.1 (H) 11.9 - 14.6 %    Platelets 174 150 - 450 K/uL    MPV 9.7 9.4 - 12.3 FL    nRBC 0.00 0.0 - 0.2 K/uL    Differential Type AUTOMATED      Neutrophils % 60 43 - 78 %    Lymphocytes % 21 13 - 44 %    Monocytes % 13 (H) 4.0 - 12.0 %    Eosinophils % 5 0.5 - 7.8 %    Basophils % 1 0.0 - 2.0 %    Immature Granulocytes % 0 0.0 - 5.0 %    Neutrophils Absolute 3.3 1.7 - 8.2 K/UL    Lymphocytes Absolute 1.2 0.5 - 4.6 K/UL    Monocytes Absolute 0.7 0.1 - 1.3 K/UL    Eosinophils Absolute 0.3 0.0 - 0.8 K/UL    Basophils Absolute 0.0 0.0 - 0.2 K/UL    Immature Granulocytes Absolute 0.0 0.0 - 0.5 K/UL   TSH with Reflex    Collection Time: 04/16/24  4:00 AM   Result Value Ref Range    TSH w Free Thyroid if Abnormal 0.82 0.358 - 3.740 UIU/ML       No results for input(s): \"COVID19\" in the last 72 hours.    Current Meds:  Current Facility-Administered Medications   Medication Dose Route Frequency    apixaban (ELIQUIS) tablet 5 mg  5 mg Oral BID    aspirin EC tablet 81 mg  81 mg Oral Daily    atorvastatin (LIPITOR) tablet 80 mg  80 mg Oral QHS    levothyroxine (SYNTHROID) tablet 75 mcg  75 mcg Oral Daily    metoprolol succinate (TOPROL XL) extended release tablet 25 mg  25 mg Oral Daily

## 2024-04-16 NOTE — ED NOTES
TRANSFER - OUT REPORT:    Verbal report given to ROXANA Mcgraw on Neto Edouard Jr.  being transferred to [facility] for routine progression of patient care       Report consisted of patient's Situation, Background, Assessment and   Recommendations(SBAR).     Information from the following report(s) Nurse Handoff Report, Index, ED Encounter Summary, Adult Overview, Surgery Report, MAR, and Recent Results was reviewed with the receiving nurse.    [place] Fall Assessment:    Presents to emergency department  because of falls (Syncope, seizure, or loss of consciousness): No  Age > 70: Yes  Altered Mental Status, Intoxication with alcohol or substance confusion (Disorientation, impaired judgment, poor safety awaremess, or inability to follow instructions): No  Impaired Mobility: Ambulates or transfers with assistive devices or assistance; Unable to ambulate or transer.: No  Nursing Judgement: Yes          Lines:   Peripheral IV 04/15/24 Left Antecubital (Active)   Site Assessment Clean, dry & intact 04/15/24 1637   Line Status Brisk blood return 04/15/24 1637       Peripheral IV 04/15/24 Right Antecubital (Active)   Site Assessment Clean, dry & intact 04/15/24 1637   Line Status Brisk blood return 04/15/24 1637        Opportunity for questions and clarification was provided.      Patient transported with:  Mary Beth White RN  04/15/24 0195

## 2024-04-16 NOTE — PROGRESS NOTES
END OF SHIFT NOTE:    INTAKE/OUTPUT  04/15 0701 - 04/16 0700  In: -   Out: 150 [Urine:150]  Voiding: Yes  Catheter: No  Drain:   External Urinary Catheter (Active)   Site Assessment Clean,dry & intact 04/16/24 0222   Placement Initiated 04/16/24 0222   Catheter Care Catheter/Wick replaced;Suction Canister/Tubing changed 04/16/24 0222   Perineal Care Yes 04/16/24 0222   Suction 40 mmgHg continuous 04/16/24 0222   Urine Color Patricia 04/16/24 0222   Urine Appearance Clear 04/16/24 0222   Output (mL) 150 mL 04/16/24 0222               Flatus: Patient does have flatus present.    Stool:  occurrences.    Characteristics:  Stool Appearance: Loose  Stool Color: Brown  Stool Amount: Large  Stool Assessment  Incontinence: Yes  Stool Appearance: Loose  Stool Color: Brown  Stool Amount: Large  Stool Source: Rectum  Last BM (including prior to admit): 04/16/24    Emesis:  occurrences.    Characteristics:        VITAL SIGNS  Patient Vitals for the past 12 hrs:   Temp Pulse Resp BP SpO2   04/16/24 0222 98.6 °F (37 °C) 74 20 122/66 100 %   04/15/24 2213 98.4 °F (36.9 °C) 76 20 134/69 100 %   04/15/24 2135 -- 76 12 -- 99 %   04/15/24 2131 -- 79 19 -- 97 %   04/15/24 2130 -- -- -- (!) 146/71 --   04/15/24 2015 -- 76 21 139/63 96 %       Pain Assessment             Ambulating  No    Shift report given to oncoming nurse at the bedside.    Mariluz Yusuf, RN

## 2024-04-16 NOTE — CARE COORDINATION
CASE MANAGEMENT ASSESSMENT NOTE  Patient is a 89 year old male with Orthostatic Hypotension and Dehydration.      Patient assessment completed at bedside.  Patient presents to assessment alert and oriented, and answers questions appropriately. Demographics confirmed with patient and spouse, Batool Edouard. He lives at home with spouse .  At baseline, he is independent with transfers using a cane and ADLs.  He has a walker available.  Lives in a single story home with level entrance.  PT and OT evals are pending.  If recommendations are made: patient and spouse verbalize that they would rather have home health than to be sent to short term rehab.    At this time, awaiting recommendations from therapy for disposition after discharge.  Patient may possibly need HH vs STR.  PT/OT evals have been ordered.  Case management will continue to follow.  Please notify if there are any changes.     Attending Physician: Dougie Spencer, DO  Admit Problem: Orthostatic hypotension [I95.1]  Dehydration [E86.0]  Hypotension [I95.9]  Orthostatic syncope [I95.1]  Elevated lactic acid level [R79.89]  Diarrhea, unspecified type [R19.7]  Date/Time of Admission: 4/15/2024  3:10 PM  Problem List:  Patient Active Problem List   Diagnosis    S/P CABG (coronary artery bypass graft)    UTI (urinary tract infection)    Systolic CHF, chronic ([facility])    Atrial fibrillation with RVR ([facility])    Syncope    Pacemaker    NICM (nonischemic cardiomyopathy) ([facility])    Dilated cardiomyopathy (HCC)    Hypothyroidism    History of CVA (cerebrovascular accident)    Coronary artery disease involving native coronary artery of native heart with angina pectoris ([facility])    Gram-negative bacteremia    Abdominal pain    Normocytic anemia    Sepsis secondary to UTI ([facility])    Current chronic use of systemic steroids    Essential hypertension    Persistent atrial fibrillation (HCC)    Chronic renal disease, stage III ([facility]) [[id number]]    Metabolic encephalopathy    Dementia

## 2024-04-16 NOTE — PROGRESS NOTES
ACUTE OCCUPATIONAL THERAPY GOALS:   (Developed with and agreed upon by patient and/or caregiver.)  1. Pt will toilet with SBA   2. Pt will complete functional mobility for ADLs with standby assist using AD as needed  3. Pt will complete lower body dressing with SBA using AE as needed  4. Pt will complete grooming and hygiene at sink with SBA  5. Pt will demonstrate independence with HEP to promote increased BUE strength and functional use for ADLs  6. Pt will tolerate 23 minutes functional activity with min or fewer rest breaks to promote increased endurance for ADLs      Timeframe: 7 visits    OCCUPATIONAL THERAPY Initial Assessment and Daily Note       OT Visit Days: 1  Acknowledge Orders  Time  OT Charge Capture  Rehab Caseload Tracker      Neto Edouard Jr. is a 89 y.o. male   PRIMARY DIAGNOSIS: Hypotension  Orthostatic hypotension [I95.1]  Dehydration [E86.0]  Hypotension [I95.9]  Orthostatic syncope [I95.1]  Elevated lactic acid level [R79.89]  Diarrhea, unspecified type [R19.7]       Reason for Referral: Generalized Muscle Weakness (M62.81)  Inpatient: Payor: MEDICARE / Plan: MEDICARE PART A AND B / Product Type: *No Product type* /     ASSESSMENT:     REHAB RECOMMENDATIONS:   Recommendation to date pending progress:  Setting:  Short-term Rehab    Equipment:    To Be Determined     ASSESSMENT:  Mr. Edouard was admitted with dehydration and hypotension after he fell at home. Pt presented generally weak with deficits in mobility, balance, activity tolerance, and cognition impacting ADLs. Pt required contact guard assistance-min A for ADLs and for bed mobility and to  prep for ADLs. Activity additionally limited by orthostatic hypotension. Pt confused with decreased safety awareness and memory and had difficulty following directions. Pt presented below his functional baseline and would benefit from skilled OT services to address deficits.      [facility] AM-PAC™ “6 Clicks” Daily Activity Inpatient

## 2024-04-16 NOTE — PROGRESS NOTES
TRANSFER - IN REPORT:    Verbal report received from ROXANA Clinton on Neto Edouard Jr.  being received from ED for routine progression of patient care      Report consisted of patient's Situation, Background, Assessment and   Recommendations(SBAR).     Information from the following report(s) Intake/Output, MAR, Recent Results, and Med Rec Status was reviewed with the receiving nurse.    Opportunity for questions and clarification was provided.      Assessment completed upon patient's arrival to unit and care assumed.

## 2024-04-16 NOTE — PROGRESS NOTES
4 Eyes Skin Assessment     NAME:  Neto Edouard Jr.  YOB: 1935  MEDICAL RECORD NUMBER:  [id number]    The patient is being assessed for  Admission    I agree that at least one RN has performed a thorough Head to Toe Skin Assessment on the patient. ALL assessment sites listed below have been assessed.      Areas assessed by both nurses:    Head, Face, Ears, Shoulders, Back, Chest, Arms, Elbows, Hands, Sacrum. Buttock, Coccyx, Ischium, and Legs. Feet and Heels        Does the Patient have a Wound? No noted wound(s)       Benji Prevention initiated by RN: Yes  Wound Care Orders initiated by RN: No    Pressure Injury (Stage 3,4, Unstageable, DTI, NWPT, and Complex wounds) if present, place Wound referral order by RN under : No    New Ostomies, if present place, Ostomy referral order under : No     Nurse 1 eSignature: Electronically signed by Mariluz Yusuf RN on 4/15/24 at 11:01 PM EDT    **SHARE this note so that the co-signing nurse can place an eSignature**    Nurse 2 eSignature: Electronically signed by Radha Ward RN on 4/15/24 at 11:02 PM EDT

## 2024-04-16 NOTE — PROGRESS NOTES
Rounds performed throughout shift. Pt denies needs at this time. Bed in low position, locked and call light/personal items within reach. Will report to night shift nurse.

## 2024-04-16 NOTE — PROGRESS NOTES
met, whichever comes first.    THERAPY PROGNOSIS: Good    PROBLEM LIST:   (Skilled intervention is medically necessary to address:)  Decreased ADL/Functional Activities  Decreased Activity Tolerance  Decreased Balance  Decreased Gait Ability  Decreased Strength  Decreased Transfer Abilities INTERVENTIONS PLANNED:   (Benefits and precautions of physical therapy have been discussed with the patient.)  Therapeutic Activity  Therapeutic Exercise/HEP  Neuromuscular Re-education  Gait Training  Education       TREATMENT:   EVALUATION: LOW COMPLEXITY: (Untimed Charge)  The initial evaluation charge encompasses clinical chart review, objective assessment, interpretation of assessment, and skilled monitoring of the patient's response to treatment in order to develop a plan of care.     TREATMENT:   Co-Treatment PT/OT necessary due to patient's decreased overall endurance/tolerance levels, as well as need for high level skilled assistance to complete functional transfers/mobility and functional tasks  Therapeutic Activity (10 Minutes): Therapeutic activity included Supine to Sit, Sit to Supine, Scooting, Transfer Training, Sitting balance , and Standing balance to improve functional Activity tolerance, Balance, Coordination, and Mobility.    TREATMENT GRID:  N/A    AFTER TREATMENT PRECAUTIONS: Alarm Activated, Bed, Bed/Chair Locked, Call light within reach, Needs within reach, RN notified, and Side rails x2    INTERDISCIPLINARY COLLABORATION:  RN/ PCT, PT/ PTA, and OT/ DESOUZA    EDUCATION: Education Given To: Patient  Education Provided: Plan of Care;Role of Therapy  Education Method: Verbal  Barriers to Learning: None  Education Outcome: Verbalized understanding    TIME IN/OUT:  Time In: 0940  Time Out: 0958  Minutes: 18    Lorraine Solis PT

## 2024-04-16 NOTE — PLAN OF CARE
Problem: Discharge Planning  Goal: Discharge to home or other facility with appropriate resources  4/16/2024 1001 by Cherri Castelan RN  Outcome: Progressing  4/15/2024 2307 by Mariluz Yusuf RN  Outcome: Progressing     Problem: Safety - Adult  Goal: Free from fall injury  4/16/2024 1001 by Cherri Castelan RN  Outcome: Progressing  4/15/2024 2307 by Mariluz Yusuf, RN  Outcome: Progressing

## 2024-04-17 ENCOUNTER — HOME HEALTH ADMISSION (OUTPATIENT)
Dept: HOME HEALTH SERVICES | Facility: HOME HEALTH | Age: 89
End: 2024-04-17
Payer: MEDICARE

## 2024-04-17 VITALS
OXYGEN SATURATION: 100 % | BODY MASS INDEX: 26.6 KG/M2 | DIASTOLIC BLOOD PRESSURE: 98 MMHG | WEIGHT: 190 LBS | HEART RATE: 98 BPM | TEMPERATURE: 97.5 F | SYSTOLIC BLOOD PRESSURE: 136 MMHG | HEIGHT: 71 IN | RESPIRATION RATE: 20 BRPM

## 2024-04-17 LAB
ANION GAP SERPL CALC-SCNC: 4 MMOL/L (ref 2–11)
BASOPHILS # BLD: 0 K/UL (ref 0–0.2)
BASOPHILS NFR BLD: 1 % (ref 0–2)
BUN SERPL-MCNC: 9 MG/DL (ref 8–23)
CALCIUM SERPL-MCNC: 8 MG/DL (ref 8.3–10.4)
CHLORIDE SERPL-SCNC: 112 MMOL/L (ref 103–113)
CO2 SERPL-SCNC: 22 MMOL/L (ref 21–32)
CREAT SERPL-MCNC: 1.1 MG/DL (ref 0.8–1.5)
DIFFERENTIAL METHOD BLD: ABNORMAL
EOSINOPHIL # BLD: 0.3 K/UL (ref 0–0.8)
EOSINOPHIL NFR BLD: 5 % (ref 0.5–7.8)
ERYTHROCYTE [DISTWIDTH] IN BLOOD BY AUTOMATED COUNT: 14.7 % (ref 11.9–14.6)
GLUCOSE SERPL-MCNC: 95 MG/DL (ref 65–100)
HCT VFR BLD AUTO: 31.4 % (ref 41.1–50.3)
HCT VFR BLD AUTO: 34.6 % (ref 41.1–50.3)
HGB BLD-MCNC: 10.4 G/DL (ref 13.6–17.2)
HGB BLD-MCNC: 9.5 G/DL (ref 13.6–17.2)
IMM GRANULOCYTES # BLD AUTO: 0 K/UL (ref 0–0.5)
IMM GRANULOCYTES NFR BLD AUTO: 0 % (ref 0–5)
LYMPHOCYTES # BLD: 1.1 K/UL (ref 0.5–4.6)
LYMPHOCYTES NFR BLD: 19 % (ref 13–44)
MCH RBC QN AUTO: 25.3 PG (ref 26.1–32.9)
MCHC RBC AUTO-ENTMCNC: 30.3 G/DL (ref 31.4–35)
MCV RBC AUTO: 83.7 FL (ref 82–102)
MM INDURATION, POC: 0 MM (ref 0–5)
MONOCYTES # BLD: 0.4 K/UL (ref 0.1–1.3)
MONOCYTES NFR BLD: 8 % (ref 4–12)
NEUTS SEG # BLD: 3.6 K/UL (ref 1.7–8.2)
NEUTS SEG NFR BLD: 67 % (ref 43–78)
NRBC # BLD: 0 K/UL (ref 0–0.2)
PLATELET # BLD AUTO: 151 K/UL (ref 150–450)
PMV BLD AUTO: 9.4 FL (ref 9.4–12.3)
POTASSIUM SERPL-SCNC: 3.7 MMOL/L (ref 3.5–5.1)
PPD, POC: NEGATIVE
RBC # BLD AUTO: 3.75 M/UL (ref 4.23–5.6)
SODIUM SERPL-SCNC: 138 MMOL/L (ref 136–146)
WBC # BLD AUTO: 5.4 K/UL (ref 4.3–11.1)

## 2024-04-17 PROCEDURE — 85025 COMPLETE CBC W/AUTO DIFF WBC: CPT

## 2024-04-17 PROCEDURE — 36415 COLL VENOUS BLD VENIPUNCTURE: CPT

## 2024-04-17 PROCEDURE — 80048 BASIC METABOLIC PNL TOTAL CA: CPT

## 2024-04-17 PROCEDURE — 2580000003 HC RX 258: Performed by: FAMILY MEDICINE

## 2024-04-17 PROCEDURE — 6370000000 HC RX 637 (ALT 250 FOR IP): Performed by: INTERNAL MEDICINE

## 2024-04-17 PROCEDURE — 85014 HEMATOCRIT: CPT

## 2024-04-17 PROCEDURE — 76937 US GUIDE VASCULAR ACCESS: CPT

## 2024-04-17 PROCEDURE — 85018 HEMOGLOBIN: CPT

## 2024-04-17 PROCEDURE — 6370000000 HC RX 637 (ALT 250 FOR IP): Performed by: FAMILY MEDICINE

## 2024-04-17 RX ORDER — QUETIAPINE FUMARATE 25 MG/1
25 TABLET, FILM COATED ORAL NIGHTLY
Status: DISCONTINUED | OUTPATIENT
Start: 2024-04-17 | End: 2024-04-17 | Stop reason: HOSPADM

## 2024-04-17 RX ORDER — QUETIAPINE FUMARATE 25 MG/1
25 TABLET, FILM COATED ORAL ONCE
Status: COMPLETED | OUTPATIENT
Start: 2024-04-17 | End: 2024-04-17

## 2024-04-17 RX ADMIN — SODIUM CHLORIDE, PRESERVATIVE FREE 10 ML: 5 INJECTION INTRAVENOUS at 09:26

## 2024-04-17 RX ADMIN — METOPROLOL SUCCINATE 25 MG: 25 TABLET, EXTENDED RELEASE ORAL at 09:24

## 2024-04-17 RX ADMIN — APIXABAN 5 MG: 5 TABLET, FILM COATED ORAL at 09:24

## 2024-04-17 RX ADMIN — ASPIRIN 81 MG: 81 TABLET, COATED ORAL at 09:24

## 2024-04-17 RX ADMIN — QUETIAPINE FUMARATE 25 MG: 25 TABLET ORAL at 06:13

## 2024-04-17 RX ADMIN — LEVOTHYROXINE SODIUM 75 MCG: 0.07 TABLET ORAL at 05:46

## 2024-04-17 RX ADMIN — PREDNISONE 5 MG: 5 TABLET ORAL at 09:24

## 2024-04-17 NOTE — PROGRESS NOTES
US Guided PIV access    Called for assistance with IV access. Ultrasound was used to find the vein which was compressible and does not have any features of an artery or nerve bundle. Skin was cleaned and disinfected prior to IV puncture. Under real-time ultrasound guidance, peripheral access was obtained in the left forearm using 20 G 1.75\" Peripheral IV catheter x 1 attempt. Blood return was present and IV flushed without difficulty. IV dressing applied, no immediate complications noted, and patient tolerated the procedure well.

## 2024-04-17 NOTE — DISCHARGE SUMMARY
Hospitalist Discharge Summary   Admit Date:  4/15/2024  3:10 PM   DC Note date: 2024  Name:  Neto Edouard Jr.   Age:  89 y.o.  Sex:  male  :  1935   MRN:  [id number]   Room:  [facility]/  PCP:  Guillermina Cerda PA-C    Presenting Complaint: Fall and Diarrhea     Initial Admission Diagnosis: Orthostatic hypotension [I95.1]  Dehydration [E86.0]  Hypotension [I95.9]  Orthostatic syncope [I95.1]  Elevated lactic acid level [R79.89]  Diarrhea, unspecified type [R19.7]     Problem List for this Hospitalization (present on admission):    Principal Problem:    Hypotension  Active Problems:    Pacemaker    Chronic renal disease, stage III ([facility]) [090141]    S/P CABG (coronary artery bypass graft)    Systolic CHF, chronic ([facility])    NICM (nonischemic cardiomyopathy) ([facility])    Hypothyroidism    History of CVA (cerebrovascular accident)    Coronary artery disease involving native coronary artery of native heart with angina pectoris ([facility])    Current chronic use of systemic steroids    Essential hypertension    Dementia ([facility])    Diarrhea    Generalized weakness    Acute kidney injury superimposed on CKD ([facility])  Resolved Problems:    * No resolved hospital problems. *      Hospital Course:    Mr. Edouard is a 88 yo male with PMH of dementia, afib on eliquis, HFrEF s/p PPM, CKD3, CVA, pituitary tumor on daily prednisone, admitted with BRENNAN in the setting of diarrhea. He has been orthostatic. He has been rehydrated and renal function improved. His blood pressure is stable on home dose toprol. In review of  his recent cardiology notes, he is on aldactone though this was not continued this admit. He is orthostatic on standing and has had several recent falls. I have discussed with his wife that he can have close PCP radiology followup to discuss his medications. Currently he is continued on eliquis and toprol. She is aware of the fall risk and risk of bleeding while on eliquis. We discussed oral rehydration. Diarrhea since resolved.  (36.6 °C) 75 21 (!) 85/45 99 %   04/16/24 1540 97.7 °F (36.5 °C) 75 19 121/80 98 %   04/16/24 1154 98.4 °F (36.9 °C) 74 19 126/71 99 %       Oxygen Therapy  SpO2: 100 %  Pulse via Oximetry: 75 beats per minute  O2 Device: None (Room air)    Estimated body mass index is 26.5 kg/m² as calculated from the following:    Height as of this encounter: 1.803 m (5' 11\").    Weight as of this encounter: 86.2 kg (190 lb).    Intake/Output Summary (Last 24 hours) at 4/17/2024 1058  Last data filed at 4/17/2024 0800  Gross per 24 hour   Intake 2669.92 ml   Output 800 ml   Net 1869.92 ml         Physical Exam:    General:    Well nourished.  No overt distress  CV:   RRR.  No m/r/g.  No JVD, no edema   Lungs:   CTAB.  No wheezing, rhonchi, or rales.  Respirations even, unlabored  Abdomen:   Soft, nontender, nondistended.    Extremities: Warm and dry.   No edema.    Skin:     No rashes.  Normal coloration  Neuro:  grossly intact though.confused   Psych:  Normal mood and affect.    Signed:  Molly Barker MD    Part of this note may have been written by using a voice dictation software.  The note has been proof read but may still contain some grammatical/other typographical errors.

## 2024-04-17 NOTE — DISCHARGE INSTRUCTIONS
Diarrhea: Care Instructions  Overview     Diarrhea is loose, watery stools (bowel movements). The exact cause is often hard to find. Sometimes diarrhea is your body's way of getting rid of what caused an upset stomach. Viruses, food poisoning, and many medicines can cause diarrhea. Some people get diarrhea in response to emotional stress, anxiety, or certain foods.  Almost everyone has diarrhea now and then. It usually isn't serious, and your stools will return to normal soon. The important thing to do is replace the fluids you have lost, so you can prevent dehydration.  The doctor has checked you carefully, but problems can develop later. If you notice any problems or new symptoms, get medical treatment right away.  Follow-up care is a key part of your treatment and safety. Be sure to make and go to all appointments, and call your doctor if you are having problems. It's also a good idea to know your test results and keep a list of the medicines you take.  How can you care for yourself at home?  Watch for signs of dehydration, which means your body has lost too much water. Dehydration is a serious condition and should be treated right away. Signs of dehydration are:  Increasing thirst and dry eyes and mouth.  Feeling faint or lightheaded.  A smaller amount of urine than normal.  To prevent dehydration, drink plenty of fluids. Choose water and other clear liquids until you feel better. If you have kidney, heart, or liver disease and have to limit fluids, talk with your doctor before you increase the amount of fluids you drink.  When you feel like eating, start with small amounts of food.  The doctor may recommend that you take over-the-counter medicine, such as loperamide (Imodium). Read and follow all instructions on the label. Do not use this medicine if you have bloody diarrhea, a high fever, or other signs of serious illness. Call your doctor if you think you are having a problem with your medicine.  When  is not under control can cause your blood pressure to drop. And so can a severe allergic reaction or infection. Another cause is dehydration, which is when your body loses too much fluid.  Treatment for low blood pressure depends on the cause.  Follow-up care is a key part of your treatment and safety. Be sure to make and go to all appointments, and call your doctor if you are having problems. It's also a good idea to know your test results and keep a list of the medicines you take.  How can you care for yourself at home?  Be safe with medicines. Call your doctor if you think you are having a problem with your medicine. You will get more details on the specific medicines your doctor prescribes.  If you feel dizzy or lightheaded, sit down or lie down for a few minutes. Or you can sit down and put your head between your knees. This will help your blood pressure go back to normal and help your symptoms go away.  Follow your doctor's suggestions for ways to prevent symptoms like dizziness. These suggestions may include:  Get up slowly from bed or after sitting for a long time. If you are in bed, roll to your side and swing your legs over the edge of the bed and onto the floor. Push your body up to a sitting position. Wait for a while before you slowly stand up.  Add more salt to your diet, if your doctor recommends it.  Drink plenty of fluids. Choose water and other clear liquids. If you have kidney, heart, or liver disease and have to limit fluids, talk with your doctor before you increase the amount of fluids you drink.  Avoid or limit alcohol to 2 drinks a day for men and 1 drink a day for women. Alcohol may interfere with your medicine. In addition, alcohol can make your low blood pressure worse by causing your body to lose water.  Wear compression stockings to help improve blood flow.  When should you call for help?   Call 911 anytime you think you may need emergency care. For example, call if:    You passed out

## 2024-04-17 NOTE — PROGRESS NOTES
END OF SHIFT NOTE:    INTAKE/OUTPUT  04/16 0701 - 04/17 0700  In: 2909.9 [P.O.:720; I.V.:2189.9]  Out: 600 [Urine:600]  Voiding: Yes  Catheter: No  Drain:   External Urinary Catheter (Active)   Site Assessment Clean,dry & intact 04/16/24 2000   Placement Initiated 04/16/24 0222   Catheter Care Catheter/Wick replaced;Suction Canister/Tubing changed 04/16/24 0222   Perineal Care Yes 04/16/24 0222   Suction 40 mmgHg continuous 04/16/24 0222   Urine Color Patricia 04/16/24 0222   Urine Appearance Clear 04/16/24 0222   Output (mL) 250 mL 04/17/24 0249               Flatus: Patient does have flatus present.    Stool:  occurrences.    Characteristics:  Stool Appearance: Soft  Stool Color: Brown  Stool Amount: Medium  Stool Assessment  Incontinence: Yes  Stool Appearance: Soft  Stool Color: Brown  Stool Amount: Medium  Stool Source: Rectum  Last BM (including prior to admit): 04/16/24    Emesis:  occurrences.    Characteristics:        VITAL SIGNS  Patient Vitals for the past 12 hrs:   Temp Pulse Resp BP SpO2   04/17/24 0249 98 °F (36.7 °C) 75 19 137/79 100 %   04/16/24 2235 97.5 °F (36.4 °C) 56 18 124/66 95 %   04/16/24 1952 -- 100 -- 94/72 --   04/16/24 1924 97.9 °F (36.6 °C) 75 21 (!) 85/45 99 %       Pain Assessment             Ambulating  No    Shift report given to oncoming nurse at the bedside.    Mariluz Yusuf, RN

## 2024-04-17 NOTE — PROGRESS NOTES
Hospitalist Progress Note   Admit Date:  4/15/2024  3:10 PM   Name:  Neto Edouard Jr.   Age:  89 y.o.  Sex:  male  :  1935   MRN:  [id number]   Room:  218/01    Presenting/Chief Complaint: Fall and Diarrhea     Reason(s) for Admission: Orthostatic hypotension [I95.1]  Dehydration [E86.0]  Hypotension [I95.9]  Orthostatic syncope [I95.1]  Elevated lactic acid level [R79.89]  Diarrhea, unspecified type [R19.7]     Hospital Course:       Mr. Edouard is a 89 yo male with PMH of dementia, afib on eliquis, HFrEF s/p PPM, CKD3, CVA, pituitary tumor on daily prednisone, admitted with BRENNAN in the setting of diarrhea. He has been orthostatic. He has been rehydrated and renal function improved. His blood pressure is stable on home dose toprol. In review of  his recent cardiology notes, he is on aldactone though this was not continued this admit. He is orthostatic on standing and has had several recent falls. I have discussed with his wife that he can have close PCP radiology followup to discuss his medications. Currently he is continued on eliquis and toprol. She is aware of the fall risk and risk of bleeding while on eliquis. We discussed oral rehydration. Diarrhea since resolved. He has home compression hose and she will try to get him to use those. He did meet criteria per therapies for STR though he did develop some agitation and his wife feels he will do better at home. She will have home health and therapies provided. She also has family support. He has a mild stable anemia, HGB is 10.4 and can have PCP followup.        Subjective & 24hr Events:       RN messaged with increased patient agitation  Climbing out of bed  Kicked student nurse   Arrived to bedside with nursing student present  He was calm and confused though interactive     I have updated his wife by phone, she plans to arrive shortly and would prefer to take him home if possible due to feeling he will do better there with mentation than  1.1 0.5 - 4.6 K/UL    Monocytes Absolute 0.4 0.1 - 1.3 K/UL    Eosinophils Absolute 0.3 0.0 - 0.8 K/UL    Basophils Absolute 0.0 0.0 - 0.2 K/UL    Immature Granulocytes Absolute 0.0 0.0 - 0.5 K/UL   Basic Metabolic Panel w/ Reflex to MG    Collection Time: 04/17/24  4:03 AM   Result Value Ref Range    Sodium 138 136 - 146 mmol/L    Potassium 3.7 3.5 - 5.1 mmol/L    Chloride 112 103 - 113 mmol/L    CO2 22 21 - 32 mmol/L    Anion Gap 4 2 - 11 mmol/L    Glucose 95 65 - 100 mg/dL    BUN 9 8 - 23 MG/DL    Creatinine 1.10 0.8 - 1.5 MG/DL    Est, Glom Filt Rate 65 >60 ml/min/1.73m2    Calcium 8.0 (L) 8.3 - 10.4 MG/DL       No results for input(s): \"COVID19\" in the last 72 hours.    Current Meds:  Current Facility-Administered Medications   Medication Dose Route Frequency    apixaban (ELIQUIS) tablet 5 mg  5 mg Oral BID    aspirin EC tablet 81 mg  81 mg Oral Daily    atorvastatin (LIPITOR) tablet 80 mg  80 mg Oral QHS    levothyroxine (SYNTHROID) tablet 75 mcg  75 mcg Oral Daily    metoprolol succinate (TOPROL XL) extended release tablet 25 mg  25 mg Oral Daily    predniSONE (DELTASONE) tablet 5 mg  5 mg Oral Daily with breakfast    sodium chloride flush 0.9 % injection 5-40 mL  5-40 mL IntraVENous 2 times per day    sodium chloride flush 0.9 % injection 5-40 mL  5-40 mL IntraVENous PRN    0.9 % sodium chloride infusion   IntraVENous PRN    ondansetron (ZOFRAN-ODT) disintegrating tablet 4 mg  4 mg Oral Q8H PRN    Or    ondansetron (ZOFRAN) injection 4 mg  4 mg IntraVENous Q6H PRN    polyethylene glycol (GLYCOLAX) packet 17 g  17 g Oral Daily PRN    acetaminophen (TYLENOL) tablet 650 mg  650 mg Oral Q6H PRN    Or    acetaminophen (TYLENOL) suppository 650 mg  650 mg Rectal Q6H PRN    0.9 % sodium chloride infusion   IntraVENous Continuous       Signed:  Molly Barker MD    Part of this note may have been written by using a voice dictation software.  The note has been proof read but may still contain some  grammatical/other typographical errors.   n/a

## 2024-04-17 NOTE — PROGRESS NOTES
Pt discharged from unit with belongings. Accompanied by EMS personnel. Pt transported downstairs via stretcher. No distress noted at discharge.

## 2024-04-17 NOTE — CARE COORDINATION
Update: 1125 Patient with orders, patient family requests transport home.  RNCM set up transport via [employer] for 1330.    Patient has met all treatment goals and milestones for discharge. CM following until patient is discharged.     Therapy recommends SNF placement for patient.  RNCM rounded at patient's bedside to discuss discharge options.  Spouse is at bedside.  She is declining SNF placement at this time, stating that she feels like patient would do better if he comes back home.  RNCM offered home health care services and she is agreeable at this time.  They have used [facility] in the past and voices preference for their services.  RNCM made referral for  SN, PT, OT, MSW services.    ASSESSMENT NOTE    Attending Physician: Molly Barker MD  Admit Problem: Orthostatic hypotension [I95.1]  Dehydration [E86.0]  Hypotension [I95.9]  Orthostatic syncope [I95.1]  Elevated lactic acid level [R79.89]  Diarrhea, unspecified type [R19.7]  Date/Time of Admission: 4/15/2024  3:10 PM  Problem List:  Patient Active Problem List   Diagnosis    S/P CABG (coronary artery bypass graft)    UTI (urinary tract infection)    Systolic CHF, chronic ([facility])    Atrial fibrillation with RVR ([facility])    Syncope    Pacemaker    NICM (nonischemic cardiomyopathy) ([facility])    Dilated cardiomyopathy ([facility])    Hypothyroidism    History of CVA (cerebrovascular accident)    Coronary artery disease involving native coronary artery of native heart with angina pectoris ([facility])    Gram-negative bacteremia    Abdominal pain    Normocytic anemia    Sepsis secondary to UTI ([facility])    Current chronic use of systemic steroids    Essential hypertension    Persistent atrial fibrillation ([facility])    Chronic renal disease, stage III ([facility]) [877230]    Metabolic encephalopathy    Dementia ([facility])    Nausea vomiting and diarrhea    Hypotension    Diarrhea    Generalized weakness    Acute kidney injury superimposed on CKD ([facility])       Service  associated with the providers? Yes     Documentation for Discharge Appeal  Discharge Appealed by     Date notified by [facility] of appeal request:     Time notified by [facility] of appeal request:     Detailed Notice of Discharge given to:     Date Notice of Discharge given:     Time Notice of Discharge given:     Date records sent to [facility]     Time records sent to [facility]     Date Notified of Outcome     Time Notified of Outcome     Outcome of appeal           Haris Purcell RN 04/17/24 11:25 AM    36.8

## 2024-04-17 NOTE — PLAN OF CARE
Problem: Discharge Planning  Goal: Discharge to home or other facility with appropriate resources  4/17/2024 1236 by Alize Villafuerte RN  Outcome: Completed  4/17/2024 1058 by Yolande Davis RN  Outcome: Progressing  4/16/2024 2253 by Mariluz Yusuf, RN  Outcome: Progressing     Problem: Safety - Adult  Goal: Free from fall injury  4/17/2024 1236 by Alize Villafuerte RN  Outcome: Completed  4/17/2024 1058 by Yolande Davis RN  Outcome: Progressing  4/16/2024 2253 by Mariluz Yusuf RN  Outcome: Progressing

## 2024-04-17 NOTE — PLAN OF CARE
Problem: Discharge Planning  Goal: Discharge to home or other facility with appropriate resources  4/17/2024 1058 by Yolande Davis RN  Outcome: Progressing  4/16/2024 2253 by Mariluz Yusuf, RN  Outcome: Progressing     Problem: Safety - Adult  Goal: Free from fall injury  4/17/2024 1058 by Yolande Davis, ROXANA  Outcome: Progressing  4/16/2024 2253 by Mariluz Yusuf, RN  Outcome: Progressing

## 2024-04-18 ENCOUNTER — TELEPHONE (OUTPATIENT)
Dept: FAMILY MEDICINE CLINIC | Facility: CLINIC | Age: 89
End: 2024-04-18

## 2024-04-18 ENCOUNTER — CARE COORDINATION (OUTPATIENT)
Dept: CARE COORDINATION | Facility: CLINIC | Age: 89
End: 2024-04-18

## 2024-04-18 DIAGNOSIS — I95.9 HYPOTENSION, UNSPECIFIED HYPOTENSION TYPE: Primary | ICD-10-CM

## 2024-04-18 LAB
BACTERIA SPEC CULT: NORMAL
SERVICE CMNT-IMP: NORMAL

## 2024-04-18 PROCEDURE — 1111F DSCHRG MED/CURRENT MED MERGE: CPT | Performed by: PHYSICIAN ASSISTANT

## 2024-04-18 NOTE — TELEPHONE ENCOUNTER
Care Transitions Initial Follow Up Call    Outreach made within 2 business days of discharge: Yes    Patient: Neto Edouard Jr. Patient : 1935   MRN: [id number]  Reason for Admission: There are no discharge diagnoses documented for the most recent discharge.  Discharge Date: 24       Spoke with: spouse     Discharge department/facility: [facility] Interactive Patient Contact:  Was patient able to fill all prescriptions: Yes  Was patient instructed to bring all medications to the follow-up visit: Yes  Is patient taking all medications as directed in the discharge summary? Yes  Does patient understand their discharge instructions: Yes  Does patient have questions or concerns that need addressed prior to 7-14 day follow up office visit: no    Scheduled appointment with PCP within 7-14 days    Follow Up  Future Appointments   Date Time Provider Department Center   2024  1:20 PM Rosmery Mckeon APRN - NP [facility]   2024  2:45 PM Christos Purcell DO [facility]       Judi Arrington LPN

## 2024-04-18 NOTE — CARE COORDINATION
Care Transitions Initial Follow Up Call    Call within 2 business days of discharge: Yes    Patient Current Location:  Home: [address]  [place] [facility] [facility] [address]    Care Transition Nurse contacted the spouse/partner by telephone to perform post hospital discharge assessment. Verified name and  with spouse/partner as identifiers. Provided introduction to self, and explanation of the Care Transition Nurse role.     Patient: Neto Edouard Jr. Patient : 1935   MRN: [id number]  Reason for Admission: Hypotension  Discharge Date: 24 RARS: Readmission Risk Score: 18.2      Last Discharge Facility       Date Complaint Diagnosis Description Type Department Provider    4/15/24 Fall; Diarrhea Diarrhea, unspecified type ... ED to Hosp-Admission (Discharged) (ADMITTED) Molly Posey MD; Nadya...            Was this an external facility discharge? No Discharge Facility: [facility]    Challenges to be reviewed by the provider   Additional needs identified to be addressed with provider: No  none               Method of communication with provider: none.      Care Transition Nurse reviewed discharge instructions, medical action plan, and red flags with spouse/partner who verbalized understanding. The spouse/partner was given an opportunity to ask questions and does not have any further questions or concerns at this time. Were discharge instructions available to patient? Yes. Reviewed appropriate site of care based on symptoms and resources available to patient including: PCP  Specialist  Urgent care clinics  [facility]. The spouse/partner agrees to contact the PCP office for questions related to their healthcare.     Advance Care Planning:   Does patient have an Advance Directive: decision maker updated.    Medication reconciliation was performed with spouse/partner, who verbalizes understanding of administration of home medications. Medications reviewed, 1111F entered: yes    Was patient discharged with a

## 2024-04-19 ENCOUNTER — HOME CARE VISIT (OUTPATIENT)
Dept: SCHEDULING | Facility: HOME HEALTH | Age: 89
End: 2024-04-19

## 2024-04-19 VITALS
DIASTOLIC BLOOD PRESSURE: 65 MMHG | HEART RATE: 71 BPM | RESPIRATION RATE: 18 BRPM | TEMPERATURE: 98 F | BODY MASS INDEX: 25.9 KG/M2 | OXYGEN SATURATION: 100 % | HEIGHT: 71 IN | SYSTOLIC BLOOD PRESSURE: 121 MMHG | WEIGHT: 185 LBS

## 2024-04-19 PROCEDURE — G0299 HHS/HOSPICE OF RN EA 15 MIN: HCPCS

## 2024-04-19 PROCEDURE — 0221000100 HH NO PAY CLAIM PROCEDURE

## 2024-04-19 ASSESSMENT — ENCOUNTER SYMPTOMS: DYSPNEA ACTIVITY LEVEL: AFTER AMBULATING 10 - 20 FT

## 2024-04-22 ENCOUNTER — HOME CARE VISIT (OUTPATIENT)
Dept: SCHEDULING | Facility: HOME HEALTH | Age: 89
End: 2024-04-22

## 2024-04-22 PROCEDURE — G0151 HHCP-SERV OF PT,EA 15 MIN: HCPCS

## 2024-04-22 NOTE — PROGRESS NOTES
Physician Progress Note      PATIENT:               YELENA CASTRO  CSN #:                  [id number]  :                       1935  ADMIT DATE:       4/15/2024 3:10 PM  DISCH DATE:        2024 1:37 PM  RESPONDING  PROVIDER #:        Molly Barker MD        QUERY TEXT:    Type of Encephalopathy: Please provide further specificity, if known.    Clinical indicators include: chronic renal disease, ckd, acute, encephalopathy  Options provided:  -- Anoxic/hypoxic encephalopathy  -- Metabolic encephalopathy  -- Toxic encephalopathy  -- Hepatic encephalopathy  -- Hypertensive encephalopathy  -- Other - I will add my own diagnosis  -- Disagree - Not applicable / Not valid  -- Disagree - Clinically Unable to determine / Unknown        PROVIDER RESPONSE TEXT:    The patient has metabolic encephalopathy.          QUERY TEXT:    Patient admitted with dehydrated , diarrhea and Orthostatic hypotension .    Noted documentation of Acute Kidney Injury superimposed on CKD3 on H+P  and ED   MD .  In order to support the diagnosis of BRENNAN, please include additional   clinical indicators in your documentation.? Or please document if the   diagnosis of BRENNAN has been ruled out after further study.    The medical record reflects the following:    Risk Factors: CKD stage III    Clinical Indicators: H+P  Acute kidney injury superimposed on CKD3 ,   Orthostatic syncope ,We gave the patient fluids and then attempted orthostatic   but he had a syncopal event from orthostatic hypotension.      Treatment: 1000cc  IVLR bolus in ED then IVNS at 75cc/hr ,Labs monitored    Defined by Kidney Disease Improving Global Outcomes (KDIGO) clinical practice   guideline for acute kidney injury:  -Increase in SCr by greater than or equal to 0.3 mg/dl within 48 hours; or  -Increase or decrease in SCr to greater than or equal to 1.5 times baseline,   which is known or presumed to have occurred within the prior 7 days; or  -Urine volume <

## 2024-04-23 ENCOUNTER — HOME CARE VISIT (OUTPATIENT)
Dept: SCHEDULING | Facility: HOME HEALTH | Age: 89
End: 2024-04-23

## 2024-04-23 VITALS
TEMPERATURE: 97.9 F | HEART RATE: 77 BPM | DIASTOLIC BLOOD PRESSURE: 72 MMHG | OXYGEN SATURATION: 98 % | SYSTOLIC BLOOD PRESSURE: 114 MMHG | RESPIRATION RATE: 16 BRPM

## 2024-04-23 PROCEDURE — G0299 HHS/HOSPICE OF RN EA 15 MIN: HCPCS

## 2024-04-23 ASSESSMENT — ENCOUNTER SYMPTOMS
STOOL DESCRIPTION: FORMED
DYSPNEA ACTIVITY LEVEL: AFTER AMBULATING MORE THAN 20 FT

## 2024-04-24 ENCOUNTER — HOME CARE VISIT (OUTPATIENT)
Dept: SCHEDULING | Facility: HOME HEALTH | Age: 89
End: 2024-04-24

## 2024-04-24 ENCOUNTER — CARE COORDINATION (OUTPATIENT)
Dept: CARE COORDINATION | Facility: CLINIC | Age: 89
End: 2024-04-24

## 2024-04-24 VITALS
RESPIRATION RATE: 16 BRPM | DIASTOLIC BLOOD PRESSURE: 60 MMHG | HEART RATE: 105 BPM | SYSTOLIC BLOOD PRESSURE: 116 MMHG | OXYGEN SATURATION: 98 % | TEMPERATURE: 98 F

## 2024-04-24 PROCEDURE — G0151 HHCP-SERV OF PT,EA 15 MIN: HCPCS

## 2024-04-24 NOTE — CARE COORDINATION
Care Transitions Outreach Attempt    Call within 2 business days of discharge: Yes   Attempted to reach patient for transitions of care follow up. Unable to reach patient.  Patient voicemail is not set up.  Will continue to outreach per protocol.    Patient: Neto Edouard Jr. Patient : 1935 MRN: [id number]    Last Discharge Facility       Date Complaint Diagnosis Description Type Department Provider    4/15/24 Fall; Diarrhea Diarrhea, unspecified type ... ED to Hosp-Admission (Discharged) (ADMITTED) [id number] Fisher MD; Nadya...              Was this an external facility discharge? No Discharge Facility Name: [facility]    Noted following upcoming appointments from discharge chart review:   [facility] follow up appointment(s):   Future Appointments   Date Time Provider Department Center   2024  3:30 AM Kathya Castro RN [facility]   2024 To Be Determined Corey Angeles OT [facility]   2024 To Be Determined Melissa Muñiz MSW [facility]   2024 To Be Determined Kathya Castro RN [facility]   5/3/2024 To Be Determined Kathya Castro RN [facility]   2024 To Be Determined Kathya Castro RN [facility]   2024  2:45 PM Christos Purcell DO [facility]     Non-BS  follow up appointment(s): n/a

## 2024-04-25 ENCOUNTER — HOME CARE VISIT (OUTPATIENT)
Dept: SCHEDULING | Facility: HOME HEALTH | Age: 89
End: 2024-04-25

## 2024-04-25 ENCOUNTER — HOME CARE VISIT (OUTPATIENT)
Dept: HOME HEALTH SERVICES | Facility: HOME HEALTH | Age: 89
End: 2024-04-25

## 2024-04-25 VITALS
RESPIRATION RATE: 16 BRPM | DIASTOLIC BLOOD PRESSURE: 84 MMHG | OXYGEN SATURATION: 99 % | SYSTOLIC BLOOD PRESSURE: 134 MMHG | HEART RATE: 75 BPM | TEMPERATURE: 98.1 F

## 2024-04-25 PROCEDURE — G0299 HHS/HOSPICE OF RN EA 15 MIN: HCPCS

## 2024-04-25 ASSESSMENT — ENCOUNTER SYMPTOMS: STOOL DESCRIPTION: FORMED

## 2024-04-29 ENCOUNTER — HOME CARE VISIT (OUTPATIENT)
Dept: SCHEDULING | Facility: HOME HEALTH | Age: 89
End: 2024-04-29

## 2024-04-29 VITALS
DIASTOLIC BLOOD PRESSURE: 70 MMHG | SYSTOLIC BLOOD PRESSURE: 126 MMHG | HEART RATE: 76 BPM | TEMPERATURE: 97.8 F | OXYGEN SATURATION: 99 % | RESPIRATION RATE: 16 BRPM

## 2024-04-29 PROCEDURE — G0152 HHCP-SERV OF OT,EA 15 MIN: HCPCS

## 2024-04-29 ASSESSMENT — ENCOUNTER SYMPTOMS: DOUBLE VISION: 0

## 2024-04-30 ENCOUNTER — HOME CARE VISIT (OUTPATIENT)
Dept: SCHEDULING | Facility: HOME HEALTH | Age: 89
End: 2024-04-30

## 2024-04-30 VITALS
DIASTOLIC BLOOD PRESSURE: 68 MMHG | HEART RATE: 64 BPM | TEMPERATURE: 97.8 F | SYSTOLIC BLOOD PRESSURE: 112 MMHG | RESPIRATION RATE: 16 BRPM | OXYGEN SATURATION: 99 %

## 2024-04-30 PROCEDURE — G0299 HHS/HOSPICE OF RN EA 15 MIN: HCPCS

## 2024-04-30 ASSESSMENT — ENCOUNTER SYMPTOMS
STOOL DESCRIPTION: FORMED
DYSPNEA ACTIVITY LEVEL: AFTER AMBULATING MORE THAN 20 FT

## 2024-05-01 ENCOUNTER — CARE COORDINATION (OUTPATIENT)
Dept: CARE COORDINATION | Facility: CLINIC | Age: 89
End: 2024-05-01

## 2024-05-01 NOTE — CARE COORDINATION
support self-management-medical conditions  Reviewed fall precautions with spouse and advised patient not stand up suddenly without assistance. Verbalized understanding.    Goals Addressed                   This Visit's Progress     Returns to baseline activity level.   On track     Patient/Family able to obtain medicine after d/c     Patient/Family able to verbalize medicine changes     Patient/Family aware and attends follow up appointments s/p d/c.     Patient/Family agrees to notify provider of any barriers to plan of care.    Patient/Family agrees to notify provider of any symptoms that indicate a worsening of condition    Patient/Family agrees to monitor and record weights daily and report a gain of 2 lbs in a day or 5 lbs in a week to MD for review.    Patient/Family agrees to monitor and record BP daily for MD review.                 Care Transitions Subsequent and Final Call    Subsequent and Final Calls  Do you have any ongoing symptoms?: No  Have your medications changed?: No  Do you have any questions related to your medications?: No  Do you currently have any active services?: Yes  Are you currently active with any services?: Home Health  Do you have any needs or concerns that I can assist you with?: No  Identified Barriers: None  Care Transitions Interventions  Other Interventions:             LPN Care Coordinator provided contact information for future needs. Plan for follow-up call in 7-10 days based on severity of symptoms and risk factors.  Plan for next call: symptom management    Lissett Goyal LPN

## 2024-05-03 ENCOUNTER — HOME CARE VISIT (OUTPATIENT)
Dept: SCHEDULING | Facility: HOME HEALTH | Age: 89
End: 2024-05-03

## 2024-05-03 VITALS
DIASTOLIC BLOOD PRESSURE: 62 MMHG | HEART RATE: 78 BPM | RESPIRATION RATE: 16 BRPM | TEMPERATURE: 98.2 F | SYSTOLIC BLOOD PRESSURE: 130 MMHG | OXYGEN SATURATION: 98 %

## 2024-05-03 PROCEDURE — G0299 HHS/HOSPICE OF RN EA 15 MIN: HCPCS

## 2024-05-03 ASSESSMENT — ENCOUNTER SYMPTOMS: STOOL DESCRIPTION: FORMED

## 2024-05-03 NOTE — PROGRESS NOTES
Physician Progress Note      PATIENT:               YELENA CASTRO  CSN #:                  [id number]  :                       1935  ADMIT DATE:       4/15/2024 3:10 PM  DISCH DATE:        2024 1:37 PM  RESPONDING  PROVIDER #:        Molly Barker MD          QUERY TEXT:    Patient admitted with Orthostatic sncope  ,Generalized weakness . noted to   have elevated lactic acid   , Diarrhea . If possible, please document in   progress notes and discharge summary after study the etiology of the syncope:    The medical record reflects the following:  Risk Factors:  is a 89 y.o. male with pmh of AICD congestive heart failure,   A-fib, coronary disease with history of coronary bypass surgery, dementia,   CKD stage III, history of CVA history of previous surgery on chronic   prednisone.    Clinical Indicators:- -dc sum-- He is orthostatic on standing and has had   several recent falls.  In review of  his recent cardiology notes, he is on aldactone though this was   not continued this admit. discussed oral rehydration. Diarrhea since resolved     IM PN Hypotension probably secondary dehydration c/o Diarrhea  History of cardiomyopathy, pacemaker/AICD/history of A-fib    Last echo with normal EF ,Eliquis 2.5 mg p.o. twice daily, aspirin 81 mg p.o.   daily, atorvastatin 80 mg p.o. nightly Metoprolol 25 mg p.o. daily    Treatment: IV fluids normal saline at 75 cc/h ,labs/VS/Tele    Jett Martinez RN  CRCR  CDI    [phone] [facility]/Will  email  [email or web address]  Options provided:  -- Syncope due to orthostatic hypotension due  to Dehydration  -- Other - I will add my own diagnosis  -- Disagree - Not applicable / Not valid  -- Disagree - Clinically unable to determine / Unknown  -- Refer to Clinical Documentation Reviewer    PROVIDER RESPONSE TEXT:    The syncope is due to orthostatic hypotension due to Dehydration.    Query created by: Jett Martinez on 2024 3:17

## 2024-05-07 ENCOUNTER — HOME CARE VISIT (OUTPATIENT)
Dept: SCHEDULING | Facility: HOME HEALTH | Age: 89
End: 2024-05-07
Payer: MEDICARE

## 2024-05-07 VITALS
TEMPERATURE: 98 F | DIASTOLIC BLOOD PRESSURE: 70 MMHG | HEART RATE: 61 BPM | OXYGEN SATURATION: 100 % | RESPIRATION RATE: 16 BRPM | SYSTOLIC BLOOD PRESSURE: 122 MMHG

## 2024-05-07 PROCEDURE — G0299 HHS/HOSPICE OF RN EA 15 MIN: HCPCS

## 2024-05-07 ASSESSMENT — ENCOUNTER SYMPTOMS
STOOL DESCRIPTION: FORMED
DYSPNEA ACTIVITY LEVEL: AFTER AMBULATING 10 - 20 FT

## 2024-05-08 ENCOUNTER — CARE COORDINATION (OUTPATIENT)
Dept: CARE COORDINATION | Facility: CLINIC | Age: 89
End: 2024-05-08

## 2024-05-08 NOTE — CARE COORDINATION
Care Transitions Follow Up Call    Patient Current Location:  Home: [address]  [place] [address]    Care Transition Nurse contacted the patient by telephone to follow up after admission on 4/15/2024.  Verified name and  with patient as identifiers.    Patient: Neto Edouard Jr.  Patient : 1935   MRN: [id number]  Reason for Admission: hypotension  Discharge Date: 24 RARS: Readmission Risk Score: 18.2      Needs to be reviewed by the provider   Additional needs identified to be addressed with provider: No  none             Method of communication with provider: none.    Addressed changes since last contact:  Patient reports he is feeling fine with no issues at time of call. BP-124/74. Discharged from  2024. Agrees to outreach to CTN with any issues/concerns prior to next outreach.  Discussed follow-up appointments. If no appointment was previously scheduled, appointment scheduling offered: Yes.   Is follow up appointment scheduled within 7 days of discharge? Patient canceled PCP hospital follow up appointment. Discussed the importance of PCP follow up.     Follow Up  Future Appointments   Date Time Provider Department Center   2024  2:45 PM Christos Purcell DO [facility]     External follow up appointment(s): [facility]    Care Transition Nurse reviewed medical action plan and red flags with patient and discussed any barriers to care and/or understanding of plan of care after discharge. Discussed appropriate site of care based on symptoms and resources available to patient including: PCP  Specialist  Urgent care clinics. The patient agrees to contact the PCP office for questions related to their healthcare.     Advance Care Planning:   decision maker updated.     Patients top risk factors for readmission: Hypotension,  Systolic CHF, chronic, Dilated Cardiomyopathy, Afib with RVR, Pacemaker, Essential HTN, CAD involving native heart with angina pectoris

## 2024-05-17 ENCOUNTER — CARE COORDINATION (OUTPATIENT)
Dept: CARE COORDINATION | Facility: CLINIC | Age: 89
End: 2024-05-17

## 2024-05-17 NOTE — CARE COORDINATION
Care Transitions Outreach Attempt    Call within 2 business days of discharge: Yes   Attempted to reach patient for transitions of care follow up. Unable to reach patient.  Voicemail is not set up.  Program closed as it is scheduled to close tomorrow, .   Patient is not enrolled in [facility] [employer].  Patient is scheduled with Dr. Purcell upcoming on 24.    Patient: Neto Edouard Jr. Patient : 1935 MRN: [id number]    Last Discharge Facility       Date Complaint Diagnosis Description Type Department Provider    4/15/24 Fall; Diarrhea Diarrhea, unspecified type ... ED to Hosp-Admission (Discharged) (ADMITTED) [id number] Luna MD; Nadya,...              Was this an external facility discharge? No Discharge Facility Name: [facility]    Noted following upcoming appointments from discharge chart review:   BS follow up appointment(s):   Future Appointments   Date Time Provider Department Center   2024  2:45 PM Christos Purcell DO [facility]     Non-[facility]  follow up appointment(s): n/a      Mother notified of below provider information.  1. Doxycycline 75 mg ordered through [facility]  2. OTC adapalene 0.1% gel as 0.3% is on backorder  3. Clindamycin solution ordered through [facility]  She is going to call [facility] tonight to register.  Patient' smother agreeable, verbalized understanding and has no further questions at this time.  Patient's mother instructed to call clinic with any changes to condition or questions.

## 2024-06-20 RX ORDER — APIXABAN 5 MG/1
TABLET, FILM COATED ORAL
Qty: 180 TABLET | Refills: 3 | Status: SHIPPED | OUTPATIENT
Start: 2024-06-20

## 2024-07-08 RX ORDER — ATORVASTATIN CALCIUM 80 MG/1
80 TABLET, FILM COATED ORAL DAILY
Qty: 90 TABLET | Refills: 3 | Status: SHIPPED | OUTPATIENT
Start: 2024-07-08

## 2024-07-18 ENCOUNTER — OFFICE VISIT (OUTPATIENT)
Age: 89
End: 2024-07-18
Payer: MEDICARE

## 2024-07-18 VITALS
HEART RATE: 70 BPM | DIASTOLIC BLOOD PRESSURE: 58 MMHG | SYSTOLIC BLOOD PRESSURE: 110 MMHG | WEIGHT: 176 LBS | HEIGHT: 71 IN | BODY MASS INDEX: 24.64 KG/M2

## 2024-07-18 DIAGNOSIS — I42.8 NICM (NONISCHEMIC CARDIOMYOPATHY) (HCC): ICD-10-CM

## 2024-07-18 DIAGNOSIS — I48.19 PERSISTENT ATRIAL FIBRILLATION (HCC): Primary | ICD-10-CM

## 2024-07-18 DIAGNOSIS — I42.0 DILATED CARDIOMYOPATHY (HCC): ICD-10-CM

## 2024-07-18 DIAGNOSIS — I50.22 SYSTOLIC CHF, CHRONIC (HCC): ICD-10-CM

## 2024-07-18 DIAGNOSIS — I10 ESSENTIAL HYPERTENSION: ICD-10-CM

## 2024-07-18 PROCEDURE — G8428 CUR MEDS NOT DOCUMENT: HCPCS | Performed by: INTERNAL MEDICINE

## 2024-07-18 PROCEDURE — 1036F TOBACCO NON-USER: CPT | Performed by: INTERNAL MEDICINE

## 2024-07-18 PROCEDURE — 99214 OFFICE O/P EST MOD 30 MIN: CPT | Performed by: INTERNAL MEDICINE

## 2024-07-18 PROCEDURE — 1123F ACP DISCUSS/DSCN MKR DOCD: CPT | Performed by: INTERNAL MEDICINE

## 2024-07-18 PROCEDURE — G8420 CALC BMI NORM PARAMETERS: HCPCS | Performed by: INTERNAL MEDICINE

## 2024-07-18 NOTE — PROGRESS NOTES
[address] [facility], [place] [address]  PHONE: [phone]     24    NAME:  Neto Edouard Jr.  : 1935  MRN: [id number]       SUBJECTIVE:   Neto Edouard Jr. is a 89 y.o. male seen for a follow up visit regarding the following:     Chief Complaint   Patient presents with    6 Month Follow-Up    Coronary Artery Disease    Congestive Heart Failure       HPI: Here for CAD, prior CVA, PPM.     CAD:  NSTEMI, followed by 3v CABG on 18.  CVA after CABG.    Echo 2018: EF 50-55%.    Carotid US 2018: mild dz   PPM 2018 for Afib and SSS   Echo 10/2019 and 2020: EF 30-35%, mod-severe MR   LHC 10/2019: stable 3/3 patent grafts   Upgrade 2/15/2020: Biotronik biventricular implantable cardioverter defibrillator     Some weak spells now.  No new angina, CP.   WAITE the same, not worsening.    Walking some now.  But, not really exercising.  No new edema, palp.     Patient denies recent history of orthopnea, PND, excessive dizziness and/or syncope.  Doing well on anticoagulation treatment as reviewed today, no bleeding issues or excessive bruising noted.          Been on prednisone since Pit surgery           Past Medical History, Past Surgical History, Family history, Social History, and Medications were all reviewed with the patient today and updated as necessary.     Current Outpatient Medications   Medication Sig Dispense Refill    atorvastatin (LIPITOR) 80 MG tablet TAKE ONE TABLET BY MOUTH EVERY DAY 90 tablet 3    ELIQUIS 5 MG TABS tablet TAKE ONE TABLET BY MOUTH TWICE A  tablet 3    metoprolol succinate (TOPROL XL) 25 MG extended release tablet TAKE ONE TABLET BY MOUTH EVERY DAY (Patient taking differently: Take 0.5 tablets by mouth daily) 90 tablet 3    predniSONE (DELTASONE) 5 MG tablet Take 1 tablet by mouth daily 30 tablet 0    levothyroxine (SYNTHROID) 75 MCG tablet Take 1 tablet by mouth daily 90 tablet 3    aspirin 81 MG EC tablet Take 1 tablet by mouth daily       No

## 2024-07-25 ENCOUNTER — TELEPHONE (OUTPATIENT)
Age: 89
End: 2024-07-25

## 2024-07-25 NOTE — TELEPHONE ENCOUNTER
Patient's wife calling to let Dr. Purcell know that Patient has stopped medication Spironolactone.

## 2024-07-26 PROCEDURE — 93295 DEV INTERROG REMOTE 1/2/MLT: CPT | Performed by: INTERNAL MEDICINE

## 2024-09-17 ENCOUNTER — APPOINTMENT (OUTPATIENT)
Dept: GENERAL RADIOLOGY | Age: 89
End: 2024-09-17
Payer: MEDICARE

## 2024-09-17 ENCOUNTER — APPOINTMENT (OUTPATIENT)
Dept: CT IMAGING | Age: 89
End: 2024-09-17
Payer: MEDICARE

## 2024-09-17 ENCOUNTER — HOSPITAL ENCOUNTER (EMERGENCY)
Age: 89
Discharge: HOME | End: 2024-09-17
Attending: EMERGENCY MEDICINE
Payer: MEDICARE

## 2024-09-17 VITALS
TEMPERATURE: 98.4 F | OXYGEN SATURATION: 100 % | BODY MASS INDEX: 24.5 KG/M2 | WEIGHT: 175 LBS | HEART RATE: 74 BPM | RESPIRATION RATE: 16 BRPM | DIASTOLIC BLOOD PRESSURE: 78 MMHG | HEIGHT: 71 IN | SYSTOLIC BLOOD PRESSURE: 156 MMHG
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
## 2024-09-17 DIAGNOSIS — S49.91XA INJURY OF RIGHT ACROMIOCLAVICULAR JOINT, INITIAL ENCOUNTER: ICD-10-CM

## 2024-09-17 PROCEDURE — 73030 X-RAY EXAM OF SHOULDER: CPT

## 2024-09-17 PROCEDURE — 72170 X-RAY EXAM OF PELVIS: CPT

## 2024-09-17 PROCEDURE — 70450 CT HEAD/BRAIN W/O DYE: CPT

## 2024-09-17 PROCEDURE — 6370000000 HC RX 637 (ALT 250 FOR IP): Performed by: EMERGENCY MEDICINE

## 2024-09-17 PROCEDURE — 99284 EMERGENCY DEPT VISIT MOD MDM: CPT

## 2024-09-17 RX ORDER — ACETAMINOPHEN 500 MG
1000 TABLET ORAL ONCE
Status: COMPLETED | OUTPATIENT
Start: 2024-09-17 | End: 2024-09-17

## 2024-09-17 RX ADMIN — ACETAMINOPHEN 1000 MG: 500 TABLET, FILM COATED ORAL at 19:37

## 2024-09-17 ASSESSMENT — PAIN - FUNCTIONAL ASSESSMENT: PAIN_FUNCTIONAL_ASSESSMENT: 0-10

## 2024-09-17 ASSESSMENT — PAIN DESCRIPTION - ORIENTATION: ORIENTATION: RIGHT

## 2024-09-17 ASSESSMENT — PAIN DESCRIPTION - LOCATION: LOCATION: SHOULDER

## 2024-09-17 ASSESSMENT — PAIN SCALES - GENERAL: PAINLEVEL_OUTOF10: 7

## 2024-09-18 ENCOUNTER — TELEPHONE (OUTPATIENT)
Dept: ORTHOPEDIC SURGERY | Age: 89
End: 2024-09-18

## 2024-09-30 ENCOUNTER — TELEPHONE (OUTPATIENT)
Dept: ORTHOPEDIC SURGERY | Age: 89
End: 2024-09-30

## 2024-10-22 ENCOUNTER — OFFICE VISIT (OUTPATIENT)
Age: 89
End: 2024-10-22
Payer: MEDICARE

## 2024-10-22 ENCOUNTER — NURSE ONLY (OUTPATIENT)
Age: 89
End: 2024-10-22

## 2024-10-22 VITALS
HEART RATE: 62 BPM | WEIGHT: 171.6 LBS | SYSTOLIC BLOOD PRESSURE: 110 MMHG | BODY MASS INDEX: 24.02 KG/M2 | HEIGHT: 71 IN | DIASTOLIC BLOOD PRESSURE: 60 MMHG

## 2024-10-22 DIAGNOSIS — I48.19 PERSISTENT ATRIAL FIBRILLATION (HCC): ICD-10-CM

## 2024-10-22 DIAGNOSIS — I10 ESSENTIAL HYPERTENSION: ICD-10-CM

## 2024-10-22 DIAGNOSIS — Z95.0 PACEMAKER: ICD-10-CM

## 2024-10-22 DIAGNOSIS — I25.119 CORONARY ARTERY DISEASE INVOLVING NATIVE CORONARY ARTERY OF NATIVE HEART WITH ANGINA PECTORIS (HCC): ICD-10-CM

## 2024-10-22 DIAGNOSIS — I42.0 DILATED CARDIOMYOPATHY (HCC): ICD-10-CM

## 2024-10-22 DIAGNOSIS — I42.8 NICM (NONISCHEMIC CARDIOMYOPATHY) (HCC): ICD-10-CM

## 2024-10-22 DIAGNOSIS — I50.22 SYSTOLIC CHF, CHRONIC (HCC): Primary | ICD-10-CM

## 2024-10-22 DIAGNOSIS — I50.22 SYSTOLIC CHF, CHRONIC (HCC): ICD-10-CM

## 2024-10-22 PROCEDURE — G8420 CALC BMI NORM PARAMETERS: HCPCS | Performed by: INTERNAL MEDICINE

## 2024-10-22 PROCEDURE — G8427 DOCREV CUR MEDS BY ELIG CLIN: HCPCS | Performed by: INTERNAL MEDICINE

## 2024-10-22 PROCEDURE — G8484 FLU IMMUNIZE NO ADMIN: HCPCS | Performed by: INTERNAL MEDICINE

## 2024-10-22 PROCEDURE — 1123F ACP DISCUSS/DSCN MKR DOCD: CPT | Performed by: INTERNAL MEDICINE

## 2024-10-22 PROCEDURE — 99214 OFFICE O/P EST MOD 30 MIN: CPT | Performed by: INTERNAL MEDICINE

## 2024-10-22 PROCEDURE — 1036F TOBACCO NON-USER: CPT | Performed by: INTERNAL MEDICINE

## 2024-10-22 NOTE — PROGRESS NOTES
[address] [facility], [place] [address]  PHONE: [phone]     10/22/24    NAME:  Neto Edouard Jr.  : 1935  MRN: [id number]       SUBJECTIVE:   Neto Edouard Jr. is a 89 y.o. male seen for a follow up visit regarding the following:     Chief Complaint   Patient presents with    Atrial Fibrillation    Congestive Heart Failure    Hypertension       HPI: Here for CAD, prior CVA, PPM.     CAD:  NSTEMI, followed by 3v CABG on 18.  CVA after CABG.    Echo 2018: EF 50-55%.    Carotid US 2018: mild dz   PPM 2018 for Afib and SSS   Echo 10/2019 and 2020: EF 30-35%, mod-severe MR   LHC 10/2019: stable 3/3 patent grafts   Upgrade 2/15/2020: Biotronik biventricular implantable cardioverter defibrillator    Not exercising, not active, sleeping, drinking coffee and cokes all day.  Watching TV as well.   No new angina, CP.   WAITE the same, not worsening.    No new edema, palp.     Patient denies recent history of orthopnea, PND, excessive dizziness and/or syncope.  Doing well on anticoagulation treatment as reviewed today, no bleeding issues or excessive bruising noted.          Been on prednisone since Pit surgery              Past Medical History, Past Surgical History, Family history, Social History, and Medications were all reviewed with the patient today and updated as necessary.     Current Outpatient Medications   Medication Sig Dispense Refill    atorvastatin (LIPITOR) 80 MG tablet TAKE ONE TABLET BY MOUTH EVERY DAY 90 tablet 3    ELIQUIS 5 MG TABS tablet TAKE ONE TABLET BY MOUTH TWICE A  tablet 3    predniSONE (DELTASONE) 5 MG tablet Take 1 tablet by mouth daily 30 tablet 0    levothyroxine (SYNTHROID) 75 MCG tablet Take 1 tablet by mouth daily 90 tablet 3    aspirin 81 MG EC tablet Take 1 tablet by mouth daily      metoprolol succinate (TOPROL XL) 25 MG extended release tablet TAKE ONE TABLET BY MOUTH EVERY DAY (Patient not taking: Reported on 10/22/2024) 90 tablet 3     No

## 2024-12-13 NOTE — ED PROVIDER NOTES
Discharge Summary       PATIENT ID: Ce Roberts  MRN: [id number]   YOB: 1970    DATE OF ADMISSION: 12/9/2024 10:07 PM    DATE OF DISCHARGE: 12/13/24     PRIMARY CARE PROVIDER: Ulisses Joy MD     ATTENDING PHYSICIAN: Chance Tsang MD    DISCHARGING PROVIDER: Chance Tsang MD    To contact this individual call [phone] and ask the  to page.  If unavailable ask to be transferred the Adult Hospitalist Department.    CONSULTATIONS: IP CONSULT TO HOSPITALIST  IP CONSULT TO GENERAL SURGERY  IP CONSULT TO GI  IP CONSULT TO INFECTIOUS DISEASES    PROCEDURES/SURGERIES: * No surgery found *     ADMITTING DIAGNOSES & HOSPITAL COURSE:     Admission Summary:   Ce Roberts is a 54 y.o. female with past medical history significant for hypertension, s/p gastric bypass secondary to obesity presented at the emergency room with multiple medical complaints including rectal bleeding, rectal pain and nausea.  Patient was admitted to this hospital in June 2024 to the surgical service and underwent gastric bypass for obesity, this became complicated with ischemic colitis necessitating exploratory laparotomy and right hemicolectomy 5 days postop.  Patient stated that she has been having GI symptoms on and off since then.  The present symptoms started a couple of days ago.  The rectal pain is constant with no radiation and no known aggravating or relieving factors. The  reported rectal bleeding is without clot formation.  CT scan of the abdomen and pelvis done in the emergency room did not show any evidence of active GI bleed but this showed findings suggestive of either colitis or diverticulitis.  Patient received Rocephin and Flagyl and is admitted for further management and evaluation.    Stool test confirmed C. difficile and antibiotics were switched to oral vancomycin.  Patient was followed by GI and general bariatric surgery.  She was cleared and was sent home on oral vancomycin to complete a  HPI Comments: 45-year-old female with history of CVA, Afib, HTN, NSTEMI, CAD status post CABG ×3 (LIMA) on 5/18/2018 performed by Dr. Dave Cruz results from [facility] cardiology clinic with complaint of hypotension. Patient was reevaluated by Dr. Jamal Dickinson when he was found to be hypertensive with blood pressure 70/40s. Per family, state that Dr. Vazquez Heard feels that this could be related to his medication. Patient took his home medications prior to being evaluated at the clinic. Patient is on metoprolol 25 mg daily. Patient is also on Eliquis. Patient currently at rehabilitation facility following CABG. Denies nausea, vomiting, diarrhea, SOB, CP, dizziness, focal weakness, urinary symptoms, abdominal pain. On arrival patient blood pressure 130s/60s. Patient is a 82 y.o. male presenting with hypotension. The history is provided by the patient. No  was used. Hypotension    This is a new problem. The current episode started less than 1 hour ago. The problem has not changed since onset. Pertinent negatives include no confusion, no weakness, no agitation and no numbness. Past Medical History:   Diagnosis Date    CAD, multiple vessel 5/22/2018 5/18/18 (Dr Dave Cruz) Coronary artery bypass grafting x3 with grafts consisting of: 1. Left internal mammary artery to left anterior descending artery.   2.  Reverse saphenous vein to diagonal.  3.  Reversed saphenous vein graft to obtuse marginal.    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     Social History Main Topics    Smoking status: Never Smoker    Smokeless tobacco: Never Used    Alcohol use No    Drug use: No    Sexual activity: Not on file     Other Topics Concern    Not on file     Social History Narrative         ALLERGIES: Pcn [penicillins]    Review of Systems   Constitutional: Positive for fatigue. Negative for chills and fever. HENT: Negative for congestion, facial swelling and sore throat. Respiratory: Negative for cough and shortness of breath. Cardiovascular: Negative for chest pain, palpitations and leg swelling. Gastrointestinal: Negative for abdominal pain, constipation, nausea and vomiting. Genitourinary: Negative for dysuria and hematuria. Musculoskeletal: Negative for back pain, joint swelling, myalgias and neck pain. Skin: Negative for pallor and wound. Neurological: Negative for dizziness, syncope, weakness, numbness and headaches. Psychiatric/Behavioral: Negative for agitation and confusion. Vitals:    06/13/18 1220   BP: 136/68   SpO2: 100%   Weight: 81.6 kg (180 lb)   Height: 5' 11\" (1.803 m)            Physical Exam   Constitutional: He is oriented to person, place, and time. He appears well-developed. Patient well-appearing & in no acute distress. Non-toxic in appearance. HENT:   Head: Normocephalic. Mouth/Throat: Oropharynx is clear and moist. No oropharyngeal exudate. MMM. Eyes: Conjunctivae and EOM are normal. Pupils are equal, round, and reactive to light. Neck: Normal range of motion. No JVD present. No tracheal deviation present. Cardiovascular: Normal rate, regular rhythm, normal heart sounds and intact distal pulses. Pulmonary/Chest: Effort normal and breath sounds normal. No respiratory distress. He has no wheezes. He has no rales. He exhibits no tenderness. CTAB. No wheezes or rales. Abdominal: Soft. There is no tenderness. There is no rebound and no guarding. Soft, NTND.     Musculoskeletal: Normal range of motion. He exhibits no edema, tenderness or deformity. Neurological: He is alert and oriented to person, place, and time. No cranial nerve deficit. Coordination normal.   Pt with baseline aphasia. No focal weakness. No facial droop. Skin: Skin is warm and dry. Nursing note and vitals reviewed. MDM  Number of Diagnoses or Management Options  Hypotension, unspecified hypotension type: new and requires workup  Diagnosis management comments: Patient with history of onset of hypertension. Patient on Midodrin 2.5 mg daily + Toprol-XL 12.5 mg daily. Patient with an normal blood pressure on arrival.   White blood cell count normal.  Procal normal. CXR clear. UA negative for UTI. LA 2.6; pt given 1L NS IVF bolus and repeat LA 2.3. Trop 0.08; repeat 0.04. EKG with no acute or concerning findings. Other labs with evidence of dehydration. Cardiology consulted. Dr. Rob Paul on call. States no indication for admission at this time. States no changes to medications need to be made at this time. Continue current medications as he is not hypotensive. States patient need to follow up outpatient in several days. Will have patient complete additional 1 L NS IVF bolus prior to discharge. Patient and wife in agreement with plan. Blood cultures obtained. Amount and/or Complexity of Data Reviewed  Clinical lab tests: ordered and reviewed  Tests in the radiology section of CPT®: ordered and reviewed  Tests in the medicine section of CPT®: ordered and reviewed  Review and summarize past medical records: yes  Discuss the patient with other providers: yes  Independent visualization of images, tracings, or specimens: yes    Risk of Complications, Morbidity, and/or Mortality  Presenting problems: moderate  Diagnostic procedures: moderate  Management options: moderate    Patient Progress  Patient progress: improved        ED Course   Comment By Time   CXR IMPRESSION: No acute abnormality.  Silvestre Allison Gave Matti Shaffer MD 06/13 1435   Repeat point of care lactic acid 2.3 after 1 L IVF bolus. Silvestre Aguero MD 06/13 1621       EKG  Date/Time: 6/13/2018 12:30 PM  Performed by: Deb Leroy  Authorized by: Charu Grullon     ECG reviewed by ED Physician in the absence of a cardiologist: yes    Rate:     ECG rate:  71    ECG rate assessment: normal    Rhythm:     Rhythm: atrial fibrillation    Ectopy:     Ectopy: none    QRS:     QRS axis:  Normal    QRS intervals:  Normal  Conduction:     Conduction: normal    ST segments:     ST segments:  Normal  T waves:     T waves: normal        Silvestre Aguero MD; 6/13/2018 @12:49 PM Voice dictation software was used during the making of this note. This software is not perfect and grammatical and other typographical errors may be present. This note has not been proofread for errors.  ===================================================================     Results Include:    Recent Results (from the past 24 hour(s))   EKG, 12 LEAD, INITIAL    Collection Time: 06/13/18 12:29 PM   Result Value Ref Range    Ventricular Rate 71 BPM    Atrial Rate 416 BPM    QRS Duration 94 ms    Q-T Interval 408 ms    QTC Calculation (Bezet) 443 ms    Calculated R Axis 42 degrees    Calculated T Axis 145 degrees    Diagnosis       !! AGE AND GENDER SPECIFIC ECG ANALYSIS !!   Atrial fibrillation with a competing junctional pacemaker  T wave abnormality, consider inferior ischemia or digitalis effect  T wave abnormality, consider anterolateral ischemia or digitalis effect  Abnormal ECG  When compared with ECG of 19-MAY-2018 07:27,  Atrial fibrillation has replaced Sinus rhythm  ST now depressed in Lateral leads  T wave inversion now evident in Anterior leads  QT has lengthened     CBC WITH AUTOMATED DIFF    Collection Time: 06/13/18 12:38 PM   Result Value Ref Range    WBC 8.1 4.3 - 11.1 K/uL    RBC 4.75 4.23 - 5.67 M/uL    HGB 13.6 13.6 - 17.2 g/dL    HCT 41.7 41.1 - 50.3 %    MCV 87.8 79.6 - 97.8 FL    MCH 28.6 26.1 - 32.9 PG    MCHC 32.6 31.4 - 35.0 g/dL    RDW 14.5 11.9 - 14.6 %    PLATELET 907 525 - 928 K/uL    MPV 10.5 (L) 10.8 - 14.1 FL    DF AUTOMATED      NEUTROPHILS 60 43 - 78 %    LYMPHOCYTES 18 13 - 44 %    MONOCYTES 7 4.0 - 12.0 %    EOSINOPHILS 14 (H) 0.5 - 7.8 %    BASOPHILS 1 0.0 - 2.0 %    IMMATURE GRANULOCYTES 0 0.0 - 5.0 %    ABS. NEUTROPHILS 4.9 1.7 - 8.2 K/UL    ABS. LYMPHOCYTES 1.4 0.5 - 4.6 K/UL    ABS. MONOCYTES 0.6 0.1 - 1.3 K/UL    ABS. EOSINOPHILS 1.1 (H) 0.0 - 0.8 K/UL    ABS. BASOPHILS 0.1 0.0 - 0.2 K/UL    ABS. IMM. GRANS. 0.0 0.0 - 0.5 K/UL   METABOLIC PANEL, COMPREHENSIVE    Collection Time: 06/13/18 12:38 PM   Result Value Ref Range    Sodium 147 (H) 136 - 145 mmol/L    Potassium 3.8 3.5 - 5.1 mmol/L    Chloride 110 (H) 98 - 107 mmol/L    CO2 20 (L) 21 - 32 mmol/L    Anion gap 17 (H) 7 - 16 mmol/L    Glucose 94 65 - 100 mg/dL    BUN 13 8 - 23 MG/DL    Creatinine 1.75 (H) 0.8 - 1.5 MG/DL    GFR est AA 48 (L) >60 ml/min/1.73m2    GFR est non-AA 40 (L) >60 ml/min/1.73m2    Calcium 8.8 8.3 - 10.4 MG/DL    Bilirubin, total 1.0 0.2 - 1.1 MG/DL    ALT (SGPT) 24 12 - 65 U/L    AST (SGOT) 36 15 - 37 U/L    Alk.  phosphatase 143 (H) 50 - 136 U/L    Protein, total 6.9 6.3 - 8.2 g/dL    Albumin 3.1 (L) 3.2 - 4.6 g/dL    Globulin 3.8 (H) 2.3 - 3.5 g/dL    A-G Ratio 0.8 (L) 1.2 - 3.5     PROCALCITONIN    Collection Time: 06/13/18 12:38 PM   Result Value Ref Range    Procalcitonin 0.1 ng/mL   POC TROPONIN-I    Collection Time: 06/13/18 12:41 PM   Result Value Ref Range    Troponin-I (POC) 0.08 (H) 0.02 - 0.05 ng/ml   POC LACTIC ACID    Collection Time: 06/13/18 12:44 PM   Result Value Ref Range    Lactic Acid (POC) 2.6 (H) 0.5 - 1.9 mmol/L   POC TROPONIN-I    Collection Time: 06/13/18  3:50 PM   Result Value Ref Range    Troponin-I (POC) 0.04 0.02 - 0.05 ng/ml     Silvestre Zuleta MD; 6/13/2018 @4:52 PM Voice dictation software was used during the making of this note. This software is not perfect and grammatical and other typographical errors may be present.   This note has not been proofread for errors.  ===================================================================

## 2024-12-18 ENCOUNTER — TELEPHONE (OUTPATIENT)
Age: 89
End: 2024-12-18

## 2024-12-18 NOTE — TELEPHONE ENCOUNTER
MEDICATION REFILL REQUEST      Name of Medication:  Prednisone   Dose:    Frequency:    Quantity:    Days' supply:        [facility] please call patient's when called in or if there's a problem

## 2024-12-20 NOTE — TELEPHONE ENCOUNTER
Have been unable to reach patient regarding prescription. We are unable to fill this prescription this needs to come from patients pcp

## 2025-01-02 ENCOUNTER — HOSPITAL ENCOUNTER (EMERGENCY)
Age: 89
Discharge: HOME | End: 2025-01-02
Attending: EMERGENCY MEDICINE
Payer: MEDICARE

## 2025-01-02 ENCOUNTER — APPOINTMENT (OUTPATIENT)
Dept: CT IMAGING | Age: 89
End: 2025-01-02
Payer: MEDICARE

## 2025-01-02 VITALS
BODY MASS INDEX: 23.55 KG/M2 | HEIGHT: 71 IN | WEIGHT: 168.2 LBS | SYSTOLIC BLOOD PRESSURE: 132 MMHG | TEMPERATURE: 97.6 F | HEART RATE: 76 BPM | DIASTOLIC BLOOD PRESSURE: 77 MMHG | OXYGEN SATURATION: 99 % | RESPIRATION RATE: 19 BRPM

## 2025-01-02 DIAGNOSIS — E86.0 DEHYDRATION: ICD-10-CM

## 2025-01-02 DIAGNOSIS — R11.2 NAUSEA AND VOMITING, UNSPECIFIED VOMITING TYPE: ICD-10-CM

## 2025-01-02 DIAGNOSIS — N17.9 AKI (ACUTE KIDNEY INJURY) (HCC): Primary | ICD-10-CM

## 2025-01-02 LAB
ALBUMIN SERPL-MCNC: 2.9 G/DL (ref 3.2–4.6)
ALBUMIN/GLOB SERPL: 1 (ref 1–1.9)
ALP SERPL-CCNC: 108 U/L (ref 40–129)
ALT SERPL-CCNC: 10 U/L (ref 8–55)
ANION GAP SERPL CALC-SCNC: 13 MMOL/L (ref 7–16)
APPEARANCE UR: CLEAR
AST SERPL-CCNC: 31 U/L (ref 15–37)
BACTERIA URNS QL MICRO: NEGATIVE /HPF
BASOPHILS # BLD: 0 K/UL (ref 0–0.2)
BASOPHILS NFR BLD: 1 % (ref 0–2)
BILIRUB SERPL-MCNC: 0.4 MG/DL (ref 0–1.2)
BILIRUB UR QL: NEGATIVE
BUN SERPL-MCNC: 8 MG/DL (ref 8–23)
CALCIUM SERPL-MCNC: 8.6 MG/DL (ref 8.8–10.2)
CASTS URNS QL MICRO: 0 /LPF
CHLORIDE SERPL-SCNC: 104 MMOL/L (ref 98–107)
CO2 SERPL-SCNC: 21 MMOL/L (ref 20–29)
COLOR UR: ABNORMAL
CREAT SERPL-MCNC: 1.33 MG/DL (ref 0.8–1.3)
CRYSTALS URNS QL MICRO: 0 /LPF
DIFFERENTIAL METHOD BLD: ABNORMAL
EKG ATRIAL RATE: 0 BPM
EKG DIAGNOSIS: NORMAL
EKG P AXIS: 0 DEGREES
EKG P-R INTERVAL: 291 MS
EKG Q-T INTERVAL: 471 MS
EKG QRS DURATION: 142 MS
EKG QTC CALCULATION (BAZETT): 527 MS
EKG R AXIS: -81 DEGREES
EKG T AXIS: 87 DEGREES
EKG VENTRICULAR RATE: 75 BPM
EOSINOPHIL # BLD: 0.3 K/UL (ref 0–0.8)
EOSINOPHIL NFR BLD: 6 % (ref 0.5–7.8)
EPI CELLS #/AREA URNS HPF: ABNORMAL /HPF
ERYTHROCYTE [DISTWIDTH] IN BLOOD BY AUTOMATED COUNT: 15.6 % (ref 11.9–14.6)
GLOBULIN SER CALC-MCNC: 2.8 G/DL (ref 2.3–3.5)
GLUCOSE SERPL-MCNC: 105 MG/DL (ref 70–99)
GLUCOSE UR STRIP.AUTO-MCNC: NEGATIVE MG/DL
HCT VFR BLD AUTO: 34.7 % (ref 41.1–50.3)
HGB BLD-MCNC: 10.7 G/DL (ref 13.6–17.2)
HGB UR QL STRIP: NEGATIVE
HYALINE CASTS URNS QL MICRO: ABNORMAL /LPF
IMM GRANULOCYTES # BLD AUTO: 0 K/UL (ref 0–0.5)
IMM GRANULOCYTES NFR BLD AUTO: 0 % (ref 0–5)
KETONES UR QL STRIP.AUTO: NEGATIVE MG/DL
LEUKOCYTE ESTERASE UR QL STRIP.AUTO: ABNORMAL
LIPASE SERPL-CCNC: 27 U/L (ref 13–60)
LYMPHOCYTES # BLD: 1 K/UL (ref 0.5–4.6)
LYMPHOCYTES NFR BLD: 24 % (ref 13–44)
MCH RBC QN AUTO: 25.7 PG (ref 26.1–32.9)
MCHC RBC AUTO-ENTMCNC: 30.8 G/DL (ref 31.4–35)
MCV RBC AUTO: 83.2 FL (ref 82–102)
MONOCYTES # BLD: 0.5 K/UL (ref 0.1–1.3)
MONOCYTES NFR BLD: 12 % (ref 4–12)
MUCOUS THREADS URNS QL MICRO: 0 /LPF
NEUTS SEG # BLD: 2.3 K/UL (ref 1.7–8.2)
NEUTS SEG NFR BLD: 57 % (ref 43–78)
NITRITE UR QL STRIP.AUTO: NEGATIVE
NRBC # BLD: 0 K/UL (ref 0–0.2)
PH UR STRIP: 5 (ref 5–9)
PLATELET # BLD AUTO: 171 K/UL (ref 150–450)
PMV BLD AUTO: 9.4 FL (ref 9.4–12.3)
POTASSIUM SERPL-SCNC: 4 MMOL/L (ref 3.5–5.1)
PROT SERPL-MCNC: 5.7 G/DL (ref 6.3–8.2)
PROT UR STRIP-MCNC: NEGATIVE MG/DL
RBC # BLD AUTO: 4.17 M/UL (ref 4.23–5.6)
RBC #/AREA URNS HPF: ABNORMAL /HPF
SODIUM SERPL-SCNC: 137 MMOL/L (ref 136–145)
SP GR UR REFRACTOMETRY: 1.02 (ref 1–1.02)
URINE CULTURE IF INDICATED: ABNORMAL
UROBILINOGEN UR QL STRIP.AUTO: 1 EU/DL (ref 0.2–1)
WBC # BLD AUTO: 4.1 K/UL (ref 4.3–11.1)
WBC URNS QL MICRO: ABNORMAL /HPF

## 2025-01-02 PROCEDURE — 74177 CT ABD & PELVIS W/CONTRAST: CPT

## 2025-01-02 PROCEDURE — 6360000004 HC RX CONTRAST MEDICATION: Performed by: EMERGENCY MEDICINE

## 2025-01-02 PROCEDURE — 85025 COMPLETE CBC W/AUTO DIFF WBC: CPT

## 2025-01-02 PROCEDURE — 81001 URINALYSIS AUTO W/SCOPE: CPT

## 2025-01-02 PROCEDURE — 80053 COMPREHEN METABOLIC PANEL: CPT

## 2025-01-02 PROCEDURE — 83690 ASSAY OF LIPASE: CPT

## 2025-01-02 PROCEDURE — 93005 ELECTROCARDIOGRAM TRACING: CPT | Performed by: EMERGENCY MEDICINE

## 2025-01-02 PROCEDURE — 99285 EMERGENCY DEPT VISIT HI MDM: CPT

## 2025-01-02 PROCEDURE — 93010 ELECTROCARDIOGRAM REPORT: CPT | Performed by: INTERNAL MEDICINE

## 2025-01-02 RX ORDER — ONDANSETRON 4 MG/1
4 TABLET, ORALLY DISINTEGRATING ORAL 3 TIMES DAILY PRN
Qty: 21 TABLET | Refills: 0 | Status: ON HOLD | OUTPATIENT
Start: 2025-01-02

## 2025-01-02 RX ORDER — IOPAMIDOL 755 MG/ML
100 INJECTION, SOLUTION INTRAVASCULAR
Status: COMPLETED | OUTPATIENT
Start: 2025-01-02 | End: 2025-01-02

## 2025-01-02 RX ADMIN — IOPAMIDOL 100 ML: 755 INJECTION, SOLUTION INTRAVENOUS at 19:23

## 2025-01-02 ASSESSMENT — PAIN - FUNCTIONAL ASSESSMENT: PAIN_FUNCTIONAL_ASSESSMENT: NONE - DENIES PAIN

## 2025-01-02 NOTE — ED TRIAGE NOTES
Pt coming from home via [facility]. Pt c/o general malaise x5 days with n/v and decreased PO intake. Pt orthostatic positive with EMS, initially 1380/80 and changed to 100/60 when stood up     Ems states VSS en route

## 2025-01-02 NOTE — ED PROVIDER NOTES
Evaluation of the osseous structures demonstrates no acute findings.  Degenerative changes are present. There is levoscoliosis of the lumbar spine.      Impression    1. No acute inflammatory changes, obstructive uropathy, free air, or free fluid.  2. Colonic diverticulosis without diverticulitis.  3. Additional findings as above.          Electronically signed by Chalino Finney   CBC with Auto Differential   Result Value Ref Range    WBC 4.1 (L) 4.3 - 11.1 K/uL    RBC 4.17 (L) 4.23 - 5.6 M/uL    Hemoglobin 10.7 (L) 13.6 - 17.2 g/dL    Hematocrit 34.7 (L) 41.1 - 50.3 %    MCV 83.2 82 - 102 FL    MCH 25.7 (L) 26.1 - 32.9 PG    MCHC 30.8 (L) 31.4 - 35.0 g/dL    RDW 15.6 (H) 11.9 - 14.6 %    Platelets 171 150 - 450 K/uL    MPV 9.4 9.4 - 12.3 FL    nRBC 0.00 0.0 - 0.2 K/uL    Differential Type AUTOMATED      Neutrophils % 57 43 - 78 %    Lymphocytes % 24 13 - 44 %    Monocytes % 12 4.0 - 12.0 %    Eosinophils % 6 0.5 - 7.8 %    Basophils % 1 0.0 - 2.0 %    Immature Granulocytes % 0 0.0 - 5.0 %    Neutrophils Absolute 2.3 1.7 - 8.2 K/UL    Lymphocytes Absolute 1.0 0.5 - 4.6 K/UL    Monocytes Absolute 0.5 0.1 - 1.3 K/UL    Eosinophils Absolute 0.3 0.0 - 0.8 K/UL    Basophils Absolute 0.0 0.0 - 0.2 K/UL    Immature Granulocytes Absolute 0.0 0.0 - 0.5 K/UL   CMP   Result Value Ref Range    Sodium 137 136 - 145 mmol/L    Potassium 4.0 3.5 - 5.1 mmol/L    Chloride 104 98 - 107 mmol/L    CO2 21 20 - 29 mmol/L    Anion Gap 13 7 - 16 mmol/L    Glucose 105 (H) 70 - 99 mg/dL    BUN 8 8 - 23 MG/DL    Creatinine 1.33 (H) 0.80 - 1.30 MG/DL    Est, Glom Filt Rate 52 (L) >60 ml/min/1.73m2    Calcium 8.6 (L) 8.8 - 10.2 MG/DL    Total Bilirubin 0.4 0.0 - 1.2 MG/DL    ALT 10 8 - 55 U/L    AST 31 15 - 37 U/L    Alk Phosphatase 108 40 - 129 U/L    Total Protein 5.7 (L) 6.3 - 8.2 g/dL    Albumin 2.9 (L) 3.2 - 4.6 g/dL    Globulin 2.8 2.3 - 3.5 g/dL    Albumin/Globulin Ratio 1.0 1.0 - 1.9     Lipase   Result Value Ref Range    Lipase 27 13 -

## 2025-01-04 ENCOUNTER — HOSPITAL ENCOUNTER (OUTPATIENT)
Age: 89
Setting detail: OBSERVATION
Discharge: HOME | End: 2025-01-06
Attending: EMERGENCY MEDICINE | Admitting: FAMILY MEDICINE
Payer: MEDICARE

## 2025-01-04 DIAGNOSIS — R53.1 GENERALIZED WEAKNESS: ICD-10-CM

## 2025-01-04 DIAGNOSIS — N17.9 AKI (ACUTE KIDNEY INJURY) (HCC): Primary | ICD-10-CM

## 2025-01-04 PROBLEM — I48.19 PERSISTENT ATRIAL FIBRILLATION (HCC): Chronic | Status: ACTIVE | Noted: 2018-07-18

## 2025-01-04 PROBLEM — N18.31 STAGE 3A CHRONIC KIDNEY DISEASE (HCC): Chronic | Status: ACTIVE | Noted: 2022-09-07

## 2025-01-04 PROBLEM — E86.0 DEHYDRATION: Status: ACTIVE | Noted: 2025-01-04

## 2025-01-04 PROBLEM — D50.9 MICROCYTIC HYPOCHROMIC ANEMIA: Status: ACTIVE | Noted: 2025-01-04

## 2025-01-04 PROBLEM — I50.42 CHRONIC COMBINED SYSTOLIC AND DIASTOLIC CONGESTIVE HEART FAILURE (HCC): Chronic | Status: ACTIVE | Noted: 2019-10-17

## 2025-01-04 PROBLEM — Z95.0 STATUS CARDIAC PACEMAKER: Status: ACTIVE | Noted: 2020-02-14

## 2025-01-04 PROBLEM — Z95.0 STATUS CARDIAC PACEMAKER: Chronic | Status: ACTIVE | Noted: 2020-02-14

## 2025-01-04 PROBLEM — Z86.018 HISTORY OF PITUITARY ADENOMA: Chronic | Status: ACTIVE | Noted: 2025-01-04

## 2025-01-04 PROBLEM — I42.8 NICM (NONISCHEMIC CARDIOMYOPATHY) (HCC): Chronic | Status: ACTIVE | Noted: 2020-02-14

## 2025-01-04 PROBLEM — D70.9 NEUTROPENIA (HCC): Status: ACTIVE | Noted: 2025-01-04

## 2025-01-04 PROBLEM — N18.31 STAGE 3A CHRONIC KIDNEY DISEASE (HCC): Status: ACTIVE | Noted: 2022-09-07

## 2025-01-04 PROBLEM — Z95.810 AICD (AUTOMATIC CARDIOVERTER/DEFIBRILLATOR) PRESENT: Chronic | Status: ACTIVE | Noted: 2025-01-04

## 2025-01-04 PROBLEM — I50.22 SYSTOLIC CHF, CHRONIC (HCC): Chronic | Status: ACTIVE | Noted: 2019-10-17

## 2025-01-04 LAB
ALBUMIN SERPL-MCNC: 2.8 G/DL (ref 3.2–4.6)
ALBUMIN/GLOB SERPL: 1 (ref 1–1.9)
ALP SERPL-CCNC: 125 U/L (ref 40–129)
ALT SERPL-CCNC: 9 U/L (ref 8–55)
ANION GAP SERPL CALC-SCNC: 11 MMOL/L (ref 7–16)
APPEARANCE UR: CLEAR
AST SERPL-CCNC: 41 U/L (ref 15–37)
BACTERIA URNS QL MICRO: NEGATIVE /HPF
BASOPHILS # BLD: 0 K/UL (ref 0–0.2)
BASOPHILS NFR BLD: 1 % (ref 0–2)
BILIRUB SERPL-MCNC: 0.4 MG/DL (ref 0–1.2)
BILIRUB UR QL: NEGATIVE
BUN SERPL-MCNC: 8 MG/DL (ref 8–23)
CALCIUM SERPL-MCNC: 8.4 MG/DL (ref 8.8–10.2)
CASTS URNS QL MICRO: 0 /LPF
CHLORIDE SERPL-SCNC: 105 MMOL/L (ref 98–107)
CO2 SERPL-SCNC: 21 MMOL/L (ref 20–29)
COLOR UR: ABNORMAL
CREAT SERPL-MCNC: 1.35 MG/DL (ref 0.8–1.3)
CRYSTALS URNS QL MICRO: 0 /LPF
DIFFERENTIAL METHOD BLD: ABNORMAL
EOSINOPHIL # BLD: 0.4 K/UL (ref 0–0.8)
EOSINOPHIL NFR BLD: 10 % (ref 0.5–7.8)
EPI CELLS #/AREA URNS HPF: ABNORMAL /HPF
ERYTHROCYTE [DISTWIDTH] IN BLOOD BY AUTOMATED COUNT: 15.6 % (ref 11.9–14.6)
FERRITIN SERPL-MCNC: 43 NG/ML (ref 8–388)
FLUAV RNA SPEC QL NAA+PROBE: NOT DETECTED
FLUBV RNA SPEC QL NAA+PROBE: NOT DETECTED
GLOBULIN SER CALC-MCNC: 2.7 G/DL (ref 2.3–3.5)
GLUCOSE SERPL-MCNC: 112 MG/DL (ref 70–99)
GLUCOSE UR STRIP.AUTO-MCNC: NEGATIVE MG/DL
HCT VFR BLD AUTO: 36 % (ref 41.1–50.3)
HGB BLD-MCNC: 11.1 G/DL (ref 13.6–17.2)
HGB UR QL STRIP: NEGATIVE
HYALINE CASTS URNS QL MICRO: ABNORMAL /LPF
IMM GRANULOCYTES # BLD AUTO: 0 K/UL (ref 0–0.5)
IMM GRANULOCYTES NFR BLD AUTO: 0 % (ref 0–5)
IRON SATN MFR SERPL: 8 % (ref 20–50)
IRON SERPL-MCNC: 23 UG/DL (ref 35–100)
KETONES UR QL STRIP.AUTO: NEGATIVE MG/DL
LEUKOCYTE ESTERASE UR QL STRIP.AUTO: NEGATIVE
LIPASE SERPL-CCNC: 43 U/L (ref 13–60)
LYMPHOCYTES # BLD: 1 K/UL (ref 0.5–4.6)
LYMPHOCYTES NFR BLD: 27 % (ref 13–44)
MCH RBC QN AUTO: 25.6 PG (ref 26.1–32.9)
MCHC RBC AUTO-ENTMCNC: 30.8 G/DL (ref 31.4–35)
MCV RBC AUTO: 82.9 FL (ref 82–102)
MONOCYTES # BLD: 0.3 K/UL (ref 0.1–1.3)
MONOCYTES NFR BLD: 9 % (ref 4–12)
MUCOUS THREADS URNS QL MICRO: 0 /LPF
NEUTS SEG # BLD: 2 K/UL (ref 1.7–8.2)
NEUTS SEG NFR BLD: 53 % (ref 43–78)
NITRITE UR QL STRIP.AUTO: NEGATIVE
NRBC # BLD: 0 K/UL (ref 0–0.2)
PH UR STRIP: 5 (ref 5–9)
PLATELET # BLD AUTO: 178 K/UL (ref 150–450)
PMV BLD AUTO: 9.4 FL (ref 9.4–12.3)
POTASSIUM SERPL-SCNC: 3.8 MMOL/L (ref 3.5–5.1)
PROT SERPL-MCNC: 5.5 G/DL (ref 6.3–8.2)
PROT UR STRIP-MCNC: ABNORMAL MG/DL
RBC # BLD AUTO: 4.34 M/UL (ref 4.23–5.6)
RBC #/AREA URNS HPF: ABNORMAL /HPF
SARS-COV-2 RDRP RESP QL NAA+PROBE: NOT DETECTED
SODIUM SERPL-SCNC: 137 MMOL/L (ref 136–145)
SOURCE: NORMAL
SP GR UR REFRACTOMETRY: >1.035 (ref 1–1.02)
TIBC SERPL-MCNC: 272 UG/DL (ref 240–450)
TSH, 3RD GENERATION: 0.83 UIU/ML (ref 0.27–4.2)
UIBC SERPL-MCNC: 249 UG/DL (ref 112–347)
URINE CULTURE IF INDICATED: ABNORMAL
UROBILINOGEN UR QL STRIP.AUTO: 1 EU/DL (ref 0.2–1)
WBC # BLD AUTO: 3.7 K/UL (ref 4.3–11.1)
WBC URNS QL MICRO: ABNORMAL /HPF

## 2025-01-04 PROCEDURE — 83540 ASSAY OF IRON: CPT

## 2025-01-04 PROCEDURE — 87635 SARS-COV-2 COVID-19 AMP PRB: CPT

## 2025-01-04 PROCEDURE — 96361 HYDRATE IV INFUSION ADD-ON: CPT

## 2025-01-04 PROCEDURE — 2500000003 HC RX 250 WO HCPCS: Performed by: FAMILY MEDICINE

## 2025-01-04 PROCEDURE — 87502 INFLUENZA DNA AMP PROBE: CPT

## 2025-01-04 PROCEDURE — 2580000003 HC RX 258: Performed by: EMERGENCY MEDICINE

## 2025-01-04 PROCEDURE — 99285 EMERGENCY DEPT VISIT HI MDM: CPT

## 2025-01-04 PROCEDURE — 81001 URINALYSIS AUTO W/SCOPE: CPT

## 2025-01-04 PROCEDURE — 2580000003 HC RX 258: Performed by: FAMILY MEDICINE

## 2025-01-04 PROCEDURE — 96360 HYDRATION IV INFUSION INIT: CPT

## 2025-01-04 PROCEDURE — 83550 IRON BINDING TEST: CPT

## 2025-01-04 PROCEDURE — 85025 COMPLETE CBC W/AUTO DIFF WBC: CPT

## 2025-01-04 PROCEDURE — 6370000000 HC RX 637 (ALT 250 FOR IP): Performed by: FAMILY MEDICINE

## 2025-01-04 PROCEDURE — G0378 HOSPITAL OBSERVATION PER HR: HCPCS

## 2025-01-04 PROCEDURE — 84443 ASSAY THYROID STIM HORMONE: CPT

## 2025-01-04 PROCEDURE — 82728 ASSAY OF FERRITIN: CPT

## 2025-01-04 PROCEDURE — 80053 COMPREHEN METABOLIC PANEL: CPT

## 2025-01-04 PROCEDURE — 83690 ASSAY OF LIPASE: CPT

## 2025-01-04 RX ORDER — ASPIRIN 81 MG/1
81 TABLET ORAL DAILY
Status: DISCONTINUED | OUTPATIENT
Start: 2025-01-05 | End: 2025-01-04

## 2025-01-04 RX ORDER — SODIUM CHLORIDE 0.9 % (FLUSH) 0.9 %
5-40 SYRINGE (ML) INJECTION EVERY 12 HOURS SCHEDULED
Status: DISCONTINUED | OUTPATIENT
Start: 2025-01-04 | End: 2025-01-06 | Stop reason: HOSPADM

## 2025-01-04 RX ORDER — ACETAMINOPHEN 325 MG/1
650 TABLET ORAL EVERY 6 HOURS PRN
Status: DISCONTINUED | OUTPATIENT
Start: 2025-01-04 | End: 2025-01-06 | Stop reason: HOSPADM

## 2025-01-04 RX ORDER — POTASSIUM CHLORIDE 7.45 MG/ML
10 INJECTION INTRAVENOUS PRN
Status: DISCONTINUED | OUTPATIENT
Start: 2025-01-04 | End: 2025-01-05

## 2025-01-04 RX ORDER — ONDANSETRON 4 MG/1
4 TABLET, ORALLY DISINTEGRATING ORAL EVERY 6 HOURS PRN
Status: DISCONTINUED | OUTPATIENT
Start: 2025-01-04 | End: 2025-01-06 | Stop reason: HOSPADM

## 2025-01-04 RX ORDER — PREDNISONE 5 MG/1
5 TABLET ORAL DAILY
Status: DISCONTINUED | OUTPATIENT
Start: 2025-01-08 | End: 2025-01-06 | Stop reason: HOSPADM

## 2025-01-04 RX ORDER — ASPIRIN 81 MG/1
81 TABLET ORAL DAILY
Status: DISCONTINUED | OUTPATIENT
Start: 2025-01-05 | End: 2025-01-06 | Stop reason: HOSPADM

## 2025-01-04 RX ORDER — LEVOTHYROXINE SODIUM 75 UG/1
75 TABLET ORAL DAILY
Status: DISCONTINUED | OUTPATIENT
Start: 2025-01-05 | End: 2025-01-06 | Stop reason: HOSPADM

## 2025-01-04 RX ORDER — SODIUM CHLORIDE 9 MG/ML
INJECTION, SOLUTION INTRAVENOUS CONTINUOUS
Status: DISCONTINUED | OUTPATIENT
Start: 2025-01-04 | End: 2025-01-05

## 2025-01-04 RX ORDER — 0.9 % SODIUM CHLORIDE 0.9 %
1000 INTRAVENOUS SOLUTION INTRAVENOUS ONCE
Status: COMPLETED | OUTPATIENT
Start: 2025-01-04 | End: 2025-01-04

## 2025-01-04 RX ORDER — ACETAMINOPHEN 650 MG/1
650 SUPPOSITORY RECTAL EVERY 6 HOURS PRN
Status: DISCONTINUED | OUTPATIENT
Start: 2025-01-04 | End: 2025-01-06 | Stop reason: HOSPADM

## 2025-01-04 RX ORDER — ATORVASTATIN CALCIUM 40 MG/1
80 TABLET, FILM COATED ORAL DAILY
Status: DISCONTINUED | OUTPATIENT
Start: 2025-01-05 | End: 2025-01-05

## 2025-01-04 RX ORDER — ONDANSETRON 2 MG/ML
4 INJECTION INTRAMUSCULAR; INTRAVENOUS EVERY 6 HOURS PRN
Status: DISCONTINUED | OUTPATIENT
Start: 2025-01-04 | End: 2025-01-06 | Stop reason: HOSPADM

## 2025-01-04 RX ORDER — SENNOSIDES A AND B 8.6 MG/1
1 TABLET, FILM COATED ORAL DAILY PRN
Status: DISCONTINUED | OUTPATIENT
Start: 2025-01-04 | End: 2025-01-05

## 2025-01-04 RX ORDER — POTASSIUM CHLORIDE 1500 MG/1
40 TABLET, EXTENDED RELEASE ORAL PRN
Status: DISCONTINUED | OUTPATIENT
Start: 2025-01-04 | End: 2025-01-05

## 2025-01-04 RX ORDER — MAGNESIUM SULFATE IN WATER 40 MG/ML
2000 INJECTION, SOLUTION INTRAVENOUS PRN
Status: DISCONTINUED | OUTPATIENT
Start: 2025-01-04 | End: 2025-01-05

## 2025-01-04 RX ORDER — SODIUM CHLORIDE 0.9 % (FLUSH) 0.9 %
5-40 SYRINGE (ML) INJECTION PRN
Status: DISCONTINUED | OUTPATIENT
Start: 2025-01-04 | End: 2025-01-06 | Stop reason: HOSPADM

## 2025-01-04 RX ORDER — PREDNISONE 10 MG/1
10 TABLET ORAL DAILY
Status: DISCONTINUED | OUTPATIENT
Start: 2025-01-04 | End: 2025-01-06 | Stop reason: HOSPADM

## 2025-01-04 RX ORDER — SODIUM CHLORIDE 9 MG/ML
INJECTION, SOLUTION INTRAVENOUS PRN
Status: DISCONTINUED | OUTPATIENT
Start: 2025-01-04 | End: 2025-01-06 | Stop reason: HOSPADM

## 2025-01-04 RX ADMIN — SODIUM CHLORIDE: 9 INJECTION, SOLUTION INTRAVENOUS at 22:15

## 2025-01-04 RX ADMIN — SODIUM CHLORIDE 1000 ML: 9 INJECTION, SOLUTION INTRAVENOUS at 18:04

## 2025-01-04 RX ADMIN — SODIUM CHLORIDE, PRESERVATIVE FREE 10 ML: 5 INJECTION INTRAVENOUS at 22:06

## 2025-01-04 RX ADMIN — APIXABAN 5 MG: 5 TABLET, FILM COATED ORAL at 22:05

## 2025-01-04 ASSESSMENT — PAIN - FUNCTIONAL ASSESSMENT: PAIN_FUNCTIONAL_ASSESSMENT: NONE - DENIES PAIN

## 2025-01-04 NOTE — ED PROVIDER NOTES
PACEMAKER  2016    VASCULAR SURGERY          Social History     Socioeconomic History    Marital status:    Tobacco Use    Smoking status: Never     Passive exposure: Never    Smokeless tobacco: Never   Substance and Sexual Activity    Alcohol use: No    Drug use: No     Social Determinants of Health     Financial Resource Strain: Low Risk  (9/12/2023)    Overall Financial Resource Strain (CARDIA)     Difficulty of Paying Living Expenses: Not hard at all   Transportation Needs: No Transportation Needs (5/7/2024)    OASIS : Transportation     Lack of Transportation (Medical): No     Lack of Transportation (Non-Medical): No     Patient Unable or Declines to Respond: No   Social Connections: Feeling Socially Integrated (5/7/2024)    OASIS : Social Isolation     Frequency of experiencing loneliness or isolation: Never   Intimate Partner Violence: Unknown (3/19/2021)    [facility]    Intimate Partner Violence     Fear of Current or Ex-Partner: Not asked     Emotionally Abused: Not asked     Physically Abused: Not asked     Sexually Abused: Not asked   Housing Stability: Unknown (9/12/2023)    Housing Stability Vital Sign     Unstable Housing in the Last Year: No        Previous Medications    ASPIRIN 81 MG EC TABLET    Take 1 tablet by mouth daily    ATORVASTATIN (LIPITOR) 80 MG TABLET    TAKE ONE TABLET BY MOUTH EVERY DAY    ELIQUIS 5 MG TABS TABLET    TAKE ONE TABLET BY MOUTH TWICE A DAY    LEVOTHYROXINE (SYNTHROID) 75 MCG TABLET    Take 1 tablet by mouth daily    METOPROLOL SUCCINATE (TOPROL XL) 25 MG EXTENDED RELEASE TABLET    TAKE ONE TABLET BY MOUTH EVERY DAY    ONDANSETRON (ZOFRAN-ODT) 4 MG DISINTEGRATING TABLET    Take 1 tablet by mouth 3 times daily as needed for Nausea or Vomiting    PREDNISONE (DELTASONE) 5 MG TABLET    Take 1 tablet by mouth daily        Results from this emergency department visit:      Results for orders placed or performed during the

## 2025-01-04 NOTE — ED TRIAGE NOTES
Pt brought in by EMS from home c/o generalized weakness (x several days). Wife reports pt had 1 episode of diarrhea today. Pt seen here yesterday for nausea and vomiting. Pt currently denies N/V, abd pain, chest pain, and shortness of breath. Pt diagnosed w/ a viral illness 2 wks ago. Pt denies all other medical complaint.  A&O 4/4. GCS 15.

## 2025-01-05 LAB
ANION GAP SERPL CALC-SCNC: 9 MMOL/L (ref 7–16)
BASOPHILS # BLD: 0.1 K/UL (ref 0–0.2)
BASOPHILS NFR BLD: 2 % (ref 0–2)
BUN SERPL-MCNC: 7 MG/DL (ref 8–23)
CALCIUM SERPL-MCNC: 8 MG/DL (ref 8.8–10.2)
CHLORIDE SERPL-SCNC: 112 MMOL/L (ref 98–107)
CO2 SERPL-SCNC: 18 MMOL/L (ref 20–29)
CREAT SERPL-MCNC: 1.2 MG/DL (ref 0.8–1.3)
DIFFERENTIAL METHOD BLD: ABNORMAL
EOSINOPHIL # BLD: 0.4 K/UL (ref 0–0.8)
EOSINOPHIL NFR BLD: 11 % (ref 0.5–7.8)
ERYTHROCYTE [DISTWIDTH] IN BLOOD BY AUTOMATED COUNT: 15.5 % (ref 11.9–14.6)
GLUCOSE SERPL-MCNC: 90 MG/DL (ref 70–99)
HCT VFR BLD AUTO: 34.3 % (ref 41.1–50.3)
HGB BLD-MCNC: 10.4 G/DL (ref 13.6–17.2)
IMM GRANULOCYTES # BLD AUTO: 0 K/UL (ref 0–0.5)
IMM GRANULOCYTES NFR BLD AUTO: 0 % (ref 0–5)
LYMPHOCYTES # BLD: 1.1 K/UL (ref 0.5–4.6)
LYMPHOCYTES NFR BLD: 29 % (ref 13–44)
MCH RBC QN AUTO: 25.5 PG (ref 26.1–32.9)
MCHC RBC AUTO-ENTMCNC: 30.3 G/DL (ref 31.4–35)
MCV RBC AUTO: 84.1 FL (ref 82–102)
MONOCYTES # BLD: 0.4 K/UL (ref 0.1–1.3)
MONOCYTES NFR BLD: 11 % (ref 4–12)
NEUTS SEG # BLD: 1.8 K/UL (ref 1.7–8.2)
NEUTS SEG NFR BLD: 48 % (ref 43–78)
NRBC # BLD: 0 K/UL (ref 0–0.2)
PLATELET # BLD AUTO: 164 K/UL (ref 150–450)
PMV BLD AUTO: 9.3 FL (ref 9.4–12.3)
POTASSIUM SERPL-SCNC: 4.7 MMOL/L (ref 3.5–5.1)
RBC # BLD AUTO: 4.08 M/UL (ref 4.23–5.6)
SODIUM SERPL-SCNC: 139 MMOL/L (ref 136–145)
WBC # BLD AUTO: 3.8 K/UL (ref 4.3–11.1)

## 2025-01-05 PROCEDURE — 36415 COLL VENOUS BLD VENIPUNCTURE: CPT

## 2025-01-05 PROCEDURE — 80048 BASIC METABOLIC PNL TOTAL CA: CPT

## 2025-01-05 PROCEDURE — 2500000003 HC RX 250 WO HCPCS: Performed by: FAMILY MEDICINE

## 2025-01-05 PROCEDURE — 96361 HYDRATE IV INFUSION ADD-ON: CPT

## 2025-01-05 PROCEDURE — 6370000000 HC RX 637 (ALT 250 FOR IP): Performed by: FAMILY MEDICINE

## 2025-01-05 PROCEDURE — 85025 COMPLETE CBC W/AUTO DIFF WBC: CPT

## 2025-01-05 PROCEDURE — 51798 US URINE CAPACITY MEASURE: CPT

## 2025-01-05 PROCEDURE — G0378 HOSPITAL OBSERVATION PER HR: HCPCS

## 2025-01-05 RX ORDER — LOPERAMIDE HYDROCHLORIDE 2 MG/1
4 CAPSULE ORAL 2 TIMES DAILY
Status: DISCONTINUED | OUTPATIENT
Start: 2025-01-05 | End: 2025-01-06 | Stop reason: HOSPADM

## 2025-01-05 RX ORDER — LOPERAMIDE HYDROCHLORIDE 2 MG/1
4 CAPSULE ORAL 2 TIMES DAILY PRN
Qty: 30 CAPSULE | Refills: 0 | Status: SHIPPED | OUTPATIENT
Start: 2025-01-05

## 2025-01-05 RX ADMIN — LEVOTHYROXINE SODIUM 75 MCG: 75 TABLET ORAL at 05:09

## 2025-01-05 RX ADMIN — PREDNISONE 10 MG: 10 TABLET ORAL at 08:49

## 2025-01-05 RX ADMIN — ATORVASTATIN CALCIUM 80 MG: 40 TABLET, FILM COATED ORAL at 08:49

## 2025-01-05 RX ADMIN — ASPIRIN 81 MG: 81 TABLET, COATED ORAL at 08:49

## 2025-01-05 RX ADMIN — APIXABAN 5 MG: 5 TABLET, FILM COATED ORAL at 20:27

## 2025-01-05 RX ADMIN — SODIUM CHLORIDE, PRESERVATIVE FREE 10 ML: 5 INJECTION INTRAVENOUS at 08:48

## 2025-01-05 RX ADMIN — APIXABAN 5 MG: 5 TABLET, FILM COATED ORAL at 08:49

## 2025-01-05 NOTE — PROGRESS NOTES
Hospitalist    Admit Date:  2025  5:24 PM   Name:  Neto Edouard Jr.   Age:  89 y.o.  Sex:  male  :  1935   MRN:  [id number]   Room:  [facility]/    Presenting/Chief Complaint: generalized weakness     Reason(s) for Admission: Generalized weakness [R53.1]  BRENNAN (acute kidney injury) (HCC) [N17.9]  Nausea vomiting and diarrhea [R11.2, R19.7]     History of Present Illness:   Neto Edouard Jr. is a 89 y.o. male who presented to [facility] emergency department from home via EMS with complaint of generalized weakness.  He was seen in the Cr on 25 with similar complaint.  At that time he was found to be dehydrated due to a GI illness with nausea, vomiting, and diarrhea.  He was advised to be hospitalized for IV fluids, however declined this offer and was discharged to home.  After laying down to take a nap today he was too weak to get out of bed so wife called EMS to bring him back to the ER.  His last episode of diarrhea and vomiting were each 2 days ago    Vital signs on presentation were unremarkable.  CMP was notable only for creatinine 1.35 (baseline appears to be 1.1-1.4 over the last 2 years).  CMP was pertinent for WBC 3.7 and hemoglobin 11.1 with microcytic hypochromic indices.  Urinalysis was pertinent for specific gravity >1.035 and trace protein.  ER provider ordered 1 L normal saline bolus and requested hospitalist admission for further evaluation and management.      Today, no ac events, 1 BM only so far, no fever or nausea or vomiting, tolerate PO      Assessment & Plan:     Acute gastroentritis, improved w/ supportive Rx  Symptomatically improving versus prior.  CT A/P with IV contrast on 2025 revealed no acute abnormality.  Suspect viral gastroenteritis.  Treat symptomatically should symptoms reoccur.    BRENNAN and Dehydration, resolved  Evidenced by elevated urine specific gravity.  Bolused IVF in the ER.  We will cautiously hydrate with NS at 75 cc/h x 1 liter in

## 2025-01-05 NOTE — H&P
sulfate 2000 mg in 50 mL IVPB premix    OR Linked Order Group     ondansetron (ZOFRAN-ODT) disintegrating tablet 4 mg     ondansetron (ZOFRAN) injection 4 mg    OR Linked Order Group     acetaminophen (TYLENOL) tablet 650 mg     acetaminophen (TYLENOL) suppository 650 mg    0.9 % sodium chloride infusion    senna (SENOKOT) tablet 8.6 mg       Prior to Admit Medications:  Current Outpatient Medications   Medication Instructions    aspirin 81 mg, Oral, DAILY    atorvastatin (LIPITOR) 80 mg, Oral, DAILY    ELIQUIS 5 MG TABS tablet TAKE ONE TABLET BY MOUTH TWICE A DAY    levothyroxine (SYNTHROID) 75 mcg, Oral, DAILY    metoprolol succinate (TOPROL XL) 25 mg, Oral, DAILY    ondansetron (ZOFRAN-ODT) 4 mg, Oral, 3 TIMES DAILY PRN    predniSONE (DELTASONE) 5 mg, Oral, DAILY       I have personally reviewed labs and tests:  Recent Results (from the past 24 hour(s))   CBC with Auto Differential    Collection Time: 01/04/25  5:46 PM   Result Value Ref Range    WBC 3.7 (L) 4.3 - 11.1 K/uL    RBC 4.34 4.23 - 5.6 M/uL    Hemoglobin 11.1 (L) 13.6 - 17.2 g/dL    Hematocrit 36.0 (L) 41.1 - 50.3 %    MCV 82.9 82 - 102 FL    MCH 25.6 (L) 26.1 - 32.9 PG    MCHC 30.8 (L) 31.4 - 35.0 g/dL    RDW 15.6 (H) 11.9 - 14.6 %    Platelets 178 150 - 450 K/uL    MPV 9.4 9.4 - 12.3 FL    nRBC 0.00 0.0 - 0.2 K/uL    Differential Type AUTOMATED      Neutrophils % 53 43 - 78 %    Lymphocytes % 27 13 - 44 %    Monocytes % 9 4.0 - 12.0 %    Eosinophils % 10 (H) 0.5 - 7.8 %    Basophils % 1 0.0 - 2.0 %    Immature Granulocytes % 0 0.0 - 5.0 %    Neutrophils Absolute 2.0 1.7 - 8.2 K/UL    Lymphocytes Absolute 1.0 0.5 - 4.6 K/UL    Monocytes Absolute 0.3 0.1 - 1.3 K/UL    Eosinophils Absolute 0.4 0.0 - 0.8 K/UL    Basophils Absolute 0.0 0.0 - 0.2 K/UL    Immature Granulocytes Absolute 0.0 0.0 - 0.5 K/UL   CMP    Collection Time: 01/04/25  5:46 PM   Result Value Ref Range    Sodium 137 136 - 145 mmol/L    Potassium 3.8 3.5 - 5.1 mmol/L    Chloride 105 98 -

## 2025-01-05 NOTE — ED NOTES
TRANSFER - OUT REPORT:    Verbal report given to ROXANA Pepper on Neto Edouard Jr.  being transferred to 6th floor for routine progression of patient care       Report consisted of patient's Situation, Background, Assessment and   Recommendations(SBAR).     Information from the following report(s) Nurse Handoff Report was reviewed with the receiving nurse.    Juana Diaz Fall Assessment:                           Lines:   Peripheral IV 01/04/25 Left Antecubital (Active)        Opportunity for questions and clarification was provided.      Patient transported with:  Marina Altamirano RN  01/04/25 [id number]

## 2025-01-05 NOTE — PROGRESS NOTES
TRANSFER - IN REPORT:    Verbal report received from ROXANA Gray on Neto Edouard Jr.  being received from ED for routine progression of patient care      Report consisted of patient's Situation, Background, Assessment and   Recommendations(SBAR).     Information from the following report(s) Nurse Handoff Report was reviewed with the receiving nurse.    Opportunity for questions and clarification was provided.      Assessment completed upon patient's arrival to unit and care assumed.

## 2025-01-05 NOTE — PLAN OF CARE
Problem: Chronic Conditions and Co-morbidities  Goal: Patient's chronic conditions and co-morbidity symptoms are monitored and maintained or improved  Outcome: Progressing  Flowsheets (Taken 1/4/2025 2124 by Shantelle Cabello, RN)  Care Plan - Patient's Chronic Conditions and Co-Morbidity Symptoms are Monitored and Maintained or Improved: Monitor and assess patient's chronic conditions and comorbid symptoms for stability, deterioration, or improvement     Problem: Discharge Planning  Goal: Discharge to home or other facility with appropriate resources  Outcome: Progressing  Flowsheets (Taken 1/4/2025 2124 by Shantelle Cabello, RN)  Discharge to home or other facility with appropriate resources: Identify barriers to discharge with patient and caregiver     Problem: Skin/Tissue Integrity  Goal: Absence of new skin breakdown  Description: 1.  Monitor for areas of redness and/or skin breakdown  2.  Assess vascular access sites hourly  3.  Every 4-6 hours minimum:  Change oxygen saturation probe site  4.  Every 4-6 hours:  If on nasal continuous positive airway pressure, respiratory therapy assess nares and determine need for appliance change or resting period.  Outcome: Progressing     Problem: Safety - Adult  Goal: Free from fall injury  Outcome: Progressing

## 2025-01-05 NOTE — PROGRESS NOTES
Resting in bed without complaints. Needs set up to eat meals but able to feed self.Needs assist to use urinal and very unsteady when sitting on side of bed. Had 1 loose brown bowel movement this shift. Pt refused imodium. Hourly rounds completed, all needs met this shift.Bed locked in low position, call light in reach in reach with bedside table and personal items in reach, Instructed not to get OOB and to call if needed anything. Verbalizes understanding Bedside alarm on

## 2025-01-05 NOTE — ACP (ADVANCE CARE PLANNING)
Advance Care Planning Note   Admit Date:  2025  5:24 PM   Name:  Neto Edouard Jr.   Age:  89 y.o.  Sex:  male  :  1935   MRN:  [id number]   Room:  [place] [address]    Neto Edouard Jr. has capacity to make his own decisions:   Yes    If pt unable to make decisions, POA/surrogate decision maker:  Spouse    Other people present:   None    Patient / surrogate decision-maker directed code status:  Full Code    Other ACP topics discussed, if applicable:   None    Patient or surrogate consented to discussion of the current conditions, workup, management plans, prognosis, and the risk for further deterioration.  Time spent: 16 minutes in direct discussion.      Signed:  Som Singh M.D.   Hospitalist  25   9:15 PM

## 2025-01-05 NOTE — PROGRESS NOTES
4 Eyes Skin Assessment     NAME:  Neto Edouard Jr.  YOB: 1935  MEDICAL RECORD NUMBER:  [id number]    The patient is being assessed for  Admission    I agree that at least one RN has performed a thorough Head to Toe Skin Assessment on the patient. ALL assessment sites listed below have been assessed.      Areas assessed by both nurses:    Head, Face, Ears, Shoulders, Back, Chest, Arms, Elbows, Hands, Sacrum. Buttock, Coccyx, Ischium, Legs. Feet and Heels, and Under Medical Devices         Does the Patient have a Wound? No noted wound(s)       Benji Prevention initiated by RN: Yes  Wound Care Orders initiated by RN: No    Pressure Injury (Stage 3,4, Unstageable, DTI, NWPT, and Complex wounds) if present, place Wound referral order by RN under : No    New Ostomies, if present place, Ostomy referral order under : No     Nurse 1 eSignature: Electronically signed by Shantelle Cabello RN on 1/4/25 at 10:46 PM EST    **SHARE this note so that the co-signing nurse can place an eSignature**    Nurse 2 eSignature: {Esignature:[id number]}

## 2025-01-06 VITALS
WEIGHT: 168 LBS | RESPIRATION RATE: 16 BRPM | HEIGHT: 71 IN | TEMPERATURE: 98.1 F | OXYGEN SATURATION: 99 % | BODY MASS INDEX: 23.52 KG/M2 | DIASTOLIC BLOOD PRESSURE: 75 MMHG | SYSTOLIC BLOOD PRESSURE: 135 MMHG | HEART RATE: 77 BPM

## 2025-01-06 PROCEDURE — 97112 NEUROMUSCULAR REEDUCATION: CPT

## 2025-01-06 PROCEDURE — 6370000000 HC RX 637 (ALT 250 FOR IP): Performed by: FAMILY MEDICINE

## 2025-01-06 PROCEDURE — 6370000000 HC RX 637 (ALT 250 FOR IP): Performed by: HOSPITALIST

## 2025-01-06 PROCEDURE — G0378 HOSPITAL OBSERVATION PER HR: HCPCS

## 2025-01-06 PROCEDURE — 97165 OT EVAL LOW COMPLEX 30 MIN: CPT

## 2025-01-06 PROCEDURE — 97530 THERAPEUTIC ACTIVITIES: CPT

## 2025-01-06 PROCEDURE — 97535 SELF CARE MNGMENT TRAINING: CPT

## 2025-01-06 PROCEDURE — 97161 PT EVAL LOW COMPLEX 20 MIN: CPT

## 2025-01-06 RX ADMIN — PREDNISONE 10 MG: 10 TABLET ORAL at 08:41

## 2025-01-06 RX ADMIN — LOPERAMIDE HYDROCHLORIDE 4 MG: 2 CAPSULE ORAL at 16:38

## 2025-01-06 RX ADMIN — LEVOTHYROXINE SODIUM 75 MCG: 75 TABLET ORAL at 05:30

## 2025-01-06 RX ADMIN — LOPERAMIDE HYDROCHLORIDE 4 MG: 2 CAPSULE ORAL at 08:40

## 2025-01-06 RX ADMIN — APIXABAN 5 MG: 5 TABLET, FILM COATED ORAL at 08:40

## 2025-01-06 RX ADMIN — ASPIRIN 81 MG: 81 TABLET, COATED ORAL at 08:40

## 2025-01-06 NOTE — THERAPY EVALUATION
ACUTE OCCUPATIONAL THERAPY GOALS:   (Developed with and agreed upon by patient and/or caregiver.)  1. Patient will complete lower body bathing and dressing with MINIMAL ASSIST and adaptive equipment as needed.     2. Patient will complete toileting with MINIMAL ASSIST.  3. Patient will complete grooming ADL standing edge of sink with CONTACT GUARD ASSIST.  4. Patient will tolerate 25 minutes of OT treatment with 1-2 rest breaks to increase activity tolerance for ADLs.   5. Patient will complete functional transfers with MINIMAL ASSIST and adaptive equipment as needed.   6. Patient will tolerate 10 minutes BUE exercises to increase strength for safe, functional transfers.     Timeframe: 7 visits      OCCUPATIONAL THERAPY Initial Assessment, Daily Note, and AM       OT Visit Days: 1  Acknowledge Orders  Time  OT Charge Capture  Rehab Caseload Tracker      Neto Edouard Jr. is a 89 y.o. male   PRIMARY DIAGNOSIS: Nausea vomiting and diarrhea  Generalized weakness [R53.1]  BRENNAN (acute kidney injury) (HCC) [N17.9]  Nausea vomiting and diarrhea [R11.2, R19.7]       Reason for Referral: Generalized Muscle Weakness (M62.81)  Other lack of cordination (R27.8)  Difficulty in walking, Not elsewhere classified (R26.2)  History of falling (Z91.81)  Dizziness and Giddiness (R42)  Observation: Payor: MEDICARE / Plan: MEDICARE PART A AND B / Product Type: *No Product type* /     ASSESSMENT:     REHAB RECOMMENDATIONS:   Recommendation to date pending progress:  Setting:  Short-term Rehab    Equipment:    To Be Determined     ASSESSMENT:  Mr. Edouard presents with changes in cognition, including increased confusion, impulsivity, decreased safety awareness, decreased insight and decreased command following. His wife is present to assist in providing background information, reports at baseline the pt is independent in ADLs and modified independent for functional mobility using single point cane.  Today, along with the aforementioned

## 2025-01-06 NOTE — CARE COORDINATION
Case Management Assessment  Initial Evaluation    Date/Time of Evaluation: 1/6/2025 1:04 PM  Assessment Completed by: TD HERRERA    If patient is discharged prior to next notation, then this note serves as note for discharge by case management.    Patient Name: Neto Edouard Jr.                   YOB: 1935  Diagnosis:   Generalized weakness [R53.1]  BRENNAN (acute kidney injury) (HCC) [N17.9]  Nausea vomiting and diarrhea [R11.2, R19.7]                   Date / Time: 1/4/2025  5:24 PM    Patient Admission Status: Observation   Readmission Risk (Low < 19, Mod (19-27), High > 27): Readmission Risk Score: 18.2        Current PCP: Guillermina Cerda PA-C  PCP verified by CM? (P) Yes    Chart Reviewed: Yes      History Provided by: (P) Spouse  Patient Orientation: (P) Other (see comment) (sitting up in chair alert but not responding)    Patient Cognition: (P) Alert    Hospitalization in the last 30 days (Readmission):  No    If yes, Readmission Assessment in  Navigator will be completed.    Advance Directives:      Code Status: Full Code   Patient's Primary Decision Maker is: (P) Legal Next of Kin    Primary Decision Maker: Batool Edouard - Spouse - [phone]    Secondary Decision Maker: Kenzie Louis - Child - [phone]    Discharge Planning:    Patient lives with: (P) Spouse/Significant Other Type of Home: (P) House  Primary Care Giver: (P) Spouse  Patient Support Systems include: (P) Spouse/Significant Other, Children   Current Financial resources: (P) Medicare, Other (Comment) (Medicare and [place])  Current community resources: (P) None  Current services prior to admission: (P) Durable Medical Equipment            Current DME: (P) Cane, Walker            Type of Home Care services:  (P) OT, PT, Aide Services, Nursing Services    ADLS  Prior functional level: (P) Assistance with the following:  Current functional level: (P) Assistance with the following:    PT AM-PAC: 16 /24  OT AM-PAC: 13

## 2025-01-06 NOTE — PROGRESS NOTES
Pt is resting in bed without any complaints. Currently awaiting transport, wife updated. Hourly rounds completed and all needs met. Bed is low, locked, bed alarm on, call light is in reach and pt is encouraged to call for assistance.

## 2025-01-06 NOTE — PROGRESS NOTES
ACUTE PHYSICAL THERAPY GOALS:   (Developed with and agreed upon by patient and/or caregiver.)    LTG:  (1.)Mr. Edouard will move from supine to sit and sit to supine , scoot up and down, and roll side to side in bed with MINIMAL ASSIST within 7 treatment day(s).    (2.)Mr. Edouard will transfer from bed to chair and chair to bed with MINIMAL ASSIST using the least restrictive device within 7 treatment day(s).    (3.)Mr. Edouard will ambulate with MINIMAL ASSIST for 100 feet with the least restrictive device within 7 treatment day(s).  (4.)Mr. Edouard will tolerate at least 23 min of dynamic standing activity to assist standing ADLs with the least restrictive device within 7 treatment days.      ________________________________________________________________________________________________      PHYSICAL THERAPY Initial Assessment, Daily Note, and AM  (Link to Caseload Tracking: PT Visit Days : 1  Acknowledge Orders  Time In/Out  PT Charge Capture  Rehab Caseload Tracker    Neto Edouard Jr. is a 89 y.o. male   PRIMARY DIAGNOSIS: Nausea vomiting and diarrhea  Generalized weakness [R53.1]  BRENNAN (acute kidney injury) (HCC) [N17.9]  Nausea vomiting and diarrhea [R11.2, R19.7]       Reason for Referral: Generalized Muscle Weakness (M62.81)  Other lack of cordination (R27.8)  Difficulty in walking, Not elsewhere classified (R26.2)  Other abnormalities of gait and mobility (R26.89)  Observation: Payor: MEDICARE / Plan: MEDICARE PART A AND B / Product Type: *No Product type* /     ASSESSMENT:     REHAB RECOMMENDATIONS:   Recommendation to date pending progress:  Setting:  Short-term Rehab    Equipment:    To Be Determined     ASSESSMENT:  Mr. Edouard presents in supine, A&Ox3 (thinks he's at home), [place], and impulsive. Extra time is taken at end of session to educate pt and wife on high fall risk and need for short term rehab at this time. While standing at sink, he fatigues quickly and requires chair quickly to prevent fall.

## 2025-01-06 NOTE — ACP (ADVANCE CARE PLANNING)
Advance Care Planning     Advance Care Planning Activator (Inpatient)  Conversation Note      Health Care Decision Maker:  No LW/HCPOA on file, patient aware legal next of kin remain decision maker until document provided.      Current Designated Health Care Decision Maker:     Primary Decision Maker: Batool Edouard - Spouse - [phone]    Secondary Decision Maker: Kenzie Louis - Child - [phone]      Care Preferences Full Code per MD order

## 2025-01-06 NOTE — DISCHARGE SUMMARY
Hospitalist Discharge Summary   Admit Date:  2025  5:24 PM   DC Note date: 2025  Name:  Neto Edouard Jr.   Age:  89 y.o.  Sex:  male  :  1935   MRN:  [id number]   Room:  [facility]  PCP:  Guillermina Cerda PA-C    Presenting Complaint: generalized weakness     Initial Admission Diagnosis: Generalized weakness [R53.1]  BRENNAN (acute kidney injury) (HCC) [N17.9]  Nausea vomiting and diarrhea [R11.2, R19.7]     Problem List for this Hospitalization (present on admission):    Principal Problem:    Nausea vomiting and diarrhea  Active Problems:    Status cardiac pacemaker    Stage 3a chronic kidney disease (HCC)    Chronic combined systolic and diastolic congestive heart failure (HCC)    NICM (nonischemic cardiomyopathy) (HCC)    Persistent atrial fibrillation (HCC)    Generalized weakness    Dehydration    Microcytic hypochromic anemia    Neutropenia (HCC)    History of pituitary adenoma    AICD (automatic cardioverter/defibrillator) present  Resolved Problems:    * No resolved hospital problems. *      Hospital Course:  Please refer to the admission H&P for details of presentation. In summary, Neto Edouard Jr. is a 89 y.o. male with past medical history significant for hypothyroidism, history of pituitary adenoma, persistent atrial fibrillation status post pacemaker, chronic combined systolic diastolic CHF, anemia, dementia who presented to emergency room with generalized weakness.  Patient was seen in the ED on  with similar complaint and noted to have dehydration due to GI illness.  Offered to be hospitalized for IV fluids but declined.  Upon returning home, patient was too weak to get out of bed and therefore EMS was called.   Patient was admitted for viral gastroenteritis and symptomatic treatment.  Patient was given IV fluids with improvement.  PT OT recommending rehab but family would like to take patient home as patient has dementia.  Patient is medically stable for discharge. Patient is to

## 2025-01-07 ENCOUNTER — TELEPHONE (OUTPATIENT)
Dept: FAMILY MEDICINE CLINIC | Facility: CLINIC | Age: 89
End: 2025-01-07

## 2025-01-07 NOTE — TELEPHONE ENCOUNTER
HH called asking if CARLINE Cano would sign new orders as pt was recently d/c from [facility] for CHF. Guillermina no longer a provider here, pt would need to see new pcp and they would be able to sign. Attempted to make appt HH rep said that was not their job to inform pt. They asked for new provider to send orders to, gave Dena Calderón NP. Attempted to call pt, to scheduled NTP OV,  no VM setup.

## 2025-01-08 ENCOUNTER — TELEPHONE (OUTPATIENT)
Age: 89
End: 2025-01-08

## 2025-01-08 NOTE — TELEPHONE ENCOUNTER
Pt left message asking for Dr Purcell Nurse to call her(guilherme) regarding her  please call her at [phone]

## 2025-01-13 ENCOUNTER — APPOINTMENT (OUTPATIENT)
Dept: GENERAL RADIOLOGY | Age: 89
End: 2025-01-13
Payer: MEDICARE

## 2025-01-13 ENCOUNTER — HOSPITAL ENCOUNTER (EMERGENCY)
Age: 89
Discharge: HOME | End: 2025-01-14
Attending: STUDENT IN AN ORGANIZED HEALTH CARE EDUCATION/TRAINING PROGRAM
Payer: MEDICARE

## 2025-01-13 DIAGNOSIS — R53.1 GENERAL WEAKNESS: Primary | ICD-10-CM

## 2025-01-13 LAB
ALBUMIN SERPL-MCNC: 2.7 G/DL (ref 3.2–4.6)
ALBUMIN/GLOB SERPL: 1 (ref 1–1.9)
ALP SERPL-CCNC: 111 U/L (ref 40–129)
ALT SERPL-CCNC: 10 U/L (ref 8–55)
ANION GAP SERPL CALC-SCNC: 9 MMOL/L (ref 7–16)
AST SERPL-CCNC: 33 U/L (ref 15–37)
BASOPHILS # BLD: 0.06 K/UL (ref 0–0.2)
BASOPHILS NFR BLD: 1.4 % (ref 0–2)
BILIRUB SERPL-MCNC: 0.6 MG/DL (ref 0–1.2)
BUN SERPL-MCNC: 10 MG/DL (ref 8–23)
CALCIUM SERPL-MCNC: 8.3 MG/DL (ref 8.8–10.2)
CHLORIDE SERPL-SCNC: 107 MMOL/L (ref 98–107)
CO2 SERPL-SCNC: 23 MMOL/L (ref 20–29)
CREAT SERPL-MCNC: 1.2 MG/DL (ref 0.8–1.3)
DIFFERENTIAL METHOD BLD: ABNORMAL
EOSINOPHIL # BLD: 0.56 K/UL (ref 0–0.8)
EOSINOPHIL NFR BLD: 13.4 % (ref 0.5–7.8)
ERYTHROCYTE [DISTWIDTH] IN BLOOD BY AUTOMATED COUNT: 15.9 % (ref 11.9–14.6)
GLOBULIN SER CALC-MCNC: 2.6 G/DL (ref 2.3–3.5)
GLUCOSE SERPL-MCNC: 86 MG/DL (ref 70–99)
HCT VFR BLD AUTO: 34.5 % (ref 41.1–50.3)
HGB BLD-MCNC: 10.7 G/DL (ref 13.6–17.2)
IMM GRANULOCYTES # BLD AUTO: 0.01 K/UL (ref 0–0.5)
IMM GRANULOCYTES NFR BLD AUTO: 0.2 % (ref 0–5)
LIPASE SERPL-CCNC: 25 U/L (ref 13–60)
LYMPHOCYTES # BLD: 1.47 K/UL (ref 0.5–4.6)
LYMPHOCYTES NFR BLD: 35.2 % (ref 13–44)
MCH RBC QN AUTO: 25.8 PG (ref 26.1–32.9)
MCHC RBC AUTO-ENTMCNC: 31 G/DL (ref 31.4–35)
MCV RBC AUTO: 83.1 FL (ref 82–102)
MONOCYTES # BLD: 0.52 K/UL (ref 0.1–1.3)
MONOCYTES NFR BLD: 12.4 % (ref 4–12)
NEUTS SEG # BLD: 1.56 K/UL (ref 1.7–8.2)
NEUTS SEG NFR BLD: 37.4 % (ref 43–78)
NRBC # BLD: 0 K/UL (ref 0–0.2)
PLATELET # BLD AUTO: 200 K/UL (ref 150–450)
PMV BLD AUTO: 9.4 FL (ref 9.4–12.3)
POTASSIUM SERPL-SCNC: 4.2 MMOL/L (ref 3.5–5.1)
PROT SERPL-MCNC: 5.3 G/DL (ref 6.3–8.2)
RBC # BLD AUTO: 4.15 M/UL (ref 4.23–5.6)
SODIUM SERPL-SCNC: 139 MMOL/L (ref 136–145)
TROPONIN T SERPL HS-MCNC: 33 NG/L (ref 0–22)
WBC # BLD AUTO: 4.2 K/UL (ref 4.3–11.1)

## 2025-01-13 PROCEDURE — 80053 COMPREHEN METABOLIC PANEL: CPT

## 2025-01-13 PROCEDURE — 99285 EMERGENCY DEPT VISIT HI MDM: CPT

## 2025-01-13 PROCEDURE — 2580000003 HC RX 258: Performed by: STUDENT IN AN ORGANIZED HEALTH CARE EDUCATION/TRAINING PROGRAM

## 2025-01-13 PROCEDURE — 93005 ELECTROCARDIOGRAM TRACING: CPT | Performed by: STUDENT IN AN ORGANIZED HEALTH CARE EDUCATION/TRAINING PROGRAM

## 2025-01-13 PROCEDURE — 85025 COMPLETE CBC W/AUTO DIFF WBC: CPT

## 2025-01-13 PROCEDURE — 83690 ASSAY OF LIPASE: CPT

## 2025-01-13 PROCEDURE — 71045 X-RAY EXAM CHEST 1 VIEW: CPT

## 2025-01-13 PROCEDURE — 84484 ASSAY OF TROPONIN QUANT: CPT

## 2025-01-13 RX ORDER — SODIUM CHLORIDE, SODIUM LACTATE, POTASSIUM CHLORIDE, AND CALCIUM CHLORIDE .6; .31; .03; .02 G/100ML; G/100ML; G/100ML; G/100ML
1000 INJECTION, SOLUTION INTRAVENOUS ONCE
Status: COMPLETED | OUTPATIENT
Start: 2025-01-13 | End: 2025-01-14

## 2025-01-13 RX ADMIN — SODIUM CHLORIDE, POTASSIUM CHLORIDE, SODIUM LACTATE AND CALCIUM CHLORIDE 1000 ML: 600; 310; 30; 20 INJECTION, SOLUTION INTRAVENOUS at 23:23

## 2025-01-13 ASSESSMENT — ENCOUNTER SYMPTOMS
ABDOMINAL PAIN: 0
VOMITING: 0
DIARRHEA: 1
COUGH: 0
NAUSEA: 0
SHORTNESS OF BREATH: 0
EYE REDNESS: 0
SORE THROAT: 0
EYE PAIN: 0

## 2025-01-13 ASSESSMENT — PAIN - FUNCTIONAL ASSESSMENT: PAIN_FUNCTIONAL_ASSESSMENT: NONE - DENIES PAIN

## 2025-01-14 VITALS
WEIGHT: 165 LBS | DIASTOLIC BLOOD PRESSURE: 81 MMHG | TEMPERATURE: 97.7 F | OXYGEN SATURATION: 93 % | BODY MASS INDEX: 23.1 KG/M2 | HEIGHT: 71 IN | HEART RATE: 76 BPM | SYSTOLIC BLOOD PRESSURE: 115 MMHG | RESPIRATION RATE: 19 BRPM

## 2025-01-14 LAB
APPEARANCE UR: CLEAR
BILIRUB UR QL: NEGATIVE
COLOR UR: NORMAL
EKG ATRIAL RATE: 0 BPM
EKG ATRIAL RATE: 70 BPM
EKG DIAGNOSIS: NORMAL
EKG DIAGNOSIS: NORMAL
EKG P AXIS: 0 DEGREES
EKG P-R INTERVAL: 37 MS
EKG Q-T INTERVAL: 469 MS
EKG Q-T INTERVAL: 473 MS
EKG QRS DURATION: 147 MS
EKG QRS DURATION: 157 MS
EKG QTC CALCULATION (BAZETT): 524 MS
EKG QTC CALCULATION (BAZETT): 529 MS
EKG R AXIS: 268 DEGREES
EKG R AXIS: 269 DEGREES
EKG T AXIS: 80 DEGREES
EKG T AXIS: 94 DEGREES
EKG VENTRICULAR RATE: 75 BPM
EKG VENTRICULAR RATE: 75 BPM
GLUCOSE UR STRIP.AUTO-MCNC: NEGATIVE MG/DL
HGB UR QL STRIP: NEGATIVE
KETONES UR QL STRIP.AUTO: NEGATIVE MG/DL
LEUKOCYTE ESTERASE UR QL STRIP.AUTO: NEGATIVE
NITRITE UR QL STRIP.AUTO: NEGATIVE
PH UR STRIP: 5 (ref 5–9)
PROT UR STRIP-MCNC: NEGATIVE MG/DL
SP GR UR REFRACTOMETRY: 1.02 (ref 1–1.02)
TROPONIN T SERPL HS-MCNC: 25 NG/L (ref 0–22)
UROBILINOGEN UR QL STRIP.AUTO: 1 EU/DL (ref 0.2–1)

## 2025-01-14 PROCEDURE — 81003 URINALYSIS AUTO W/O SCOPE: CPT

## 2025-01-14 PROCEDURE — 93010 ELECTROCARDIOGRAM REPORT: CPT | Performed by: INTERNAL MEDICINE

## 2025-01-14 PROCEDURE — 84484 ASSAY OF TROPONIN QUANT: CPT

## 2025-01-14 NOTE — ED PROVIDER NOTES
Emergency Department Provider Note       PCP: None, None   Age: 89 y.o.   Sex: male     DISPOSITION Decision To Discharge 01/14/2025 01:24:58 AM    ICD-10-CM    1. General weakness  R53.1           Medical Decision Making     89-year-old male who presents to the ED for generalized weakness.  Vital signs within normal limits.  No episodes of hypotension while in ED.  EKG shows paced ventricular rhythm, no obvious ischemia per Sgarbossa criteria.  Patient did not ever have any episode of syncope that was reported.  High-sensitivity troponin is 33, repeat is 25, not complain of any chest pain at this time, low suspicion for ACS.  Hemoglobin stable at 10.7, mild leukopenia 4.2, appears to be chronic for patient.  UA does not demonstrate urinary tract infection.  No consolidation, pneumothorax or pleural effusion noted on chest x-ray.  Patient still does not have any acute complaints at this time.  Does not appear to be floridly dehydrated.  Do not believe any labs or imaging is necessary at this time, hemodynamically stable at time of discharge.    Patient was instructed to follow-up with PCP in the next 24 to 48 hours and to follow-up with all specialists given with discharge as soon as possible.  Patient is nontoxic-appearing and has no complaints at time of discharge.  Instructed to return to the emergency department immediately if current symptoms worsen, or any new/concerning symptoms develop for which they voice understanding.  All questions answered at time of discharge.         1 or more acute illnesses that pose a threat to life or bodily function.   Patient was discharged risks and benefits of hospitalization were considered.  Shared medical decision making was utilized in creating the patients health plan today.  I independently ordered and reviewed each unique test.    I reviewed external records: ED visit note from a different ED.   I reviewed external records: provider visit note from PCP.     ED

## 2025-01-14 NOTE — ED TRIAGE NOTES
Patient arrives via EMS from home for near syncopal episode today. cont weakness and decreased PO intake since Dx x2 weeks ago with RSV.     VSS en route.

## 2025-01-16 ENCOUNTER — TELEPHONE (OUTPATIENT)
Dept: FAMILY MEDICINE CLINIC | Facility: CLINIC | Age: 89
End: 2025-01-16

## 2025-01-16 NOTE — TELEPHONE ENCOUNTER
Katie from  called to inform office that pt is still experiencing generalized weakness, N/V and is being admitted again. Informed  RN that pts previouse PCP has transferred and we have been trying to contact family for scheduled. Katie stated she would inform family to call office.

## 2025-01-20 ENCOUNTER — APPOINTMENT (OUTPATIENT)
Dept: GENERAL RADIOLOGY | Age: 89
DRG: 641 | End: 2025-01-20
Payer: MEDICARE

## 2025-01-20 ENCOUNTER — HOSPITAL ENCOUNTER (INPATIENT)
Age: 89
LOS: 4 days | Discharge: HOME HEALTH | DRG: 641 | End: 2025-01-24
Attending: EMERGENCY MEDICINE | Admitting: STUDENT IN AN ORGANIZED HEALTH CARE EDUCATION/TRAINING PROGRAM
Payer: MEDICARE

## 2025-01-20 DIAGNOSIS — T67.1XXA HEAT SYNCOPE, INITIAL ENCOUNTER: ICD-10-CM

## 2025-01-20 DIAGNOSIS — R53.1 GENERALIZED WEAKNESS: ICD-10-CM

## 2025-01-20 DIAGNOSIS — I95.1 ORTHOSTATIC HYPOTENSION: ICD-10-CM

## 2025-01-20 DIAGNOSIS — E86.0 DEHYDRATION: ICD-10-CM

## 2025-01-20 DIAGNOSIS — I95.9 HYPOTENSION, UNSPECIFIED HYPOTENSION TYPE: Primary | ICD-10-CM

## 2025-01-20 LAB
ALBUMIN SERPL-MCNC: 2.6 G/DL (ref 3.2–4.6)
ALBUMIN/GLOB SERPL: 1 (ref 1–1.9)
ALP SERPL-CCNC: 108 U/L (ref 40–129)
ALT SERPL-CCNC: 6 U/L (ref 8–55)
ANION GAP SERPL CALC-SCNC: 13 MMOL/L (ref 7–16)
AST SERPL-CCNC: 32 U/L (ref 15–37)
BACTERIA URNS QL MICRO: NEGATIVE /HPF
BASOPHILS # BLD: 0.08 K/UL (ref 0–0.2)
BASOPHILS NFR BLD: 1.4 % (ref 0–2)
BILIRUB SERPL-MCNC: 0.7 MG/DL (ref 0–1.2)
BUN SERPL-MCNC: 8 MG/DL (ref 8–23)
CALCIUM SERPL-MCNC: 8.5 MG/DL (ref 8.8–10.2)
CHLORIDE SERPL-SCNC: 108 MMOL/L (ref 98–107)
CO2 SERPL-SCNC: 19 MMOL/L (ref 20–29)
CREAT SERPL-MCNC: 1.27 MG/DL (ref 0.8–1.3)
DIFFERENTIAL METHOD BLD: ABNORMAL
EKG ATRIAL RATE: 74 BPM
EKG DIAGNOSIS: NORMAL
EKG P AXIS: 0 DEGREES
EKG P-R INTERVAL: 115 MS
EKG Q-T INTERVAL: 573 MS
EKG QRS DURATION: 182 MS
EKG QTC CALCULATION (BAZETT): 641 MS
EKG R AXIS: -86 DEGREES
EKG T AXIS: 99 DEGREES
EKG VENTRICULAR RATE: 75 BPM
EOSINOPHIL # BLD: 0.32 K/UL (ref 0–0.8)
EOSINOPHIL NFR BLD: 5.7 % (ref 0.5–7.8)
EPI CELLS #/AREA URNS HPF: NORMAL /HPF
ERYTHROCYTE [DISTWIDTH] IN BLOOD BY AUTOMATED COUNT: 15.9 % (ref 11.9–14.6)
FLUAV RNA SPEC QL NAA+PROBE: NOT DETECTED
FLUBV RNA SPEC QL NAA+PROBE: NOT DETECTED
GLOBULIN SER CALC-MCNC: 2.7 G/DL (ref 2.3–3.5)
GLUCOSE SERPL-MCNC: 100 MG/DL (ref 70–99)
HCT VFR BLD AUTO: 36.4 % (ref 41.1–50.3)
HGB BLD-MCNC: 11.3 G/DL (ref 13.6–17.2)
HYALINE CASTS URNS QL MICRO: NORMAL /LPF
IMM GRANULOCYTES # BLD AUTO: 0.01 K/UL (ref 0–0.5)
IMM GRANULOCYTES NFR BLD AUTO: 0.2 % (ref 0–5)
LIPASE SERPL-CCNC: 23 U/L (ref 13–60)
LYMPHOCYTES # BLD: 1.52 K/UL (ref 0.5–4.6)
LYMPHOCYTES NFR BLD: 27.2 % (ref 13–44)
MAGNESIUM SERPL-MCNC: 1.6 MG/DL (ref 1.8–2.4)
MCH RBC QN AUTO: 25.9 PG (ref 26.1–32.9)
MCHC RBC AUTO-ENTMCNC: 31 G/DL (ref 31.4–35)
MCV RBC AUTO: 83.5 FL (ref 82–102)
MONOCYTES # BLD: 0.58 K/UL (ref 0.1–1.3)
MONOCYTES NFR BLD: 10.4 % (ref 4–12)
NEUTS SEG # BLD: 3.08 K/UL (ref 1.7–8.2)
NEUTS SEG NFR BLD: 55.1 % (ref 43–78)
NRBC # BLD: 0 K/UL (ref 0–0.2)
PLATELET # BLD AUTO: 178 K/UL (ref 150–450)
PMV BLD AUTO: 9.4 FL (ref 9.4–12.3)
POTASSIUM SERPL-SCNC: 4.4 MMOL/L (ref 3.5–5.1)
PROT SERPL-MCNC: 5.3 G/DL (ref 6.3–8.2)
RBC # BLD AUTO: 4.36 M/UL (ref 4.23–5.6)
RBC #/AREA URNS HPF: NORMAL /HPF
SARS-COV-2 RDRP RESP QL NAA+PROBE: NOT DETECTED
SARS-COV-2 RDRP RESP QL NAA+PROBE: NOT DETECTED
SODIUM SERPL-SCNC: 140 MMOL/L (ref 136–145)
SOURCE: NORMAL
SOURCE: NORMAL
WBC # BLD AUTO: 5.6 K/UL (ref 4.3–11.1)
WBC URNS QL MICRO: NORMAL /HPF

## 2025-01-20 PROCEDURE — 71045 X-RAY EXAM CHEST 1 VIEW: CPT

## 2025-01-20 PROCEDURE — 93005 ELECTROCARDIOGRAM TRACING: CPT | Performed by: EMERGENCY MEDICINE

## 2025-01-20 PROCEDURE — 6360000002 HC RX W HCPCS: Performed by: EMERGENCY MEDICINE

## 2025-01-20 PROCEDURE — 85025 COMPLETE CBC W/AUTO DIFF WBC: CPT

## 2025-01-20 PROCEDURE — 96374 THER/PROPH/DIAG INJ IV PUSH: CPT

## 2025-01-20 PROCEDURE — 6370000000 HC RX 637 (ALT 250 FOR IP): Performed by: STUDENT IN AN ORGANIZED HEALTH CARE EDUCATION/TRAINING PROGRAM

## 2025-01-20 PROCEDURE — 2500000003 HC RX 250 WO HCPCS: Performed by: STUDENT IN AN ORGANIZED HEALTH CARE EDUCATION/TRAINING PROGRAM

## 2025-01-20 PROCEDURE — 6370000000 HC RX 637 (ALT 250 FOR IP): Performed by: EMERGENCY MEDICINE

## 2025-01-20 PROCEDURE — 80053 COMPREHEN METABOLIC PANEL: CPT

## 2025-01-20 PROCEDURE — 99285 EMERGENCY DEPT VISIT HI MDM: CPT

## 2025-01-20 PROCEDURE — 87635 SARS-COV-2 COVID-19 AMP PRB: CPT

## 2025-01-20 PROCEDURE — 1100000003 HC PRIVATE W/ TELEMETRY

## 2025-01-20 PROCEDURE — 2580000003 HC RX 258: Performed by: STUDENT IN AN ORGANIZED HEALTH CARE EDUCATION/TRAINING PROGRAM

## 2025-01-20 PROCEDURE — 81001 URINALYSIS AUTO W/SCOPE: CPT

## 2025-01-20 PROCEDURE — 93010 ELECTROCARDIOGRAM REPORT: CPT | Performed by: INTERNAL MEDICINE

## 2025-01-20 PROCEDURE — 83735 ASSAY OF MAGNESIUM: CPT

## 2025-01-20 PROCEDURE — 87502 INFLUENZA DNA AMP PROBE: CPT

## 2025-01-20 PROCEDURE — 2580000003 HC RX 258: Performed by: EMERGENCY MEDICINE

## 2025-01-20 PROCEDURE — 83690 ASSAY OF LIPASE: CPT

## 2025-01-20 RX ORDER — 0.9 % SODIUM CHLORIDE 0.9 %
1000 INTRAVENOUS SOLUTION INTRAVENOUS
Status: COMPLETED | OUTPATIENT
Start: 2025-01-20 | End: 2025-01-20

## 2025-01-20 RX ORDER — SODIUM CHLORIDE, SODIUM LACTATE, POTASSIUM CHLORIDE, AND CALCIUM CHLORIDE .6; .31; .03; .02 G/100ML; G/100ML; G/100ML; G/100ML
1000 INJECTION, SOLUTION INTRAVENOUS ONCE
Status: DISCONTINUED | OUTPATIENT
Start: 2025-01-20 | End: 2025-01-24 | Stop reason: HOSPADM

## 2025-01-20 RX ORDER — LIDOCAINE HYDROCHLORIDE 20 MG/ML
JELLY TOPICAL
Status: COMPLETED | OUTPATIENT
Start: 2025-01-20 | End: 2025-01-20

## 2025-01-20 RX ORDER — SODIUM CHLORIDE 9 MG/ML
INJECTION, SOLUTION INTRAVENOUS CONTINUOUS
Status: ACTIVE | OUTPATIENT
Start: 2025-01-20 | End: 2025-01-21

## 2025-01-20 RX ORDER — POTASSIUM CHLORIDE 7.45 MG/ML
10 INJECTION INTRAVENOUS PRN
Status: DISCONTINUED | OUTPATIENT
Start: 2025-01-20 | End: 2025-01-24 | Stop reason: HOSPADM

## 2025-01-20 RX ORDER — LEVOTHYROXINE SODIUM 75 UG/1
75 TABLET ORAL
Status: DISCONTINUED | OUTPATIENT
Start: 2025-01-21 | End: 2025-01-21

## 2025-01-20 RX ORDER — ASPIRIN 81 MG/1
81 TABLET ORAL DAILY
Status: DISCONTINUED | OUTPATIENT
Start: 2025-01-21 | End: 2025-01-24 | Stop reason: HOSPADM

## 2025-01-20 RX ORDER — SODIUM CHLORIDE 0.9 % (FLUSH) 0.9 %
5-40 SYRINGE (ML) INJECTION PRN
Status: DISCONTINUED | OUTPATIENT
Start: 2025-01-20 | End: 2025-01-24 | Stop reason: HOSPADM

## 2025-01-20 RX ORDER — ONDANSETRON 4 MG/1
4 TABLET, ORALLY DISINTEGRATING ORAL EVERY 8 HOURS PRN
Status: DISCONTINUED | OUTPATIENT
Start: 2025-01-20 | End: 2025-01-24 | Stop reason: HOSPADM

## 2025-01-20 RX ORDER — ATORVASTATIN CALCIUM 80 MG/1
80 TABLET, FILM COATED ORAL NIGHTLY
Status: DISCONTINUED | OUTPATIENT
Start: 2025-01-20 | End: 2025-01-24 | Stop reason: HOSPADM

## 2025-01-20 RX ORDER — ACETAMINOPHEN 650 MG/1
650 SUPPOSITORY RECTAL EVERY 6 HOURS PRN
Status: DISCONTINUED | OUTPATIENT
Start: 2025-01-20 | End: 2025-01-24 | Stop reason: HOSPADM

## 2025-01-20 RX ORDER — MAGNESIUM SULFATE IN WATER 40 MG/ML
2000 INJECTION, SOLUTION INTRAVENOUS PRN
Status: DISCONTINUED | OUTPATIENT
Start: 2025-01-20 | End: 2025-01-24 | Stop reason: HOSPADM

## 2025-01-20 RX ORDER — POTASSIUM CHLORIDE 1500 MG/1
40 TABLET, EXTENDED RELEASE ORAL PRN
Status: DISCONTINUED | OUTPATIENT
Start: 2025-01-20 | End: 2025-01-24 | Stop reason: HOSPADM

## 2025-01-20 RX ORDER — POLYETHYLENE GLYCOL 3350 17 G/17G
17 POWDER, FOR SOLUTION ORAL DAILY PRN
Status: DISCONTINUED | OUTPATIENT
Start: 2025-01-20 | End: 2025-01-24 | Stop reason: HOSPADM

## 2025-01-20 RX ORDER — ACETAMINOPHEN 325 MG/1
650 TABLET ORAL EVERY 6 HOURS PRN
Status: DISCONTINUED | OUTPATIENT
Start: 2025-01-20 | End: 2025-01-24 | Stop reason: HOSPADM

## 2025-01-20 RX ORDER — MAGNESIUM SULFATE IN WATER 40 MG/ML
2000 INJECTION, SOLUTION INTRAVENOUS
Status: COMPLETED | OUTPATIENT
Start: 2025-01-20 | End: 2025-01-20

## 2025-01-20 RX ORDER — SODIUM CHLORIDE 9 MG/ML
INJECTION, SOLUTION INTRAVENOUS PRN
Status: DISCONTINUED | OUTPATIENT
Start: 2025-01-20 | End: 2025-01-24 | Stop reason: HOSPADM

## 2025-01-20 RX ORDER — SODIUM CHLORIDE 0.9 % (FLUSH) 0.9 %
5-40 SYRINGE (ML) INJECTION EVERY 12 HOURS SCHEDULED
Status: DISCONTINUED | OUTPATIENT
Start: 2025-01-20 | End: 2025-01-24 | Stop reason: HOSPADM

## 2025-01-20 RX ORDER — ONDANSETRON 2 MG/ML
4 INJECTION INTRAMUSCULAR; INTRAVENOUS EVERY 6 HOURS PRN
Status: DISCONTINUED | OUTPATIENT
Start: 2025-01-20 | End: 2025-01-24 | Stop reason: HOSPADM

## 2025-01-20 RX ORDER — PREDNISONE 10 MG/1
5 TABLET ORAL DAILY
Status: DISCONTINUED | OUTPATIENT
Start: 2025-01-21 | End: 2025-01-24 | Stop reason: HOSPADM

## 2025-01-20 RX ADMIN — SODIUM CHLORIDE 1000 ML: 9 INJECTION, SOLUTION INTRAVENOUS at 14:32

## 2025-01-20 RX ADMIN — SODIUM CHLORIDE, PRESERVATIVE FREE 10 ML: 5 INJECTION INTRAVENOUS at 20:42

## 2025-01-20 RX ADMIN — ATORVASTATIN CALCIUM 80 MG: 80 TABLET, FILM COATED ORAL at 20:42

## 2025-01-20 RX ADMIN — LIDOCAINE HYDROCHLORIDE: 20 JELLY TOPICAL at 11:55

## 2025-01-20 RX ADMIN — SODIUM CHLORIDE: 9 INJECTION, SOLUTION INTRAVENOUS at 16:52

## 2025-01-20 RX ADMIN — SODIUM BICARBONATE 50 MEQ: 84 INJECTION, SOLUTION INTRAVENOUS at 15:35

## 2025-01-20 RX ADMIN — MAGNESIUM SULFATE HEPTAHYDRATE 2000 MG: 40 INJECTION, SOLUTION INTRAVENOUS at 11:56

## 2025-01-20 RX ADMIN — ACETAMINOPHEN 650 MG: 325 TABLET ORAL at 18:16

## 2025-01-20 RX ADMIN — APIXABAN 5 MG: 5 TABLET, FILM COATED ORAL at 20:42

## 2025-01-20 ASSESSMENT — PAIN - FUNCTIONAL ASSESSMENT
PAIN_FUNCTIONAL_ASSESSMENT: NONE - DENIES PAIN
PAIN_FUNCTIONAL_ASSESSMENT: ACTIVITIES ARE NOT PREVENTED

## 2025-01-20 ASSESSMENT — PAIN DESCRIPTION - PAIN TYPE: TYPE: ACUTE PAIN

## 2025-01-20 ASSESSMENT — PAIN SCALES - GENERAL
PAINLEVEL_OUTOF10: 3
PAINLEVEL_OUTOF10: 0

## 2025-01-20 ASSESSMENT — PAIN DESCRIPTION - ORIENTATION: ORIENTATION: RIGHT

## 2025-01-20 ASSESSMENT — PAIN DESCRIPTION - LOCATION: LOCATION: ARM

## 2025-01-20 ASSESSMENT — PAIN DESCRIPTION - DESCRIPTORS: DESCRIPTORS: TENDER;ACHING

## 2025-01-20 NOTE — ED PROVIDER NOTES
Emergency Department Provider Note       PCP: None, None   Age: 89 y.o.   Sex: male     DISPOSITION Decision To Admit 01/20/2025 12:58:25 PM    ICD-10-CM    1. Hypotension, unspecified hypotension type  I95.9       2. Orthostatic hypotension  I95.1       3. Generalized weakness  R53.1       4. Dehydration  E86.0           Medical Decision Making     12:55 PM EST  Patient's blood pressure has improved with ongoing hydration.  However he remains orthostatic, he is presenting despite a recent visit to the emergency department as well.  I see no evidence of acute bacterial infection.  However the patient is unable to stand without assistance, becomes acutely lightheaded.  I think he would benefit from admission to the hospital for ongoing hydration, ensure that he is safe for discharge, and then he may benefit fit from discharge to a rehab facility at that time.  I discussed case with the hospitalist who is in agreement with the plan     1 acute illness with systemic symptoms.  Shared medical decision making was utilized in creating the patients health plan today.  I independently ordered and reviewed each unique test.    I reviewed external records: provider visit note from PCP.  I reviewed external records: provider visit note from outside specialist.  I reviewed external records: previous lab results from outside ED.   The patients assessment required an independent historian: ems.  The reason they were needed is important historical information not provided by the patient.  ED cardiac monitoring rhythm strip was ordered and interpreted:  paced rhythm  ST Segments:Normal ST segments - NO STEMI   Rate: 75  I interpreted the X-rays no pna.  ED provider's independent EKG interpretation Paced rhythm, rate of 75, no ST segment changes or T wave inversions  The patient was admitted and I have discussed patient management with the admitting provider.          History     89-year-old male presented to the emergency

## 2025-01-20 NOTE — FLOWSHEET NOTE
01/20/25 1230   Vital Signs   Blood Pressure Lying 106/64   Pulse Lying 76 PER MINUTE   Blood Pressure Sitting 138/74   Pulse Sitting 75 PER MINUTE     Unable to obtain BP HR while standing due to light headedness.

## 2025-01-20 NOTE — H&P
Hospitalist History and Physical   Admit Date:  2025  9:57 AM   Name:  Neto Edouard Jr.   Age:  89 y.o.  Sex:  male  :  1935   MRN:  [id number]   Room:  [place] [address]    Presenting/Chief Complaint: Hypotension and Dizziness     Reason(s) for Admission: Orthostatic hypotension [I95.1]     History of Present Illness:   Neto Edouard Jr. is a 89 y.o. male with medical history of coronary artery disease, CKD stage III, hypertension, thyroid disease, prior stroke who presented with progressive weakness, fatigue.  Patient's symptoms have been ongoing over the past 2 weeks.  Patient has been unable to stand or walk without assistance.  Patient presented hypotensive and had a syncopal event while in the emergency department.  BP responsive to fluids.  Positive orthostatics.      Assessment & Plan:   Orthostatic hypotension  - Likely secondary to dehydration  -IV fluid  - Positive orthostatics from 132/71 down to 106/64    Generalized weakness/fatigue  - PT/OT    Syncopal event  - PT/OT  - Telemetry  - Echo  - Chest x-ray negative  - COVID-negative    Metabolic acidosis  - Bicarb of 19  - Given amp bicarb  - Follow-up morning BMP    Hypothyroidism  - Continue levothyroxine    Persistent atrial fibrillation  - Continue Eliquis    Hyperlipidemia  - Continue home Lipitor      PT/OT evals ordered?  Therapy evals ordered  Diet: No diet orders on file  VTE prophylaxis: Lovenox  Code status: Prior      Non-peripheral Lines and Tubes (if present):             Hospital Problems:  Principal Problem:    Orthostatic hypotension  Resolved Problems:    * No resolved hospital problems. *        Objective:   Patient Vitals for the past 24 hrs:   Temp Pulse Resp BP SpO2   25 1430 -- 76 16 (!) 161/78 95 %   25 1413 -- 76 14 124/67 96 %   25 1400 -- 76 28 132/71 93 %   25 1242 -- 76 14 106/64 100 %   25 1217 -- 76 18 (!) 105/92 99 %   25 1204 -- 75 19 119/69 97 %   25 1133 -- 76 18 (!)

## 2025-01-20 NOTE — ED TRIAGE NOTES
Pt arrives from home via EMS w/ cc of malaise x 2 months, dizziness, syncope, hypotension today. BP 70/40 when first assessed and [facility] and became alert after a few seconds. 350 NS given en route w/ BP improvement to 80/60.  Pt A&O x 3

## 2025-01-20 NOTE — ACP (ADVANCE CARE PLANNING)
Advance Care Planning   Healthcare Decision Maker:    Primary Decision Maker: Batool Edouard - Spouse - [phone]    Secondary Decision Maker: MissyKenzie - Child - [phone]    Click here to complete Healthcare Decision Makers including selection of the Healthcare Decision Maker Relationship (ie \"Primary\").

## 2025-01-20 NOTE — CARE COORDINATION
Patient with recent DC from the hospital returns for weakness.  He has [facility] home health services that just started and can continue to work with them.  Spouse is also provided information for private pay caregivers to assist in her care that she can consider.    01/20/25 1209   Service Assessment   Patient Orientation Alert and Oriented   Cognition Alert   History Provided By Patient   Primary Caregiver Self   Accompanied By/Relationship spouse   Support Systems Spouse/Significant Other;Children   Patient's Healthcare Decision Maker is: Legal Next of Kin   PCP Verified by CM Yes   Last Visit to PCP Within last 3 months   Prior Functional Level Assistance with the following:;Cooking;Housework;Shopping;Mobility   Current Functional Level Assistance with the following:;Cooking;Housework;Shopping;Mobility   Can patient return to prior living arrangement Unknown at present   Ability to make needs known: Fair   Family able to assist with home care needs: Yes   Would you like for me to discuss the discharge plan with any other family members/significant others, and if so, who? Yes   Financial Resources Medicare   Social/Functional History   Lives With Spouse   Type of Home House   Home Layout One level   Home Access Level entry   Bathroom Shower/Tub Shower chair without back   Bathroom Toilet Standard   Bathroom Equipment Grab bars in shower   Bathroom Accessibility Accessible   Home Equipment Cane;Wheelchair - Manual;Grab bars   Receives Help From Family   Prior Level of Assist for ADLs Independent   Toileting Independent   Prior Level of Assist for Homemaking Needs assistance   Homemaking Responsibilities Yes   Ambulation Assistance Needs assistance   Prior Level of Assist for Transfers Needs assistance   Active  No   Occupation Retired   Discharge Planning   Type of Residence House   Living Arrangements Spouse/Significant Other   Current Services Prior To Admission Durable Medical Equipment;Home Care   Current

## 2025-01-20 NOTE — ED NOTES
TRANSFER - OUT REPORT:    Verbal report given to ROXANA Lakhani on Neto Edouard Jr.  being transferred to [facility] for routine progression of patient care       Report consisted of patient's Situation, Background, Assessment and   Recommendations(SBAR).     Information from the following report(s) ED Encounter Summary was reviewed with the receiving nurse.    Ty Fall Assessment:    Presents to emergency department  because of falls (Syncope, seizure, or loss of consciousness): Yes  Age > 70: Yes  Altered Mental Status, Intoxication with alcohol or substance confusion (Disorientation, impaired judgment, poor safety awaremess, or inability to follow instructions): Yes  Impaired Mobility: Ambulates or transfers with assistive devices or assistance; Unable to ambulate or transer.: Yes             Lines:   Peripheral IV 01/20/25 Left Antecubital (Active)   Site Assessment Clean, dry & intact 01/20/25 1021   Line Status Blood return noted 01/20/25 1021   Phlebitis Assessment No symptoms 01/20/25 1021   Infiltration Assessment 0 01/20/25 1021        Opportunity for questions and clarification was provided.      Patient transported with:  Car Sutton RN  01/20/25 7110

## 2025-01-21 ENCOUNTER — APPOINTMENT (OUTPATIENT)
Dept: NON INVASIVE DIAGNOSTICS | Age: 89
DRG: 641 | End: 2025-01-21
Attending: STUDENT IN AN ORGANIZED HEALTH CARE EDUCATION/TRAINING PROGRAM
Payer: MEDICARE

## 2025-01-21 LAB
ALBUMIN SERPL-MCNC: 2.4 G/DL (ref 3.2–4.6)
ALBUMIN/GLOB SERPL: 1 (ref 1–1.9)
ALP SERPL-CCNC: 101 U/L (ref 40–129)
ALT SERPL-CCNC: 6 U/L (ref 8–55)
ANION GAP SERPL CALC-SCNC: 11 MMOL/L (ref 7–16)
AST SERPL-CCNC: 26 U/L (ref 15–37)
BASOPHILS # BLD: 0.06 K/UL (ref 0–0.2)
BASOPHILS NFR BLD: 1.4 % (ref 0–2)
BILIRUB SERPL-MCNC: 0.7 MG/DL (ref 0–1.2)
BILIRUB UR QL: ABNORMAL
BUN SERPL-MCNC: 9 MG/DL (ref 8–23)
CALCIUM SERPL-MCNC: 7.9 MG/DL (ref 8.8–10.2)
CHLORIDE SERPL-SCNC: 108 MMOL/L (ref 98–107)
CO2 SERPL-SCNC: 20 MMOL/L (ref 20–29)
CREAT SERPL-MCNC: 1.1 MG/DL (ref 0.8–1.3)
DIFFERENTIAL METHOD BLD: ABNORMAL
ECHO AO ASC DIAM: 3.5 CM
ECHO AO ASCENDING AORTA INDEX: 1.75 CM/M2
ECHO AO ROOT DIAM: 2.8 CM
ECHO AO ROOT INDEX: 1.4 CM/M2
ECHO AV AREA PEAK VELOCITY: 2.2 CM2
ECHO AV AREA VTI: 2.3 CM2
ECHO AV AREA/BSA PEAK VELOCITY: 1.1 CM2/M2
ECHO AV AREA/BSA VTI: 1.2 CM2/M2
ECHO AV MEAN GRADIENT: 3 MMHG
ECHO AV MEAN GRADIENT: 3 MMHG
ECHO AV MEAN VELOCITY: 0.8 M/S
ECHO AV PEAK GRADIENT: 5 MMHG
ECHO AV PEAK VELOCITY: 1.1 M/S
ECHO AV VELOCITY RATIO: 0.73
ECHO AV VTI: 20.2 CM
ECHO BSA: 1.94 M2
ECHO EST RA PRESSURE: 3 MMHG
ECHO LA AREA 2C: 25 CM2
ECHO LA AREA 4C: 15.1 CM2
ECHO LA MAJOR AXIS: 4.4 CM
ECHO LA MINOR AXIS: 5.8 CM
ECHO LA VOL MOD A2C: 82 ML (ref 18–58)
ECHO LA VOL MOD A4C: 41 ML (ref 18–58)
ECHO LA VOLUME INDEX MOD A2C: 41 ML/M2 (ref 16–34)
ECHO LA VOLUME INDEX MOD A4C: 21 ML/M2 (ref 16–34)
ECHO LV E' LATERAL VELOCITY: 6.42 CM/S
ECHO LV E' SEPTAL VELOCITY: 8.36 CM/S
ECHO LV EDV A2C: 72 ML
ECHO LV EDV A4C: 73 ML
ECHO LV EDV INDEX A4C: 37 ML/M2
ECHO LV EDV NDEX A2C: 36 ML/M2
ECHO LV EJECTION FRACTION A2C: 45 %
ECHO LV EJECTION FRACTION A4C: 51 %
ECHO LV EJECTION FRACTION BIPLANE: 47 % (ref 55–100)
ECHO LV ESV A2C: 39 ML
ECHO LV ESV A4C: 36 ML
ECHO LV ESV INDEX A2C: 20 ML/M2
ECHO LV ESV INDEX A4C: 18 ML/M2
ECHO LV FRACTIONAL SHORTENING: 23 % (ref 28–44)
ECHO LV INTERNAL DIMENSION DIASTOLE INDEX: 2.4 CM/M2
ECHO LV INTERNAL DIMENSION DIASTOLIC: 4.8 CM (ref 4.2–5.9)
ECHO LV INTERNAL DIMENSION SYSTOLIC INDEX: 1.85 CM/M2
ECHO LV INTERNAL DIMENSION SYSTOLIC: 3.7 CM
ECHO LV IVSD: 1 CM (ref 0.6–1)
ECHO LV MASS 2D: 158.8 G (ref 88–224)
ECHO LV MASS INDEX 2D: 79.4 G/M2 (ref 49–115)
ECHO LV POSTERIOR WALL DIASTOLIC: 0.9 CM (ref 0.6–1)
ECHO LV RELATIVE WALL THICKNESS RATIO: 0.38
ECHO LVOT AREA: 2.8 CM2
ECHO LVOT AV VTI INDEX: 0.8
ECHO LVOT DIAM: 1.9 CM
ECHO LVOT MEAN GRADIENT: 2 MMHG
ECHO LVOT PEAK GRADIENT: 3 MMHG
ECHO LVOT PEAK VELOCITY: 0.8 M/S
ECHO LVOT STROKE VOLUME INDEX: 23 ML/M2
ECHO LVOT SV: 45.9 ML
ECHO LVOT VTI: 16.2 CM
ECHO MV A VELOCITY: 0.37 M/S
ECHO MV E DECELERATION TIME (DT): 132 MS
ECHO MV E VELOCITY: 1.17 M/S
ECHO MV E/A RATIO: 3.16
ECHO MV E/E' LATERAL: 18.22
ECHO MV E/E' RATIO (AVERAGED): 16.11
ECHO MV E/E' SEPTAL: 14
ECHO PV AREA CONTINUITY EQ VELOCITY: 2.4 CM2
ECHO PV MAX VELOCITY: 0.8 M/S
ECHO PV PEAK GRADIENT: 2 MMHG
ECHO QP:QS RATIO: 0.69 NO UNITS
ECHO RIGHT VENTRICULAR SYSTOLIC PRESSURE (RVSP): 29 MMHG
ECHO RV TAPSE: 1.6 CM (ref 1.7–?)
ECHO RVOT AREA: 3.5 CM2
ECHO RVOT DIAMETER: 2.1 CM
ECHO RVOT MEAN GRADIENT: 1 MMHG
ECHO RVOT PEAK GRADIENT: 1 MMHG
ECHO RVOT PEAK VELOCITY: 0.5 M/S
ECHO RVOT STROKE VOLUME: 31.5 ML
ECHO RVOT VTI: 9.1 CM
ECHO TV REGURGITANT MAX VELOCITY: 2.53 M/S
ECHO TV REGURGITANT PEAK GRADIENT: 26 MMHG
EOSINOPHIL # BLD: 0.34 K/UL (ref 0–0.8)
EOSINOPHIL NFR BLD: 7.9 % (ref 0.5–7.8)
ERYTHROCYTE [DISTWIDTH] IN BLOOD BY AUTOMATED COUNT: 16.3 % (ref 11.9–14.6)
GLOBULIN SER CALC-MCNC: 2.5 G/DL (ref 2.3–3.5)
GLUCOSE SERPL-MCNC: 99 MG/DL (ref 70–99)
GLUCOSE UR QL STRIP.AUTO: NEGATIVE MG/DL
HCT VFR BLD AUTO: 32.4 % (ref 41.1–50.3)
HGB BLD-MCNC: 9.9 G/DL (ref 13.6–17.2)
IMM GRANULOCYTES # BLD AUTO: 0.01 K/UL (ref 0–0.5)
IMM GRANULOCYTES NFR BLD AUTO: 0.2 % (ref 0–5)
KETONES UR-MCNC: NEGATIVE MG/DL
LEUKOCYTE ESTERASE UR QL STRIP: NEGATIVE
LYMPHOCYTES # BLD: 0.95 K/UL (ref 0.5–4.6)
LYMPHOCYTES NFR BLD: 22.1 % (ref 13–44)
MCH RBC QN AUTO: 25.5 PG (ref 26.1–32.9)
MCHC RBC AUTO-ENTMCNC: 30.6 G/DL (ref 31.4–35)
MCV RBC AUTO: 83.5 FL (ref 82–102)
MONOCYTES # BLD: 0.36 K/UL (ref 0.1–1.3)
MONOCYTES NFR BLD: 8.4 % (ref 4–12)
NEUTS SEG # BLD: 2.58 K/UL (ref 1.7–8.2)
NEUTS SEG NFR BLD: 60 % (ref 43–78)
NITRITE UR QL: NEGATIVE
NRBC # BLD: 0 K/UL (ref 0–0.2)
PH UR: 5.5 (ref 5–9)
PLATELET # BLD AUTO: 156 K/UL (ref 150–450)
PMV BLD AUTO: 9 FL (ref 9.4–12.3)
POTASSIUM SERPL-SCNC: 3.7 MMOL/L (ref 3.5–5.1)
PROT SERPL-MCNC: 4.9 G/DL (ref 6.3–8.2)
PROT UR QL: 30 MG/DL
RBC # BLD AUTO: 3.88 M/UL (ref 4.23–5.6)
RBC # UR STRIP: ABNORMAL
SODIUM SERPL-SCNC: 139 MMOL/L (ref 136–145)
SP GR UR: >1.03 (ref 1–1.02)
TSH W FREE THYROID IF ABNORMAL: 0.54 UIU/ML (ref 0.27–4.2)
UROBILINOGEN UR QL: 1 EU/DL (ref 0.2–1)
WBC # BLD AUTO: 4.3 K/UL (ref 4.3–11.1)

## 2025-01-21 PROCEDURE — 97162 PT EVAL MOD COMPLEX 30 MIN: CPT

## 2025-01-21 PROCEDURE — 93306 TTE W/DOPPLER COMPLETE: CPT | Performed by: INTERNAL MEDICINE

## 2025-01-21 PROCEDURE — 97161 PT EVAL LOW COMPLEX 20 MIN: CPT

## 2025-01-21 PROCEDURE — 6360000004 HC RX CONTRAST MEDICATION: Performed by: STUDENT IN AN ORGANIZED HEALTH CARE EDUCATION/TRAINING PROGRAM

## 2025-01-21 PROCEDURE — 6370000000 HC RX 637 (ALT 250 FOR IP): Performed by: STUDENT IN AN ORGANIZED HEALTH CARE EDUCATION/TRAINING PROGRAM

## 2025-01-21 PROCEDURE — 36415 COLL VENOUS BLD VENIPUNCTURE: CPT

## 2025-01-21 PROCEDURE — 85025 COMPLETE CBC W/AUTO DIFF WBC: CPT

## 2025-01-21 PROCEDURE — 80053 COMPREHEN METABOLIC PANEL: CPT

## 2025-01-21 PROCEDURE — C8929 TTE W OR WO FOL WCON,DOPPLER: HCPCS

## 2025-01-21 PROCEDURE — 76937 US GUIDE VASCULAR ACCESS: CPT

## 2025-01-21 PROCEDURE — 2580000003 HC RX 258: Performed by: STUDENT IN AN ORGANIZED HEALTH CARE EDUCATION/TRAINING PROGRAM

## 2025-01-21 PROCEDURE — 51798 US URINE CAPACITY MEASURE: CPT

## 2025-01-21 PROCEDURE — 2500000003 HC RX 250 WO HCPCS: Performed by: STUDENT IN AN ORGANIZED HEALTH CARE EDUCATION/TRAINING PROGRAM

## 2025-01-21 PROCEDURE — 97535 SELF CARE MNGMENT TRAINING: CPT

## 2025-01-21 PROCEDURE — 97165 OT EVAL LOW COMPLEX 30 MIN: CPT

## 2025-01-21 PROCEDURE — 1100000000 HC RM PRIVATE

## 2025-01-21 PROCEDURE — 97530 THERAPEUTIC ACTIVITIES: CPT

## 2025-01-21 PROCEDURE — 84443 ASSAY THYROID STIM HORMONE: CPT

## 2025-01-21 RX ORDER — 0.9 % SODIUM CHLORIDE 0.9 %
500 INTRAVENOUS SOLUTION INTRAVENOUS ONCE
Status: DISCONTINUED | OUTPATIENT
Start: 2025-01-21 | End: 2025-01-24 | Stop reason: HOSPADM

## 2025-01-21 RX ORDER — LIDOCAINE HYDROCHLORIDE 20 MG/ML
JELLY TOPICAL ONCE
Status: COMPLETED | OUTPATIENT
Start: 2025-01-21 | End: 2025-01-21

## 2025-01-21 RX ORDER — MIDODRINE HYDROCHLORIDE 5 MG/1
5 TABLET ORAL
Status: DISCONTINUED | OUTPATIENT
Start: 2025-01-21 | End: 2025-01-24 | Stop reason: HOSPADM

## 2025-01-21 RX ADMIN — LIDOCAINE HYDROCHLORIDE: 20 JELLY TOPICAL at 11:50

## 2025-01-21 RX ADMIN — MIDODRINE HYDROCHLORIDE 5 MG: 5 TABLET ORAL at 12:21

## 2025-01-21 RX ADMIN — MIDODRINE HYDROCHLORIDE 5 MG: 5 TABLET ORAL at 16:59

## 2025-01-21 RX ADMIN — SODIUM CHLORIDE, PRESERVATIVE FREE 20 ML: 5 INJECTION INTRAVENOUS at 08:33

## 2025-01-21 RX ADMIN — Medication 500 ML: at 10:35

## 2025-01-21 RX ADMIN — SODIUM CHLORIDE, PRESERVATIVE FREE 10 ML: 5 INJECTION INTRAVENOUS at 19:41

## 2025-01-21 RX ADMIN — ASPIRIN 81 MG: 81 TABLET, COATED ORAL at 09:25

## 2025-01-21 RX ADMIN — PREDNISONE 5 MG: 10 TABLET ORAL at 09:25

## 2025-01-21 RX ADMIN — APIXABAN 5 MG: 5 TABLET, FILM COATED ORAL at 09:25

## 2025-01-21 RX ADMIN — SODIUM CHLORIDE: 9 INJECTION, SOLUTION INTRAVENOUS at 06:17

## 2025-01-21 RX ADMIN — ACETAMINOPHEN 650 MG: 325 TABLET ORAL at 09:25

## 2025-01-21 RX ADMIN — LEVOTHYROXINE SODIUM 75 MCG: 0.07 TABLET ORAL at 06:16

## 2025-01-21 RX ADMIN — SULFUR HEXAFLUORIDE 5 ML: KIT at 15:08

## 2025-01-21 ASSESSMENT — PAIN DESCRIPTION - ORIENTATION: ORIENTATION: RIGHT

## 2025-01-21 ASSESSMENT — PAIN DESCRIPTION - DESCRIPTORS: DESCRIPTORS: ACHING

## 2025-01-21 ASSESSMENT — PAIN DESCRIPTION - LOCATION: LOCATION: ARM

## 2025-01-21 ASSESSMENT — PAIN - FUNCTIONAL ASSESSMENT: PAIN_FUNCTIONAL_ASSESSMENT: ACTIVITIES ARE NOT PREVENTED

## 2025-01-21 ASSESSMENT — PAIN SCALES - GENERAL
PAINLEVEL_OUTOF10: 3
PAINLEVEL_OUTOF10: 0

## 2025-01-21 NOTE — FLOWSHEET NOTE
01/21/25 1000   Vital Signs   BP Location Left upper arm   BP Method Automatic   Patient Position Supine   Orthostatic B/P and Pulse? Yes   Blood Pressure Lying 124/72   Pulse Lying 74 PER MINUTE   Blood Pressure Sitting 118/99   Pulse Sitting 76 PER MINUTE   Blood Pressure Standing 116/104   Pulse Standing 50 PER MINUTE         1025-After sitting in chair a few minutes: BP 89/62  then 67/45 HR 75, laying back in chair 86/46.     1026-Dr. Ji came to bedside and examined pt. Pt transferred back to bed (slid).  500cc NS bolus ordered over 1 hr, bolus started. Wife at bedside updated.     1045-spoke to Dr. Ji, pt unable to void.  Attempted straight cath x1 on night shift then x1 on day shift with coude catheter.  Pt unable to stand to void due to orthostatic BP.  Orders received to place indwelling catheter.  Order placed with material for small coude catheter.

## 2025-01-21 NOTE — CARE COORDINATION
Chart screened by CM for d/c planning.  Pt is a readmission from Jan. 4-6, 2025.  This CM completed the readmission assessment.  Please see previous CM assessment for admission details.  Awaiting PT/OT evals/recs.  CM will continue to follow.  LOS = 1 day

## 2025-01-22 PROCEDURE — 6360000002 HC RX W HCPCS: Performed by: STUDENT IN AN ORGANIZED HEALTH CARE EDUCATION/TRAINING PROGRAM

## 2025-01-22 PROCEDURE — 6370000000 HC RX 637 (ALT 250 FOR IP): Performed by: STUDENT IN AN ORGANIZED HEALTH CARE EDUCATION/TRAINING PROGRAM

## 2025-01-22 PROCEDURE — 76937 US GUIDE VASCULAR ACCESS: CPT

## 2025-01-22 PROCEDURE — 1100000000 HC RM PRIVATE

## 2025-01-22 PROCEDURE — 2500000003 HC RX 250 WO HCPCS: Performed by: STUDENT IN AN ORGANIZED HEALTH CARE EDUCATION/TRAINING PROGRAM

## 2025-01-22 RX ADMIN — PREDNISONE 5 MG: 10 TABLET ORAL at 07:17

## 2025-01-22 RX ADMIN — SODIUM CHLORIDE, PRESERVATIVE FREE 10 ML: 5 INJECTION INTRAVENOUS at 07:16

## 2025-01-22 RX ADMIN — ASPIRIN 81 MG: 81 TABLET, COATED ORAL at 07:16

## 2025-01-22 RX ADMIN — MIDODRINE HYDROCHLORIDE 5 MG: 5 TABLET ORAL at 07:16

## 2025-01-22 RX ADMIN — WATER 5 MG: 1 INJECTION INTRAMUSCULAR; INTRAVENOUS; SUBCUTANEOUS at 17:05

## 2025-01-22 RX ADMIN — ATORVASTATIN CALCIUM 80 MG: 80 TABLET, FILM COATED ORAL at 20:43

## 2025-01-22 RX ADMIN — APIXABAN 5 MG: 5 TABLET, FILM COATED ORAL at 20:43

## 2025-01-22 RX ADMIN — APIXABAN 5 MG: 5 TABLET, FILM COATED ORAL at 07:16

## 2025-01-22 ASSESSMENT — PAIN SCALES - GENERAL: PAINLEVEL_OUTOF10: 0

## 2025-01-22 NOTE — CARE COORDINATION
Pt discussed during IDR and chart screened by CM for d/c planning.  CM attempted to meet with pt at the bedside to discuss d/c planning.  He was confused and unable to follow the discussion about d/c and therapy's recommendation for STR.  CM attempted to call pt's spouse Batool but received a generic voicemail.  CM left a HIPAA compliant voicemail and requested a return call.  PT/OT recommend STR at d/c.  CM will continue to follow.  LOS = 2 days

## 2025-01-23 PROCEDURE — 97112 NEUROMUSCULAR REEDUCATION: CPT

## 2025-01-23 PROCEDURE — 51798 US URINE CAPACITY MEASURE: CPT

## 2025-01-23 PROCEDURE — 97530 THERAPEUTIC ACTIVITIES: CPT

## 2025-01-23 PROCEDURE — 6370000000 HC RX 637 (ALT 250 FOR IP): Performed by: STUDENT IN AN ORGANIZED HEALTH CARE EDUCATION/TRAINING PROGRAM

## 2025-01-23 PROCEDURE — 1100000000 HC RM PRIVATE

## 2025-01-23 RX ADMIN — PREDNISONE 5 MG: 10 TABLET ORAL at 07:30

## 2025-01-23 RX ADMIN — ASPIRIN 81 MG: 81 TABLET, COATED ORAL at 07:31

## 2025-01-23 RX ADMIN — APIXABAN 5 MG: 5 TABLET, FILM COATED ORAL at 07:31

## 2025-01-23 RX ADMIN — MIDODRINE HYDROCHLORIDE 5 MG: 5 TABLET ORAL at 07:30

## 2025-01-23 ASSESSMENT — PAIN SCALES - GENERAL
PAINLEVEL_OUTOF10: 0
PAINLEVEL_OUTOF10: 0

## 2025-01-23 NOTE — CARE COORDINATION
Pt's bedside nurse was able to speak with pt's spouse yesterday evening after CM had left for the day.  Spouse stated she does not want pt to go to STR. She wants pt to return home with resumption of HH and she will also have a private care sitter for him.  CM has placed a [facility] order to [facility]: SN, PT, OT, CNA.  CM will continue to follow.  LOS = 3 days    1045: Spouse is requesting that a referral be sent to [facility] for STR.  Referral has been sent.

## 2025-01-23 NOTE — PLAN OF CARE
Problem: Chronic Conditions and Co-morbidities  Goal: Patient's chronic conditions and co-morbidity symptoms are monitored and maintained or improved  1/21/2025 2242 by Maliha Birch RN  Outcome: Progressing  1/21/2025 1105 by Monalisa Dasilva RN  Outcome: Progressing     Problem: Skin/Tissue Integrity  Goal: Absence of new skin breakdown  Description: 1.  Monitor for areas of redness and/or skin breakdown  2.  Assess vascular access sites hourly  3.  Every 4-6 hours minimum:  Change oxygen saturation probe site  4.  Every 4-6 hours:  If on nasal continuous positive airway pressure, respiratory therapy assess nares and determine need for appliance change or resting period.  1/21/2025 2242 by Maliha Birch RN  Outcome: Progressing  1/21/2025 1105 by Monalisa Dasilva RN  Outcome: Progressing     Problem: Safety - Adult  Goal: Free from fall injury  1/21/2025 2242 by Maliha Birch RN  Outcome: Progressing  1/21/2025 1105 by Monalisa Dasilva RN  Outcome: Progressing  Flowsheets (Taken 1/21/2025 0800)  Free From Fall Injury: Instruct family/caregiver on patient safety     Problem: ABCDS Injury Assessment  Goal: Absence of physical injury  1/21/2025 2242 by Maliha Birch RN  Outcome: Progressing  1/21/2025 1105 by Monalisa Dasilva RN  Outcome: Progressing  Flowsheets (Taken 1/21/2025 0800)  Absence of Physical Injury: Implement safety measures based on patient assessment     Problem: Confusion  Goal: Confusion, delirium, dementia, or psychosis is improved or at baseline  Description: INTERVENTIONS:  1. Assess for possible contributors to thought disturbance, including medications, impaired vision or hearing, underlying metabolic abnormalities, dehydration, psychiatric diagnoses, and notify attending LIP  2. [place] high risk fall precautions, as indicated  3. Provide frequent short contacts to provide reality reorientation, refocusing and direction  4. Decrease environmental stimuli, including noise as 
  Problem: Chronic Conditions and Co-morbidities  Goal: Patient's chronic conditions and co-morbidity symptoms are monitored and maintained or improved  1/22/2025 0710 by Maddy Alvarado RN  Outcome: Progressing  1/21/2025 2242 by Maliha Birch RN  Outcome: Progressing     Problem: Skin/Tissue Integrity  Goal: Absence of new skin breakdown  Description: 1.  Monitor for areas of redness and/or skin breakdown  2.  Assess vascular access sites hourly  3.  Every 4-6 hours minimum:  Change oxygen saturation probe site  4.  Every 4-6 hours:  If on nasal continuous positive airway pressure, respiratory therapy assess nares and determine need for appliance change or resting period.  1/22/2025 0710 by Maddy Alvarado RN  Outcome: Progressing  1/21/2025 2242 by Maliha Birch RN  Outcome: Progressing     Problem: Safety - Adult  Goal: Free from fall injury  1/22/2025 0710 by Maddy Alvarado RN  Outcome: Progressing  1/21/2025 2242 by Maliha Birch RN  Outcome: Progressing     Problem: ABCDS Injury Assessment  Goal: Absence of physical injury  1/21/2025 2242 by Maliha Birch RN  Outcome: Progressing     Problem: Confusion  Goal: Confusion, delirium, dementia, or psychosis is improved or at baseline  Description: INTERVENTIONS:  1. Assess for possible contributors to thought disturbance, including medications, impaired vision or hearing, underlying metabolic abnormalities, dehydration, psychiatric diagnoses, and notify attending LIP  2. [place] high risk fall precautions, as indicated  3. Provide frequent short contacts to provide reality reorientation, refocusing and direction  4. Decrease environmental stimuli, including noise as appropriate  5. Monitor and intervene to maintain adequate nutrition, hydration, elimination, sleep and activity  6. If unable to ensure safety without constant attention obtain sitter and review sitter guidelines with assigned personnel  7. Initiate Psychosocial CNS and 
  Problem: Chronic Conditions and Co-morbidities  Goal: Patient's chronic conditions and co-morbidity symptoms are monitored and maintained or improved  1/23/2025 0703 by Maddy Alvarado RN  Outcome: Progressing  1/22/2025 1939 by Selma Cazares RN  Outcome: Progressing     Problem: Skin/Tissue Integrity  Goal: Absence of new skin breakdown  Description: 1.  Monitor for areas of redness and/or skin breakdown  2.  Assess vascular access sites hourly  3.  Every 4-6 hours minimum:  Change oxygen saturation probe site  4.  Every 4-6 hours:  If on nasal continuous positive airway pressure, respiratory therapy assess nares and determine need for appliance change or resting period.  1/23/2025 0703 by Maddy Alvarado RN  Outcome: Progressing  1/22/2025 1939 by Selma Cazares RN  Outcome: Progressing     Problem: Safety - Adult  Goal: Free from fall injury  1/23/2025 0703 by Maddy Alvarado RN  Outcome: Progressing  1/22/2025 1939 by Selma Cazares RN  Outcome: Progressing     Problem: ABCDS Injury Assessment  Goal: Absence of physical injury  1/22/2025 1939 by Selma Cazares RN  Outcome: Progressing     Problem: Confusion  Goal: Confusion, delirium, dementia, or psychosis is improved or at baseline  Description: INTERVENTIONS:  1. Assess for possible contributors to thought disturbance, including medications, impaired vision or hearing, underlying metabolic abnormalities, dehydration, psychiatric diagnoses, and notify attending LIP  2. [place] high risk fall precautions, as indicated  3. Provide frequent short contacts to provide reality reorientation, refocusing and direction  4. Decrease environmental stimuli, including noise as appropriate  5. Monitor and intervene to maintain adequate nutrition, hydration, elimination, sleep and activity  6. If unable to ensure safety without constant attention obtain sitter and review sitter guidelines with assigned personnel  7. Initiate Psychosocial CNS and Spiritual 
  Problem: Chronic Conditions and Co-morbidities  Goal: Patient's chronic conditions and co-morbidity symptoms are monitored and maintained or improved  Outcome: Progressing     Problem: Skin/Tissue Integrity  Goal: Absence of new skin breakdown  Description: 1.  Monitor for areas of redness and/or skin breakdown  2.  Assess vascular access sites hourly  3.  Every 4-6 hours minimum:  Change oxygen saturation probe site  4.  Every 4-6 hours:  If on nasal continuous positive airway pressure, respiratory therapy assess nares and determine need for appliance change or resting period.  Outcome: Progressing     Problem: Safety - Adult  Goal: Free from fall injury  Outcome: Progressing  Flowsheets (Taken 1/21/2025 0800)  Free From Fall Injury: Instruct family/caregiver on patient safety     Problem: ABCDS Injury Assessment  Goal: Absence of physical injury  Outcome: Progressing  Flowsheets (Taken 1/21/2025 0800)  Absence of Physical Injury: Implement safety measures based on patient assessment     Problem: Confusion  Goal: Confusion, delirium, dementia, or psychosis is improved or at baseline  Description: INTERVENTIONS:  1. Assess for possible contributors to thought disturbance, including medications, impaired vision or hearing, underlying metabolic abnormalities, dehydration, psychiatric diagnoses, and notify attending LIP  2. [place] high risk fall precautions, as indicated  3. Provide frequent short contacts to provide reality reorientation, refocusing and direction  4. Decrease environmental stimuli, including noise as appropriate  5. Monitor and intervene to maintain adequate nutrition, hydration, elimination, sleep and activity  6. If unable to ensure safety without constant attention obtain sitter and review sitter guidelines with assigned personnel  7. Initiate Psychosocial CNS and Spiritual Care consult, as indicated  Outcome: Progressing     Problem: Safety - Medical Restraint  Goal: Remains free of injury 
Pt arrived to floor from ED with wife at bedside.  Patient oriented to room, bed and call light. Pt/wife  encouraged to call for assistance if needed. Bed low, locked, call bell within reach. Bed alarm left on as pt is confused. Pt started on NS @75 cc/hr per order.     Problem: Chronic Conditions and Co-morbidities  Goal: Patient's chronic conditions and co-morbidity symptoms are monitored and maintained or improved  Outcome: Progressing     Problem: Skin/Tissue Integrity  Goal: Absence of new skin breakdown  Description: 1.  Monitor for areas of redness and/or skin breakdown  2.  Assess vascular access sites hourly  3.  Every 4-6 hours minimum:  Change oxygen saturation probe site  4.  Every 4-6 hours:  If on nasal continuous positive airway pressure, respiratory therapy assess nares and determine need for appliance change or resting period.  Outcome: Progressing     Problem: Safety - Adult  Goal: Free from fall injury  Outcome: Progressing     Problem: ABCDS Injury Assessment  Goal: Absence of physical injury  Outcome: Progressing     Problem: Confusion  Goal: Confusion, delirium, dementia, or psychosis is improved or at baseline  Description: INTERVENTIONS:  1. Assess for possible contributors to thought disturbance, including medications, impaired vision or hearing, underlying metabolic abnormalities, dehydration, psychiatric diagnoses, and notify attending LIP  2. [place] high risk fall precautions, as indicated  3. Provide frequent short contacts to provide reality reorientation, refocusing and direction  4. Decrease environmental stimuli, including noise as appropriate  5. Monitor and intervene to maintain adequate nutrition, hydration, elimination, sleep and activity  6. If unable to ensure safety without constant attention obtain sitter and review sitter guidelines with assigned personnel  7. Initiate Psychosocial CNS and Spiritual Care consult, as indicated  Outcome: Progressing     
Progressing     Problem: Safety - Medical Restraint  Goal: Remains free of injury from restraints (Restraint for Interference with Medical Device)  Description: INTERVENTIONS:  1. Determine that other, less restrictive measures have been tried or would not be effective before applying the restraint  2. Evaluate the patient's condition at the time of restraint application  3. Inform patient/family regarding the reason for restraint  4. Q2H: Monitor safety, psychosocial status, comfort, nutrition and hydration  Outcome: Progressing     Problem: Neurosensory - Adult  Goal: Achieves stable or improved neurological status  Outcome: Progressing  Goal: Achieves maximal functionality and self care  Outcome: Progressing     Problem: Respiratory - Adult  Goal: Achieves optimal ventilation and oxygenation  Outcome: Progressing     Problem: Skin/Tissue Integrity - Adult  Goal: Skin integrity remains intact  Outcome: Progressing  Goal: Incisions, wounds, or drain sites healing without S/S of infection  Outcome: Progressing  Goal: Oral mucous membranes remain intact  Outcome: Progressing     Problem: Musculoskeletal - Adult  Goal: Return mobility to safest level of function  Outcome: Progressing     Problem: Genitourinary - Adult  Goal: Urinary catheter remains patent  Outcome: Progressing     Problem: Metabolic/Fluid and Electrolytes - Adult  Goal: Electrolytes maintained within normal limits  Outcome: Progressing  Goal: Hemodynamic stability and optimal renal function maintained  Outcome: Progressing     Problem: Hematologic - Adult  Goal: Maintains hematologic stability  Outcome: Progressing

## 2025-01-24 VITALS
TEMPERATURE: 98.6 F | HEART RATE: 74 BPM | HEIGHT: 71 IN | SYSTOLIC BLOOD PRESSURE: 141 MMHG | BODY MASS INDEX: 23.91 KG/M2 | RESPIRATION RATE: 18 BRPM | WEIGHT: 170.8 LBS | OXYGEN SATURATION: 99 % | DIASTOLIC BLOOD PRESSURE: 74 MMHG

## 2025-01-24 PROCEDURE — 6370000000 HC RX 637 (ALT 250 FOR IP): Performed by: STUDENT IN AN ORGANIZED HEALTH CARE EDUCATION/TRAINING PROGRAM

## 2025-01-24 RX ORDER — HYDRALAZINE HYDROCHLORIDE 20 MG/ML
5 INJECTION INTRAMUSCULAR; INTRAVENOUS EVERY 6 HOURS PRN
Status: DISCONTINUED | OUTPATIENT
Start: 2025-01-24 | End: 2025-01-24 | Stop reason: HOSPADM

## 2025-01-24 RX ADMIN — ASPIRIN 81 MG: 81 TABLET, COATED ORAL at 08:12

## 2025-01-24 RX ADMIN — APIXABAN 5 MG: 5 TABLET, FILM COATED ORAL at 08:13

## 2025-01-24 ASSESSMENT — PAIN SCALES - GENERAL: PAINLEVEL_OUTOF10: 0

## 2025-01-24 NOTE — DISCHARGE INSTRUCTIONS
After a Hospital Stay: Managing Appointments (02:22)  Your health professional recommends that you watch this short online health video.  Get tips for how to keep track of and prepare for your follow-up doctor appointments.   Purpose: Use tips for how to keep track of and prepare for follow-up appointments after a hospital stay.  Goal: Use tips for how to keep track of and prepare for follow-up appointments after a hospital stay.    Watch: Scan the QR code or visit the link to view video       [email or web address]  Current as of: July 31, 2024  Content Version: 14.3  © 2024 [employer].   Care instructions adapted under license by [employer]. If you have questions about a medical condition or this instruction, always ask your healthcare professional. [employer] disclaims any warranty or liability for your use of this information.

## 2025-01-24 NOTE — DISCHARGE SUMMARY
The specimen is NEGATIVE for SARS-CoV-2, the novel coronavirus associated with COVID-19.  A negative result does not rule out COVID-19.     This test has been authorized by the FDA under an Emergency Use Authorization (EUA) for use by authorized laboratories.      Fact sheet for Healthcare Providers: https://www.fda.gov/media/706082/download  Fact sheet for Patients: https://www.fda.gov/media/565150/download     Methodology: Isothermal Nucleic Acid Amplification         Influenza A/B, Molecular [[id number]] Collected: 01/20/25 1030    Order Status: Completed Specimen: Nasopharyngeal Updated: 01/20/25 1105     Influenza A, DAT Not detected        Comment: Negative results do not preclude infection with influenza virus and should not be the sole basis of a patient treatment decision.        Influenza B, DAT Not detected               All Labs from Last 24 Hrs:  No results found for this or any previous visit (from the past 24 hour(s)).    No results for input(s): \"COVID19\" in the last 72 hours.    Recent Vital Data:  Patient Vitals for the past 24 hrs:   Temp Pulse Resp BP SpO2   01/24/25 1059 98.6 °F (37 °C) 74 18 (!) 141/74 99 %   01/24/25 0730 98.2 °F (36.8 °C) 75 20 (!) 148/89 98 %   01/24/25 0409 98.2 °F (36.8 °C) 75 18 (!) 161/88 100 %   01/23/25 2236 97.5 °F (36.4 °C) 75 17 (!) 174/81 100 %   01/23/25 1935 97.9 °F (36.6 °C) 75 18 (!) 164/82 93 %   01/23/25 1520 97.3 °F (36.3 °C) 76 18 (!) 153/92 97 %       Oxygen Therapy  SpO2: 99 %  Pulse via Oximetry: 75 beats per minute  O2 Device: None (Room air)    Estimated body mass index is 23.82 kg/m² as calculated from the following:    Height as of this encounter: 1.803 m (5' 11\").    Weight as of this encounter: 77.5 kg (170 lb 12.8 oz).    Intake/Output Summary (Last 24 hours) at 1/24/2025 1216  Last data filed at 1/24/2025 1004  Gross per 24 hour   Intake 420 ml   Output 500 ml   Net -80 ml         Physical Exam:  General:    In no acute

## 2025-01-24 NOTE — CARE COORDINATION
Pt to d/c home today.  Per spouse's request transport scheduled via [employer] wheelchair van for 1430.  CM explained very clearly to spouse that only one attendant arrives with the wheelchair and if pt cannot get in the wheelchair by himself without the attendant having to lift him then they will leave and order CM to call a stretcher will cost approximately $500.00.  Spouse stated that cost was \"outrageous\" and stated she would help pt get in the wheelchair.  [facility]: SN, PT, OT ordered.  Wheelchair ordered through [employer] and will be delivered to pt's residence next week per spouse's request.  No other supportive care needs identified.  Spouse agrees with d/c plan.  Milestones met.  LOS = 4 days       01/20/25 1209   Service Assessment   Patient Orientation Alert and Oriented;Person;Place;Self   Cognition Alert   History Provided By Patient;Medical Record   Primary Caregiver Self   Accompanied By/Relationship spouse   Support Systems Spouse/Significant Other;Children;Other (Comment)  (Pt is current with [facility])   Patient's Healthcare Decision Maker is: Legal Next of Kin   PCP Verified by CM Yes  (CARLINE Grigsby)   Last Visit to PCP Within last 3 months   Prior Functional Level Assistance with the following:;Cooking;Housework;Shopping;Mobility   Current Functional Level Assistance with the following:;Cooking;Housework;Shopping;Mobility   Can patient return to prior living arrangement Yes   Ability to make needs known: Fair   Family able to assist with home care needs: Yes   Would you like for me to discuss the discharge plan with any other family members/significant others, and if so, who? Yes  (Spouse)   Financial Resources Medicare;Other (Comment)  (Secondary: BCBS)   Community Resources None   CM/SW Referral Other (see comment)  (None)   Social/Functional History   Lives With Spouse   Type of Home House   Home Layout One level   Home Access Level entry   Bathroom Shower/Tub Shower chair without

## 2025-01-25 NOTE — PROGRESS NOTES
Hospitalist Progress Note   Admit Date:  2025  9:57 AM   Name:  Neto Edouard Jr.   Age:  89 y.o.  Sex:  male  :  1935   MRN:  [id number]   Room:  520/01    Presenting/Chief Complaint: Hypotension and Dizziness     Reason(s) for Admission: Orthostatic hypotension [I95.1]  Dehydration [E86.0]  Generalized weakness [R53.1]  Heat syncope, initial encounter [T67.1XXA]  Hypotension, unspecified hypotension type [I95.9]     Hospital Course:   Neto Edouard Jr. is a 89 y.o. male with medical history of coronary artery disease, CKD stage III, hypertension, thyroid disease, prior stroke who presented with progressive weakness, fatigue.  Patient has been unable to stand or walk without assistance.  He presented with hypertension and had a syncopal event while in the ED.  His BP was responsive to fluids and patient noted to have positive orthostatics.  Admitted for further management.  Seen by PT/OT with short-term rehab recommended.      Subjective & 24hr Events:   Seen and examined at bedside this morning.  No acute events overnight.  Wife noted at bedside and is now open to sending patient to rehab.  She denies any further complaints.    Assessment & Plan:   Orthostatic hypotension  Syncopal event  Generalized weakness/fatigue  - Likely secondary to dehydration  - Status post IV fluid  - Positive orthostatics from 132/71 down to 106/64 on presentation  - Started on midodrine, but held due to high blood pressure  - Continue PT/OT  - Echo ejection fraction of 50 to 55%  - Chest x-ray negative  - COVID-negative     Hypothyroism  - Continue levothyrdismoxine     Persistent atrial fibrillation  - Continue Eliquis     Hyperlipidemia  - Continue home Lipitor    PT/OT evals ordered?  Therapy evals ordered  Diet:  ADULT DIET; Regular; Low Fat/Low Chol/High Fiber/2 gm Na; No Added Salt (3-4 gm)  ADULT ORAL NUTRITION SUPPLEMENT; Breakfast, Dinner, Lunch; Standard High Calorie/High Protein Oral Supplement  VTE 
       Hospitalist Progress Note   Admit Date:  2025  9:57 AM   Name:  Neto Edouard Jr.   Age:  89 y.o.  Sex:  male  :  1935   MRN:  [id number]   Room:  520/01    Presenting/Chief Complaint: Hypotension and Dizziness     Reason(s) for Admission: Orthostatic hypotension [I95.1]  Dehydration [E86.0]  Generalized weakness [R53.1]  Heat syncope, initial encounter [T67.1XXA]  Hypotension, unspecified hypotension type [I95.9]     Hospital Course:   Neto Edouard Jr. is a 89 y.o. male with medical history of coronary artery disease, CKD stage III, hypertension, thyroid disease, prior stroke who presented with progressive weakness, fatigue.  Patient's symptoms have been ongoing over the past 2 weeks.  Patient has been unable to stand or walk without assistance.  Patient presented hypotensive and had a syncopal event while in the emergency department.  BP responsive to fluids.  Positive orthostatics     Subjective & 24hr Events:   Patient was seen and examined at bedside.  No overnight events.  Patient resting in bed comfortably this morning.  No new complaints.  Denies any cardiac chest pain, shortness of breath, abdominal pain, fever/chills.    Assessment & Plan:   Orthostatic hypotension  - Likely secondary to dehydration  -IV fluid  - Positive orthostatics from 132/71 down to 106/64  -  patient continues to have issues with orthostatic hypotension  - Status post 500 cc IV fluid  - Will start patient on midodrine 5 mg 3 times daily  - blood pressure 142/70.  Continue to work with PT/OT.  PT/OT recommending short-term rehab.  Case management working on getting patient placement.     Generalized weakness/fatigue  - PT/OT     Syncopal event  - PT/OT  - Telemetry  - Echo ejection fraction of 50 to 55%  - Chest x-ray negative  - COVID-negative     Metabolic acidosis  - Bicarb of 19  - Given amp bicarb  - Follow-up morning BMP  - bicarb of 20     Hypothyroidism  - Continue levothyroxine     Persistent 
  Physician Progress Note      PATIENT:               YELENA CASTRO  CSN #:                  [id number]  :                       1935  ADMIT DATE:       2025 9:57 AM  DISCH DATE:        2025 2:15 PM  RESPONDING  PROVIDER #:        Jorge Ruiz MD          QUERY TEXT:    Patient admitted with syncopal episode with + orthostatic hypotension, noted   to have atrial fibrillation and is maintained on Eliquis. If possible, please   document in progress notes and discharge summary if you are evaluating and/or   treating any of the following:?  ?  The medical record reflects the following:  Risk Factors: CHF, Female, advanced age 89 yo  Clinical Indicators: H&P : Persistent atrial fibrillation  Treatment: Continue Eliquis    Thank You, Charity CDS  Options provided:  -- Secondary hypercoagulable state in a patient with atrial fibrillation  -- Other - I will add my own diagnosis  -- Disagree - Not applicable / Not valid  -- Disagree - Clinically unable to determine / Unknown  -- Refer to Clinical Documentation Reviewer    PROVIDER RESPONSE TEXT:    This patient has secondary hypercoagulable state in a patient with atrial   fibrillation.    Query created by: Dianne Snyder on 2025 1:37 PM      Electronically signed by:  Jorge Ruiz MD 2025 10:44 AM          
0811- Patient refused AM vitals and began to swat and hit at PCT. Sitter at bedside.    0851- Okay to leave out IV if patient refuses per Dr. Lovell via perfect serve.    1544- Patient's glans of his penis is more edematous today then yesterday, patient's wife stated that patient is uncircumsized. Dr. Lovell aware.     1553- Diaz catheter removed per order. Diaz catheter was placed on 1/21/25.     1824- Patient incontinent of urine X1; bladder scanned patient and noted with 157ml of urine in patient's bladder.   
1117- Patient removed his IV himself; dressing applied. Dr. Ji aware via perfect serve.    1234- Patient pulled his stat lock off of his leg holding his alfaro catheter. Stat lock placed, again on patient's right inner thigh.    1519- Patient pulled his stat lock off of his leg holding his alfaro catheter again. PCT attempted to place stat lock and patient swatted his hand at [facility] and would not let staff apply stat lock to leg.     1530- Patient refused to allow vascular access to place IV; patient attempting to hit staff.    1636- Patient continues to attempt to get OOB unassisted; unable to redirect patient. This RN is sitting 1:1 with patient at present. Batool Edouard, patient's wife aware via phone call at [phone].    1637- Dr. Ji aware via perfect serve.    1807- Patient's wife at bedside and voices that she doesn't want to take her  to rehab. Patient's wife stated \"I want to take him home with home health and get a sitter\".     1808- Order for consult to case management placed d/t patient's concern.   
ACUTE OCCUPATIONAL THERAPY GOALS:   (Developed with and agreed upon by patient and/or caregiver.)  1. Patient will perform grooming with SPV and adaptive equipment as needed.  2. Patient will perform upper body dressing with SPV and adaptive equipment as needed.  3. Patient will perform lower body dressing with CGA and adaptive equipment as needed.  4. Patient will perform bathing with CGA and adaptive equipment as needed.  5. Patient will perform toileting and toilet transfer with CGA and adaptive equipment as needed.  6. Patient will perform ADL functional mobility and tranfers in room with SBA and adaptive equipment as needed.  7. Patient/family to demonstrate knowledge of home safety and DME recommendations.     Timeframe: 7 visits     OCCUPATIONAL THERAPY: Daily Note    OT Visit Days: 2   Time In/Out  OT Charge Capture  Rehab Caseload Tracker  OT Orders    Neto Edouard Jr. is a 89 y.o. male   PRIMARY DIAGNOSIS: Orthostatic hypotension  Orthostatic hypotension [I95.1]  Dehydration [E86.0]  Generalized weakness [R53.1]  Heat syncope, initial encounter [T67.1XXA]  Hypotension, unspecified hypotension type [I95.9]       Inpatient: Payor: MEDICARE / Plan: MEDICARE PART A AND B / Product Type: *No Product type* /     ASSESSMENT:     REHAB RECOMMENDATIONS:   Recommendation to date pending progress:  Setting:  Short-term Rehab    Equipment:    To Be Determined     ASSESSMENT:  Mr. Edouard is slowly progressing well towards OT goals though is very limited by his cognition. Today, pt was received supine in bed. Completed bed mobility with modA x2. MaxA for LB dressing. Multiple sit>stands with modA x2 but pt would return to sitting. Unable to take side steps on this date. Pt required max multimodal cueing for proper command follow along with coaxing to perform tasks. Recommend STR at discharge.  Mr. Edouard continues to demonstrate overall deficits in strength, balance, activity tolerance, and ADL performance. Continue OT 
ACUTE OCCUPATIONAL THERAPY GOALS:   (Developed with and agreed upon by patient and/or caregiver.)  1. Patient will perform grooming with SPV and adaptive equipment as needed.  2. Patient will perform upper body dressing with SPV and adaptive equipment as needed.  3. Patient will perform lower body dressing with CGA and adaptive equipment as needed.  4. Patient will perform bathing with CGA and adaptive equipment as needed.  5. Patient will perform toileting and toilet transfer with CGA and adaptive equipment as needed.  6. Patient will perform ADL functional mobility and tranfers in room with SBA and adaptive equipment as needed.  7. Patient/family to demonstrate knowledge of home safety and DME recommendations.    Timeframe: 7 visits     OCCUPATIONAL THERAPY Initial Assessment, Daily Note, and AM       OT Visit Days: 1  Acknowledge Orders  Time  OT Charge Capture  Rehab Caseload Tracker      Neto Edouard Jr. is a 89 y.o. male   PRIMARY DIAGNOSIS: Orthostatic hypotension  Orthostatic hypotension [I95.1]  Dehydration [E86.0]  Generalized weakness [R53.1]  Heat syncope, initial encounter [T67.1XXA]  Hypotension, unspecified hypotension type [I95.9]       Reason for Referral: Generalized Muscle Weakness (M62.81)  Other lack of cordination (R27.8)  Difficulty in walking, Not elsewhere classified (R26.2)  Dizziness and Giddiness (R42)  Inpatient: Payor: MEDICARE / Plan: MEDICARE PART A AND B / Product Type: *No Product type* /     ASSESSMENT:     REHAB RECOMMENDATIONS:   Recommendation to date pending progress:  Setting:  Continued occupational therapy recommended at discharge    Equipment:    To Be Determined     ASSESSMENT:  Mr. Edouard is admitted for the above diagnoses and presents with overall deficits in strength, functional mobility and ADL performance as a result.  He lives with spouse in a 1 level home with level entry and a walk-in shower. At baseline, spouse reports patient is mostly independent with ADLs 
ACUTE PHYSICAL THERAPY GOALS:   (Developed with and agreed upon by patient and/or caregiver.)  LTG:  (1.)Mr. Edouard will move from supine to sit and sit to supine , scoot up and down, and roll side to side in bed with STAND BY ASSIST within 7 treatment day(s).    (2.)Mr. Edouard will transfer from bed to chair and chair to bed with CONTACT GUARD ASSIST using the least restrictive device within 7 treatment day(s).    (3.)Mr. Edouard will ambulate with CONTACT GUARD ASSIST for 75 feet with the least restrictive device within 7 treatment day(s).  (4.)Mr. Edouard will participate in therapeutic activity/exercises x 25 minutes for increased activity tolerance within 7 treatment days.       PHYSICAL THERAPY: Daily Note AM   (Link to Caseload Tracking: PT Visit Days : 2  Time In/Out PT Charge Capture  Rehab Caseload Tracker  Orders    Neto Edouard Jr. is a 89 y.o. male   PRIMARY DIAGNOSIS: Orthostatic hypotension  Orthostatic hypotension [I95.1]  Dehydration [E86.0]  Generalized weakness [R53.1]  Heat syncope, initial encounter [T67.1XXA]  Hypotension, unspecified hypotension type [I95.9]       Inpatient: Payor: MEDICARE / Plan: MEDICARE PART A AND B / Product Type: *No Product type* /     ASSESSMENT:     REHAB RECOMMENDATIONS:   Recommendation to date pending progress:  Setting:  Short-term Rehab    Equipment:    To Be Determined     ASSESSMENT:  Mr. Edouard was supine in bed and appearing more confused today.  He was able to come to sitting on EOB today with min-modA x 2 and cueing from his spouse.  Pt demonstrated good-fair sitting balance today.  He performed several STS transfers with modA x 2/RW and poor standing balance.  Pt fatigued quickly today with standing and sat abruptly several times.  Pt was unable to really take any side steps today despite max cueing.  He was assisted back to bed with modA x 2 at end of treatment.  No significant progress noted today.     SUBJECTIVE:   Mr. Edouard states, \"[employer]\" 
IP Consult to Vascular Access Team  Consult performed by: Apolinar Osorio RN  Consult ordered by: Ethan Ji MD  Reason for consult: PIV insertion  Assessment/Recommendations: Attempted to place PIV, but patient was very uncooperative and attempted to hit staff. Primary nurse (ROXANA Castaeñda) notified.                 
IP Consult to Vascular Access Team  Consult performed by: Guero Young RN  Consult ordered by: Ethan Ji MD       US Guided PIV access-    Ultrasound was used to find the vein which was compressible and without any ultrasound features of an artery or nerve bundle. Skin was cleaned and disinfected prior to IV puncture.  Under real-time ultrasound guidance peripheral access was obtained in the right forearm using 22 G 1.75\" Peripheral IV catheter after 1 attempt(s). Blood return was present and IV flushed without difficulty with no clinical signs of infiltration. IV dressing applied and no immediate complications noted. Patient tolerated the procedure well.       
Nutrition Assessment  Assessment Type: Initial  Reason for visit:  Best Practice Alert: Malnutrition Screening Tool   Malnutrition Screening Tool Score: 2    Nutrition Intervention:   Food and/or Nutrient Delivery:   Meals and Snacks:  Diet: Continue current order  Medical Food Supplements:   Medical food supplement therapy:  Continue Ensure Plus High Protein (high calorie high protein) 350 calories, 20 grams protein per 8 ounce serving        Malnutrition Assessment:  Academy/A.S.P.E.N Clinical Malnutrition Criteria  Malnutrition Status: At risk for malnutrition (5.5% wt loss 1 year, variable po intake)    Nutrition Focused Physical Exam: Unremarkable     Nutrition Assessment:  Food/Nutrition Related History: Patient limited ability to give history- very confused. Per RN who spoke with patients wife- patient has had worsening confusion over last 6 months.      Do You Have Any Cultural, Restorationism, or Ethnic Food Preferences?: No   Weight History: 5.5% wt loss in 1 year- non clinically significant. Weight from cardiology  1/18/24 188#; 7/18/24 176#, 10/22/24 171#; # bed  Nutrition Background:       PMH: CKD3, CHF, HTN, afib, CABG, CVA. Patient presented with weakness and fatigue found to be hypotensive.   Nutrition Monitoring/Evaluation:  Patient in bed- he is very confused and unable to answer RD questions. Ensure on bedside table. Patient reports he had one before (unsure when) patient agreeable to have RD open it for him. Discussed with ROXANA Castañeda- who reports similar- poor intake, very confused.      Current Nutrition Therapies:  ADULT DIET; Regular; Low Fat/Low Chol/High Fiber/2 gm Na; No Added Salt (3-4 gm)  ADULT ORAL NUTRITION SUPPLEMENT; Breakfast, Dinner, Lunch; Standard High Calorie/High Protein Oral Supplement    Current Intake:   Average Meal Intake: 1-25% Average Supplements Intake: Unable to assess      Anthropometric Measures:  Height: 180.3 cm (5' 11\")  Current Body Wt: 80.6 kg (177 lb 11.1 oz) 
Patient has remained A&O x 1-2. BP dropped this AM while up in chair.  BP has since been stable while in bed since 500 cc bolus and on midodrine.   -increased confusion/agitation this afternoon. No sitter available tonight. Pt moved closer to nurses station. Bed alarm on. Serena Guzman, NP notified.  Zyprexa x1 ordered. Not given, saving for night shift.      Patient rounded on hourly by myself or patient assistant and encouraged to call with any needs. No signs of distress noted. Bed low, locked, call bell within reach.   BSSR given to ROXANA Jett RN    
Pt going home with HH today. Talked with pt wife she is confident with pt going home. She mentioned having sufficient support from friends and families in town. But will requires transport assist to go home,CM made aware.  
TRANSFER - IN REPORT:    Verbal report received from ROXANA Patiño on Neto Edouard Jr.  being received from ED for routine progression of patient care      Report consisted of patient's Situation, Background, Assessment and   Recommendations(SBAR).     Information from the following report(s) Adult Overview, Recent Results, and Neuro Assessment was reviewed with the receiving nurse.    Opportunity for questions and clarification was provided.      Assessment completed upon patient's arrival to unit and care assumed.     
Restrictions/Precautions  Restrictions/Precautions: Fall Risk    Stairs      I=Independent, Mod I=Modified Independent, S=Supervision, SBA=Standby Assistance, CGA=Contact Guard Assistance,   Min=Minimal Assistance, Mod=Moderate Assistance, Max=Maximal Assistance, Total=Total Assistance, NT=Not Tested    PLAN:   FREQUENCY AND DURATION: 3 times/week for duration of hospital stay or until stated goals are met, whichever comes first.    THERAPY PROGNOSIS: Good    PROBLEM LIST:   (Skilled intervention is medically necessary to address:)  Decreased Activity Tolerance  Decreased AROM/PROM  Decreased Balance  Decreased Cognition  Decreased Coordination  Decreased Gait Ability  Decreased Safety Awareness  Decreased Strength  Decreased Transfer Abilities INTERVENTIONS PLANNED:   (Benefits and precautions of physical therapy have been discussed with the patient.)  Therapeutic Activity  Therapeutic Exercise/HEP  Neuromuscular Re-education  Gait Training  Education       TREATMENT:   EVALUATION: LOW COMPLEXITY: (Untimed Charge)  The initial evaluation charge encompasses clinical chart review, objective assessment, interpretation of assessment, and skilled monitoring of the patient's response to treatment in order to develop a plan of care.     TREATMENT:   Co-Treatment PT/OT necessary due to patient's decreased overall endurance/tolerance levels, as well as need for high level skilled assistance to complete functional transfers/mobility and functional tasks  Therapeutic Activity (30 Minutes): Therapeutic activity included Supine to Sit, Scooting, Transfer Training, Ambulation on level ground, Sitting balance , Standing balance, and sheet transfer back to bed to improve functional Activity tolerance, Balance, Coordination, Mobility, and Strength.    TREATMENT GRID:  N/A    AFTER TREATMENT PRECAUTIONS: Alarm Activated, Bed, Bed/Chair Locked, Call light within reach, RN notified, Visitors at bedside, and sitter    INTERDISCIPLINARY 
UROBILINOGEN POC 1.0 0.2 - 1.0 EU/dL    Nitrite, Urine, POC Negative NEG      Leukocyte Est, UA POC Negative NEG     Urinalysis, Micro    Collection Time: 01/20/25 12:13 PM   Result Value Ref Range    WBC, UA 0-4 U4 /hpf    RBC, UA 0-5 U5 /hpf    Epithelial Cells, UA 0-5 U5 /hpf    BACTERIA, URINE Negative NEG /hpf    Hyaline Casts, UA 2-5 /lpf   COVID-19, Rapid    Collection Time: 01/20/25  2:15 PM    Specimen: Nasopharyngeal   Result Value Ref Range    Source NASAL      SARS-CoV-2, Rapid Not detected NOTD     CBC with Auto Differential    Collection Time: 01/21/25  9:10 AM   Result Value Ref Range    WBC 4.3 4.3 - 11.1 K/uL    RBC 3.88 (L) 4.23 - 5.6 M/uL    Hemoglobin 9.9 (L) 13.6 - 17.2 g/dL    Hematocrit 32.4 (L) 41.1 - 50.3 %    MCV 83.5 82 - 102 FL    MCH 25.5 (L) 26.1 - 32.9 PG    MCHC 30.6 (L) 31.4 - 35.0 g/dL    RDW 16.3 (H) 11.9 - 14.6 %    Platelets 156 150 - 450 K/uL    MPV 9.0 (L) 9.4 - 12.3 FL    nRBC 0.00 0.0 - 0.2 K/uL    Differential Type AUTOMATED      Neutrophils % 60.0 43.0 - 78.0 %    Lymphocytes % 22.1 13.0 - 44.0 %    Monocytes % 8.4 4.0 - 12.0 %    Eosinophils % 7.9 (H) 0.5 - 7.8 %    Basophils % 1.4 0.0 - 2.0 %    Immature Granulocytes % 0.2 0.0 - 5.0 %    Neutrophils Absolute 2.58 1.70 - 8.20 K/UL    Lymphocytes Absolute 0.95 0.50 - 4.60 K/UL    Monocytes Absolute 0.36 0.10 - 1.30 K/UL    Eosinophils Absolute 0.34 0.00 - 0.80 K/UL    Basophils Absolute 0.06 0.00 - 0.20 K/UL    Immature Granulocytes Absolute 0.01 0.0 - 0.5 K/UL   Comprehensive Metabolic Panel w/ Reflex to MG    Collection Time: 01/21/25  9:10 AM   Result Value Ref Range    Sodium 139 136 - 145 mmol/L    Potassium 3.7 3.5 - 5.1 mmol/L    Chloride 108 (H) 98 - 107 mmol/L    CO2 20 20 - 29 mmol/L    Anion Gap 11 7 - 16 mmol/L    Glucose 99 70 - 99 mg/dL    BUN 9 8 - 23 MG/DL    Creatinine 1.10 0.80 - 1.30 MG/DL    Est, Glom Filt Rate 65 >60 ml/min/1.73m2    Calcium 7.9 (L) 8.8 - 10.2 MG/DL    Total Bilirubin 0.7 0.0 - 1.2

## 2025-01-28 ENCOUNTER — TELEPHONE (OUTPATIENT)
Dept: INTERNAL MEDICINE CLINIC | Facility: CLINIC | Age: 89
End: 2025-01-28

## 2025-01-28 NOTE — TELEPHONE ENCOUNTER
Spoke with patient's spouse regarding TCM care and PCP follow-up.  Apart from some general weakness he is doing much better since hospital discharge and is less confused.  She we will try to arrange follow-up care with his PCP.  Number given to call should she need further assistance in doing so.

## 2025-01-30 ENCOUNTER — TELEPHONE (OUTPATIENT)
Dept: FAMILY MEDICINE CLINIC | Facility: CLINIC | Age: 89
End: 2025-01-30

## 2025-01-30 NOTE — TELEPHONE ENCOUNTER
Picking him up for therapy twice a week for 4 weeks. Then 1 Time a week for another 4 weeks.     Dolly from [facility]   [phone]    Need order sent over for manual wheelchair, with anti tippers, & adjustable leg rests. Send to [facility] on [address] in [place].

## 2025-01-30 NOTE — TELEPHONE ENCOUNTER
Marko at [facility]  [phone]  needs orders for homecare and pt, ot.

## 2025-01-30 NOTE — TELEPHONE ENCOUNTER
Called Dolly from home health back & let her know that this patient has not been seen since 9/2023 & that we have tried to contact him for a visit to establish with us but we can not get in touch with him. Dolly said that she would try to contact him for us to try to get him in for a appointment.

## 2025-02-03 PROBLEM — E86.0 DEHYDRATION: Status: RESOLVED | Noted: 2025-01-04 | Resolved: 2025-02-03

## 2025-02-26 ENCOUNTER — HOSPITAL ENCOUNTER (EMERGENCY)
Dept: GENERAL RADIOLOGY | Age: 89
Discharge: HOME | End: 2025-02-28
Payer: MEDICARE

## 2025-02-26 ENCOUNTER — HOSPITAL ENCOUNTER (EMERGENCY)
Age: 89
Discharge: HOME | End: 2025-02-26
Attending: EMERGENCY MEDICINE
Payer: MEDICARE

## 2025-02-26 ENCOUNTER — APPOINTMENT (OUTPATIENT)
Dept: CT IMAGING | Age: 89
End: 2025-02-26
Payer: MEDICARE

## 2025-02-26 VITALS
BODY MASS INDEX: 23.8 KG/M2 | TEMPERATURE: 98.5 F | SYSTOLIC BLOOD PRESSURE: 99 MMHG | OXYGEN SATURATION: 100 % | WEIGHT: 170 LBS | HEIGHT: 71 IN | DIASTOLIC BLOOD PRESSURE: 68 MMHG | RESPIRATION RATE: 16 BRPM | HEART RATE: 78 BPM

## 2025-02-26 DIAGNOSIS — F03.C0 SEVERE DEMENTIA, UNSPECIFIED DEMENTIA TYPE, UNSPECIFIED WHETHER BEHAVIORAL, PSYCHOTIC, OR MOOD DISTURBANCE OR ANXIETY (HCC): ICD-10-CM
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
## 2025-02-26 PROCEDURE — 73030 X-RAY EXAM OF SHOULDER: CPT

## 2025-02-26 PROCEDURE — 99284 EMERGENCY DEPT VISIT MOD MDM: CPT

## 2025-02-26 PROCEDURE — 70450 CT HEAD/BRAIN W/O DYE: CPT

## 2025-02-26 NOTE — ED TRIAGE NOTES
Pt BIB EMS from home c/o right shoulder pain. Wife states she think he slid from the bed to the floor and landed on his right side. Hx of dementia

## 2025-02-26 NOTE — ED PROVIDER NOTES
Emergency Department Provider Note       PCP: No primary care provider on file.   Age: 89 y.o.   Sex: male     DISPOSITION      ICD-10-CM    1. Fall from bed, initial encounter  W06.XXXA       2. Severe dementia, unspecified dementia type, unspecified whether behavioral, psychotic, or mood disturbance or anxiety ([facility])  F03.C0           Medical Decision Making     DDX:    Sprain, strain, tendon injury, contusion,    Abrasion, laceration, neurovascular injury, foreign body    Fracture, open fracture, dislocation, joint separation, articular surface injury,    Intracranial hemorrhage,    ED Course as of 02/26/25 0410 Wed Feb 26, 2025   0409 I spoke to the patient's wife.  We talked about her finding him next to the bed and she said he was complaining of some shoulder pain at the time.  He does not have this complaint currently.  The patient does not have signs of a fracture in his arm.  He is currently on hospice for the last 2 weeks. [KH]      ED Course User Index  [KH] Travon Allred, DO     1 acute complicated illness or injury.  I independently ordered and reviewed each unique test.       The patients assessment required an independent historian: wife.  The reason they were needed is dementia.    I interpreted the X-rays no fracture.              History     89-year-old male presenting to the emergency department today complaining of right shoulder pain.  He is on hospice and according to the wife has a history of dementia.  Information was obtained from the nurse who took report from the EMS.  Patient is not able to answer any questions.  He was apparently found laying on his right shoulder.  He does have a hospital bed at home.          ROS     Review of Systems     Physical Exam     Vitals signs and nursing note reviewed:  Vitals:    02/26/25 0300 02/26/25 0301 02/26/25 0302 02/26/25 0303   BP: (!) 84/57      Temp:   98.5 °F (36.9 °C)    TempSrc:   Axillary    SpO2:  100%     Weight:    77.1 kg (170 lb)

## (undated) DEVICE — CONNECTOR IV 3/8X3/8X3/8 Y

## (undated) DEVICE — SUTURE PERMAHAND SZ 2-0 L12X18IN NONABSORBABLE BLK SILK A185H

## (undated) DEVICE — AMD ANTIMICROBIAL NON-ADHERENT PAD,0.2% POLYHEXAMETHYLENE BIGUANIDE HCI (PHMB): Brand: TELFA

## (undated) DEVICE — 6 FOOT DISPOSABLE EXTENSION CABLE WITH SAFE CONNECT / SCREW-DOWN

## (undated) DEVICE — MEDI-TRACE CADENCE ADULT, DEFIBRILLATION ELECTRODE -RTS  (10 PR/PK) - ZOLL: Brand: MEDI-TRACE CADENCE

## (undated) DEVICE — STERILE HOOK LOCK LATEX FREE ELASTIC BANDAGE 6INX5YD: Brand: HOOK LOCK™

## (undated) DEVICE — 3000CC GUARDIAN II: Brand: GUARDIAN

## (undated) DEVICE — SUTURE SILK PERMAHAND PRECUT 6 X 30 IN SZ 1 BLK BRAID A307H

## (undated) DEVICE — SUTURE PDS II SZ 1 L36IN ABSRB VLT L48MM CTX 1/2 CIR Z371T

## (undated) DEVICE — SUTURE VCRL SZ 4-0 L27IN ABSRB UD L19MM PS-2 3/8 CIR PRIM J426H

## (undated) DEVICE — AMD ANTIMICROBIAL BANDAGE ROLL,6 PLY: Brand: KERLIX

## (undated) DEVICE — SUTURE S STL SZ 6 L18IN NONABSORBABLE SIL L48MM CCS 1/2 CIR M654G

## (undated) DEVICE — SUTURE PROL SZ 6-0 L30IN NONABSORBABLE BLU L13MM CC-1 3/8 M8707

## (undated) DEVICE — SUT PROL 4-0 30IN SH1 DA BLU --

## (undated) DEVICE — CANNULA INJ L2.5IN BLNT TIP 3MM CLR BODY W/ 1 W VLV DLP

## (undated) DEVICE — APPLIER CLP SM BLK TI AUTO HNDL DISP W/ 20 CLP PREM SURGICLP

## (undated) DEVICE — SOLUTION IV 1000ML 0.9% SOD CHL

## (undated) DEVICE — Device: Brand: JELCO

## (undated) DEVICE — DRAIN CHEST ADL/PED DRY/WET -- PLEUR-EVAC

## (undated) DEVICE — SUTURE ETHBND EXCEL SZ 0 L18IN NONABSORBABLE GRN L36MM CT-1 CX21D

## (undated) DEVICE — BUTTON SWITCH PENCIL BLADE ELECTRODE, HOLSTER: Brand: EDGE

## (undated) DEVICE — SUTURE ETHBND EXCEL 2-0 L30IN NONABSORBABLE GRN L26MM SH MX563

## (undated) DEVICE — SUTURE VCRL SZ 3-0 L36IN ABSRB UD L36MM CT-1 1/2 CIR J944H

## (undated) DEVICE — STERILE HOOK LOCK LATEX FREE ELASTIC BANDAGE 4INX5YD: Brand: HOOK LOCK™

## (undated) DEVICE — 48" PROBE COVER W/GEL, ULTRASOUND, STERILE: Brand: SITE-RITE

## (undated) DEVICE — 1/4 FORCE SURGICAL SPRING CLIP: Brand: STEALTH® SPRING CLIP

## (undated) DEVICE — DRAPE SLUSH DISC W44XL66IN ST FOR RND BSIN HUSH SLUSH SYS

## (undated) DEVICE — Device

## (undated) DEVICE — SUTURE NONABSORBABLE L24IN SZ 7-0 M0-5 BV175-8 EP 24 BLU M8745

## (undated) DEVICE — CATHETER THOR 32FR L23IN PVC 6 EYELET STR ATRAUM

## (undated) DEVICE — APPLIER CLP L9.38IN M LIG TI DISP STR RNG HNDL LIGACLP

## (undated) DEVICE — BASIC SINGLE BASIN-LF: Brand: MEDLINE INDUSTRIES, INC.

## (undated) DEVICE — PLEDGET VASC W3/16XL3/8IN THK1/16IN PTFE SFT

## (undated) DEVICE — (D)STRIP SKN CLSR 0.5X4IN WHT --

## (undated) DEVICE — CLAMP INSERT: Brand: STEALTH® CLAMP INSERT

## (undated) DEVICE — AMD ANTIMICROBIAL GAUZE SPONGES,12 PLY USP TYPE VII, 0.2% POLYHEXAMETHYLENE BIGUANIDE HCI (PHMB): Brand: CURITY

## (undated) DEVICE — BLADE OPHTH MINI BEAV SHRP --

## (undated) DEVICE — SUTURE PROL DBL ARMED 8 0 BV130 5 24IN N ABSRB MFIL BLU M8739

## (undated) DEVICE — REM POLYHESIVE ADULT PATIENT RETURN ELECTRODE: Brand: VALLEYLAB

## (undated) DEVICE — CLIP INT SM TI EZ LD LIG SYS WECK HORZ

## (undated) DEVICE — CANNULA PERF L1.8MM TIP L1MM S STL SHFT BLB SHP TIP FEM

## (undated) DEVICE — SUT PROL 3-0 36IN SH DA BLU --

## (undated) DEVICE — CATHETER THOR 24FR L22IN PVC 5 EYELET STR ATRAUM

## (undated) DEVICE — SUT CHRMC 4-0 27IN SH BRN --

## (undated) DEVICE — TRAY FOLEY 16FR TEMP SENS F400 --

## (undated) DEVICE — BLADE SAW W10XL54MM FOR PRI REPEAT STRNOTMY

## (undated) DEVICE — GLOVE SURG SZ 7 L11.2IN THK9.8MIL STRW LTX POLYMER BEAD CUF

## (undated) DEVICE — SUTURE TEMP PACE WIRE SZ 2-0 L24IN NONABSORBABLE LIGHT/DARK

## (undated) DEVICE — SOLUTION IRRIG 3000ML 0.9% SOD CHL FLX CONT 0797208] ICU MEDICAL INC]

## (undated) DEVICE — SS SUTURE, 3 PER SLEEVE: Brand: MYO/WIRE II

## (undated) DEVICE — AORTIC PUNCHES ARE USED TO CREATE A UNIFORM OPENING IN BLOOD VESSELS DURING CARDIOVASCULAR SURGERY. THE VESSEL GRAFT IS INSERTED INTO THE CREATED OPENING AND SUTURED TO THE VESSEL WALL. AORTIC LANCETS ARE USED TO MAKE A SMALL UNIFORM CUT IN A BLOOD VESSEL TO FACILITATE INSERTION OF AN AORTIC PUNCH.  PUNCHES COME IN VARIOUS LENGTHS, DIAMETERS AND TIP CONFIGURATIONS.: Brand: CLEANCUT ROTATING AORTIC PUNCH

## (undated) DEVICE — SURGIFOAM SPNG SZ 100

## (undated) DEVICE — CATH URETH INTMIT ROB 14FR FUN -- USE ITEM 179521

## (undated) DEVICE — (D)PREP SKN CHLRAPRP APPL 26ML -- CONVERT TO ITEM 371833